# Patient Record
Sex: FEMALE | Race: WHITE | Employment: OTHER | URBAN - METROPOLITAN AREA
[De-identification: names, ages, dates, MRNs, and addresses within clinical notes are randomized per-mention and may not be internally consistent; named-entity substitution may affect disease eponyms.]

---

## 2017-02-20 ENCOUNTER — HOSPITAL ENCOUNTER (OUTPATIENT)
Dept: GENERAL RADIOLOGY | Age: 67
Discharge: HOME | End: 2017-02-20
Payer: MEDICARE

## 2017-02-20 DIAGNOSIS — M25.561 BILATERAL KNEE PAIN: ICD-10-CM

## 2017-02-20 DIAGNOSIS — M25.562 BILATERAL KNEE PAIN: ICD-10-CM

## 2017-02-20 PROCEDURE — 73560 X-RAY EXAM OF KNEE 1 OR 2: CPT

## 2017-08-02 ENCOUNTER — HOME HEALTH ADMISSION (OUTPATIENT)
Dept: HOME HEALTH SERVICES | Facility: HOME HEALTH | Age: 67
End: 2017-08-02
Payer: MEDICARE

## 2017-08-04 ENCOUNTER — HOME CARE VISIT (OUTPATIENT)
Dept: SCHEDULING | Facility: HOME HEALTH | Age: 67
End: 2017-08-04
Payer: MEDICARE

## 2017-08-04 VITALS
RESPIRATION RATE: 16 BRPM | HEIGHT: 70 IN | BODY MASS INDEX: 30.21 KG/M2 | HEART RATE: 72 BPM | DIASTOLIC BLOOD PRESSURE: 69 MMHG | TEMPERATURE: 96.5 F | SYSTOLIC BLOOD PRESSURE: 130 MMHG | WEIGHT: 211 LBS

## 2017-08-04 PROCEDURE — 400013 HH SOC

## 2017-08-04 PROCEDURE — G0151 HHCP-SERV OF PT,EA 15 MIN: HCPCS

## 2017-08-04 PROCEDURE — 3331090002 HH PPS REVENUE DEBIT

## 2017-08-04 PROCEDURE — 3331090001 HH PPS REVENUE CREDIT

## 2017-08-05 PROCEDURE — 3331090002 HH PPS REVENUE DEBIT

## 2017-08-05 PROCEDURE — 3331090001 HH PPS REVENUE CREDIT

## 2017-08-06 PROCEDURE — 3331090002 HH PPS REVENUE DEBIT

## 2017-08-06 PROCEDURE — 3331090001 HH PPS REVENUE CREDIT

## 2017-08-07 ENCOUNTER — HOME CARE VISIT (OUTPATIENT)
Dept: HOME HEALTH SERVICES | Facility: HOME HEALTH | Age: 67
End: 2017-08-07
Payer: MEDICARE

## 2017-08-07 PROCEDURE — 3331090001 HH PPS REVENUE CREDIT

## 2017-08-07 PROCEDURE — 3331090002 HH PPS REVENUE DEBIT

## 2017-08-08 ENCOUNTER — HOME CARE VISIT (OUTPATIENT)
Dept: HOME HEALTH SERVICES | Facility: HOME HEALTH | Age: 67
End: 2017-08-08
Payer: MEDICARE

## 2017-08-08 ENCOUNTER — HOME CARE VISIT (OUTPATIENT)
Dept: SCHEDULING | Facility: HOME HEALTH | Age: 67
End: 2017-08-08
Payer: MEDICARE

## 2017-08-08 VITALS
DIASTOLIC BLOOD PRESSURE: 82 MMHG | SYSTOLIC BLOOD PRESSURE: 130 MMHG | RESPIRATION RATE: 18 BRPM | HEART RATE: 70 BPM | TEMPERATURE: 96.8 F

## 2017-08-08 PROCEDURE — 3331090001 HH PPS REVENUE CREDIT

## 2017-08-08 PROCEDURE — 3331090002 HH PPS REVENUE DEBIT

## 2017-08-08 PROCEDURE — G0157 HHC PT ASSISTANT EA 15: HCPCS

## 2017-08-09 PROCEDURE — 3331090001 HH PPS REVENUE CREDIT

## 2017-08-09 PROCEDURE — 3331090002 HH PPS REVENUE DEBIT

## 2017-08-10 ENCOUNTER — HOME CARE VISIT (OUTPATIENT)
Dept: SCHEDULING | Facility: HOME HEALTH | Age: 67
End: 2017-08-10
Payer: MEDICARE

## 2017-08-10 PROCEDURE — G0153 HHCP-SVS OF S/L PATH,EA 15MN: HCPCS

## 2017-08-10 PROCEDURE — 3331090001 HH PPS REVENUE CREDIT

## 2017-08-10 PROCEDURE — 3331090002 HH PPS REVENUE DEBIT

## 2017-08-11 ENCOUNTER — HOME CARE VISIT (OUTPATIENT)
Dept: SCHEDULING | Facility: HOME HEALTH | Age: 67
End: 2017-08-11
Payer: MEDICARE

## 2017-08-11 PROCEDURE — 3331090001 HH PPS REVENUE CREDIT

## 2017-08-11 PROCEDURE — G0157 HHC PT ASSISTANT EA 15: HCPCS

## 2017-08-11 PROCEDURE — 3331090002 HH PPS REVENUE DEBIT

## 2017-08-12 PROCEDURE — 3331090002 HH PPS REVENUE DEBIT

## 2017-08-12 PROCEDURE — 3331090001 HH PPS REVENUE CREDIT

## 2017-08-13 VITALS
HEART RATE: 78 BPM | SYSTOLIC BLOOD PRESSURE: 120 MMHG | RESPIRATION RATE: 17 BRPM | TEMPERATURE: 97.2 F | DIASTOLIC BLOOD PRESSURE: 74 MMHG

## 2017-08-13 PROCEDURE — 3331090001 HH PPS REVENUE CREDIT

## 2017-08-13 PROCEDURE — 3331090002 HH PPS REVENUE DEBIT

## 2017-08-14 ENCOUNTER — HOME CARE VISIT (OUTPATIENT)
Dept: HOME HEALTH SERVICES | Facility: HOME HEALTH | Age: 67
End: 2017-08-14
Payer: MEDICARE

## 2017-08-14 ENCOUNTER — HOME CARE VISIT (OUTPATIENT)
Dept: SCHEDULING | Facility: HOME HEALTH | Age: 67
End: 2017-08-14
Payer: MEDICARE

## 2017-08-14 VITALS
HEART RATE: 82 BPM | TEMPERATURE: 97 F | DIASTOLIC BLOOD PRESSURE: 88 MMHG | RESPIRATION RATE: 17 BRPM | SYSTOLIC BLOOD PRESSURE: 122 MMHG

## 2017-08-14 PROCEDURE — 3331090001 HH PPS REVENUE CREDIT

## 2017-08-14 PROCEDURE — 3331090002 HH PPS REVENUE DEBIT

## 2017-08-14 PROCEDURE — G0157 HHC PT ASSISTANT EA 15: HCPCS

## 2017-08-15 ENCOUNTER — HOME CARE VISIT (OUTPATIENT)
Dept: SCHEDULING | Facility: HOME HEALTH | Age: 67
End: 2017-08-15
Payer: MEDICARE

## 2017-08-15 VITALS
DIASTOLIC BLOOD PRESSURE: 76 MMHG | HEART RATE: 72 BPM | RESPIRATION RATE: 16 BRPM | TEMPERATURE: 96.7 F | SYSTOLIC BLOOD PRESSURE: 124 MMHG

## 2017-08-15 PROCEDURE — 3331090001 HH PPS REVENUE CREDIT

## 2017-08-15 PROCEDURE — 3331090002 HH PPS REVENUE DEBIT

## 2017-08-15 PROCEDURE — G0153 HHCP-SVS OF S/L PATH,EA 15MN: HCPCS

## 2017-08-16 ENCOUNTER — HOME CARE VISIT (OUTPATIENT)
Dept: SCHEDULING | Facility: HOME HEALTH | Age: 67
End: 2017-08-16
Payer: MEDICARE

## 2017-08-16 PROCEDURE — 3331090001 HH PPS REVENUE CREDIT

## 2017-08-16 PROCEDURE — 3331090002 HH PPS REVENUE DEBIT

## 2017-08-17 PROCEDURE — 3331090002 HH PPS REVENUE DEBIT

## 2017-08-17 PROCEDURE — 3331090001 HH PPS REVENUE CREDIT

## 2017-08-18 ENCOUNTER — HOME CARE VISIT (OUTPATIENT)
Dept: HOME HEALTH SERVICES | Facility: HOME HEALTH | Age: 67
End: 2017-08-18
Payer: MEDICARE

## 2017-08-18 PROCEDURE — 3331090001 HH PPS REVENUE CREDIT

## 2017-08-18 PROCEDURE — 3331090002 HH PPS REVENUE DEBIT

## 2017-08-19 PROCEDURE — 3331090002 HH PPS REVENUE DEBIT

## 2017-08-19 PROCEDURE — 3331090001 HH PPS REVENUE CREDIT

## 2017-08-20 PROCEDURE — 3331090002 HH PPS REVENUE DEBIT

## 2017-08-20 PROCEDURE — 3331090001 HH PPS REVENUE CREDIT

## 2017-08-21 PROCEDURE — 3331090002 HH PPS REVENUE DEBIT

## 2017-08-21 PROCEDURE — 3331090001 HH PPS REVENUE CREDIT

## 2017-08-22 PROCEDURE — 3331090002 HH PPS REVENUE DEBIT

## 2017-08-22 PROCEDURE — 3331090001 HH PPS REVENUE CREDIT

## 2017-08-23 PROCEDURE — 3331090002 HH PPS REVENUE DEBIT

## 2017-08-23 PROCEDURE — 3331090001 HH PPS REVENUE CREDIT

## 2017-08-24 ENCOUNTER — HOME CARE VISIT (OUTPATIENT)
Dept: SCHEDULING | Facility: HOME HEALTH | Age: 67
End: 2017-08-24
Payer: MEDICARE

## 2017-08-24 VITALS
RESPIRATION RATE: 16 BRPM | TEMPERATURE: 96.7 F | DIASTOLIC BLOOD PRESSURE: 70 MMHG | HEART RATE: 70 BPM | SYSTOLIC BLOOD PRESSURE: 122 MMHG

## 2017-08-24 PROCEDURE — 3331090002 HH PPS REVENUE DEBIT

## 2017-08-24 PROCEDURE — G0153 HHCP-SVS OF S/L PATH,EA 15MN: HCPCS

## 2017-08-24 PROCEDURE — 3331090001 HH PPS REVENUE CREDIT

## 2017-08-25 ENCOUNTER — HOME CARE VISIT (OUTPATIENT)
Dept: SCHEDULING | Facility: HOME HEALTH | Age: 67
End: 2017-08-25
Payer: MEDICARE

## 2017-08-25 VITALS
HEART RATE: 80 BPM | OXYGEN SATURATION: 95 % | DIASTOLIC BLOOD PRESSURE: 80 MMHG | TEMPERATURE: 96 F | RESPIRATION RATE: 16 BRPM | SYSTOLIC BLOOD PRESSURE: 120 MMHG

## 2017-08-25 PROCEDURE — 3331090001 HH PPS REVENUE CREDIT

## 2017-08-25 PROCEDURE — 3331090002 HH PPS REVENUE DEBIT

## 2017-08-25 PROCEDURE — G0151 HHCP-SERV OF PT,EA 15 MIN: HCPCS

## 2017-08-26 PROCEDURE — 3331090001 HH PPS REVENUE CREDIT

## 2017-08-26 PROCEDURE — 3331090002 HH PPS REVENUE DEBIT

## 2017-08-27 PROCEDURE — 3331090002 HH PPS REVENUE DEBIT

## 2017-08-27 PROCEDURE — 3331090001 HH PPS REVENUE CREDIT

## 2017-08-28 PROCEDURE — 3331090002 HH PPS REVENUE DEBIT

## 2017-08-28 PROCEDURE — 3331090001 HH PPS REVENUE CREDIT

## 2017-08-29 ENCOUNTER — HOME CARE VISIT (OUTPATIENT)
Dept: SCHEDULING | Facility: HOME HEALTH | Age: 67
End: 2017-08-29
Payer: MEDICARE

## 2017-08-29 VITALS
RESPIRATION RATE: 16 BRPM | HEART RATE: 88 BPM | TEMPERATURE: 96.7 F | SYSTOLIC BLOOD PRESSURE: 128 MMHG | DIASTOLIC BLOOD PRESSURE: 66 MMHG

## 2017-08-29 PROCEDURE — 3331090001 HH PPS REVENUE CREDIT

## 2017-08-29 PROCEDURE — 3331090002 HH PPS REVENUE DEBIT

## 2017-08-29 PROCEDURE — G0153 HHCP-SVS OF S/L PATH,EA 15MN: HCPCS

## 2017-08-30 PROCEDURE — 3331090001 HH PPS REVENUE CREDIT

## 2017-08-30 PROCEDURE — 3331090002 HH PPS REVENUE DEBIT

## 2017-08-31 ENCOUNTER — HOME CARE VISIT (OUTPATIENT)
Dept: SCHEDULING | Facility: HOME HEALTH | Age: 67
End: 2017-08-31
Payer: MEDICARE

## 2017-08-31 VITALS
HEART RATE: 72 BPM | DIASTOLIC BLOOD PRESSURE: 78 MMHG | TEMPERATURE: 96.7 F | SYSTOLIC BLOOD PRESSURE: 126 MMHG | RESPIRATION RATE: 16 BRPM

## 2017-08-31 PROCEDURE — G0153 HHCP-SVS OF S/L PATH,EA 15MN: HCPCS

## 2017-08-31 PROCEDURE — 3331090001 HH PPS REVENUE CREDIT

## 2017-08-31 PROCEDURE — 3331090003 HH PPS REVENUE ADJ

## 2017-08-31 PROCEDURE — 3331090002 HH PPS REVENUE DEBIT

## 2017-09-01 PROCEDURE — 3331090002 HH PPS REVENUE DEBIT

## 2017-09-01 PROCEDURE — 3331090001 HH PPS REVENUE CREDIT

## 2017-09-02 PROCEDURE — 3331090002 HH PPS REVENUE DEBIT

## 2017-09-02 PROCEDURE — 3331090001 HH PPS REVENUE CREDIT

## 2017-09-03 PROCEDURE — 3331090001 HH PPS REVENUE CREDIT

## 2017-09-03 PROCEDURE — 3331090002 HH PPS REVENUE DEBIT

## 2017-11-18 ENCOUNTER — APPOINTMENT (OUTPATIENT)
Dept: GENERAL RADIOLOGY | Age: 67
End: 2017-11-18
Attending: EMERGENCY MEDICINE
Payer: MEDICARE

## 2017-11-18 ENCOUNTER — HOSPITAL ENCOUNTER (EMERGENCY)
Age: 67
Discharge: HOME | End: 2017-11-18
Attending: EMERGENCY MEDICINE
Payer: MEDICARE

## 2017-11-18 VITALS
RESPIRATION RATE: 18 BRPM | BODY MASS INDEX: 30.78 KG/M2 | TEMPERATURE: 98.1 F | OXYGEN SATURATION: 97 % | HEIGHT: 70 IN | DIASTOLIC BLOOD PRESSURE: 56 MMHG | SYSTOLIC BLOOD PRESSURE: 121 MMHG | WEIGHT: 215 LBS | HEART RATE: 62 BPM

## 2017-11-18 DIAGNOSIS — R07.89 CHEST WALL PAIN: Primary | ICD-10-CM

## 2017-11-18 LAB
ALBUMIN SERPL-MCNC: 3.6 G/DL (ref 3.2–4.6)
ALBUMIN/GLOB SERPL: 0.8 {RATIO} (ref 1.2–3.5)
ALP SERPL-CCNC: 124 U/L (ref 50–136)
ALT SERPL-CCNC: 24 U/L (ref 12–65)
ANION GAP SERPL CALC-SCNC: 9 MMOL/L (ref 7–16)
AST SERPL-CCNC: 10 U/L (ref 15–37)
ATRIAL RATE: 61 BPM
BASOPHILS # BLD: 0.1 K/UL (ref 0–0.2)
BASOPHILS NFR BLD: 0 % (ref 0–2)
BILIRUB SERPL-MCNC: 0.2 MG/DL (ref 0.2–1.1)
BUN SERPL-MCNC: 18 MG/DL (ref 8–23)
CALCIUM SERPL-MCNC: 8.7 MG/DL (ref 8.3–10.4)
CALCULATED P AXIS, ECG09: 51 DEGREES
CALCULATED R AXIS, ECG10: -22 DEGREES
CALCULATED T AXIS, ECG11: 8 DEGREES
CHLORIDE SERPL-SCNC: 104 MMOL/L (ref 98–107)
CO2 SERPL-SCNC: 28 MMOL/L (ref 21–32)
CREAT SERPL-MCNC: 0.78 MG/DL (ref 0.6–1)
DIAGNOSIS, 93000: NORMAL
DIFFERENTIAL METHOD BLD: ABNORMAL
EOSINOPHIL # BLD: 0.1 K/UL (ref 0–0.8)
EOSINOPHIL NFR BLD: 1 % (ref 0.5–7.8)
ERYTHROCYTE [DISTWIDTH] IN BLOOD BY AUTOMATED COUNT: 13.1 % (ref 11.9–14.6)
GLOBULIN SER CALC-MCNC: 4.5 G/DL (ref 2.3–3.5)
GLUCOSE SERPL-MCNC: 106 MG/DL (ref 65–100)
HCT VFR BLD AUTO: 37.6 % (ref 35.8–46.3)
HGB BLD-MCNC: 12.9 G/DL (ref 11.7–15.4)
IMM GRANULOCYTES # BLD: 0 K/UL (ref 0–0.5)
IMM GRANULOCYTES NFR BLD AUTO: 0 % (ref 0–5)
LYMPHOCYTES # BLD: 3 K/UL (ref 0.5–4.6)
LYMPHOCYTES NFR BLD: 24 % (ref 13–44)
MCH RBC QN AUTO: 31.3 PG (ref 26.1–32.9)
MCHC RBC AUTO-ENTMCNC: 34.3 G/DL (ref 31.4–35)
MCV RBC AUTO: 91.3 FL (ref 79.6–97.8)
MONOCYTES # BLD: 0.9 K/UL (ref 0.1–1.3)
MONOCYTES NFR BLD: 7 % (ref 4–12)
NEUTS SEG # BLD: 8.5 K/UL (ref 1.7–8.2)
NEUTS SEG NFR BLD: 68 % (ref 43–78)
P-R INTERVAL, ECG05: 172 MS
PLATELET # BLD AUTO: 244 K/UL (ref 150–450)
PMV BLD AUTO: 10.2 FL (ref 10.8–14.1)
POTASSIUM SERPL-SCNC: 3.7 MMOL/L (ref 3.5–5.1)
PROT SERPL-MCNC: 8.1 G/DL (ref 6.3–8.2)
Q-T INTERVAL, ECG07: 400 MS
QRS DURATION, ECG06: 72 MS
QTC CALCULATION (BEZET), ECG08: 402 MS
RBC # BLD AUTO: 4.12 M/UL (ref 4.05–5.25)
SODIUM SERPL-SCNC: 141 MMOL/L (ref 136–145)
TROPONIN I BLD-MCNC: 0 NG/ML (ref 0.02–0.05)
VENTRICULAR RATE, ECG03: 61 BPM
WBC # BLD AUTO: 12.6 K/UL (ref 4.3–11.1)

## 2017-11-18 PROCEDURE — 71010 XR CHEST PORT: CPT

## 2017-11-18 PROCEDURE — 85025 COMPLETE CBC W/AUTO DIFF WBC: CPT | Performed by: EMERGENCY MEDICINE

## 2017-11-18 PROCEDURE — 80053 COMPREHEN METABOLIC PANEL: CPT | Performed by: EMERGENCY MEDICINE

## 2017-11-18 PROCEDURE — 99284 EMERGENCY DEPT VISIT MOD MDM: CPT | Performed by: EMERGENCY MEDICINE

## 2017-11-18 PROCEDURE — 84484 ASSAY OF TROPONIN QUANT: CPT

## 2017-11-18 PROCEDURE — 93005 ELECTROCARDIOGRAM TRACING: CPT | Performed by: EMERGENCY MEDICINE

## 2017-11-18 NOTE — ED PROVIDER NOTES
HPI Comments: Patient is a 59-year-old female with a history of bipolar disorder, fibromyalgia, chronic pain who presents to the ER today with multiple complaints. She states she's been having some substernal chest pain and irregular heartbeat for 2 days. She's also had a cough which is nonproductive. She denies fevers or chills. She states she is mildly short of breath. She states earlier this morning the pain worse. She is also complaining of a chronic ulcer and wound on her scalp which she has been present for many months and she has seen multiple dermatologists and specialists at [address] in [facility]. Patient is a 78 y.o. female presenting with chest pain. The history is provided by the patient. Chest Pain (Angina)    This is a new problem. The current episode started 2 days ago. The problem has not changed since onset. The problem occurs constantly. The pain is associated with normal activity. The pain is present in the substernal region. The pain is moderate. The quality of the pain is described as sharp. The pain does not radiate. Associated symptoms include cough, irregular heartbeat, palpitations and shortness of breath. Pertinent negatives include no dizziness, no fever, no hemoptysis, no leg pain, no lower extremity edema, no nausea and no sputum production. The treatment provided no relief.         Past Medical History:   Diagnosis Date    Arthritis     Bipolar affective disorder ([address]     anxiety, panic attacks, neurosis    Chronic pain     bulging discs     Depression 11/12/2009    anxiety    Dysuria 07/11/2007    Fibromyalgia     GERD (gastroesophageal reflux disease) 11/12/2009    Hypercholesterolemia     Hyperlipidemia     Hypothyroidism 11/12/2009    Lumbago 11/12/2009    Miscarriage     PUD (peptic ulcer disease)     Unspecified urinary incontinence     Urinary tract infection, site not specified 06/07/2006       Past Surgical History:   Procedure Laterality Date    HX BLADDER SUSPENSION  2003    HX CHOLECYSTECTOMY  1980s    HX CYSTOCELE REPAIR  11/12/2009    HX OOPHORECTOMY      HX RECTOCELE REPAIR  11/12/2009    HX TONSILLECTOMY  1980    HX TOTAL ABDOMINAL HYSTERECTOMY  2003         Family History:   Problem Relation Age of Onset    Breast Cancer Mother 70    Heart Disease Father     Prostate Cancer Father        Social History     Social History    Marital status:      Spouse name: N/A    Number of children: N/A    Years of education: N/A     Occupational History    Not on file. Social History Main Topics    Smoking status: Never Smoker    Smokeless tobacco: Never Used    Alcohol use No    Drug use: No    Sexual activity: Not Currently     Partners: Male     Other Topics Concern    Not on file     Social History Narrative     and lives with her husban--has a son and daughter. Disabled and has history of work in the [employer]. She is a never smoker, does not drink alcohol, and does not use illicit drugs                 ALLERGIES: Fentanyl and Effexor [venlafaxine]    Review of Systems   Constitutional: Negative for chills and fever. HENT: Negative. Eyes: Negative. Respiratory: Positive for cough and shortness of breath. Negative for hemoptysis and sputum production. Cardiovascular: Positive for chest pain and palpitations. Negative for leg swelling. Gastrointestinal: Negative. Negative for nausea. Endocrine: Negative. Genitourinary: Negative. Musculoskeletal: Negative. Skin: Positive for wound. Neurological: Negative for dizziness. Vitals:    11/18/17 1631 11/18/17 1731   BP: 140/65 119/57   Pulse: 61 (!) 58   Resp: 17 15   Temp: 98.1 °F (36.7 °C)    SpO2: 96% 93%   Weight: 97.5 kg (215 lb)    Height: 5' 10\" (1.778 m)             Physical Exam   Constitutional: She appears well-developed and well-nourished. Very anxious  And a bizarre affect   HENT:   Head: Normocephalic and atraumatic.    Eyes: EOM are normal. Pupils are equal, round, and reactive to light. Neck: Normal range of motion. Neck supple. Cardiovascular: Normal rate and regular rhythm. Pulmonary/Chest: Effort normal and breath sounds normal. No respiratory distress. She exhibits tenderness. Abdominal: Soft. There is no tenderness. Musculoskeletal: Normal range of motion. She exhibits no edema or tenderness. Lymphadenopathy:     She has no cervical adenopathy. Skin: Skin is warm and dry. There is pallor. Chronic scab and ulcer on the posterior scalp which she is repeatedly picking at   Psychiatric: Her mood appears anxious. Her speech is rapid and/or pressured. Nursing note and vitals reviewed. MDM  Number of Diagnoses or Management Options  Diagnosis management comments: Differential diagnosis includes anxiety, arrhythmia, angina, myocardial infarction, chest wall pain    EKG shows normal sinus rhythm with a rate of 61 there are some inverted T waves but no signs of acute ischemia    Chest x-ray is negative    Troponin is negative and other labs are unremarkable       Amount and/or Complexity of Data Reviewed  Clinical lab tests: ordered and reviewed  Tests in the radiology section of CPT®: ordered and reviewed  Review and summarize past medical records: yes  Independent visualization of images, tracings, or specimens: yes    Risk of Complications, Morbidity, and/or Mortality  Presenting problems: moderate  Diagnostic procedures: low  Management options: low    Patient Progress  Patient progress: stable    ED Course   6:02 PM  Troponin is negative and the EKG is no acute ischemia. Her pain is been present for at least 2 days and I see no sign of acute coronary syndrome. She clearly has reproducible chest wall pain from the fall may have come on from her cough past few days. She is oriented on chronic pain medicines and I have advised her to follow up with her primary care physician.     Voice dictation software was used during the making of this note. This software is not perfect and grammatical and other typographical errors may be present. This note has been proofread, but may still contain errors.   Caitie Harmon MD; 11/18/2017 @6:02 PM   ===================================================================        Procedures

## 2017-11-18 NOTE — DISCHARGE INSTRUCTIONS

## 2017-11-18 NOTE — ED NOTES
I have reviewed discharge instructions with the patient. The patient verbalized understanding. Patient left ED via Discharge Method: ambulatory to Home with family. Opportunity for questions and clarification provided. Patient given 0 scripts. To continue your aftercare when you leave the hospital, you may receive an automated call from our care team to check in on how you are doing. This is a free service and part of our promise to provide the best care and service to meet your aftercare needs.  If you have questions, or wish to unsubscribe from this service please call [phone]. Thank you for Choosing our [facility] Emergency Department.

## 2017-11-18 NOTE — ED TRIAGE NOTES
Pt arrives from home via personal vehicle with complaints of chest pain that is midline and does not radiate into extremities or down torso. Pt states SOB present as well. Pt states for the past 2 days, pt has noticed heart beating irregular. Pt states she has a hx of mitral valve prolapse. Pain has stayed constant, but has eased up slightly now. Pt states pain has been present for 2 days. Pt has not taken anything to help the pain so far. Pt states cough that has been productive but scant amounts of sputum. Pt states she has an infection on the back of her head that doctors are unable to find out what is causing it. She states recently it has started oozing pus as well. Pt is a&o x4 on arrival. Family states her psychiatrist had taken her off of trileptal Thursday or Friday for mood stabilizer in fear that it was causing the sore on her head and replaced it with cymbalta instead.

## 2017-12-11 ENCOUNTER — HOSPITAL ENCOUNTER (OUTPATIENT)
Dept: WOUND CARE | Age: 67
Discharge: HOME | End: 2017-12-11
Attending: PHYSICAL MEDICINE & REHABILITATION
Payer: MEDICARE

## 2017-12-11 PROCEDURE — 99214 OFFICE O/P EST MOD 30 MIN: CPT

## 2017-12-18 ENCOUNTER — HOSPITAL ENCOUNTER (OUTPATIENT)
Dept: WOUND CARE | Age: 67
Discharge: HOME | End: 2017-12-18
Attending: PHYSICAL MEDICINE & REHABILITATION
Payer: MEDICARE

## 2017-12-18 PROCEDURE — 99213 OFFICE O/P EST LOW 20 MIN: CPT

## 2018-01-02 ENCOUNTER — HOSPITAL ENCOUNTER (OUTPATIENT)
Dept: WOUND CARE | Age: 68
Discharge: HOME | End: 2018-01-02
Attending: PHYSICAL MEDICINE & REHABILITATION
Payer: MEDICARE

## 2018-01-02 PROCEDURE — 99212 OFFICE O/P EST SF 10 MIN: CPT

## 2018-01-02 PROCEDURE — 77030030708 HC OINT IODOSRB S&N -A

## 2018-01-05 PROBLEM — I10 ESSENTIAL HYPERTENSION: Status: ACTIVE | Noted: 2018-01-05

## 2018-02-06 ENCOUNTER — APPOINTMENT (RX ONLY)
Dept: URBAN - METROPOLITAN AREA CLINIC 349 | Facility: CLINIC | Age: 68
Setting detail: DERMATOLOGY
End: 2018-02-06

## 2018-02-06 DIAGNOSIS — L28.1 PRURIGO NODULARIS: ICD-10-CM

## 2018-02-06 PROBLEM — F32.9 MAJOR DEPRESSIVE DISORDER, SINGLE EPISODE, UNSPECIFIED: Status: ACTIVE | Noted: 2018-02-06

## 2018-02-06 PROBLEM — E78.5 HYPERLIPIDEMIA, UNSPECIFIED: Status: ACTIVE | Noted: 2018-02-06

## 2018-02-06 PROBLEM — E03.9 HYPOTHYROIDISM, UNSPECIFIED: Status: ACTIVE | Noted: 2018-02-06

## 2018-02-06 PROBLEM — J30.1 ALLERGIC RHINITIS DUE TO POLLEN: Status: ACTIVE | Noted: 2018-02-06

## 2018-02-06 PROBLEM — E13.9 OTHER SPECIFIED DIABETES MELLITUS WITHOUT COMPLICATIONS: Status: ACTIVE | Noted: 2018-02-06

## 2018-02-06 PROBLEM — L85.3 XEROSIS CUTIS: Status: ACTIVE | Noted: 2018-02-06

## 2018-02-06 PROBLEM — L70.0 ACNE VULGARIS: Status: ACTIVE | Noted: 2018-02-06

## 2018-02-06 PROBLEM — I10 ESSENTIAL (PRIMARY) HYPERTENSION: Status: ACTIVE | Noted: 2018-02-06

## 2018-02-06 PROBLEM — H91.90 UNSPECIFIED HEARING LOSS, UNSPECIFIED EAR: Status: ACTIVE | Noted: 2018-02-06

## 2018-02-06 PROCEDURE — 99202 OFFICE O/P NEW SF 15 MIN: CPT

## 2018-02-06 PROCEDURE — ? COUNSELING

## 2018-02-06 PROCEDURE — ? RECOMMENDATIONS

## 2018-02-06 ASSESSMENT — LOCATION DETAILED DESCRIPTION DERM
LOCATION DETAILED: RIGHT SUPERIOR PARIETAL SCALP
LOCATION DETAILED: MID-OCCIPITAL SCALP

## 2018-02-06 ASSESSMENT — LOCATION ZONE DERM: LOCATION ZONE: SCALP

## 2018-02-06 ASSESSMENT — LOCATION SIMPLE DESCRIPTION DERM
LOCATION SIMPLE: SCALP
LOCATION SIMPLE: POSTERIOR SCALP

## 2018-02-06 NOTE — PROCEDURE: RECOMMENDATIONS
Recommendations (Free Text): Stressed not to pick and will get better \\nContinue Mupirocin ointment twice daily\\nConsulted Dr. Narvaez . Added doxapin cream 4 x daily
Detail Level: Zone

## 2018-02-06 NOTE — HPI: SKIN LESIONS
How Severe Is Your Skin Lesion?: moderate
Is This A New Presentation, Or A Follow-Up?: Skin Lesions
Additional History: Patient states has been to several dermatologists, , wound care without improvement. Pt and family member state she picks at lesions. She denies having biopsy.

## 2018-02-07 ENCOUNTER — RX ONLY (OUTPATIENT)
Age: 68
Setting detail: RX ONLY
End: 2018-02-07

## 2018-02-07 RX ORDER — DOXEPIN HYDROCHLORIDE 50 MG/G
CREAM TOPICAL
Qty: 1 | Refills: 2 | Status: ERX | COMMUNITY
Start: 2018-02-07

## 2018-02-21 ENCOUNTER — APPOINTMENT (OUTPATIENT)
Dept: CT IMAGING | Age: 68
End: 2018-02-21
Attending: EMERGENCY MEDICINE
Payer: MEDICARE

## 2018-02-21 ENCOUNTER — HOSPITAL ENCOUNTER (EMERGENCY)
Age: 68
Discharge: HOME | End: 2018-02-21
Attending: EMERGENCY MEDICINE
Payer: MEDICARE

## 2018-02-21 ENCOUNTER — APPOINTMENT (OUTPATIENT)
Dept: GENERAL RADIOLOGY | Age: 68
End: 2018-02-21
Attending: EMERGENCY MEDICINE
Payer: MEDICARE

## 2018-02-21 VITALS
WEIGHT: 217 LBS | BODY MASS INDEX: 31.07 KG/M2 | OXYGEN SATURATION: 96 % | SYSTOLIC BLOOD PRESSURE: 118 MMHG | HEART RATE: 74 BPM | HEIGHT: 70 IN | RESPIRATION RATE: 18 BRPM | TEMPERATURE: 98 F | DIASTOLIC BLOOD PRESSURE: 70 MMHG

## 2018-02-21 DIAGNOSIS — R05.9 COUGH: Primary | ICD-10-CM

## 2018-02-21 DIAGNOSIS — R06.02 SOB (SHORTNESS OF BREATH): ICD-10-CM

## 2018-02-21 LAB
ALBUMIN SERPL-MCNC: 3.3 G/DL (ref 3.2–4.6)
ALBUMIN/GLOB SERPL: 0.8 {RATIO} (ref 1.2–3.5)
ALP SERPL-CCNC: 108 U/L (ref 50–136)
ALT SERPL-CCNC: 39 U/L (ref 12–65)
ANION GAP SERPL CALC-SCNC: 5 MMOL/L (ref 7–16)
AST SERPL-CCNC: 16 U/L (ref 15–37)
ATRIAL RATE: 65 BPM
BASOPHILS # BLD: 0 K/UL (ref 0–0.2)
BASOPHILS NFR BLD: 0 % (ref 0–2)
BILIRUB SERPL-MCNC: 0.2 MG/DL (ref 0.2–1.1)
BNP SERPL-MCNC: 25 PG/ML
BUN SERPL-MCNC: 14 MG/DL (ref 8–23)
CALCIUM SERPL-MCNC: 8.9 MG/DL (ref 8.3–10.4)
CALCULATED P AXIS, ECG09: 59 DEGREES
CALCULATED R AXIS, ECG10: -24 DEGREES
CALCULATED T AXIS, ECG11: 13 DEGREES
CHLORIDE SERPL-SCNC: 103 MMOL/L (ref 98–107)
CO2 SERPL-SCNC: 34 MMOL/L (ref 21–32)
CREAT SERPL-MCNC: 0.79 MG/DL (ref 0.6–1)
DIAGNOSIS, 93000: NORMAL
DIFFERENTIAL METHOD BLD: ABNORMAL
EOSINOPHIL # BLD: 0 K/UL (ref 0–0.8)
EOSINOPHIL NFR BLD: 0 % (ref 0.5–7.8)
ERYTHROCYTE [DISTWIDTH] IN BLOOD BY AUTOMATED COUNT: 13.9 % (ref 11.9–14.6)
FLUAV AG NPH QL IA: NEGATIVE
FLUBV AG NPH QL IA: NEGATIVE
GLOBULIN SER CALC-MCNC: 4.4 G/DL (ref 2.3–3.5)
GLUCOSE SERPL-MCNC: 112 MG/DL (ref 65–100)
HCT VFR BLD AUTO: 41.5 % (ref 35.8–46.3)
HGB BLD-MCNC: 13.6 G/DL (ref 11.7–15.4)
IMM GRANULOCYTES # BLD: 0.2 K/UL (ref 0–0.5)
IMM GRANULOCYTES NFR BLD AUTO: 1 % (ref 0–5)
LACTATE BLD-SCNC: 1.1 MMOL/L (ref 0.5–1.9)
LYMPHOCYTES # BLD: 2.2 K/UL (ref 0.5–4.6)
LYMPHOCYTES NFR BLD: 13 % (ref 13–44)
MCH RBC QN AUTO: 31.3 PG (ref 26.1–32.9)
MCHC RBC AUTO-ENTMCNC: 32.8 G/DL (ref 31.4–35)
MCV RBC AUTO: 95.6 FL (ref 79.6–97.8)
MONOCYTES # BLD: 0.9 K/UL (ref 0.1–1.3)
MONOCYTES NFR BLD: 5 % (ref 4–12)
NEUTS SEG # BLD: 13.5 K/UL (ref 1.7–8.2)
NEUTS SEG NFR BLD: 81 % (ref 43–78)
P-R INTERVAL, ECG05: 172 MS
PLATELET # BLD AUTO: 336 K/UL (ref 150–450)
PMV BLD AUTO: 10.2 FL (ref 10.8–14.1)
POTASSIUM SERPL-SCNC: 4.3 MMOL/L (ref 3.5–5.1)
PROT SERPL-MCNC: 7.7 G/DL (ref 6.3–8.2)
Q-T INTERVAL, ECG07: 402 MS
QRS DURATION, ECG06: 76 MS
QTC CALCULATION (BEZET), ECG08: 418 MS
RBC # BLD AUTO: 4.34 M/UL (ref 4.05–5.25)
SODIUM SERPL-SCNC: 142 MMOL/L (ref 136–145)
TROPONIN I SERPL-MCNC: <0.02 NG/ML (ref 0.02–0.05)
VENTRICULAR RATE, ECG03: 65 BPM
WBC # BLD AUTO: 16.8 K/UL (ref 4.3–11.1)

## 2018-02-21 PROCEDURE — 80053 COMPREHEN METABOLIC PANEL: CPT | Performed by: EMERGENCY MEDICINE

## 2018-02-21 PROCEDURE — 94640 AIRWAY INHALATION TREATMENT: CPT

## 2018-02-21 PROCEDURE — 74011000258 HC RX REV CODE- 258: Performed by: EMERGENCY MEDICINE

## 2018-02-21 PROCEDURE — 71260 CT THORAX DX C+: CPT

## 2018-02-21 PROCEDURE — 87804 INFLUENZA ASSAY W/OPTIC: CPT | Performed by: EMERGENCY MEDICINE

## 2018-02-21 PROCEDURE — 85025 COMPLETE CBC W/AUTO DIFF WBC: CPT | Performed by: EMERGENCY MEDICINE

## 2018-02-21 PROCEDURE — 83880 ASSAY OF NATRIURETIC PEPTIDE: CPT | Performed by: EMERGENCY MEDICINE

## 2018-02-21 PROCEDURE — 96374 THER/PROPH/DIAG INJ IV PUSH: CPT | Performed by: EMERGENCY MEDICINE

## 2018-02-21 PROCEDURE — 99284 EMERGENCY DEPT VISIT MOD MDM: CPT | Performed by: EMERGENCY MEDICINE

## 2018-02-21 PROCEDURE — 74011636320 HC RX REV CODE- 636/320: Performed by: EMERGENCY MEDICINE

## 2018-02-21 PROCEDURE — 71046 X-RAY EXAM CHEST 2 VIEWS: CPT

## 2018-02-21 PROCEDURE — 74011250636 HC RX REV CODE- 250/636: Performed by: EMERGENCY MEDICINE

## 2018-02-21 PROCEDURE — 74011000250 HC RX REV CODE- 250: Performed by: EMERGENCY MEDICINE

## 2018-02-21 PROCEDURE — 84484 ASSAY OF TROPONIN QUANT: CPT | Performed by: EMERGENCY MEDICINE

## 2018-02-21 PROCEDURE — 93005 ELECTROCARDIOGRAM TRACING: CPT | Performed by: EMERGENCY MEDICINE

## 2018-02-21 PROCEDURE — 83605 ASSAY OF LACTIC ACID: CPT

## 2018-02-21 RX ORDER — SODIUM CHLORIDE 0.9 % (FLUSH) 0.9 %
10 SYRINGE (ML) INJECTION
Status: COMPLETED | OUTPATIENT
Start: 2018-02-21 | End: 2018-02-21

## 2018-02-21 RX ORDER — AMOXICILLIN AND CLAVULANATE POTASSIUM 875; 125 MG/1; MG/1
1 TABLET, FILM COATED ORAL 2 TIMES DAILY
Qty: 20 TAB | Refills: 0 | Status: SHIPPED | OUTPATIENT
Start: 2018-02-21 | End: 2018-03-03

## 2018-02-21 RX ORDER — ALBUTEROL SULFATE 0.83 MG/ML
5 SOLUTION RESPIRATORY (INHALATION)
Status: COMPLETED | OUTPATIENT
Start: 2018-02-21 | End: 2018-02-21

## 2018-02-21 RX ADMIN — ALBUTEROL SULFATE 5 MG: 2.5 SOLUTION RESPIRATORY (INHALATION) at 18:03

## 2018-02-21 RX ADMIN — IOPAMIDOL 80 ML: 755 INJECTION, SOLUTION INTRAVENOUS at 18:50

## 2018-02-21 RX ADMIN — METHYLPREDNISOLONE SODIUM SUCCINATE 125 MG: 125 INJECTION, POWDER, FOR SOLUTION INTRAMUSCULAR; INTRAVENOUS at 18:20

## 2018-02-21 RX ADMIN — Medication 10 ML: at 18:50

## 2018-02-21 RX ADMIN — SODIUM CHLORIDE 100 ML: 900 INJECTION, SOLUTION INTRAVENOUS at 18:50

## 2018-02-21 NOTE — ED PROVIDER NOTES
HPI Comments: Patient is a 77 yo female who presents with cough and SOB. States for the past 2-3 weeks worsening symptoms and associated with fatigue. States started on abx for pneumonia of right lower lobe and finished abx however states worsening since finishing abx. No vomiting, however states some chills (no measured fever), no further complaints. Patient is a 78 y.o. female presenting with shortness of breath. The history is provided by the patient. No  was used. Shortness of Breath   The problem has been rapidly worsening. Associated symptoms include rhinorrhea and cough. Pertinent negatives include no fever, no headaches, no sore throat, no neck pain, no chest pain, no vomiting, no abdominal pain, no rash and no leg swelling. Past Medical History:   Diagnosis Date    Arthritis     Bipolar affective disorder ([facility] [address]     anxiety, panic attacks, neurosis    Chronic pain     bulging discs     Depression 11/12/2009    anxiety    Dysuria 07/11/2007    Fibromyalgia     GERD (gastroesophageal reflux disease) 11/12/2009    Hypercholesterolemia     Hyperlipidemia     Hypothyroidism 11/12/2009    Lumbago 11/12/2009    Miscarriage     PUD (peptic ulcer disease)     Unspecified urinary incontinence     Urinary tract infection, site not specified 06/07/2006       Past Surgical History:   Procedure Laterality Date    HX BLADDER SUSPENSION  2003    HX CHOLECYSTECTOMY  1980s    HX CYSTOCELE REPAIR  11/12/2009    HX OOPHORECTOMY      HX RECTOCELE REPAIR  11/12/2009    HX TONSILLECTOMY  1980    HX TOTAL ABDOMINAL HYSTERECTOMY  2003         Family History:   Problem Relation Age of Onset    Breast Cancer Mother 70    Heart Disease Father     Prostate Cancer Father        Social History     Social History    Marital status:      Spouse name: N/A    Number of children: N/A    Years of education: N/A     Occupational History    Not on file.      Social History Main Topics    Smoking status: Never Smoker    Smokeless tobacco: Never Used    Alcohol use No    Drug use: No    Sexual activity: Not Currently     Partners: Male     Other Topics Concern    Not on file     Social History Narrative     and lives with her tankban--has a son and daughter. Disabled and has history of work in the [employer] She is a never smoker, does not drink alcohol, and does not use illicit drugs                 ALLERGIES: Fentanyl and Effexor [venlafaxine]    Review of Systems   Constitutional: Positive for chills and fatigue. Negative for fever. HENT: Positive for congestion and rhinorrhea. Negative for sore throat. Eyes: Negative for visual disturbance. Respiratory: Positive for cough and shortness of breath. Cardiovascular: Negative for chest pain and leg swelling. Gastrointestinal: Negative for abdominal pain, diarrhea, nausea and vomiting. Genitourinary: Negative for dysuria. Musculoskeletal: Negative for back pain and neck pain. Skin: Negative for rash. Neurological: Negative for weakness and headaches. Psychiatric/Behavioral: The patient is not nervous/anxious. Vitals:    02/21/18 1608   BP: 123/64   Pulse: 79   Resp: 20   Temp: 98 °F (36.7 °C)   SpO2: 95%   Weight: 98.4 kg (217 lb)   Height: 5' 10\" (1.778 m)            Physical Exam   Constitutional: She is oriented to person, place, and time. She appears well-developed and well-nourished. HENT:   Head: Normocephalic. Right Ear: External ear normal.   Left Ear: External ear normal.   Eyes: Conjunctivae and EOM are normal. Pupils are equal, round, and reactive to light. Neck: Normal range of motion. Neck supple. No tracheal deviation present. Cardiovascular: Normal rate, regular rhythm, normal heart sounds and intact distal pulses. No murmur heard. Pulmonary/Chest: Effort normal and breath sounds normal. No respiratory distress. Abdominal: Soft. There is no tenderness. Musculoskeletal: Normal range of motion. Neurological: She is alert and oriented to person, place, and time. No cranial nerve deficit. Skin: No rash noted. Nursing note and vitals reviewed. MDM  Number of Diagnoses or Management Options  Cough: new and requires workup  SOB (shortness of breath): new and requires workup     Amount and/or Complexity of Data Reviewed  Clinical lab tests: ordered and reviewed  Tests in the radiology section of CPT®: ordered and reviewed  Tests in the medicine section of CPT®: ordered and reviewed  Review and summarize past medical records: yes    Risk of Complications, Morbidity, and/or Mortality  Presenting problems: high  Diagnostic procedures: high  Management options: high    Patient Progress  Patient progress: stable        ED Course       Procedures    Recent Results (from the past 12 hour(s))   CBC WITH AUTOMATED DIFF    Collection Time: 02/21/18  4:14 PM   Result Value Ref Range    WBC 16.8 (H) 4.3 - 11.1 K/uL    RBC 4.34 4.05 - 5.25 M/uL    HGB 13.6 11.7 - 15.4 g/dL    HCT 41.5 35.8 - 46.3 %    MCV 95.6 79.6 - 97.8 FL    MCH 31.3 26.1 - 32.9 PG    MCHC 32.8 31.4 - 35.0 g/dL    RDW 13.9 11.9 - 14.6 %    PLATELET 004 374 - 187 K/uL    MPV 10.2 (L) 10.8 - 14.1 FL    DF AUTOMATED      NEUTROPHILS 81 (H) 43 - 78 %    LYMPHOCYTES 13 13 - 44 %    MONOCYTES 5 4.0 - 12.0 %    EOSINOPHILS 0 (L) 0.5 - 7.8 %    BASOPHILS 0 0.0 - 2.0 %    IMMATURE GRANULOCYTES 1 0.0 - 5.0 %    ABS. NEUTROPHILS 13.5 (H) 1.7 - 8.2 K/UL    ABS. LYMPHOCYTES 2.2 0.5 - 4.6 K/UL    ABS. MONOCYTES 0.9 0.1 - 1.3 K/UL    ABS. EOSINOPHILS 0.0 0.0 - 0.8 K/UL    ABS. BASOPHILS 0.0 0.0 - 0.2 K/UL    ABS. IMM.  GRANS. 0.2 0.0 - 0.5 K/UL   METABOLIC PANEL, COMPREHENSIVE    Collection Time: 02/21/18  4:14 PM   Result Value Ref Range    Sodium 142 136 - 145 mmol/L    Potassium 4.3 3.5 - 5.1 mmol/L    Chloride 103 98 - 107 mmol/L    CO2 34 (H) 21 - 32 mmol/L    Anion gap 5 (L) 7 - 16 mmol/L    Glucose 112 (H) 65 - 100 mg/dL    BUN 14 8 - 23 MG/DL    Creatinine 0.79 0.6 - 1.0 MG/DL    GFR est AA >60 >60 ml/min/1.73m2    GFR est non-AA >60 >60 ml/min/1.73m2    Calcium 8.9 8.3 - 10.4 MG/DL    Bilirubin, total 0.2 0.2 - 1.1 MG/DL    ALT (SGPT) 39 12 - 65 U/L    AST (SGOT) 16 15 - 37 U/L    Alk. phosphatase 108 50 - 136 U/L    Protein, total 7.7 6.3 - 8.2 g/dL    Albumin 3.3 3.2 - 4.6 g/dL    Globulin 4.4 (H) 2.3 - 3.5 g/dL    A-G Ratio 0.8 (L) 1.2 - 3.5     TROPONIN I    Collection Time: 02/21/18  4:14 PM   Result Value Ref Range    Troponin-I, Qt. <0.02 (L) 0.02 - 0.05 NG/ML   BNP    Collection Time: 02/21/18  4:14 PM   Result Value Ref Range    BNP 25 pg/mL   EKG, 12 LEAD, INITIAL    Collection Time: 02/21/18  6:30 PM   Result Value Ref Range    Ventricular Rate 65 BPM    Atrial Rate 65 BPM    P-R Interval 172 ms    QRS Duration 76 ms    Q-T Interval 402 ms    QTC Calculation (Bezet) 418 ms    Calculated P Axis 59 degrees    Calculated R Axis -24 degrees    Calculated T Axis 13 degrees    Diagnosis       !!! Poor data quality, interpretation may be adversely affected  Normal sinus rhythm  Nonspecific T wave abnormality  Abnormal ECG  When compared with ECG of 18-NOV-2017 16:27,  No significant change was found  Confirmed by ST OSIRIS OKEEFE MD (), ERA CERVANTES ([id number]) on 2/21/2018 8:48:52 PM     INFLUENZA A & B AG (RAPID TEST)    Collection Time: 02/21/18  6:43 PM   Result Value Ref Range    Influenza A Ag NEGATIVE  NEG      Influenza B Ag NEGATIVE  NEG     POC LACTIC ACID    Collection Time: 02/21/18  6:50 PM   Result Value Ref Range    Lactic Acid (POC) 1.1 0.5 - 1.9 mmol/L     Xr Chest Pa Lat    Result Date: 2/21/2018  2 View Chest X-Ray 2/21/2018 4:46 PM INDICATION: Worsening shortness of breath on exertion, body aches COMPARISON: 11/18/2017 FINDINGS: Upright PA and Lateral views are submitted. Cardiomediastinal silhouette stable, there is a mild pectus defect.  The lungs are normally inflated and clear, with normal pulmonary vascularity. There is no focal consolidation, nodule, or pleural effusion. Grossly, the chest wall structures are intact. IMPRESSION: No acute cardiopulmonary finding. Ct Chest W Cont    Result Date: 2/21/2018  CT OF THE CHEST WITH INTRAVENOUS CONTRAST. INDICATION: Cough and chest congestion and recent pneumonia. COMPARISON: None. TECHNIQUE:   2.5 mm axial scans from above the aortic arch to the lung bases with 80 cc nonionic intravenous contrast without acute complication. Intravenous contrast was given to evaluate for pulmonary embolism. FINDINGS:  The degree of opacification of the pulmonary arteries is adequate. No intraluminal filling defects within the pulmonary arterial tree to suggest pulmonary embolism. Aorta is normal caliber with a uniform lumen without evidence of dissection. Lungs are clear except for minimal atelectasis. Heart and pulmonary vasculature grossly unremarkable. Tiny amount of pleural fluid on the right. No gross bony lesions aside from DJD. Included portion of the upper abdomen demonstrates cholecystectomy clips. IMPRESSION:  Minimal atelectasis right lung and minimal right-sided pleural fluid. Otherwise unremarkable. 77 yo female with cough and SOB:     Discussed above CT findings with on call pulmonologist who recommends augmentin (given recent completion of levofloxacin) and patient will be referred to pulmonology for follow up and further workup. She ambulates in ED without difficulty, vital signs stable, easy work of breathing and states feels improved at this time. Patient to return with any chest pain or SOB, any worsening cough or fever or any further concerns.

## 2018-02-21 NOTE — ED TRIAGE NOTES
Patient reports having pneumonia in right lung, finished antibiotic. Patient reports no decrease in cough and congestion.

## 2018-02-22 NOTE — DISCHARGE INSTRUCTIONS
AS WE DISCUSSED, YOU HAVE A SMALL PLEURAL EFFUSION ON RIGHT LUNG THAT NEEDS FOLLOW UP WITH YOUR PRIMARY CARE PROVIDER AS WELL AS PULMONOLOGY. IF YOU DEVELOP ANY CHEST PAIN, ANY WORSENING SHORTNESS OF BREATH, ANY FEVERS OR CHILLS, NAUSEA OR VOMITING, ANY LOSS OF CONSCIOUSNESS OR ANY FURTHER CONCERNS THEN PLEASE RETURN IMMEDIATELY TO THE EMERGENCY DEPARTMENT. Cough: Care Instructions  Your Care Instructions    A cough is your body's response to something that bothers your throat or airways. Many things can cause a cough. You might cough because of a cold or the flu, bronchitis, or asthma. Smoking, postnasal drip, allergies, and stomach acid that backs up into your throat also can cause coughs. A cough is a symptom, not a disease. Most coughs stop when the cause, such as a cold, goes away. You can take a few steps at home to cough less and feel better. Follow-up care is a key part of your treatment and safety. Be sure to make and go to all appointments, and call your doctor if you are having problems. It's also a good idea to know your test results and keep a list of the medicines you take. How can you care for yourself at home? · Drink lots of water and other fluids. This helps thin the mucus and soothes a dry or sore throat. Honey or lemon juice in hot water or tea may ease a dry cough. · Take cough medicine as directed by your doctor. · Prop up your head on pillows to help you breathe and ease a dry cough. · Try cough drops to soothe a dry or sore throat. Cough drops don't stop a cough. Medicine-flavored cough drops are no better than candy-flavored drops or hard candy. · Do not smoke. Avoid secondhand smoke. If you need help quitting, talk to your doctor about stop-smoking programs and medicines. These can increase your chances of quitting for good. When should you call for help? Call 911 anytime you think you may need emergency care. For example, call if:  ? · You have severe trouble breathing. ?Call your doctor now or seek immediate medical care if:  ? · You cough up blood. ? · You have new or worse trouble breathing. ? · You have a new or higher fever. ? · You have a new rash. ? Watch closely for changes in your health, and be sure to contact your doctor if:  ? · You cough more deeply or more often, especially if you notice more mucus or a change in the color of your mucus. ? · You have new symptoms, such as a sore throat, an earache, or sinus pain. ? · You do not get better as expected. Where can you learn more? Go to http://josias-tayla.info/. Enter D279 in the search box to learn more about \"Cough: Care Instructions. \"  Current as of: May 12, 2017  Content Version: 11.4  © 0446-2491 [employer]. Care instructions adapted under license by [employer] (which disclaims liability or warranty for this information). If you have questions about a medical condition or this instruction, always ask your healthcare professional. [place] [address] any warranty or liability for your use of this information. Amoxicillin/Clavulanate Potassium (By mouth)   Amoxicillin (k-cwl-k-WINSOME-in), Clavulanate Potassium (DZBR-tm-ql-esvin whh-MBZ-im-um)  Treats infections. This medicine is a penicillin antibiotic. Brand Name(s): Augmentin, Augmentin ES-600, Augmentin XR   There may be other brand names for this medicine. When This Medicine Should Not Be Used: This medicine is not right for everyone. Do not use it if you had an allergic reaction to amoxicillin, clavulanate, or a similar antibiotic (penicillin or cephalosporin), or if you had liver problems caused by Augmentin®. How to Use This Medicine:   Liquid, Tablet, Chewable Tablet, Long Acting Tablet  · Your doctor will tell you how much medicine to use. Do not use more than directed. · Take this medicine with a snack or at the beginning of a meal to help prevent nausea.   · Chewable tablets: Chew the tablet completely before you swallow it. · Measure the oral liquid medicine with a marked measuring spoon, oral syringe, or medicine cup. Shake the medicine well just before you measure each dose. Rinse the spoon or dropper after each use. · Swallow the extended-release tablet whole. Do not crush, break, or chew it. · Take all of the medicine in your prescription to clear up your infection, even if you feel better after the first few doses. · Missed dose: Take a dose as soon as you remember. If it is almost time for your next dose, wait until then and take a regular dose. Do not take extra medicine to make up for a missed dose. · Tablet, extended-release tablet, chewable tablet: Store at room temperature, away from heat, moisture, and direct light. · Oral liquid: Store in the refrigerator. Do not freeze. · Throw away any unused oral liquid after 10 days. Drugs and Foods to Avoid:   Ask your doctor or pharmacist before using any other medicine, including over-the-counter medicines, vitamins, and herbal products. · Some medicines can affect how this medicine works. Tell your doctor if you are taking a blood thinner (such as warfarin), allopurinol, or probenecid. Warnings While Using This Medicine:   · Tell your doctor if you are pregnant or breastfeeding, or if you have kidney disease, liver disease, or mononucleosis (mono). · Birth control pills may not work as well while you are taking this medicine. Use another form of birth control to prevent pregnancy. · This medicine can cause diarrhea. Call your doctor if the diarrhea becomes severe, does not stop, or is bloody. Do not take any medicine to stop diarrhea until you have talked to your doctor. Diarrhea can occur 2 months or more after you stop taking this medicine. · Tell any doctor or dentist who treats you that you are using this medicine. This medicine may affect certain medical test results.   · Call your doctor if your symptoms do not improve or if they get worse. · The chewable tablet and oral liquid contain phenylalanine. Talk to your doctor before you use this medicine if you have phenylketonuria (PKU). · Keep all medicine out of the reach of children. Never share your medicine with anyone. Possible Side Effects While Using This Medicine:   Call your doctor right away if you notice any of these side effects:  · Allergic reaction: Itching or hives, swelling in your face or hands, swelling or tingling in your mouth or throat, chest tightness, trouble breathing  · Blistering, peeling, red skin rash  · Change in how much or how often you urinate  · Dark urine or pale stools, nausea, vomiting, loss of appetite, stomach pain, yellow eyes or skin  · Diarrhea that may contain blood, stomach cramps  If you notice these less serious side effects, talk with your doctor:   · Diaper rash  · Mild diarrhea, nausea, vomiting  · Tooth discoloration (in children)  If you notice other side effects that you think are caused by this medicine, tell your doctor. Call your doctor for medical advice about side effects. You may report side effects to FDA at 5-752-FDA-6557  © 2017 [facility] Information is for End User's use only and may not be sold, redistributed or otherwise used for commercial purposes. The above information is an  only. It is not intended as medical advice for individual conditions or treatments. Talk to your doctor, nurse or pharmacist before following any medical regimen to see if it is safe and effective for you.

## 2018-03-09 ENCOUNTER — APPOINTMENT (OUTPATIENT)
Dept: GENERAL RADIOLOGY | Age: 68
End: 2018-03-09
Attending: EMERGENCY MEDICINE
Payer: MEDICARE

## 2018-03-09 ENCOUNTER — HOSPITAL ENCOUNTER (OUTPATIENT)
Dept: MAMMOGRAPHY | Age: 68
Discharge: HOME | End: 2018-03-09
Attending: OBSTETRICS & GYNECOLOGY
Payer: MEDICARE

## 2018-03-09 ENCOUNTER — HOSPITAL ENCOUNTER (EMERGENCY)
Age: 68
Discharge: HOME | End: 2018-03-09
Attending: EMERGENCY MEDICINE
Payer: MEDICARE

## 2018-03-09 VITALS
HEART RATE: 78 BPM | SYSTOLIC BLOOD PRESSURE: 134 MMHG | BODY MASS INDEX: 31.07 KG/M2 | TEMPERATURE: 97.6 F | RESPIRATION RATE: 18 BRPM | HEIGHT: 70 IN | WEIGHT: 217 LBS | OXYGEN SATURATION: 98 % | DIASTOLIC BLOOD PRESSURE: 72 MMHG

## 2018-03-09 DIAGNOSIS — Z01.419 WELL WOMAN EXAM: ICD-10-CM

## 2018-03-09 DIAGNOSIS — R53.83 FATIGUE, UNSPECIFIED TYPE: Primary | ICD-10-CM

## 2018-03-09 DIAGNOSIS — Z12.39 BREAST SCREENING: ICD-10-CM

## 2018-03-09 LAB
ALBUMIN SERPL-MCNC: 3.4 G/DL (ref 3.2–4.6)
ALBUMIN/GLOB SERPL: 0.8 {RATIO} (ref 1.2–3.5)
ALP SERPL-CCNC: 116 U/L (ref 50–136)
ALT SERPL-CCNC: 43 U/L (ref 12–65)
ANION GAP SERPL CALC-SCNC: 8 MMOL/L (ref 7–16)
AST SERPL-CCNC: 26 U/L (ref 15–37)
BASOPHILS # BLD: 0 K/UL (ref 0–0.2)
BASOPHILS NFR BLD: 0 % (ref 0–2)
BILIRUB SERPL-MCNC: 0.2 MG/DL (ref 0.2–1.1)
BUN SERPL-MCNC: 9 MG/DL (ref 8–23)
CALCIUM SERPL-MCNC: 8.6 MG/DL (ref 8.3–10.4)
CHLORIDE SERPL-SCNC: 104 MMOL/L (ref 98–107)
CO2 SERPL-SCNC: 31 MMOL/L (ref 21–32)
CREAT SERPL-MCNC: 0.72 MG/DL (ref 0.6–1)
DIFFERENTIAL METHOD BLD: ABNORMAL
EOSINOPHIL # BLD: 0.2 K/UL (ref 0–0.8)
EOSINOPHIL NFR BLD: 3 % (ref 0.5–7.8)
ERYTHROCYTE [DISTWIDTH] IN BLOOD BY AUTOMATED COUNT: 13.9 % (ref 11.9–14.6)
GLOBULIN SER CALC-MCNC: 4.1 G/DL (ref 2.3–3.5)
GLUCOSE SERPL-MCNC: 128 MG/DL (ref 65–100)
HCT VFR BLD AUTO: 40.1 % (ref 35.8–46.3)
HGB BLD-MCNC: 13 G/DL (ref 11.7–15.4)
IMM GRANULOCYTES # BLD: 0 K/UL (ref 0–0.5)
IMM GRANULOCYTES NFR BLD AUTO: 0 % (ref 0–5)
LYMPHOCYTES # BLD: 2.7 K/UL (ref 0.5–4.6)
LYMPHOCYTES NFR BLD: 34 % (ref 13–44)
MCH RBC QN AUTO: 31.2 PG (ref 26.1–32.9)
MCHC RBC AUTO-ENTMCNC: 32.4 G/DL (ref 31.4–35)
MCV RBC AUTO: 96.2 FL (ref 79.6–97.8)
MONOCYTES # BLD: 0.5 K/UL (ref 0.1–1.3)
MONOCYTES NFR BLD: 6 % (ref 4–12)
NEUTS SEG # BLD: 4.4 K/UL (ref 1.7–8.2)
NEUTS SEG NFR BLD: 57 % (ref 43–78)
PLATELET # BLD AUTO: 219 K/UL (ref 150–450)
PMV BLD AUTO: 10.1 FL (ref 10.8–14.1)
POTASSIUM SERPL-SCNC: 3.7 MMOL/L (ref 3.5–5.1)
PROT SERPL-MCNC: 7.5 G/DL (ref 6.3–8.2)
RBC # BLD AUTO: 4.17 M/UL (ref 4.05–5.25)
SODIUM SERPL-SCNC: 143 MMOL/L (ref 136–145)
WBC # BLD AUTO: 7.8 K/UL (ref 4.3–11.1)

## 2018-03-09 PROCEDURE — 80053 COMPREHEN METABOLIC PANEL: CPT | Performed by: EMERGENCY MEDICINE

## 2018-03-09 PROCEDURE — 99283 EMERGENCY DEPT VISIT LOW MDM: CPT | Performed by: EMERGENCY MEDICINE

## 2018-03-09 PROCEDURE — 77067 SCR MAMMO BI INCL CAD: CPT

## 2018-03-09 PROCEDURE — 85025 COMPLETE CBC W/AUTO DIFF WBC: CPT | Performed by: EMERGENCY MEDICINE

## 2018-03-09 PROCEDURE — 71046 X-RAY EXAM CHEST 2 VIEWS: CPT

## 2018-03-09 NOTE — DISCHARGE INSTRUCTIONS
Fatigue: Care Instructions  Your Care Instructions    Fatigue is a feeling of tiredness, exhaustion, or lack of energy. You may feel fatigue because of too much or not enough activity. It can also come from stress, lack of sleep, boredom, and poor diet. Many medical problems, such as viral infections, can cause fatigue. Emotional problems, especially depression, are often the cause of fatigue. Fatigue is most often a symptom of another problem. Treatment for fatigue depends on the cause. For example, if you have fatigue because you have a certain health problem, treating this problem also treats your fatigue. If depression or anxiety is the cause, treatment may help. Follow-up care is a key part of your treatment and safety. Be sure to make and go to all appointments, and call your doctor if you are having problems. It's also a good idea to know your test results and keep a list of the medicines you take. How can you care for yourself at home? · Get regular exercise. But don't overdo it. Go back and forth between rest and exercise. · Get plenty of rest.  · Eat a healthy diet. Do not skip meals, especially breakfast.  · Reduce your use of caffeine, tobacco, and alcohol. Caffeine is most often found in coffee, tea, cola drinks, and chocolate. · Limit medicines that can cause fatigue. This includes tranquilizers and cold and allergy medicines. When should you call for help? Watch closely for changes in your health, and be sure to contact your doctor if:  ? · You have new symptoms such as fever or a rash. ? · Your fatigue gets worse. ? · You have been feeling down, depressed, or hopeless. Or you may have lost interest in things that you usually enjoy. ? · You are not getting better as expected. Where can you learn more? Go to http://josias-tayla.info/. Enter I957 in the search box to learn more about \"Fatigue: Care Instructions. \"  Current as of: March 20, 2017  Content Version: 11.4  © 7843-6788 [employer]. Care instructions adapted under license by [employer] (which disclaims liability or warranty for this information). If you have questions about a medical condition or this instruction, always ask your healthcare professional. [address] any warranty or liability for your use of this information.

## 2018-03-09 NOTE — ED NOTES
I have reviewed discharge instructions with the patient and spouse. The patient and spouse verbalized understanding. Patient left ED via Discharge Method: wheelchair to Home with transport from . The patient has been wheeled to car via nurse and appears in no acute distress. The patient has been provided discharge instructions and follow up information. The patient and spouse do not have any questions at this time. Opportunity for questions and clarification provided. Patient given 0 scripts. To continue your aftercare when you leave the hospital, you may receive an automated call from our care team to check in on how you are doing. This is a free service and part of our promise to provide the best care and service to meet your aftercare needs.  If you have questions, or wish to unsubscribe from this service please call [phone]. Thank you for Choosing our [facility].

## 2018-03-09 NOTE — ED PROVIDER NOTES
HPI Comments: 36-year-old lady presents with concerns about a cough that she has had now for a couple of weeks. She says she recently finished a course of antibiotics for this but feels like she hasn't got any better. He says she continues to have some mild shortness of breath but is not coughing anything up. She denies any fevers or chills. She went had a mammogram today and due to her continued symptoms  Came here for further evaluation. Brief triage Physical exam.  Patient had clear lungs with no wheezing rales or rhonchi and she had a regular rate and rhythm. Triage plan: I will get a chest x-ray as well as some basic blood work and then further evaluation pending room placement. Elements of this note were created using speech recognition software. As such, errors of speech recognition may be present. Patient was seen in triage, a brief history and physical was done. Labs and/or other studies were ordered pending placement of patient in the back. Elodia Vigil. Isabella Meeks MD    Patient is a 78 y.o. female presenting with cough. The history is provided by the patient.    Cough          Past Medical History:   Diagnosis Date    Arthritis     Bipolar affective disorder ([facility] [address]     anxiety, panic attacks, neurosis    Chronic pain     bulging discs     Depression 11/12/2009    anxiety    Dysuria 07/11/2007    Fibromyalgia     GERD (gastroesophageal reflux disease) 11/12/2009    Hypercholesterolemia     Hyperlipidemia     Hypothyroidism 11/12/2009    Lumbago 11/12/2009    Miscarriage     PUD (peptic ulcer disease)     Unspecified urinary incontinence     Urinary tract infection, site not specified 06/07/2006       Past Surgical History:   Procedure Laterality Date    HX BLADDER SUSPENSION  2003    HX CHOLECYSTECTOMY  1980s    HX CYSTOCELE REPAIR  11/12/2009    HX OOPHORECTOMY      HX RECTOCELE REPAIR  11/12/2009    HX TONSILLECTOMY  1980    HX TOTAL ABDOMINAL HYSTERECTOMY  2003 Family History:   Problem Relation Age of Onset   Delphine Booker Breast Cancer Mother 70    Heart Disease Father     Prostate Cancer Father        Social History     Social History    Marital status:      Spouse name: N/A    Number of children: N/A    Years of education: N/A     Occupational History    Not on file. Social History Main Topics    Smoking status: Never Smoker    Smokeless tobacco: Never Used    Alcohol use No    Drug use: No    Sexual activity: Not Currently     Partners: Male     Other Topics Concern    Not on file     Social History Narrative     and lives with her husban--has a son and daughter. Disabled and has history of work in the [employer] She is a never smoker, does not drink alcohol, and does not use illicit drugs                 ALLERGIES: Fentanyl and Effexor [venlafaxine]    Review of Systems   Constitutional: Positive for fatigue. HENT: Positive for congestion. Eyes: Negative. Respiratory: Positive for cough. Gastrointestinal: Negative. Endocrine: Negative. Musculoskeletal: Negative. Skin: Negative. Neurological: Negative. Vitals:    03/09/18 1336   BP: 121/68   Pulse: 78   Resp: 18   Temp: 97.6 °F (36.4 °C)   SpO2: 97%   Weight: 98.4 kg (217 lb)   Height: 5' 10\" (1.778 m)            Physical Exam   Constitutional: She appears well-developed and well-nourished. HENT:   Head: Normocephalic and atraumatic. Chronic sores on her scalp   Neck: Normal range of motion. Neck supple. Cardiovascular: Normal rate and regular rhythm. Pulmonary/Chest: Effort normal and breath sounds normal. No respiratory distress. She has no wheezes. Abdominal: Soft. There is tenderness. Musculoskeletal: Normal range of motion. She exhibits no edema or tenderness. Lymphadenopathy:     She has no cervical adenopathy. Skin: There is pallor. Nursing note and vitals reviewed.        MDM  Number of Diagnoses or Management Options  Diagnosis management comments: Differential diagnosis includes URI, viral syndrome, bronchitis, pneumonia, sinusitis, anemia, dehydration, likely amounts    Blood work is unremarkable chest x-ray is negative. She had an extensive evaluation with a CAT scan several weeks ago and was treated with Augmentin. Amount and/or Complexity of Data Reviewed  Clinical lab tests: ordered and reviewed  Tests in the radiology section of CPT®: ordered and reviewed  Review and summarize past medical records: yes  Independent visualization of images, tracings, or specimens: yes    Risk of Complications, Morbidity, and/or Mortality  Presenting problems: moderate  Diagnostic procedures: low  Management options: low    Patient Progress  Patient progress: stable        ED Course   3:50 PM  Workup here is unremarkable. I think that her chronic fatigue is likely due to her pain and bipolar medications. I've advised her to follow up with her doctor to discuss all of her medicines. She is now asking about chronic trouble with swallowing.  states she has a gastroenterologist and he will discuss with them further evaluation and possible EGD. Voice dictation software was used during the making of this note. This software is not perfect and grammatical and other typographical errors may be present. This note has been proofread, but may still contain errors.   Miri Whalen MD; 3/9/2018 @3:50 PM   ===================================================================        Procedures

## 2018-03-13 PROBLEM — J98.4 RESTRICTIVE LUNG DISEASE: Status: ACTIVE | Noted: 2018-03-13

## 2018-03-13 PROBLEM — R91.1 LUNG NODULE: Status: ACTIVE | Noted: 2018-03-13

## 2018-03-13 PROBLEM — R53.81 PHYSICAL DECONDITIONING: Status: ACTIVE | Noted: 2018-03-13

## 2018-03-13 PROBLEM — J47.9 BRONCHIECTASIS WITHOUT COMPLICATION (HCC): Status: ACTIVE | Noted: 2018-03-13

## 2018-05-08 PROBLEM — Z86.69 HISTORY OF SLEEP APNEA: Status: ACTIVE | Noted: 2018-05-08

## 2018-07-19 ENCOUNTER — HOSPITAL ENCOUNTER (OUTPATIENT)
Dept: SLEEP MEDICINE | Age: 68
Discharge: HOME | End: 2018-07-19
Payer: MEDICARE

## 2018-07-19 PROCEDURE — 95810 POLYSOM 6/> YRS 4/> PARAM: CPT

## 2018-08-13 ENCOUNTER — HOSPITAL ENCOUNTER (OUTPATIENT)
Dept: SLEEP MEDICINE | Age: 68
Discharge: HOME | End: 2018-08-13
Payer: MEDICARE

## 2018-08-13 PROCEDURE — 95811 POLYSOM 6/>YRS CPAP 4/> PARM: CPT

## 2018-08-15 PROBLEM — R10.30 LOWER ABDOMINAL PAIN: Status: ACTIVE | Noted: 2018-08-15

## 2018-08-15 PROBLEM — M51.36 DDD (DEGENERATIVE DISC DISEASE), LUMBAR: Status: ACTIVE | Noted: 2018-08-15

## 2018-08-21 PROBLEM — G47.33 OSA (OBSTRUCTIVE SLEEP APNEA): Status: ACTIVE | Noted: 2018-08-21

## 2018-08-24 ENCOUNTER — HOSPITAL ENCOUNTER (OUTPATIENT)
Dept: CT IMAGING | Age: 68
Discharge: HOME | End: 2018-08-24
Attending: UROLOGY
Payer: MEDICARE

## 2018-08-24 DIAGNOSIS — N39.3 SUI (STRESS URINARY INCONTINENCE, FEMALE): ICD-10-CM

## 2018-08-24 DIAGNOSIS — N39.0 URINARY TRACT INFECTION WITHOUT HEMATURIA, SITE UNSPECIFIED: ICD-10-CM

## 2018-08-24 DIAGNOSIS — R10.30 LOWER ABDOMINAL PAIN: ICD-10-CM

## 2018-08-24 DIAGNOSIS — R39.15 URGENCY OF URINATION: ICD-10-CM

## 2018-08-24 LAB — CREAT BLD-MCNC: 0.8 MG/DL (ref 0.8–1.5)

## 2018-08-24 PROCEDURE — 74011000258 HC RX REV CODE- 258: Performed by: UROLOGY

## 2018-08-24 PROCEDURE — 82565 ASSAY OF CREATININE: CPT

## 2018-08-24 PROCEDURE — 74178 CT ABD&PLV WO CNTR FLWD CNTR: CPT

## 2018-08-24 PROCEDURE — 74011636320 HC RX REV CODE- 636/320: Performed by: UROLOGY

## 2018-08-24 RX ORDER — SODIUM CHLORIDE 0.9 % (FLUSH) 0.9 %
10 SYRINGE (ML) INJECTION
Status: COMPLETED | OUTPATIENT
Start: 2018-08-24 | End: 2018-08-24

## 2018-08-24 RX ADMIN — SODIUM CHLORIDE 100 ML: 900 INJECTION, SOLUTION INTRAVENOUS at 13:14

## 2018-08-24 RX ADMIN — IOPAMIDOL 100 ML: 755 INJECTION, SOLUTION INTRAVENOUS at 13:14

## 2018-08-24 RX ADMIN — Medication 10 ML: at 13:14

## 2018-09-13 ENCOUNTER — HOSPITAL ENCOUNTER (OUTPATIENT)
Dept: CT IMAGING | Age: 68
Discharge: HOME | End: 2018-09-13
Attending: NURSE PRACTITIONER
Payer: MEDICARE

## 2018-09-13 DIAGNOSIS — R91.1 LUNG NODULE: ICD-10-CM

## 2018-09-13 PROCEDURE — 71250 CT THORAX DX C-: CPT

## 2018-09-13 NOTE — PROGRESS NOTES
Please let patient know that CT scan demonstrates pneumonia. If she hasn't been treated by another provider, recommend starting augmentin 875 mg bid x 10 days. Needs F/U CT of chest without contrast in 3 months with appt with us after CT scan. Thank you.

## 2018-09-20 ENCOUNTER — HOSPITAL ENCOUNTER (EMERGENCY)
Age: 68
Discharge: HOME | End: 2018-09-20
Attending: EMERGENCY MEDICINE
Payer: MEDICARE

## 2018-09-20 ENCOUNTER — APPOINTMENT (OUTPATIENT)
Dept: GENERAL RADIOLOGY | Age: 68
End: 2018-09-20
Attending: EMERGENCY MEDICINE
Payer: MEDICARE

## 2018-09-20 ENCOUNTER — APPOINTMENT (OUTPATIENT)
Dept: CT IMAGING | Age: 68
End: 2018-09-20
Attending: EMERGENCY MEDICINE
Payer: MEDICARE

## 2018-09-20 VITALS
SYSTOLIC BLOOD PRESSURE: 129 MMHG | HEIGHT: 70 IN | BODY MASS INDEX: 31.5 KG/M2 | DIASTOLIC BLOOD PRESSURE: 63 MMHG | OXYGEN SATURATION: 92 % | HEART RATE: 73 BPM | RESPIRATION RATE: 23 BRPM | TEMPERATURE: 97.8 F | WEIGHT: 220 LBS

## 2018-09-20 DIAGNOSIS — R06.02 SOB (SHORTNESS OF BREATH): Primary | ICD-10-CM

## 2018-09-20 LAB
ALBUMIN SERPL-MCNC: 3.5 G/DL (ref 3.2–4.6)
ALBUMIN/GLOB SERPL: 0.7 {RATIO} (ref 1.2–3.5)
ALP SERPL-CCNC: 152 U/L (ref 50–136)
ALT SERPL-CCNC: 59 U/L (ref 12–65)
ANION GAP SERPL CALC-SCNC: 5 MMOL/L (ref 7–16)
ARTERIAL PATENCY WRIST A: POSITIVE
AST SERPL-CCNC: 42 U/L (ref 15–37)
ATRIAL RATE: 68 BPM
BASE EXCESS BLDA CALC-SCNC: 0.2 MMOL/L (ref 0–3)
BASOPHILS # BLD: 0.1 K/UL (ref 0–0.2)
BASOPHILS NFR BLD: 1 % (ref 0–2)
BDY SITE: ABNORMAL
BILIRUB SERPL-MCNC: 0.3 MG/DL (ref 0.2–1.1)
BUN SERPL-MCNC: 10 MG/DL (ref 8–23)
CALCIUM SERPL-MCNC: 8.9 MG/DL (ref 8.3–10.4)
CALCULATED P AXIS, ECG09: 73 DEGREES
CALCULATED R AXIS, ECG10: -48 DEGREES
CALCULATED T AXIS, ECG11: 29 DEGREES
CHLORIDE SERPL-SCNC: 105 MMOL/L (ref 98–107)
CO2 SERPL-SCNC: 31 MMOL/L (ref 21–32)
COHGB MFR BLD: 0.4 % (ref 0.5–1.5)
CREAT SERPL-MCNC: 0.87 MG/DL (ref 0.6–1)
DIAGNOSIS, 93000: NORMAL
DIFFERENTIAL METHOD BLD: NORMAL
DO-HGB BLD-MCNC: 3 % (ref 0–5)
EOSINOPHIL # BLD: 0.2 K/UL (ref 0–0.8)
EOSINOPHIL NFR BLD: 3 % (ref 0.5–7.8)
ERYTHROCYTE [DISTWIDTH] IN BLOOD BY AUTOMATED COUNT: 13.1 %
GLOBULIN SER CALC-MCNC: 5 G/DL (ref 2.3–3.5)
GLUCOSE SERPL-MCNC: 117 MG/DL (ref 65–100)
HCO3 BLDA-SCNC: 22 MMOL/L (ref 22–26)
HCT VFR BLD AUTO: 45.8 % (ref 35.8–46.3)
HGB BLD-MCNC: 14.7 G/DL (ref 11.7–15.4)
HGB BLDMV-MCNC: 14.9 GM/DL (ref 11.7–15)
IMM GRANULOCYTES # BLD: 0 K/UL (ref 0–0.5)
IMM GRANULOCYTES NFR BLD AUTO: 0 % (ref 0–5)
LYMPHOCYTES # BLD: 2.6 K/UL (ref 0.5–4.6)
LYMPHOCYTES NFR BLD: 32 % (ref 13–44)
MCH RBC QN AUTO: 30.9 PG (ref 26.1–32.9)
MCHC RBC AUTO-ENTMCNC: 32.1 G/DL (ref 31.4–35)
MCV RBC AUTO: 96.4 FL (ref 79.6–97.8)
METHGB MFR BLD: 0.2 % (ref 0–1.5)
MONOCYTES # BLD: 0.7 K/UL (ref 0.1–1.3)
MONOCYTES NFR BLD: 8 % (ref 4–12)
NEUTS SEG # BLD: 4.7 K/UL (ref 1.7–8.2)
NEUTS SEG NFR BLD: 57 % (ref 43–78)
NRBC # BLD: 0 K/UL (ref 0–0.2)
OXYHGB MFR BLDA: 96.2 % (ref 94–97)
P-R INTERVAL, ECG05: 192 MS
PCO2 BLDA: 29 MMHG (ref 35–45)
PH BLDA: 7.5 [PH] (ref 7.35–7.45)
PLATELET # BLD AUTO: 281 K/UL (ref 150–450)
PMV BLD AUTO: 10.3 FL (ref 9.4–12.3)
PO2 BLDA: 84 MMHG (ref 80–105)
POTASSIUM SERPL-SCNC: 3.9 MMOL/L (ref 3.5–5.1)
PROT SERPL-MCNC: 8.5 G/DL (ref 6.3–8.2)
Q-T INTERVAL, ECG07: 382 MS
QRS DURATION, ECG06: 72 MS
QTC CALCULATION (BEZET), ECG08: 406 MS
RBC # BLD AUTO: 4.75 M/UL (ref 4.05–5.2)
SAO2 % BLD: 97 % (ref 92–98.5)
SODIUM SERPL-SCNC: 141 MMOL/L (ref 136–145)
TROPONIN I BLD-MCNC: 0 NG/ML (ref 0.02–0.05)
TROPONIN I BLD-MCNC: 0 NG/ML (ref 0.02–0.05)
VENTILATION MODE VENT: ABNORMAL
VENTRICULAR RATE, ECG03: 68 BPM
WBC # BLD AUTO: 8.3 K/UL (ref 4.3–11.1)

## 2018-09-20 PROCEDURE — 74011000250 HC RX REV CODE- 250: Performed by: EMERGENCY MEDICINE

## 2018-09-20 PROCEDURE — 74011250636 HC RX REV CODE- 250/636: Performed by: EMERGENCY MEDICINE

## 2018-09-20 PROCEDURE — 85025 COMPLETE CBC W/AUTO DIFF WBC: CPT

## 2018-09-20 PROCEDURE — 96375 TX/PRO/DX INJ NEW DRUG ADDON: CPT | Performed by: EMERGENCY MEDICINE

## 2018-09-20 PROCEDURE — 94640 AIRWAY INHALATION TREATMENT: CPT

## 2018-09-20 PROCEDURE — 82803 BLOOD GASES ANY COMBINATION: CPT

## 2018-09-20 PROCEDURE — 99285 EMERGENCY DEPT VISIT HI MDM: CPT | Performed by: EMERGENCY MEDICINE

## 2018-09-20 PROCEDURE — 84484 ASSAY OF TROPONIN QUANT: CPT

## 2018-09-20 PROCEDURE — 93005 ELECTROCARDIOGRAM TRACING: CPT | Performed by: EMERGENCY MEDICINE

## 2018-09-20 PROCEDURE — 74011000258 HC RX REV CODE- 258: Performed by: EMERGENCY MEDICINE

## 2018-09-20 PROCEDURE — 96374 THER/PROPH/DIAG INJ IV PUSH: CPT | Performed by: EMERGENCY MEDICINE

## 2018-09-20 PROCEDURE — 80053 COMPREHEN METABOLIC PANEL: CPT

## 2018-09-20 PROCEDURE — 36600 WITHDRAWAL OF ARTERIAL BLOOD: CPT

## 2018-09-20 PROCEDURE — 74011636320 HC RX REV CODE- 636/320: Performed by: EMERGENCY MEDICINE

## 2018-09-20 PROCEDURE — 71260 CT THORAX DX C+: CPT

## 2018-09-20 PROCEDURE — 71045 X-RAY EXAM CHEST 1 VIEW: CPT

## 2018-09-20 RX ORDER — LORAZEPAM 2 MG/ML
0.5 INJECTION INTRAMUSCULAR
Status: COMPLETED | OUTPATIENT
Start: 2018-09-20 | End: 2018-09-20

## 2018-09-20 RX ORDER — IPRATROPIUM BROMIDE AND ALBUTEROL SULFATE 2.5; .5 MG/3ML; MG/3ML
3 SOLUTION RESPIRATORY (INHALATION)
Status: COMPLETED | OUTPATIENT
Start: 2018-09-20 | End: 2018-09-20

## 2018-09-20 RX ORDER — SODIUM CHLORIDE 0.9 % (FLUSH) 0.9 %
10 SYRINGE (ML) INJECTION
Status: COMPLETED | OUTPATIENT
Start: 2018-09-20 | End: 2018-09-20

## 2018-09-20 RX ADMIN — Medication 10 ML: at 17:13

## 2018-09-20 RX ADMIN — IPRATROPIUM BROMIDE AND ALBUTEROL SULFATE 3 ML: .5; 3 SOLUTION RESPIRATORY (INHALATION) at 16:30

## 2018-09-20 RX ADMIN — IOPAMIDOL 100 ML: 755 INJECTION, SOLUTION INTRAVENOUS at 17:13

## 2018-09-20 RX ADMIN — SODIUM CHLORIDE 100 ML: 900 INJECTION, SOLUTION INTRAVENOUS at 17:13

## 2018-09-20 RX ADMIN — LORAZEPAM 0.5 MG: 2 INJECTION INTRAMUSCULAR; INTRAVENOUS at 16:04

## 2018-09-20 RX ADMIN — METHYLPREDNISOLONE SODIUM SUCCINATE 125 MG: 125 INJECTION, POWDER, FOR SOLUTION INTRAMUSCULAR; INTRAVENOUS at 16:04

## 2018-09-20 NOTE — ED NOTES
I have reviewed discharge instructions with the patient. The patient verbalized understanding. Patient left ED via Discharge Method: ambulatory to Home with (spouse). Opportunity for questions and clarification provided. Patient given 0 scripts. No e-sign To continue your aftercare when you leave the hospital, you may receive an automated call from our care team to check in on how you are doing. This is a free service and part of our promise to provide the best care and service to meet your aftercare needs.  If you have questions, or wish to unsubscribe from this service please call [phone]. Thank you for Choosing our [facility].

## 2018-09-20 NOTE — ED PROVIDER NOTES
HPI Comments: Patient is a 52-year-old female with sleep apnea and recent diagnosis of pneumonia on CT about 1 week ago presents with continued shortness of breath and bilateral chest pain. She states she been using breathing treatments at home without significant relief. She's been taking Augmentin. She called the pulmonary office and he told her to come to the emergency department if her symptoms worsen. she denies any significant fevers. She reports she is having difficulty clearing mucus. Patient is a 80 y.o. female presenting with shortness of breath. The history is provided by the patient. No  was used. Shortness of Breath Associated symptoms include cough. Pertinent negatives include no fever, no headaches, no sore throat, no vomiting, no abdominal pain and no leg swelling. Past Medical History:  
Diagnosis Date  Arthritis  Bipolar affective disorder (HCC)   
 anxiety, panic attacks, neurosis  Chronic pain   
 bulging discs  Depression 11/12/2009  
 anxiety  Dysuria 07/11/2007  Fibromyalgia  GERD (gastroesophageal reflux disease) 11/12/2009  Hypercholesterolemia  Hyperlipidemia  Hypothyroidism 11/12/2009  Lumbago 11/12/2009  Miscarriage  PUD (peptic ulcer disease)  Unspecified urinary incontinence  Urinary tract infection, site not specified 06/07/2006 Past Surgical History:  
Procedure Laterality Date  HX BLADDER SUSPENSION  2003  HX CHOLECYSTECTOMY  1980s  HX CYSTOCELE REPAIR  11/12/2009  HX OOPHORECTOMY  HX RECTOCELE REPAIR  11/12/2009 [facility]  HX TOTAL ABDOMINAL HYSTERECTOMY  2003 Family History:  
Problem Relation Age of Onset  Breast Cancer Mother 70  
 Heart Disease Father  Prostate Cancer Father Social History Social History  Marital status:    Spouse name: N/A  
 Number of children: N/A  
 Years of education: N/A  
 
 Occupational History  Not on file. Social History Main Topics  Smoking status: Never Smoker  Smokeless tobacco: Never Used  Alcohol use No  
 Drug use: No  
 Sexual activity: Not Currently Partners: Male Other Topics Concern  Not on file Social History Narrative  and lives with her --has a son and daughter. Disabled and has history of work in the [employer] No pets. ALLERGIES: Fentanyl and Effexor [venlafaxine] Review of Systems Constitutional: Negative for fatigue and fever. HENT: Negative for sore throat. Respiratory: Positive for cough and shortness of breath. Negative for chest tightness. Cardiovascular: Negative for leg swelling. Gastrointestinal: Negative for abdominal pain, nausea and vomiting. Genitourinary: Negative for dysuria. Musculoskeletal: Negative for back pain. Neurological: Negative for syncope and headaches. Psychiatric/Behavioral: Negative for confusion. The patient is nervous/anxious. Vitals:  
 09/20/18 1452 09/20/18 1520 09/20/18 1532 BP: 148/87 104/58 135/68 Pulse: 70 68 68 Resp: 18 23 19 Temp: 97.3 °F (36.3 °C) SpO2: 97% 97% 95% Weight: 99.8 kg (220 lb) Height: 5' 10\" (1.778 m) Physical Exam  
Constitutional: She is oriented to person, place, and time. She appears well-developed and well-nourished. No distress. HENT:  
Head: Normocephalic and atraumatic. Eyes: Conjunctivae and EOM are normal. Pupils are equal, round, and reactive to light. Neck: Normal range of motion. Neck supple. Cardiovascular: Normal rate, regular rhythm and normal heart sounds. Pulmonary/Chest: Effort normal and breath sounds normal. No respiratory distress. She has no wheezes. She has no rales. Clear lungs bilaterally Abdominal: Soft. She exhibits no distension. There is no tenderness. There is no rebound. Musculoskeletal: Normal range of motion. She exhibits no edema or tenderness. Neurological: She is alert and oriented to person, place, and time. Skin: Skin is warm and dry. No rash noted. She is not diaphoretic. Psychiatric: She has a normal mood and affect. Her behavior is normal.  
Vitals reviewed. MDM Number of Diagnoses or Management Options SOB (shortness of breath): new and does not require workup Diagnosis management comments: Cardiac enzymes negative. CT scan negative for any acute findings including pulmonary embolism or pneumonia. She is slightly hyperventilating. Long history of anxiety. I feel anxiety may be contributing to her symptoms. Normal white count. Stable vitals. We'll discharge to home and have her follow up with pulmonary future. Return precautions discussed. Domo Cruz MD; 9/20/2018 @10:15 PM Voice dictation software was used during the making of this note. This software is not perfect and grammatical and other typographical errors may be present. This note has not been proofread for errors. 
=================================================================== 
' Amount and/or Complexity of Data Reviewed Clinical lab tests: reviewed and ordered (Results for orders placed or performed during the hospital encounter of 09/20/18 
-CBC WITH AUTOMATED DIFF Result                                            Value                         Ref Range WBC                                               8.3                           4.3 - 11.1 K/uL           
     RBC                                               4.75                          4.05 - 5.2 M/uL HGB                                               14.7                          11.7 - 15.4 g/dL HCT                                               45.8                          35.8 - 46.3 % MCV                                               96.4                          79.6 - 97.8 FL            
     MCH                                               30.9                          26.1 - 32.9 PG            
     MCHC                                              32.1                          31.4 - 35.0 g/dL RDW                                               13.1                          % PLATELET                                          281                           150 - 450 K/uL MPV                                               10.3                          9.4 - 12.3 FL ABSOLUTE NRBC                                     0.00                          0.0 - 0.2 K/uL            
     DF                                                AUTOMATED NEUTROPHILS                                       57                            43 - 78 % LYMPHOCYTES                                       32                            13 - 44 % MONOCYTES                                         8                             4.0 - 12.0 % EOSINOPHILS                                       3                             0.5 - 7.8 % BASOPHILS                                         1                             0.0 - 2.0 % IMMATURE GRANULOCYTES                             0                             0.0 - 5.0 %               
     ABS. NEUTROPHILS                                  4.7                           1.7 - 8.2 K/UL            
     ABS. LYMPHOCYTES                                  2.6                           0.5 - 4.6 K/UL            
     ABS. MONOCYTES                                    0.7                           0.1 - 1.3 K/UL ABS. EOSINOPHILS                                  0.2                           0.0 - 0.8 K/UL            
     ABS. BASOPHILS                                    0.1                           0.0 - 0.2 K/UL            
     ABS. IMM. GRANS.                                  0.0                           0.0 - 0.5 K/UL            
-METABOLIC PANEL, COMPREHENSIVE Result                                            Value                         Ref Range Sodium                                            141                           136 - 145 mmol/L Potassium                                         3.9                           3.5 - 5.1 mmol/L Chloride                                          105                           98 - 107 mmol/L           
     CO2                                               31                            21 - 32 mmol/L Anion gap                                         5 (L)                         7 - 16 mmol/L Glucose                                           117 (H)                       65 - 100 mg/dL BUN                                               10                            8 - 23 MG/DL Creatinine                                        0.87                          0.6 - 1.0 MG/DL           
     GFR est AA                                        >60                           >60 ml/min/1.73m2 GFR est non-AA                                    >60                           >60 ml/min/1.73m2 Calcium                                           8.9                           8.3 - 10.4 MG/DL      Bilirubin, total                                  0.3                           0.2 - 1.1 MG/DL           
     ALT (SGPT)                                        59                            12 - 65 U/L               
 AST (SGOT)                                        42 (H)                        15 - 37 U/L Alk. phosphatase                                  152 (H)                       50 - 136 U/L Protein, total                                    8.5 (H)                       6.3 - 8.2 g/dL Albumin                                           3.5                           3.2 - 4.6 g/dL Globulin                                          5.0 (H)                       2.3 - 3.5 g/dL A-G Ratio                                         0.7 (L)                       1.2 - 3.5                 
-BLOOD GAS, ARTERIAL Result                                            Value                         Ref Range pH                                                7.50 (H)                      7.35 - 7.45               
     PCO2                                              29 (L)                        35 - 45 mmHg PO2                                               84                            80 - 105 mmHg BICARBONATE                                       22                            22 - 26 mmol/L            
     BASE EXCESS                                       0.2                           0 - 3 mmol/L              
     TOTAL HEMOGLOBIN                                  14.9                          11.7 - 15.0 GM/DL         
     O2 SAT                                            97                            92 - 98.5 % Arterial O2 Hgb                                   96.2                          94 - 97 % CARBOXYHEMOGLOBIN                                 0.4 (L)                       0.5 - 1.5 % METHEMOGLOBIN                                     0.2                           0.0 - 1.5 % DEOXYHEMOGLOBIN                                   3                             0.0 - 5.0 % SITE                                              LR                                                      
     ALLENS TEST                                       POSITIVE                                                
     MODE                                              RA                                                      
-POC TROPONIN-I Result                                            Value                         Ref Range Troponin-I (POC)                                  0 (L)                         0.02 - 0.05 ng/ml         
-POC TROPONIN-I Result                                            Value                         Ref Range Troponin-I (POC)                                  0 (L)                         0.02 - 0.05 ng/ml         
-EKG, 12 LEAD, INITIAL Result                                            Value                         Ref Range Ventricular Rate                                  68                            BPM                       
     Atrial Rate                                       68                            BPM                       
     P-R Interval                                      192                           ms                        
     QRS Duration                                      72                            ms Q-T Interval                                      382                           ms                        
     QTC Calculation (Bezet)                           406                           ms                        
     Calculated P Axis                                 73                            degrees      Calculated R Axis                                 -48 degrees Calculated T Axis                                 29                            degrees Diagnosis Normal sinus rhythm Left anterior fascicular block Low voltage QRS Abnormal ECG When compared with ECG of 21-FEB-2018 18:30, No significant change was found Confirmed by PASTORA CARLSON (), Kerriemark Leahy ([id number]) on 9/20/2018 4:30:45 PM 
  
) Tests in the radiology section of [facility]: ordered and reviewed (Ct Chest W Cont Result Date: 9/20/2018 CHEST CT ANGIOGRAPHY WITH ADDITIONAL REFORMATS:  CLINICAL HISTORY:  Shortness of breath. TECHNIQUE:  During bolus injection of nonionic intravenous contrast, the chest was scanned with spiral technique, and coronal reformats were produced. Radiation dose reduction was achieved using one or all of the following techniques: automated exposure control, weight-based dosing, iterative reconstruction. COMPARISON:  Portable chest today and noncontrast chest CT of September 13, 2018 FINDINGS:  There is expected opacification of the pulmonary arterial tree with no intraluminal soft tissue density to suggest acute pulmonary embolism. There is no definite pneumothorax. No pathologically enlarged lymph nodes or abnormal fluid collection is seen. The epigastrium appears unremarkable as imaged. IMPRESSION:  NO DEFINITE PULMONARY EMBOLISM OR OTHER ACUTE THORACIC ABNORMALITY IDENTIFIED. Xr Chest [facility] Result Date: 9/20/2018 CHEST X-RAY, single portable view  9/20/2018 History: Shortness of breath with chest pain. Technique: Single frontal view of the chest. Comparison: Chest x-ray 3/9/2018 Findings: Worsening aeration is seen of the lungs.  The cardiac silhouette is mildly enlarged although not felt to be significantly changed when differences in lung aeration is taken into consideration. The sadia are not felt to be significantly changed in size or configuration. The lungs are expanded without evidence for pneumothorax. No evolving consolidative airspace process or pleural effusion is seen. IMPRESSION: 1. Grossly stable cardiomegaly without evolving acute changes. Only worsening aeration of the lungs is seen likely due to the patient's respiratory effort. ) Review and summarize past medical records: yes Independent visualization of images, tracings, or specimens: yes Risk of Complications, Morbidity, and/or Mortality Presenting problems: moderate Diagnostic procedures: moderate Management options: moderate Patient Progress Patient progress: improved ED Course Procedures

## 2018-09-20 NOTE — ED TRIAGE NOTES
Pt reports feeling shob, diagnosed with bilateral pneumonia recently, has not been getting any better.

## 2018-09-20 NOTE — DISCHARGE INSTRUCTIONS

## 2018-10-15 ENCOUNTER — HOSPITAL ENCOUNTER (OUTPATIENT)
Dept: GENERAL RADIOLOGY | Age: 68
Discharge: HOME | End: 2018-10-15
Payer: MEDICARE

## 2018-10-15 DIAGNOSIS — R05.9 COUGH: ICD-10-CM

## 2018-10-15 PROCEDURE — 71046 X-RAY EXAM CHEST 2 VIEWS: CPT

## 2018-10-21 ENCOUNTER — APPOINTMENT (OUTPATIENT)
Dept: GENERAL RADIOLOGY | Age: 68
End: 2018-10-21
Attending: EMERGENCY MEDICINE
Payer: MEDICARE

## 2018-10-21 ENCOUNTER — HOSPITAL ENCOUNTER (EMERGENCY)
Age: 68
Discharge: HOME | End: 2018-10-21
Attending: EMERGENCY MEDICINE
Payer: MEDICARE

## 2018-10-21 VITALS
TEMPERATURE: 98.1 F | BODY MASS INDEX: 30.78 KG/M2 | WEIGHT: 215 LBS | RESPIRATION RATE: 14 BRPM | OXYGEN SATURATION: 93 % | DIASTOLIC BLOOD PRESSURE: 59 MMHG | HEART RATE: 78 BPM | HEIGHT: 70 IN | SYSTOLIC BLOOD PRESSURE: 127 MMHG

## 2018-10-21 DIAGNOSIS — J20.9 ACUTE BRONCHITIS, UNSPECIFIED ORGANISM: Primary | ICD-10-CM

## 2018-10-21 LAB
ALBUMIN SERPL-MCNC: 3.5 G/DL (ref 3.2–4.6)
ALBUMIN/GLOB SERPL: 0.8 {RATIO}
ALP SERPL-CCNC: 132 U/L (ref 50–130)
ALT SERPL-CCNC: 41 U/L (ref 12–65)
ANION GAP SERPL CALC-SCNC: 8 MMOL/L
AST SERPL-CCNC: 33 U/L (ref 15–37)
ATRIAL RATE: 84 BPM
BASOPHILS # BLD: 0.1 K/UL (ref 0–0.2)
BASOPHILS NFR BLD: 1 % (ref 0–2)
BILIRUB SERPL-MCNC: 0.2 MG/DL (ref 0.2–1.1)
BUN SERPL-MCNC: 8 MG/DL (ref 8–23)
CALCIUM SERPL-MCNC: 9 MG/DL (ref 8.3–10.4)
CALCULATED P AXIS, ECG09: 61 DEGREES
CALCULATED R AXIS, ECG10: -61 DEGREES
CALCULATED T AXIS, ECG11: 22 DEGREES
CHLORIDE SERPL-SCNC: 103 MMOL/L (ref 98–107)
CO2 SERPL-SCNC: 29 MMOL/L (ref 21–32)
CREAT SERPL-MCNC: 0.88 MG/DL (ref 0.6–1)
DIAGNOSIS, 93000: NORMAL
DIFFERENTIAL METHOD BLD: ABNORMAL
EOSINOPHIL # BLD: 0.7 K/UL (ref 0–0.8)
EOSINOPHIL NFR BLD: 4 % (ref 0.5–7.8)
ERYTHROCYTE [DISTWIDTH] IN BLOOD BY AUTOMATED COUNT: 13 %
GLOBULIN SER CALC-MCNC: 4.6 G/DL (ref 2.3–3.5)
GLUCOSE SERPL-MCNC: 150 MG/DL (ref 65–100)
HCT VFR BLD AUTO: 47.4 % (ref 35.8–46.3)
HGB BLD-MCNC: 15.1 G/DL (ref 11.7–15.4)
IMM GRANULOCYTES # BLD: 0.1 K/UL (ref 0–0.5)
IMM GRANULOCYTES NFR BLD AUTO: 1 % (ref 0–5)
LYMPHOCYTES # BLD: 2.2 K/UL (ref 0.5–4.6)
LYMPHOCYTES NFR BLD: 15 % (ref 13–44)
MCH RBC QN AUTO: 30.6 PG (ref 26.1–32.9)
MCHC RBC AUTO-ENTMCNC: 31.9 G/DL (ref 31.4–35)
MCV RBC AUTO: 96 FL (ref 79.6–97.8)
MONOCYTES # BLD: 0.8 K/UL (ref 0.1–1.3)
MONOCYTES NFR BLD: 5 % (ref 4–12)
NEUTS SEG # BLD: 11.3 K/UL (ref 1.7–8.2)
NEUTS SEG NFR BLD: 75 % (ref 43–78)
NRBC # BLD: 0 K/UL (ref 0–0.2)
P-R INTERVAL, ECG05: 172 MS
PLATELET # BLD AUTO: 336 K/UL (ref 150–450)
PMV BLD AUTO: 10.5 FL (ref 9.4–12.3)
POTASSIUM SERPL-SCNC: 4 MMOL/L (ref 3.5–5.1)
PROT SERPL-MCNC: 8.1 G/DL
Q-T INTERVAL, ECG07: 342 MS
QRS DURATION, ECG06: 78 MS
QTC CALCULATION (BEZET), ECG08: 404 MS
RBC # BLD AUTO: 4.94 M/UL (ref 4.05–5.2)
SODIUM SERPL-SCNC: 140 MMOL/L (ref 136–145)
TROPONIN I BLD-MCNC: 0.01 NG/ML (ref 0.02–0.05)
VENTRICULAR RATE, ECG03: 84 BPM
WBC # BLD AUTO: 15.1 K/UL (ref 4.3–11.1)

## 2018-10-21 PROCEDURE — 74011000250 HC RX REV CODE- 250: Performed by: EMERGENCY MEDICINE

## 2018-10-21 PROCEDURE — 85025 COMPLETE CBC W/AUTO DIFF WBC: CPT

## 2018-10-21 PROCEDURE — 80053 COMPREHEN METABOLIC PANEL: CPT

## 2018-10-21 PROCEDURE — 71046 X-RAY EXAM CHEST 2 VIEWS: CPT

## 2018-10-21 PROCEDURE — 84484 ASSAY OF TROPONIN QUANT: CPT

## 2018-10-21 PROCEDURE — 93005 ELECTROCARDIOGRAM TRACING: CPT | Performed by: EMERGENCY MEDICINE

## 2018-10-21 PROCEDURE — 99284 EMERGENCY DEPT VISIT MOD MDM: CPT | Performed by: EMERGENCY MEDICINE

## 2018-10-21 RX ORDER — AZITHROMYCIN 250 MG/1
TABLET, FILM COATED ORAL
Qty: 6 TAB | Refills: 0 | Status: SHIPPED | OUTPATIENT
Start: 2018-10-21 | End: 2018-10-25

## 2018-10-21 RX ORDER — ALBUTEROL SULFATE 90 UG/1
2 AEROSOL, METERED RESPIRATORY (INHALATION)
Qty: 1 INHALER | Refills: 4 | Status: SHIPPED | OUTPATIENT
Start: 2018-10-21 | End: 2018-10-25

## 2018-10-21 RX ORDER — LIDOCAINE HYDROCHLORIDE 40 MG/ML
3 SOLUTION TOPICAL ONCE
Status: COMPLETED | OUTPATIENT
Start: 2018-10-21 | End: 2018-10-21

## 2018-10-21 RX ADMIN — LIDOCAINE HYDROCHLORIDE 3 ML: 40 SOLUTION TOPICAL at 16:46

## 2018-10-21 NOTE — ED NOTES
Pt advised to not eat or drink for at least 1 hour after nebulizer treatment due to numbing of mouth and throat.

## 2018-10-21 NOTE — ED NOTES
I have reviewed discharge instructions with the patient. The patient verbalized understanding. Patient left ED via Discharge Method: ambulatory to Home with family. Opportunity for questions and clarification provided. Patient given 2 scripts. To continue your aftercare when you leave the hospital, you may receive an automated call from our care team to check in on how you are doing. This is a free service and part of our promise to provide the best care and service to meet your aftercare needs.  If you have questions, or wish to unsubscribe from this service please call [phone]. Thank you for Choosing our [facility] Emergency Department.

## 2018-10-21 NOTE — ED TRIAGE NOTES
Pt to ED from urgent care d/t worsening cough and congestion. Hacking cough noted. Pt currently on augmentin and prednisone for bronchitis but not feeling better. Afebrile.

## 2018-10-21 NOTE — DISCHARGE INSTRUCTIONS
Follow up with your doctor next week. Return to the ER if your symptoms worsen. Bronchitis: Care Instructions  Your Care Instructions    Bronchitis is inflammation of the bronchial tubes, which carry air to the lungs. The tubes swell and produce mucus, or phlegm. The mucus and inflamed bronchial tubes make you cough. You may have trouble breathing. Most cases of bronchitis are caused by viruses like those that cause colds. Antibiotics usually do not help and they may be harmful. Bronchitis usually develops rapidly and lasts about 2 to 3 weeks in otherwise healthy people. Follow-up care is a key part of your treatment and safety. Be sure to make and go to all appointments, and call your doctor if you are having problems. It's also a good idea to know your test results and keep a list of the medicines you take. How can you care for yourself at home? · Take all medicines exactly as prescribed. Call your doctor if you think you are having a problem with your medicine. · Get some extra rest.  · Take an over-the-counter pain medicine, such as acetaminophen (Tylenol), ibuprofen (Advil, Motrin), or naproxen (Aleve) to reduce fever and relieve body aches. Read and follow all instructions on the label. · Do not take two or more pain medicines at the same time unless the doctor told you to. Many pain medicines have acetaminophen, which is Tylenol. Too much acetaminophen (Tylenol) can be harmful. · Take an over-the-counter cough medicine that contains dextromethorphan to help quiet a dry, hacking cough so that you can sleep. Avoid cough medicines that have more than one active ingredient. Read and follow all instructions on the label. · Breathe moist air from a humidifier, hot shower, or sink filled with hot water. The heat and moisture will thin mucus so you can cough it out. · Do not smoke. Smoking can make bronchitis worse.  If you need help quitting, talk to your doctor about stop-smoking programs and medicines. These can increase your chances of quitting for good. When should you call for help? Call 911 anytime you think you may need emergency care. For example, call if:    · You have severe trouble breathing.    Call your doctor now or seek immediate medical care if:    · You have new or worse trouble breathing.     · You cough up dark brown or bloody mucus (sputum).     · You have a new or higher fever.     · You have a new rash.    Watch closely for changes in your health, and be sure to contact your doctor if:    · You cough more deeply or more often, especially if you notice more mucus or a change in the color of your mucus.     · You are not getting better as expected. Where can you learn more? Go to http://josias-tayla.info/. Enter H333 in the search box to learn more about \"Bronchitis: Care Instructions. \"  Current as of: December 6, 2017  Content Version: 11.8  © 9245-3612 [employer]. Care instructions adapted under license by [employer] (which disclaims liability or warranty for this information). If you have questions about a medical condition or this instruction, always ask your healthcare professional. [place] [address] any warranty or liability for your use of this information.

## 2018-10-21 NOTE — ED PROVIDER NOTES
Patient states she has been having a cough for the past week. She also hasn't been having chest pain, describes it as \"like someone sitting on my chest\" when she coughs. She denies any production, has been having some wheezing. She is not a smoker, denies any prior history of lung disease. She was admitted at [facility] recently for some type of vascular problem in her neck. She denies any aggravating or alleviating factors. She went to an urgent care and was sent here for evaluation of her cough and congestion. She is currently taking Augmentin and prednisone without any improvement. Elements of this note were created using speech recognition software. As such, errors of speech recognition may be present. Past Medical History:  
Diagnosis Date  Arthritis  Bipolar affective disorder (HCC)   
 anxiety, panic attacks, neurosis  Chronic pain   
 bulging discs  Depression 11/12/2009  
 anxiety  Dysuria 07/11/2007  Fibromyalgia  GERD (gastroesophageal reflux disease) 11/12/2009  Hypercholesterolemia  Hyperlipidemia  Hypothyroidism 11/12/2009  Lumbago 11/12/2009  Miscarriage  PUD (peptic ulcer disease)  Unspecified urinary incontinence  Urinary tract infection, site not specified 06/07/2006 Past Surgical History:  
Procedure Laterality Date  HX BLADDER SUSPENSION  2003  HX CHOLECYSTECTOMY  1980s  HX CYSTOCELE REPAIR  11/12/2009  HX OOPHORECTOMY  HX RECTOCELE REPAIR  11/12/2009 [facility]  HX TOTAL ABDOMINAL HYSTERECTOMY  2003 Family History:  
Problem Relation Age of Onset  Breast Cancer Mother 70  
 Heart Disease Father  Prostate Cancer Father Social History Socioeconomic History  Marital status:  Spouse name: Not on file  Number of children: Not on file  Years of education: Not on file  Highest education level: Not on file Social Needs  Financial resource strain: Not on file  Food insecurity - worry: Not on file  Food insecurity - inability: Not on file  Transportation needs - medical: Not on file  Transportation needs - non-medical: Not on file Occupational History  Not on file Tobacco Use  Smoking status: Never Smoker  Smokeless tobacco: Never Used Substance and Sexual Activity  Alcohol use: No  
 Drug use: No  
 Sexual activity: Not Currently Partners: Male Other Topics Concern  Not on file Social History Narrative  and lives with her --has a son and daughter. Disabled and has history of work in the [employer] No pets. ALLERGIES: Fentanyl and Effexor [venlafaxine] Review of Systems Constitutional: Negative for chills and fever. Respiratory: Positive for cough, chest tightness and shortness of breath. Gastrointestinal: Negative for nausea and vomiting. All other systems reviewed and are negative. Vitals:  
 10/21/18 1621 BP: 155/72 Pulse: 92 Resp: 22 Temp: 97.9 °F (36.6 °C) SpO2: 97% Weight: 97.5 kg (215 lb) Height: 5' 10\" (1.778 m) Physical Exam  
Constitutional: She is oriented to person, place, and time. She appears well-developed and well-nourished. Well-developed well-nourished female who has a frequent dry hacking cough HENT:  
Head: Normocephalic and atraumatic. Eyes: Conjunctivae are normal. Pupils are equal, round, and reactive to light. Neck: Normal range of motion. Neck supple. Cardiovascular: Normal rate, regular rhythm and normal heart sounds. Pulmonary/Chest: Effort normal and breath sounds normal. She has no decreased breath sounds. She has no wheezes. She has no rhonchi. She has no rales. Abdominal: Soft. Bowel sounds are normal.  
Musculoskeletal: She exhibits no edema or tenderness. Neurological: She is alert and oriented to person, place, and time. Skin: Skin is warm and dry. Psychiatric: She has a normal mood and affect. Her behavior is normal.  
Nursing note and vitals reviewed. MDM Number of Diagnoses or Management Options Acute bronchitis, unspecified organism: new and does not require workup Diagnosis management comments: 5:32 PM patient's cough improved dramatically after lidocaine. She appears couple and in no distress. Discussed results with her, need for follow-up with her primary doctor Amount and/or Complexity of Data Reviewed Clinical lab tests: reviewed and ordered Tests in the radiology section of [facility]®: ordered and reviewed Risk of Complications, Morbidity, and/or Mortality Presenting problems: moderate Diagnostic procedures: moderate Management options: moderate Patient Progress Patient progress: stable Procedures

## 2018-10-29 ENCOUNTER — HOSPITAL ENCOUNTER (EMERGENCY)
Age: 68
Discharge: HOME | End: 2018-10-29
Attending: EMERGENCY MEDICINE
Payer: MEDICARE

## 2018-10-29 ENCOUNTER — APPOINTMENT (OUTPATIENT)
Dept: GENERAL RADIOLOGY | Age: 68
End: 2018-10-29
Attending: EMERGENCY MEDICINE
Payer: MEDICARE

## 2018-10-29 ENCOUNTER — APPOINTMENT (OUTPATIENT)
Dept: CT IMAGING | Age: 68
End: 2018-10-29
Attending: EMERGENCY MEDICINE
Payer: MEDICARE

## 2018-10-29 VITALS
OXYGEN SATURATION: 98 % | HEART RATE: 64 BPM | BODY MASS INDEX: 30.78 KG/M2 | RESPIRATION RATE: 18 BRPM | WEIGHT: 215 LBS | HEIGHT: 70 IN | DIASTOLIC BLOOD PRESSURE: 63 MMHG | SYSTOLIC BLOOD PRESSURE: 137 MMHG | TEMPERATURE: 98 F

## 2018-10-29 DIAGNOSIS — J20.9 ACUTE BRONCHITIS, UNSPECIFIED ORGANISM: ICD-10-CM

## 2018-10-29 DIAGNOSIS — R05.9 COUGH: Primary | ICD-10-CM

## 2018-10-29 LAB
ALBUMIN SERPL-MCNC: 3.6 G/DL (ref 3.2–4.6)
ALBUMIN/GLOB SERPL: 0.8 {RATIO}
ALP SERPL-CCNC: 118 U/L (ref 50–136)
ALT SERPL-CCNC: 31 U/L (ref 12–65)
ANION GAP SERPL CALC-SCNC: 12 MMOL/L
AST SERPL-CCNC: 15 U/L (ref 15–37)
BASOPHILS # BLD: 0 K/UL (ref 0–0.2)
BASOPHILS NFR BLD: 0 % (ref 0–2)
BILIRUB SERPL-MCNC: 0.2 MG/DL (ref 0.2–1.1)
BUN SERPL-MCNC: 13 MG/DL (ref 8–23)
CALCIUM SERPL-MCNC: 9 MG/DL (ref 8.3–10.4)
CHLORIDE SERPL-SCNC: 100 MMOL/L (ref 98–107)
CO2 SERPL-SCNC: 27 MMOL/L (ref 21–32)
CREAT SERPL-MCNC: 1.04 MG/DL (ref 0.6–1)
DIFFERENTIAL METHOD BLD: ABNORMAL
EOSINOPHIL # BLD: 0 K/UL (ref 0–0.8)
EOSINOPHIL NFR BLD: 0 % (ref 0.5–7.8)
ERYTHROCYTE [DISTWIDTH] IN BLOOD BY AUTOMATED COUNT: 12.7 %
GLOBULIN SER CALC-MCNC: 4.4 G/DL (ref 2.3–3.5)
GLUCOSE SERPL-MCNC: 178 MG/DL (ref 65–100)
HCT VFR BLD AUTO: 45 % (ref 35.8–46.3)
HGB BLD-MCNC: 15 G/DL (ref 11.7–15.4)
IMM GRANULOCYTES # BLD: 0.2 K/UL (ref 0–0.5)
IMM GRANULOCYTES NFR BLD AUTO: 1 % (ref 0–5)
LACTATE BLD-SCNC: 1.19 MMOL/L (ref 0.5–1.9)
LACTATE BLD-SCNC: 4.15 MMOL/L (ref 0.5–1.9)
LYMPHOCYTES # BLD: 0.7 K/UL (ref 0.5–4.6)
LYMPHOCYTES NFR BLD: 4 % (ref 13–44)
MCH RBC QN AUTO: 30.9 PG (ref 26.1–32.9)
MCHC RBC AUTO-ENTMCNC: 33.3 G/DL (ref 31.4–35)
MCV RBC AUTO: 92.6 FL (ref 79.6–97.8)
MONOCYTES # BLD: 0.3 K/UL (ref 0.1–1.3)
MONOCYTES NFR BLD: 1 % (ref 4–12)
NEUTS SEG # BLD: 16.7 K/UL (ref 1.7–8.2)
NEUTS SEG NFR BLD: 94 % (ref 43–78)
NRBC # BLD: 0 K/UL (ref 0–0.2)
PLATELET # BLD AUTO: 345 K/UL (ref 150–450)
PMV BLD AUTO: 10.5 FL (ref 9.4–12.3)
POTASSIUM SERPL-SCNC: 3.8 MMOL/L (ref 3.5–5.1)
PROT SERPL-MCNC: 8 G/DL
RBC # BLD AUTO: 4.86 M/UL (ref 4.05–5.2)
SODIUM SERPL-SCNC: 139 MMOL/L (ref 136–145)
WBC # BLD AUTO: 17.8 K/UL (ref 4.3–11.1)

## 2018-10-29 PROCEDURE — 83605 ASSAY OF LACTIC ACID: CPT

## 2018-10-29 PROCEDURE — 96366 THER/PROPH/DIAG IV INF ADDON: CPT | Performed by: EMERGENCY MEDICINE

## 2018-10-29 PROCEDURE — 85025 COMPLETE CBC W/AUTO DIFF WBC: CPT

## 2018-10-29 PROCEDURE — 74011000258 HC RX REV CODE- 258: Performed by: EMERGENCY MEDICINE

## 2018-10-29 PROCEDURE — 71260 CT THORAX DX C+: CPT

## 2018-10-29 PROCEDURE — 71045 X-RAY EXAM CHEST 1 VIEW: CPT

## 2018-10-29 PROCEDURE — 96365 THER/PROPH/DIAG IV INF INIT: CPT | Performed by: EMERGENCY MEDICINE

## 2018-10-29 PROCEDURE — 74011636320 HC RX REV CODE- 636/320: Performed by: EMERGENCY MEDICINE

## 2018-10-29 PROCEDURE — 74011250636 HC RX REV CODE- 250/636: Performed by: EMERGENCY MEDICINE

## 2018-10-29 PROCEDURE — 87040 BLOOD CULTURE FOR BACTERIA: CPT

## 2018-10-29 PROCEDURE — 80053 COMPREHEN METABOLIC PANEL: CPT

## 2018-10-29 PROCEDURE — 99284 EMERGENCY DEPT VISIT MOD MDM: CPT | Performed by: EMERGENCY MEDICINE

## 2018-10-29 RX ORDER — HYDROCODONE POLISTIREX AND CHLORPHENIRAMINE POLISTIREX 10; 8 MG/5ML; MG/5ML
5 SUSPENSION, EXTENDED RELEASE ORAL
Qty: 60 ML | Refills: 0 | Status: SHIPPED | OUTPATIENT
Start: 2018-10-29 | End: 2019-03-26

## 2018-10-29 RX ORDER — LEVOFLOXACIN 5 MG/ML
750 INJECTION, SOLUTION INTRAVENOUS EVERY 24 HOURS
Status: DISCONTINUED | OUTPATIENT
Start: 2018-10-29 | End: 2018-10-30 | Stop reason: HOSPADM

## 2018-10-29 RX ORDER — SODIUM CHLORIDE 0.9 % (FLUSH) 0.9 %
10 SYRINGE (ML) INJECTION
Status: COMPLETED | OUTPATIENT
Start: 2018-10-29 | End: 2018-10-29

## 2018-10-29 RX ADMIN — SODIUM CHLORIDE 1000 ML: 900 INJECTION, SOLUTION INTRAVENOUS at 19:14

## 2018-10-29 RX ADMIN — SODIUM CHLORIDE 1000 ML: 900 INJECTION, SOLUTION INTRAVENOUS at 18:37

## 2018-10-29 RX ADMIN — SODIUM CHLORIDE 100 ML: 900 INJECTION, SOLUTION INTRAVENOUS at 18:52

## 2018-10-29 RX ADMIN — IOPAMIDOL 100 ML: 755 INJECTION, SOLUTION INTRAVENOUS at 18:51

## 2018-10-29 RX ADMIN — Medication 10 ML: at 18:52

## 2018-10-29 RX ADMIN — LEVOFLOXACIN 750 MG: 5 INJECTION, SOLUTION INTRAVENOUS at 18:37

## 2018-10-29 NOTE — ED PROVIDER NOTES
Patient was diagnosed with pneumonia or bronchitis 3-4 weeks ago. Has been on Augmentin and steroids and more recently doxycycline and steroids. Is followed by pulmonology and diagnosed with restrictive lung disease. No specific asthma or COPD. Uses inhalers and nebulizers at home. Has been on multiple treatments with no improvement in her cough. Cough has occasional sputum but mostly nonproductive. With symptoms continuing today came here. The history is provided by the patient and a relative. No  was used. Cough This is a new problem. Episode onset: 3-4 weeks. The problem occurs constantly. The problem has not changed since onset. The cough is productive of sputum. There has been no fever. Associated symptoms include chest pain (with cough), shortness of breath, wheezing and nausea. Pertinent negatives include no chills, no sweats, no eye redness, no ear pain, no headaches, no rhinorrhea, no sore throat, no myalgias, no vomiting and no confusion. She has tried nebulizers, cough syrup, antibiotics, steroids and inhalers for the symptoms. The treatment provided no relief. She is not a smoker. Her past medical history is significant for bronchitis and pneumonia. Past Medical History:  
Diagnosis Date  Arthritis  Bipolar affective disorder (HCC)   
 anxiety, panic attacks, neurosis  Chronic pain   
 bulging discs  Depression 11/12/2009  
 anxiety  Dysuria 07/11/2007  Fibromyalgia  GERD (gastroesophageal reflux disease) 11/12/2009  Hypercholesterolemia  Hyperlipidemia  Hypothyroidism 11/12/2009  Lumbago 11/12/2009  Miscarriage  PUD (peptic ulcer disease)  Unspecified urinary incontinence  Urinary tract infection, site not specified 06/07/2006 Past Surgical History:  
Procedure Laterality Date  HX BLADDER SUSPENSION  2003  HX CHOLECYSTECTOMY  1980s  HX CYSTOCELE REPAIR  11/12/2009  HX OOPHORECTOMY  HX RECTOCELE REPAIR  11/12/2009 [facility]  HX TOTAL ABDOMINAL HYSTERECTOMY  2003 Family History:  
Problem Relation Age of Onset  Breast Cancer Mother 70  
 Heart Disease Father  Prostate Cancer Father Social History Socioeconomic History  Marital status:  Spouse name: Not on file  Number of children: Not on file  Years of education: Not on file  Highest education level: Not on file Social Needs  Financial resource strain: Not on file  Food insecurity - worry: Not on file  Food insecurity - inability: Not on file  Transportation needs - medical: Not on file  Transportation needs - non-medical: Not on file Occupational History  Not on file Tobacco Use  Smoking status: Never Smoker  Smokeless tobacco: Never Used Substance and Sexual Activity  Alcohol use: No  
 Drug use: No  
 Sexual activity: Not Currently Partners: Male Other Topics Concern  Not on file Social History Narrative  and lives with her --has a son and daughter. Disabled and has history of work in the [employer] No pets. ALLERGIES: Fentanyl and Effexor [venlafaxine] Review of Systems Constitutional: Negative for chills and fever. HENT: Negative for ear pain, rhinorrhea and sore throat. Eyes: Negative for pain and redness. Respiratory: Positive for cough, shortness of breath and wheezing. Negative for chest tightness. Cardiovascular: Positive for chest pain (with cough). Negative for leg swelling. Gastrointestinal: Positive for nausea. Negative for abdominal pain, diarrhea and vomiting. Genitourinary: Negative for dysuria and hematuria. Musculoskeletal: Negative for back pain, gait problem, myalgias, neck pain and neck stiffness. Skin: Negative for color change and rash. Neurological: Negative for weakness, numbness and headaches. Psychiatric/Behavioral: Negative for confusion. Vitals:  
 10/29/18 1745 BP: 180/87 Pulse: 93 Resp: 18 Temp: 97.6 °F (36.4 °C) SpO2: 96% Weight: 97.5 kg (215 lb) Height: 5' 10\" (1.778 m) Physical Exam  
Constitutional: She is oriented to person, place, and time. She appears well-developed and well-nourished. HENT:  
Head: Normocephalic and atraumatic. Neck: Normal range of motion. Neck supple. Cardiovascular: Normal rate and regular rhythm. No murmur heard. Pulmonary/Chest: Effort normal and breath sounds normal. No respiratory distress. She has no wheezes. Lots of coughing. Abdominal: Soft. Bowel sounds are normal. There is no tenderness. Musculoskeletal: Normal range of motion. She exhibits no edema. Neurological: She is alert and oriented to person, place, and time. Skin: Skin is warm and dry. MDM Number of Diagnoses or Management Options Diagnosis management comments: Lactic acid completely improved after fluids. CT negative for pneumonia. white blood cell count likely elevated from steroids. No fever. Will have patient continue outpatient medications and add cough medication. Amount and/or Complexity of Data Reviewed Clinical lab tests: ordered and reviewed Tests in the radiology section of CPT®: ordered and reviewed Tests in the medicine section of CPT®: ordered and reviewed Patient Progress Patient progress: stable Procedures XR CHEST PORT (Final result) Result time 10/29/18 18:00:21 Final result by Eliza Ocasio MD (10/29/18 18:00:21) Impression:  
 IMPRESSION: Nondiagnostic chest x-ray. Respiratory motion artifact. Correlate 
with an upright PA lateral chest x-ray when possible. Narrative: PORTABLE CHEST X-RAY HISTORY: Cough COMPARISON: October 21, 2018 FINDINGS: Respiratory motion artifact limits evaluation of the chest. There are 
no large pleural effusions.    
 
 
CT CHEST W CONT (Final result) Result time 10/29/18 19:10:06 Final result by Antony Manzo MD (10/29/18 19:10:06) Impression:  
 IMPRESSION: 
 
1. No pulmonary embolus. 2. Small pulmonary nodules measuring up to 3.5 mm in diameter that appear 
unchanged since February 2017. This stability favors a benign etiology. Narrative:  
 CT CHEST WITH CONTRAST 10/29/2018 HISTORY: Cough. Admission 3 weeks ago for pneumonia. Chest pain and pressure for 2 weeks. TECHNIQUE: The patient received 100 mL Isovue-370 nonionic IV contrast and axial 
images were obtained through the chest. Coronal reformatted images were 
generated.   All CT scans at this facility used dose modulation, interactive 
reconstruction and/or weight based dosing when appropriate to reduce radiation 
dose to as low as reasonably achievable. COMPARISON: 9/20/2018 FINDINGS: There are no filling defects within the main or proximal segmental 
pulmonary arteries suggestive of emboli. The thoracic aorta is well-opacified 
without aneurysm or dissection. There is no consolidation or pleural effusions. Included images of the upper abdomen demonstrate normal-appearing adrenal 
glands. Cholecystectomy clips are present. There is a stable 2 mm nodule in the base of the right middle lobe anteriorly 
(image 87). There is a stable pleural nodule in the left upper lobe measuring 3.5 mm in diameter (image 22). These nodules appear unchanged since February 2017.  
  
  
  
   
 
Results Include: 
 
Recent Results (from the past 24 hour(s)) CBC WITH AUTOMATED DIFF Collection Time: 10/29/18  5:59 PM  
Result Value Ref Range WBC 17.8 (H) 4.3 - 11.1 K/uL  
 RBC 4.86 4.05 - 5.2 M/uL  
 HGB 15.0 11.7 - 15.4 g/dL HCT 45.0 35.8 - 46.3 % MCV 92.6 79.6 - 97.8 FL  
 MCH 30.9 26.1 - 32.9 PG  
 MCHC 33.3 31.4 - 35.0 g/dL  
 RDW 12.7 % PLATELET 480 126 - 576 K/uL  MPV 10.5 9.4 - 12.3 FL  
 ABSOLUTE NRBC 0.00 0.0 - 0.2 K/uL  
 DF AUTOMATED NEUTROPHILS 94 (H) 43 - 78 % LYMPHOCYTES 4 (L) 13 - 44 % MONOCYTES 1 (L) 4.0 - 12.0 % EOSINOPHILS 0 (L) 0.5 - 7.8 % BASOPHILS 0 0.0 - 2.0 % IMMATURE GRANULOCYTES 1 0.0 - 5.0 %  
 ABS. NEUTROPHILS 16.7 (H) 1.7 - 8.2 K/UL  
 ABS. LYMPHOCYTES 0.7 0.5 - 4.6 K/UL  
 ABS. MONOCYTES 0.3 0.1 - 1.3 K/UL  
 ABS. EOSINOPHILS 0.0 0.0 - 0.8 K/UL  
 ABS. BASOPHILS 0.0 0.0 - 0.2 K/UL  
 ABS. IMM. GRANS. 0.2 0.0 - 0.5 K/UL METABOLIC PANEL, COMPREHENSIVE Collection Time: 10/29/18  5:59 PM  
Result Value Ref Range Sodium 139 136 - 145 mmol/L Potassium 3.8 3.5 - 5.1 mmol/L Chloride 100 98 - 107 mmol/L  
 CO2 27 21 - 32 mmol/L Anion gap 12 mmol/L Glucose 178 (H) 65 - 100 mg/dL BUN 13 8 - 23 MG/DL Creatinine 1.04 (H) 0.6 - 1.0 MG/DL  
 GFR est AA >60 >60 ml/min/1.73m2 GFR est non-AA 56 ml/min/1.73m2 Calcium 9.0 8.3 - 10.4 MG/DL Bilirubin, total 0.2 0.2 - 1.1 MG/DL  
 ALT (SGPT) 31 12 - 65 U/L  
 AST (SGOT) 15 15 - 37 U/L Alk. phosphatase 118 50 - 136 U/L Protein, total 8.0 g/dL Albumin 3.6 3.2 - 4.6 g/dL Globulin 4.4 (H) 2.3 - 3.5 g/dL A-G Ratio 0.8 POC LACTIC ACID Collection Time: 10/29/18  6:07 PM  
Result Value Ref Range Lactic Acid (POC) 4.15 (H) 0.5 - 1.9 mmol/L POC LACTIC ACID Collection Time: 10/29/18  8:47 PM  
Result Value Ref Range  Lactic Acid (POC) 1.19 0.5 - 1.9 mmol/L

## 2018-10-30 NOTE — ED NOTES
I have reviewed discharge instructions with the patient. The patient verbalized understanding. Patient left ED via Discharge Method: ambulatory to Home with spouse. Opportunity for questions and clarification provided. Patient given 1 scripts. To continue your aftercare when you leave the hospital, you may receive an automated call from our care team to check in on how you are doing. This is a free service and part of our promise to provide the best care and service to meet your aftercare needs.  If you have questions, or wish to unsubscribe from this service please call [phone]. Thank you for Choosing our [facility] Department.

## 2018-10-30 NOTE — DISCHARGE INSTRUCTIONS
Bronchitis: Care Instructions  Your Care Instructions    Bronchitis is inflammation of the bronchial tubes, which carry air to the lungs. The tubes swell and produce mucus, or phlegm. The mucus and inflamed bronchial tubes make you cough. You may have trouble breathing. Most cases of bronchitis are caused by viruses like those that cause colds. Antibiotics usually do not help and they may be harmful. Bronchitis usually develops rapidly and lasts about 2 to 3 weeks in otherwise healthy people. Follow-up care is a key part of your treatment and safety. Be sure to make and go to all appointments, and call your doctor if you are having problems. It's also a good idea to know your test results and keep a list of the medicines you take. How can you care for yourself at home? · Take all medicines exactly as prescribed. Call your doctor if you think you are having a problem with your medicine. · Get some extra rest.  · Take an over-the-counter pain medicine, such as acetaminophen (Tylenol), ibuprofen (Advil, Motrin), or naproxen (Aleve) to reduce fever and relieve body aches. Read and follow all instructions on the label. · Do not take two or more pain medicines at the same time unless the doctor told you to. Many pain medicines have acetaminophen, which is Tylenol. Too much acetaminophen (Tylenol) can be harmful. · Take an over-the-counter cough medicine that contains dextromethorphan to help quiet a dry, hacking cough so that you can sleep. Avoid cough medicines that have more than one active ingredient. Read and follow all instructions on the label. · Breathe moist air from a humidifier, hot shower, or sink filled with hot water. The heat and moisture will thin mucus so you can cough it out. · Do not smoke. Smoking can make bronchitis worse. If you need help quitting, talk to your doctor about stop-smoking programs and medicines. These can increase your chances of quitting for good.   When should you call for help? Call 911 anytime you think you may need emergency care. For example, call if:    · You have severe trouble breathing.    Call your doctor now or seek immediate medical care if:    · You have new or worse trouble breathing.     · You cough up dark brown or bloody mucus (sputum).     · You have a new or higher fever.     · You have a new rash.    Watch closely for changes in your health, and be sure to contact your doctor if:    · You cough more deeply or more often, especially if you notice more mucus or a change in the color of your mucus.     · You are not getting better as expected. Where can you learn more? Go to http://josias-tayla.info/. Enter H333 in the search box to learn more about \"Bronchitis: Care Instructions. \"  Current as of: December 6, 2017  Content Version: 11.8  © 2669-8596 [employer]. Care instructions adapted under license by "[employer] (which disclaims liability or warranty for this information). If you have questions about a medical condition or this instruction, always ask your healthcare professional. [place] [address] any warranty or liability for your use of this information. Cough: Care Instructions  Your Care Instructions    A cough is your body's response to something that bothers your throat or airways. Many things can cause a cough. You might cough because of a cold or the flu, bronchitis, or asthma. Smoking, postnasal drip, allergies, and stomach acid that backs up into your throat also can cause coughs. A cough is a symptom, not a disease. Most coughs stop when the cause, such as a cold, goes away. You can take a few steps at home to cough less and feel better. Follow-up care is a key part of your treatment and safety. Be sure to make and go to all appointments, and call your doctor if you are having problems.  It's also a good idea to know your test results and keep a list of the medicines you take.  How can you care for yourself at home? · Drink lots of water and other fluids. This helps thin the mucus and soothes a dry or sore throat. Honey or lemon juice in hot water or tea may ease a dry cough. · Take cough medicine as directed by your doctor. · Prop up your head on pillows to help you breathe and ease a dry cough. · Try cough drops to soothe a dry or sore throat. Cough drops don't stop a cough. Medicine-flavored cough drops are no better than candy-flavored drops or hard candy. · Do not smoke. Avoid secondhand smoke. If you need help quitting, talk to your doctor about stop-smoking programs and medicines. These can increase your chances of quitting for good. When should you call for help? Call 911 anytime you think you may need emergency care. For example, call if:    · You have severe trouble breathing.    Call your doctor now or seek immediate medical care if:    · You cough up blood.     · You have new or worse trouble breathing.     · You have a new or higher fever.     · You have a new rash.    Watch closely for changes in your health, and be sure to contact your doctor if:    · You cough more deeply or more often, especially if you notice more mucus or a change in the color of your mucus.     · You have new symptoms, such as a sore throat, an earache, or sinus pain.     · You do not get better as expected. Where can you learn more? Go to http://josias-tayla.info/. Enter D279 in the search box to learn more about \"Cough: Care Instructions. \"  Current as of: December 6, 2017  Content Version: 11.8  © 4440-4930 [employer]. Care instructions adapted under license by [employer] (which disclaims liability or warranty for this information). If you have questions about a medical condition or this instruction, always ask your healthcare professional. [address] any warranty or liability for your use of this information.

## 2018-11-03 LAB
BACTERIA SPEC CULT: NORMAL
BACTERIA SPEC CULT: NORMAL
SERVICE CMNT-IMP: NORMAL
SERVICE CMNT-IMP: NORMAL

## 2018-12-17 ENCOUNTER — HOSPITAL ENCOUNTER (EMERGENCY)
Age: 68
Discharge: HOME | End: 2018-12-17
Attending: EMERGENCY MEDICINE
Payer: MEDICARE

## 2018-12-17 ENCOUNTER — APPOINTMENT (OUTPATIENT)
Dept: CT IMAGING | Age: 68
End: 2018-12-17
Attending: EMERGENCY MEDICINE
Payer: MEDICARE

## 2018-12-17 VITALS
DIASTOLIC BLOOD PRESSURE: 53 MMHG | OXYGEN SATURATION: 91 % | SYSTOLIC BLOOD PRESSURE: 115 MMHG | HEART RATE: 65 BPM | WEIGHT: 215 LBS | BODY MASS INDEX: 30.78 KG/M2 | HEIGHT: 70 IN | RESPIRATION RATE: 16 BRPM | TEMPERATURE: 98.9 F

## 2018-12-17 DIAGNOSIS — R53.1 WEAKNESS: Primary | ICD-10-CM

## 2018-12-17 LAB
ALBUMIN SERPL-MCNC: 3.6 G/DL (ref 3.2–4.6)
ALBUMIN/GLOB SERPL: 1 {RATIO}
ALP SERPL-CCNC: 133 U/L (ref 50–130)
ALT SERPL-CCNC: 32 U/L (ref 12–65)
ANION GAP SERPL CALC-SCNC: 8 MMOL/L
AST SERPL-CCNC: 24 U/L (ref 15–37)
BASOPHILS # BLD: 0.1 K/UL (ref 0–0.2)
BASOPHILS NFR BLD: 1 % (ref 0–2)
BILIRUB SERPL-MCNC: 0.3 MG/DL (ref 0.2–1.1)
BUN SERPL-MCNC: 7 MG/DL (ref 8–23)
CALCIUM SERPL-MCNC: 8.8 MG/DL (ref 8.3–10.4)
CHLORIDE SERPL-SCNC: 106 MMOL/L (ref 98–107)
CK SERPL-CCNC: 57 U/L (ref 21–215)
CO2 SERPL-SCNC: 28 MMOL/L (ref 21–32)
CREAT SERPL-MCNC: 0.74 MG/DL (ref 0.6–1)
DIFFERENTIAL METHOD BLD: NORMAL
EOSINOPHIL # BLD: 0.2 K/UL (ref 0–0.8)
EOSINOPHIL NFR BLD: 3 % (ref 0.5–7.8)
ERYTHROCYTE [DISTWIDTH] IN BLOOD BY AUTOMATED COUNT: 13.2 % (ref 11.9–14.6)
ERYTHROCYTE [SEDIMENTATION RATE] IN BLOOD: 31 MM/HR (ref 0–30)
GLOBULIN SER CALC-MCNC: 3.7 G/DL (ref 2.3–3.5)
GLUCOSE SERPL-MCNC: 135 MG/DL (ref 65–100)
HCT VFR BLD AUTO: 40.7 % (ref 35.8–46.3)
HGB BLD-MCNC: 13 G/DL (ref 11.7–15.4)
IMM GRANULOCYTES # BLD: 0 K/UL (ref 0–0.5)
IMM GRANULOCYTES NFR BLD AUTO: 0 % (ref 0–5)
LYMPHOCYTES # BLD: 2 K/UL (ref 0.5–4.6)
LYMPHOCYTES NFR BLD: 27 % (ref 13–44)
MAGNESIUM SERPL-MCNC: 1.8 MG/DL (ref 1.8–2.4)
MCH RBC QN AUTO: 30.5 PG (ref 26.1–32.9)
MCHC RBC AUTO-ENTMCNC: 31.9 G/DL (ref 31.4–35)
MCV RBC AUTO: 95.5 FL (ref 79.6–97.8)
MONOCYTES # BLD: 0.7 K/UL (ref 0.1–1.3)
MONOCYTES NFR BLD: 9 % (ref 4–12)
NEUTS SEG # BLD: 4.6 K/UL (ref 1.7–8.2)
NEUTS SEG NFR BLD: 60 % (ref 43–78)
NRBC # BLD: 0 K/UL (ref 0–0.2)
PLATELET # BLD AUTO: 237 K/UL (ref 150–450)
PMV BLD AUTO: 10.9 FL (ref 9.4–12.3)
POTASSIUM SERPL-SCNC: 3.5 MMOL/L (ref 3.5–5.1)
PROT SERPL-MCNC: 7.3 G/DL
RBC # BLD AUTO: 4.26 M/UL (ref 4.05–5.2)
SODIUM SERPL-SCNC: 142 MMOL/L (ref 136–145)
WBC # BLD AUTO: 7.6 K/UL (ref 4.3–11.1)

## 2018-12-17 PROCEDURE — 96360 HYDRATION IV INFUSION INIT: CPT | Performed by: EMERGENCY MEDICINE

## 2018-12-17 PROCEDURE — 85652 RBC SED RATE AUTOMATED: CPT

## 2018-12-17 PROCEDURE — 96361 HYDRATE IV INFUSION ADD-ON: CPT | Performed by: EMERGENCY MEDICINE

## 2018-12-17 PROCEDURE — 81003 URINALYSIS AUTO W/O SCOPE: CPT | Performed by: EMERGENCY MEDICINE

## 2018-12-17 PROCEDURE — 85025 COMPLETE CBC W/AUTO DIFF WBC: CPT

## 2018-12-17 PROCEDURE — 80053 COMPREHEN METABOLIC PANEL: CPT

## 2018-12-17 PROCEDURE — 82550 ASSAY OF CK (CPK): CPT

## 2018-12-17 PROCEDURE — 83735 ASSAY OF MAGNESIUM: CPT

## 2018-12-17 PROCEDURE — 70450 CT HEAD/BRAIN W/O DYE: CPT

## 2018-12-17 PROCEDURE — 99284 EMERGENCY DEPT VISIT MOD MDM: CPT | Performed by: EMERGENCY MEDICINE

## 2018-12-17 PROCEDURE — 74011250636 HC RX REV CODE- 250/636: Performed by: EMERGENCY MEDICINE

## 2018-12-17 RX ADMIN — SODIUM CHLORIDE 1000 ML: 900 INJECTION, SOLUTION INTRAVENOUS at 19:16

## 2018-12-17 NOTE — ED TRIAGE NOTES
Patient reports decreased ability to walk/loss of balance starting 3 days ago.  Also states nausea, diarrhea, anxiety and depression  States started Wellbutrin recently and believes these symptoms have gotten worse since

## 2018-12-18 NOTE — ED PROVIDER NOTES
For the last 2 weeks she has had some progression of baseline weakness. No focal component to this. She has her norm uses a walker to get around, but at times is found ambulation even with this more difficult.  has concerns that maybe her recent adjustment of doses of her Wellbutrin may be a problem. She has long-standing psychiatric illness with diagnosis of bipolar affective disorder. She is on Valium 10 mg in the morning, 5 mg a day and 5 mg at bedtime. Has recently increased her Wellbutrin  mg per day. trazodone 100 mg at bedtime as needed. He is also on Trileptal 300 mgtwice daily. The history is provided by the patient. Gait Problem    The current episode started more than 1 week ago. The problem occurs daily. Pertinent negatives include no tingling, no back pain and no neck pain. There has been no history of extremity trauma.         Past Medical History:   Diagnosis Date    Arthritis     Bipolar affective disorder ([address]     anxiety, panic attacks, neurosis    Chronic pain     bulging discs     Depression 11/12/2009    anxiety    Dysuria 07/11/2007    Fibromyalgia     GERD (gastroesophageal reflux disease) 11/12/2009    Hypercholesterolemia     Hyperlipidemia     Hypothyroidism 11/12/2009    Lumbago 11/12/2009    Miscarriage     PUD (peptic ulcer disease)     Unspecified urinary incontinence     Urinary tract infection, site not specified 06/07/2006       Past Surgical History:   Procedure Laterality Date    HX BLADDER SUSPENSION  2003    HX CHOLECYSTECTOMY  1980s    HX CYSTOCELE REPAIR  11/12/2009    HX OOPHORECTOMY      HX RECTOCELE REPAIR  11/12/2009    HX TONSILLECTOMY  1980    HX TOTAL ABDOMINAL HYSTERECTOMY  2003         Family History:   Problem Relation Age of Onset    Breast Cancer Mother 70    Heart Disease Father     Prostate Cancer Father        Social History     Socioeconomic History    Marital status:      Spouse name: Not on file    Number of children: Not on file    Years of education: Not on file    Highest education level: Not on file   Social Needs    Financial resource strain: Not on file    Food insecurity - worry: Not on file    Food insecurity - inability: Not on file    Transportation needs - medical: Not on file   [place] needs - non-medical: Not on file   Occupational History    Not on file   Tobacco Use    Smoking status: Never Smoker    Smokeless tobacco: Never Used   Substance and Sexual Activity    Alcohol use: No    Drug use: No    Sexual activity: Not Currently     Partners: Male   Other Topics Concern    Not on file   Social History Narrative     and lives with her --has a son and daughter. Disabled and has history of work in the [employer] No pets. ALLERGIES: Fentanyl and Effexor [venlafaxine]    Review of Systems   Musculoskeletal: Positive for gait problem. Negative for back pain and neck pain. Neurological: Negative for tingling. Vitals:    12/17/18 1816 12/17/18 1850   BP: 100/72    Pulse: 75    Resp: 18    Temp: 98.9 °F (37.2 °C)    SpO2: 95% 95%   Weight: 97.5 kg (215 lb)    Height: 5' 10\" (1.778 m)             Physical Exam   Constitutional: She appears well-developed and well-nourished. No distress. No distress/ sad affect   HENT:   Head: Atraumatic. Right Ear: External ear normal.   Left Ear: External ear normal.   Nose: Nose normal.   Mouth/Throat: Oropharynx is clear and moist.   Eyes: No scleral icterus. Neck: Neck supple. Cardiovascular: Normal rate, normal heart sounds and intact distal pulses. Pulmonary/Chest: Effort normal. No respiratory distress. Abdominal: Soft. There is no tenderness. There is no guarding. Musculoskeletal: She exhibits no tenderness or deformity. Lymphadenopathy:     She has no cervical adenopathy. Neurological: She is alert. No cranial nerve deficit or sensory deficit.  Coordination normal.   Slight non-focal general weakness   Skin: Skin is warm and dry. Psychiatric: Her behavior is normal. Judgment and thought content normal.   Nursing note and vitals reviewed. MDM  Number of Diagnoses or Management Options  Weakness:   Diagnosis management comments: Several weeks of decline. No present defined cause found. No other acute changes. patient and  (also seen) aware that will need ongoing work up       Amount and/or Complexity of Data Reviewed  Clinical lab tests: reviewed and ordered  Tests in the radiology section of CPT®: reviewed and ordered  Obtain history from someone other than the patient: yes  Independent visualization of images, tracings, or specimens: yes    Risk of Complications, Morbidity, and/or Mortality  Presenting problems: moderate  Diagnostic procedures: low  Management options: moderate  General comments:  To continue work up    Patient Progress  Patient progress: stable         Procedures

## 2018-12-18 NOTE — ED NOTES
I have reviewed discharge instructions with the patient and spouse. The patient and spouse verbalized understanding. Patient left ED via Discharge Method: ambulatory to Home with her . Opportunity for questions and clarification provided. Patient given 0 scripts. To continue your aftercare when you leave the hospital, you may receive an automated call from our care team to check in on how you are doing. This is a free service and part of our promise to provide the best care and service to meet your aftercare needs.  If you have questions, or wish to unsubscribe from this service please call [phone]. Thank you for Choosing our [facility] Emergency Department.

## 2018-12-18 NOTE — DISCHARGE INSTRUCTIONS
Extra fluids. Always usual walker. Call your doctor tomorrow for ongoing workup  Contact your psychiatrist for consideration of decreasing somewhat.   Other medication doses

## 2019-05-01 ENCOUNTER — HOSPITAL ENCOUNTER (OUTPATIENT)
Dept: MAMMOGRAPHY | Age: 69
Discharge: HOME | End: 2019-05-01
Attending: OBSTETRICS & GYNECOLOGY
Payer: MEDICARE

## 2019-05-01 DIAGNOSIS — Z12.31 VISIT FOR SCREENING MAMMOGRAM: ICD-10-CM

## 2019-05-01 PROCEDURE — 77067 SCR MAMMO BI INCL CAD: CPT

## 2019-06-27 ENCOUNTER — HOSPITAL ENCOUNTER (OUTPATIENT)
Dept: CT IMAGING | Age: 69
Discharge: HOME | End: 2019-06-27
Attending: NURSE PRACTITIONER
Payer: MEDICARE

## 2019-06-27 VITALS — HEIGHT: 70 IN | WEIGHT: 185 LBS | BODY MASS INDEX: 26.48 KG/M2

## 2019-06-27 DIAGNOSIS — R10.9 FLANK PAIN: ICD-10-CM

## 2019-06-27 DIAGNOSIS — R31.0 GROSS HEMATURIA: ICD-10-CM

## 2019-06-27 LAB — CREAT BLD-MCNC: 0.6 MG/DL (ref 0.8–1.5)

## 2019-06-27 PROCEDURE — 74011000258 HC RX REV CODE- 258: Performed by: NURSE PRACTITIONER

## 2019-06-27 PROCEDURE — 74178 CT ABD&PLV WO CNTR FLWD CNTR: CPT

## 2019-06-27 PROCEDURE — 74011636320 HC RX REV CODE- 636/320: Performed by: NURSE PRACTITIONER

## 2019-06-27 PROCEDURE — 82565 ASSAY OF CREATININE: CPT

## 2019-06-27 RX ORDER — SODIUM CHLORIDE 0.9 % (FLUSH) 0.9 %
10 SYRINGE (ML) INJECTION
Status: COMPLETED | OUTPATIENT
Start: 2019-06-27 | End: 2019-06-27

## 2019-06-27 RX ADMIN — IOPAMIDOL 100 ML: 755 INJECTION, SOLUTION INTRAVENOUS at 08:43

## 2019-06-27 RX ADMIN — SODIUM CHLORIDE 100 ML: 900 INJECTION, SOLUTION INTRAVENOUS at 08:43

## 2019-06-27 RX ADMIN — Medication 10 ML: at 08:43

## 2019-08-07 ENCOUNTER — HOSPITAL ENCOUNTER (OUTPATIENT)
Dept: MRI IMAGING | Age: 69
Discharge: HOME | End: 2019-08-07
Attending: PSYCHIATRY & NEUROLOGY
Payer: MEDICARE

## 2019-08-07 DIAGNOSIS — M48.062 SPINAL STENOSIS OF LUMBAR REGION WITH NEUROGENIC CLAUDICATION: ICD-10-CM

## 2019-08-07 PROCEDURE — 72148 MRI LUMBAR SPINE W/O DYE: CPT

## 2019-11-18 ENCOUNTER — HOSPITAL ENCOUNTER (OUTPATIENT)
Dept: MRI IMAGING | Age: 69
Discharge: HOME | End: 2019-11-18
Attending: PSYCHIATRY & NEUROLOGY
Payer: MEDICARE

## 2019-11-18 DIAGNOSIS — R26.89 BALANCE DISORDER: ICD-10-CM

## 2019-11-18 DIAGNOSIS — M48.02 CERVICAL STENOSIS OF SPINAL CANAL: ICD-10-CM

## 2019-11-18 DIAGNOSIS — M48.04 THORACIC STENOSIS: ICD-10-CM

## 2019-11-18 PROCEDURE — 72141 MRI NECK SPINE W/O DYE: CPT

## 2019-11-18 PROCEDURE — 72146 MRI CHEST SPINE W/O DYE: CPT

## 2020-03-06 PROBLEM — R20.9 DISTURBANCE OF SKIN SENSATION: Status: ACTIVE | Noted: 2020-03-06

## 2020-12-29 ENCOUNTER — HOSPITAL ENCOUNTER (OUTPATIENT)
Dept: CT IMAGING | Age: 70
Discharge: HOME | End: 2020-12-29
Attending: PSYCHIATRY & NEUROLOGY
Payer: MEDICARE

## 2020-12-29 DIAGNOSIS — G45.9 TIA (TRANSIENT ISCHEMIC ATTACK): ICD-10-CM

## 2020-12-29 LAB — CREAT BLD-MCNC: 0.6 MG/DL (ref 0.8–1.5)

## 2020-12-29 PROCEDURE — 74011000258 HC RX REV CODE- 258: Performed by: PSYCHIATRY & NEUROLOGY

## 2020-12-29 PROCEDURE — 82565 ASSAY OF CREATININE: CPT

## 2020-12-29 PROCEDURE — 70498 CT ANGIOGRAPHY NECK: CPT

## 2020-12-29 PROCEDURE — 74011000636 HC RX REV CODE- 636: Performed by: PSYCHIATRY & NEUROLOGY

## 2020-12-29 RX ORDER — SODIUM CHLORIDE 0.9 % (FLUSH) 0.9 %
10 SYRINGE (ML) INJECTION
Status: COMPLETED | OUTPATIENT
Start: 2020-12-29 | End: 2020-12-29

## 2020-12-29 RX ADMIN — Medication 10 ML: at 15:11

## 2020-12-29 RX ADMIN — IOPAMIDOL 100 ML: 755 INJECTION, SOLUTION INTRAVENOUS at 15:11

## 2020-12-29 RX ADMIN — SODIUM CHLORIDE 100 ML: 900 INJECTION, SOLUTION INTRAVENOUS at 15:11

## 2021-04-22 ENCOUNTER — TRANSCRIBE ORDER (OUTPATIENT)
Dept: SCHEDULING | Age: 71
End: 2021-04-22

## 2021-04-22 DIAGNOSIS — Z12.31 SCREENING MAMMOGRAM FOR HIGH-RISK PATIENT: Primary | ICD-10-CM

## 2021-05-26 ENCOUNTER — HOSPITAL ENCOUNTER (OUTPATIENT)
Dept: MAMMOGRAPHY | Age: 71
Discharge: HOME | End: 2021-05-26
Attending: INTERNAL MEDICINE
Payer: MEDICARE

## 2021-05-26 DIAGNOSIS — Z12.31 SCREENING MAMMOGRAM FOR HIGH-RISK PATIENT: ICD-10-CM

## 2021-05-26 PROCEDURE — 77063 BREAST TOMOSYNTHESIS BI: CPT

## 2021-06-18 ENCOUNTER — HOSPITAL ENCOUNTER (OUTPATIENT)
Dept: CT IMAGING | Age: 71
Discharge: HOME | End: 2021-06-18
Attending: PSYCHIATRY & NEUROLOGY
Payer: MEDICARE

## 2021-06-18 DIAGNOSIS — G45.9 TRANSIENT ISCHEMIC ATTACK (TIA): ICD-10-CM

## 2021-06-18 DIAGNOSIS — G45.9 TIA (TRANSIENT ISCHEMIC ATTACK): ICD-10-CM

## 2021-06-18 DIAGNOSIS — S06.5XAA SDH (SUBDURAL HEMATOMA): ICD-10-CM

## 2021-06-18 DIAGNOSIS — S06.5XAA SUBDURAL HEMATOMA: ICD-10-CM

## 2021-06-18 PROCEDURE — 70450 CT HEAD/BRAIN W/O DYE: CPT

## 2021-06-18 PROCEDURE — 70486 CT MAXILLOFACIAL W/O DYE: CPT

## 2021-06-28 ENCOUNTER — APPOINTMENT (RX ONLY)
Dept: URBAN - METROPOLITAN AREA CLINIC 24 | Facility: CLINIC | Age: 71
Setting detail: DERMATOLOGY
End: 2021-06-28

## 2021-06-28 DIAGNOSIS — B35.3 TINEA PEDIS: ICD-10-CM

## 2021-06-28 DIAGNOSIS — L30.9 DERMATITIS, UNSPECIFIED: ICD-10-CM

## 2021-06-28 PROCEDURE — 11105 PUNCH BX SKIN EA SEP/ADDL: CPT

## 2021-06-28 PROCEDURE — ? TREATMENT REGIMEN

## 2021-06-28 PROCEDURE — ? BIOPSY BY PUNCH METHOD

## 2021-06-28 PROCEDURE — ? PRESCRIPTION

## 2021-06-28 PROCEDURE — ? ORDER TESTS

## 2021-06-28 PROCEDURE — 99203 OFFICE O/P NEW LOW 30 MIN: CPT | Mod: 25

## 2021-06-28 PROCEDURE — ? COUNSELING

## 2021-06-28 PROCEDURE — ? OTHER

## 2021-06-28 PROCEDURE — 11104 PUNCH BX SKIN SINGLE LESION: CPT

## 2021-06-28 RX ORDER — KETOCONAZOLE 20 MG/G
CREAM TOPICAL BID
Qty: 1 | Refills: 3 | Status: ERX

## 2021-06-28 ASSESSMENT — LOCATION SIMPLE DESCRIPTION DERM
LOCATION SIMPLE: RIGHT PRETIBIAL REGION
LOCATION SIMPLE: RIGHT UPPER BACK
LOCATION SIMPLE: RIGHT FOOT
LOCATION SIMPLE: LEFT FOOT
LOCATION SIMPLE: RIGHT KNEE
LOCATION SIMPLE: LEFT PRETIBIAL REGION
LOCATION SIMPLE: RIGHT SHOULDER
LOCATION SIMPLE: LEFT THIGH
LOCATION SIMPLE: RIGHT ACHILLES SKIN

## 2021-06-28 ASSESSMENT — LOCATION DETAILED DESCRIPTION DERM
LOCATION DETAILED: LEFT ANTERIOR DISTAL THIGH
LOCATION DETAILED: LEFT DORSAL FOOT
LOCATION DETAILED: RIGHT PROXIMAL PRETIBIAL REGION
LOCATION DETAILED: RIGHT MID-UPPER BACK
LOCATION DETAILED: RIGHT LATERAL KNEE
LOCATION DETAILED: RIGHT ACHILLES SKIN
LOCATION DETAILED: LEFT LATERAL DISTAL PRETIBIAL REGION
LOCATION DETAILED: RIGHT POSTERIOR SHOULDER
LOCATION DETAILED: RIGHT DORSAL FOOT
LOCATION DETAILED: RIGHT KNEE

## 2021-06-28 ASSESSMENT — LOCATION ZONE DERM
LOCATION ZONE: FEET
LOCATION ZONE: LEG
LOCATION ZONE: ARM
LOCATION ZONE: TRUNK

## 2021-06-28 NOTE — PROCEDURE: TREATMENT REGIMEN
Detail Level: Zone
Continue Regimen: Clobetasol cream as previously prescribed\\nHydroxyzine at bedtime
Initiate Treatment: Allergy meds during the day, \\nClaritin, Allegra or Zyrtec

## 2021-06-28 NOTE — PROCEDURE: OTHER
Note Text (......Xxx Chief Complaint.): This diagnosis correlates with the
Render Risk Assessment In Note?: no
Detail Level: Simple
Other (Free Text): She’s coming off prednisone for bronchitis which may cloud the clinical picture.

## 2021-06-28 NOTE — PROCEDURE: BIOPSY BY PUNCH METHOD
Validate Triangulation: No
Additional Anesthesia Volume In Cc (Will Not Render If 0): 0
Hemostasis: None
Anesthesia Volume In Cc: 1
Home Suture Removal Text: Patient was provided a home suture removal kit and will remove their sutures at home.  If they have any questions or difficulties they will call the office.
Billing Type: Third-Party Bill
Dressing: bandage
Biopsy Type: H and E
Wound Care: Vaseline
Accession #: [id number]
Anesthesia Type: 1% lidocaine with 1:100,000 epinephrine and a 1:6 solution of 8.4% sodium bicarbonate
Notification Instructions: Patient will be notified of biopsy results. However, patient instructed to call the office if not contacted within 2 weeks.
Information: Selecting Yes will display possible errors in your note based on the variables you have selected. This validation is only offered as a suggestion for you. PLEASE NOTE THAT THE VALIDATION TEXT WILL BE REMOVED WHEN YOU FINALIZE YOUR NOTE. IF YOU WANT TO FAX A PRELIMINARY NOTE YOU WILL NEED TO TOGGLE THIS TO 'NO' IF YOU DO NOT WANT IT IN YOUR FAXED NOTE.
Post-Care Instructions: I reviewed with the patient in detail post-care instructions. Patient is to keep the biopsy site dry overnight, and then apply bacitracin twice daily until healed. Patient may apply hydrogen peroxide soaks to remove any crusting.
Punch Size In Mm: 4
Detail Level: Detailed
Suture Removal: 14 days
Epidermal Sutures: 4-0 Prolene
Was A Bandage Applied: Yes
Consent: Written consent was obtained and risks were reviewed including but not limited to scarring, infection, bleeding, scabbing, incomplete removal, nerve damage and allergy to anesthesia.

## 2021-07-01 ENCOUNTER — APPOINTMENT (OUTPATIENT)
Dept: GENERAL RADIOLOGY | Age: 71
DRG: 641 | End: 2021-07-01
Attending: EMERGENCY MEDICINE
Payer: MEDICARE

## 2021-07-01 ENCOUNTER — HOSPITAL ENCOUNTER (INPATIENT)
Age: 71
LOS: 2 days | Discharge: HOME | DRG: 641 | End: 2021-07-03
Attending: EMERGENCY MEDICINE | Admitting: FAMILY MEDICINE
Payer: MEDICARE

## 2021-07-01 ENCOUNTER — APPOINTMENT (OUTPATIENT)
Dept: CT IMAGING | Age: 71
DRG: 641 | End: 2021-07-01
Attending: EMERGENCY MEDICINE
Payer: MEDICARE

## 2021-07-01 ENCOUNTER — RX ONLY (OUTPATIENT)
Age: 71
Setting detail: RX ONLY
End: 2021-07-01

## 2021-07-01 DIAGNOSIS — E87.1 HYPONATREMIA: Primary | ICD-10-CM

## 2021-07-01 PROBLEM — R41.82 AMS (ALTERED MENTAL STATUS): Status: ACTIVE | Noted: 2021-07-01

## 2021-07-01 LAB
ALBUMIN SERPL-MCNC: 4 G/DL (ref 3.2–4.6)
ALBUMIN/GLOB SERPL: 1 {RATIO} (ref 1.2–3.5)
ALP SERPL-CCNC: 153 U/L (ref 50–136)
ALT SERPL-CCNC: 25 U/L (ref 12–65)
AMMONIA PLAS-SCNC: 14 UMOL/L (ref 24.9–68)
ANION GAP SERPL CALC-SCNC: 6 MMOL/L (ref 7–16)
APPEARANCE UR: CLEAR
AST SERPL-CCNC: 12 U/L (ref 15–37)
BASOPHILS # BLD: 0.1 K/UL (ref 0–0.2)
BASOPHILS NFR BLD: 1 % (ref 0–2)
BILIRUB SERPL-MCNC: 0.2 MG/DL (ref 0.2–1.1)
BILIRUB UR QL: NEGATIVE
BUN SERPL-MCNC: 7 MG/DL (ref 8–23)
CALCIUM SERPL-MCNC: 9.1 MG/DL (ref 8.3–10.4)
CHLORIDE SERPL-SCNC: 89 MMOL/L (ref 98–107)
CO2 SERPL-SCNC: 30 MMOL/L (ref 21–32)
COLOR UR: YELLOW
CREAT SERPL-MCNC: 0.59 MG/DL (ref 0.6–1)
DIFFERENTIAL METHOD BLD: ABNORMAL
EOSINOPHIL # BLD: 0.3 K/UL (ref 0–0.8)
EOSINOPHIL NFR BLD: 3 % (ref 0.5–7.8)
ERYTHROCYTE [DISTWIDTH] IN BLOOD BY AUTOMATED COUNT: 11.7 % (ref 11.9–14.6)
GLOBULIN SER CALC-MCNC: 4.2 G/DL (ref 2.3–3.5)
GLUCOSE BLD STRIP.AUTO-MCNC: 100 MG/DL (ref 65–100)
GLUCOSE SERPL-MCNC: 70 MG/DL (ref 65–100)
GLUCOSE UR STRIP.AUTO-MCNC: NEGATIVE MG/DL
HCT VFR BLD AUTO: 40.1 % (ref 35.8–46.3)
HGB BLD-MCNC: 13.6 G/DL (ref 11.7–15.4)
HGB UR QL STRIP: NEGATIVE
IMM GRANULOCYTES # BLD AUTO: 0 K/UL (ref 0–0.5)
IMM GRANULOCYTES NFR BLD AUTO: 0 % (ref 0–5)
KETONES UR QL STRIP.AUTO: NEGATIVE MG/DL
LACTATE SERPL-SCNC: 0.9 MMOL/L (ref 0.4–2)
LEUKOCYTE ESTERASE UR QL STRIP.AUTO: NEGATIVE
LYMPHOCYTES # BLD: 2.2 K/UL (ref 0.5–4.6)
LYMPHOCYTES NFR BLD: 23 % (ref 13–44)
MCH RBC QN AUTO: 30.6 PG (ref 26.1–32.9)
MCHC RBC AUTO-ENTMCNC: 33.9 G/DL (ref 31.4–35)
MCV RBC AUTO: 90.1 FL (ref 79.6–97.8)
MONOCYTES # BLD: 0.6 K/UL (ref 0.1–1.3)
MONOCYTES NFR BLD: 6 % (ref 4–12)
NEUTS SEG # BLD: 6.6 K/UL (ref 1.7–8.2)
NEUTS SEG NFR BLD: 66 % (ref 43–78)
NITRITE UR QL STRIP.AUTO: NEGATIVE
NRBC # BLD: 0 K/UL (ref 0–0.2)
PH UR STRIP: 6.5 [PH] (ref 5–9)
PLATELET # BLD AUTO: 325 K/UL (ref 150–450)
PMV BLD AUTO: 9.4 FL (ref 9.4–12.3)
POTASSIUM SERPL-SCNC: 3.8 MMOL/L (ref 3.5–5.1)
PROT SERPL-MCNC: 8.2 G/DL (ref 6.3–8.2)
PROT UR STRIP-MCNC: NEGATIVE MG/DL
RBC # BLD AUTO: 4.45 M/UL (ref 4.05–5.2)
SERVICE CMNT-IMP: NORMAL
SODIUM SERPL-SCNC: 125 MMOL/L (ref 136–145)
SODIUM SERPL-SCNC: 128 MMOL/L (ref 136–145)
SP GR UR REFRACTOMETRY: 1.01 (ref 1–1.02)
TSH SERPL DL<=0.005 MIU/L-ACNC: 1.12 UIU/ML
UROBILINOGEN UR QL STRIP.AUTO: 0.2 EU/DL (ref 0.2–1)
WBC # BLD AUTO: 9.9 K/UL (ref 4.3–11.1)

## 2021-07-01 PROCEDURE — 77030008771 HC TU NG SALEM SUMP -A

## 2021-07-01 PROCEDURE — 93005 ELECTROCARDIOGRAM TRACING: CPT | Performed by: EMERGENCY MEDICINE

## 2021-07-01 PROCEDURE — 96361 HYDRATE IV INFUSION ADD-ON: CPT

## 2021-07-01 PROCEDURE — 65270000029 HC RM PRIVATE

## 2021-07-01 PROCEDURE — 70450 CT HEAD/BRAIN W/O DYE: CPT

## 2021-07-01 PROCEDURE — 74011250636 HC RX REV CODE- 250/636: Performed by: FAMILY MEDICINE

## 2021-07-01 PROCEDURE — 84443 ASSAY THYROID STIM HORMONE: CPT

## 2021-07-01 PROCEDURE — 85025 COMPLETE CBC W/AUTO DIFF WBC: CPT

## 2021-07-01 PROCEDURE — 74011250637 HC RX REV CODE- 250/637: Performed by: FAMILY MEDICINE

## 2021-07-01 PROCEDURE — 81003 URINALYSIS AUTO W/O SCOPE: CPT

## 2021-07-01 PROCEDURE — 2709999900 HC NON-CHARGEABLE SUPPLY

## 2021-07-01 PROCEDURE — 74011250636 HC RX REV CODE- 250/636: Performed by: EMERGENCY MEDICINE

## 2021-07-01 PROCEDURE — 80053 COMPREHEN METABOLIC PANEL: CPT

## 2021-07-01 PROCEDURE — 83605 ASSAY OF LACTIC ACID: CPT

## 2021-07-01 PROCEDURE — 82962 GLUCOSE BLOOD TEST: CPT

## 2021-07-01 PROCEDURE — 84295 ASSAY OF SERUM SODIUM: CPT

## 2021-07-01 PROCEDURE — 99285 EMERGENCY DEPT VISIT HI MDM: CPT

## 2021-07-01 PROCEDURE — 36415 COLL VENOUS BLD VENIPUNCTURE: CPT

## 2021-07-01 PROCEDURE — 96374 THER/PROPH/DIAG INJ IV PUSH: CPT

## 2021-07-01 PROCEDURE — 87040 BLOOD CULTURE FOR BACTERIA: CPT

## 2021-07-01 PROCEDURE — 71045 X-RAY EXAM CHEST 1 VIEW: CPT

## 2021-07-01 PROCEDURE — 82140 ASSAY OF AMMONIA: CPT

## 2021-07-01 RX ORDER — SODIUM CHLORIDE 0.9 % (FLUSH) 0.9 %
5-40 SYRINGE (ML) INJECTION AS NEEDED
Status: DISCONTINUED | OUTPATIENT
Start: 2021-07-01 | End: 2021-07-03 | Stop reason: HOSPADM

## 2021-07-01 RX ORDER — CETIRIZINE HCL 10 MG
10 TABLET ORAL DAILY
Status: DISCONTINUED | OUTPATIENT
Start: 2021-07-02 | End: 2021-07-01 | Stop reason: CLARIF

## 2021-07-01 RX ORDER — OXYCODONE HYDROCHLORIDE 5 MG/1
5 TABLET ORAL
Status: DISCONTINUED | OUTPATIENT
Start: 2021-07-01 | End: 2021-07-03 | Stop reason: HOSPADM

## 2021-07-01 RX ORDER — BUPROPION HYDROCHLORIDE 150 MG/1
150 TABLET, EXTENDED RELEASE ORAL 2 TIMES DAILY
Status: DISCONTINUED | OUTPATIENT
Start: 2021-07-01 | End: 2021-07-03 | Stop reason: HOSPADM

## 2021-07-01 RX ORDER — MUPIROCIN 20 MG/G
OINTMENT TOPICAL
Qty: 1 | Refills: 2 | Status: ERX | COMMUNITY
Start: 2021-07-01

## 2021-07-01 RX ORDER — LORATADINE 10 MG/1
10 TABLET ORAL DAILY
Status: DISCONTINUED | OUTPATIENT
Start: 2021-07-02 | End: 2021-07-03 | Stop reason: HOSPADM

## 2021-07-01 RX ORDER — ONDANSETRON 2 MG/ML
4 INJECTION INTRAMUSCULAR; INTRAVENOUS
Status: COMPLETED | OUTPATIENT
Start: 2021-07-01 | End: 2021-07-01

## 2021-07-01 RX ORDER — ONDANSETRON 2 MG/ML
4 INJECTION INTRAMUSCULAR; INTRAVENOUS
Status: DISCONTINUED | OUTPATIENT
Start: 2021-07-01 | End: 2021-07-03 | Stop reason: HOSPADM

## 2021-07-01 RX ORDER — MONTELUKAST SODIUM 10 MG/1
10 TABLET ORAL
Status: DISCONTINUED | OUTPATIENT
Start: 2021-07-01 | End: 2021-07-03 | Stop reason: HOSPADM

## 2021-07-01 RX ORDER — HYDRALAZINE HYDROCHLORIDE 20 MG/ML
10 INJECTION INTRAMUSCULAR; INTRAVENOUS
Status: DISCONTINUED | OUTPATIENT
Start: 2021-07-01 | End: 2021-07-03 | Stop reason: HOSPADM

## 2021-07-01 RX ORDER — LISINOPRIL 5 MG/1
10 TABLET ORAL DAILY
Status: DISCONTINUED | OUTPATIENT
Start: 2021-07-02 | End: 2021-07-03

## 2021-07-01 RX ORDER — ARIPIPRAZOLE 2 MG/1
2 TABLET ORAL DAILY
Status: DISCONTINUED | OUTPATIENT
Start: 2021-07-02 | End: 2021-07-03 | Stop reason: HOSPADM

## 2021-07-01 RX ORDER — SERTRALINE HYDROCHLORIDE 50 MG/1
50 TABLET, FILM COATED ORAL DAILY
Status: DISCONTINUED | OUTPATIENT
Start: 2021-07-02 | End: 2021-07-03 | Stop reason: HOSPADM

## 2021-07-01 RX ORDER — ACETAMINOPHEN 325 MG/1
650 TABLET ORAL
Status: DISCONTINUED | OUTPATIENT
Start: 2021-07-01 | End: 2021-07-03 | Stop reason: HOSPADM

## 2021-07-01 RX ORDER — SODIUM CHLORIDE 0.9 % (FLUSH) 0.9 %
5-40 SYRINGE (ML) INJECTION EVERY 8 HOURS
Status: DISCONTINUED | OUTPATIENT
Start: 2021-07-01 | End: 2021-07-03 | Stop reason: HOSPADM

## 2021-07-01 RX ORDER — CEPHALEXIN 500 MG/1
TABLET ORAL
Qty: 30 | Refills: 0 | Status: ERX

## 2021-07-01 RX ORDER — ONDANSETRON 4 MG/1
4 TABLET, ORALLY DISINTEGRATING ORAL
Status: DISCONTINUED | OUTPATIENT
Start: 2021-07-01 | End: 2021-07-03 | Stop reason: HOSPADM

## 2021-07-01 RX ORDER — ASPIRIN 81 MG/1
81 TABLET ORAL DAILY
Status: DISCONTINUED | OUTPATIENT
Start: 2021-07-02 | End: 2021-07-03 | Stop reason: HOSPADM

## 2021-07-01 RX ORDER — SODIUM CHLORIDE 9 MG/ML
100 INJECTION, SOLUTION INTRAVENOUS CONTINUOUS
Status: DISCONTINUED | OUTPATIENT
Start: 2021-07-01 | End: 2021-07-02

## 2021-07-01 RX ORDER — POLYETHYLENE GLYCOL 3350 17 G/17G
17 POWDER, FOR SOLUTION ORAL DAILY PRN
Status: DISCONTINUED | OUTPATIENT
Start: 2021-07-01 | End: 2021-07-03 | Stop reason: HOSPADM

## 2021-07-01 RX ORDER — ALBUTEROL SULFATE 90 UG/1
2 AEROSOL, METERED RESPIRATORY (INHALATION)
COMMUNITY

## 2021-07-01 RX ORDER — ACETAMINOPHEN 650 MG/1
650 SUPPOSITORY RECTAL
Status: DISCONTINUED | OUTPATIENT
Start: 2021-07-01 | End: 2021-07-03 | Stop reason: HOSPADM

## 2021-07-01 RX ORDER — METOPROLOL SUCCINATE 25 MG/1
25 TABLET, EXTENDED RELEASE ORAL DAILY
Status: DISCONTINUED | OUTPATIENT
Start: 2021-07-02 | End: 2021-07-03 | Stop reason: HOSPADM

## 2021-07-01 RX ORDER — LEVOTHYROXINE SODIUM 125 UG/1
125 TABLET ORAL
Status: DISCONTINUED | OUTPATIENT
Start: 2021-07-02 | End: 2021-07-03 | Stop reason: HOSPADM

## 2021-07-01 RX ORDER — ATORVASTATIN CALCIUM 40 MG/1
40 TABLET, FILM COATED ORAL
Status: DISCONTINUED | OUTPATIENT
Start: 2021-07-01 | End: 2021-07-03 | Stop reason: HOSPADM

## 2021-07-01 RX ADMIN — ONDANSETRON 4 MG: 2 INJECTION INTRAMUSCULAR; INTRAVENOUS at 17:57

## 2021-07-01 RX ADMIN — Medication 10 ML: at 22:00

## 2021-07-01 RX ADMIN — SODIUM CHLORIDE 100 ML/HR: 900 INJECTION, SOLUTION INTRAVENOUS at 20:49

## 2021-07-01 RX ADMIN — ATORVASTATIN CALCIUM 40 MG: 40 TABLET, FILM COATED ORAL at 22:00

## 2021-07-01 RX ADMIN — MONTELUKAST 10 MG: 10 TABLET, FILM COATED ORAL at 22:00

## 2021-07-01 RX ADMIN — SODIUM CHLORIDE 1000 ML: 900 INJECTION, SOLUTION INTRAVENOUS at 18:32

## 2021-07-01 NOTE — ED PROVIDER NOTES
Patient presents to the ER via private vehicle with  for concerns of \"being out of it\". Has reports today patient appears to be more confused and weak. States she has had problems with falls recently. There is no reported fever or vomiting.  reports she is requiring increased assistance with walking today. History is limited by the patient's current condition. The history is provided by the patient. The history is limited by the condition of the patient. Altered mental status   This is a new problem. The current episode started 3 to 5 hours ago. The problem has not changed since onset. Associated symptoms include confusion and weakness. Pertinent negatives include no unresponsiveness, no agitation and no delusions. Mental status baseline is normal.  Risk factors include a recent illness. Her past medical history is significant for hypertension and psychotropic medication treatment.         Past Medical History:   Diagnosis Date    Arthritis     Bipolar affective disorder ([address]     anxiety, panic attacks, neurosis    Chronic pain     bulging discs     Depression 11/12/2009    anxiety    Disturbance of skin sensation 3/6/2020    Dysuria 07/11/2007    Essential hypertension     Fibromyalgia     GERD (gastroesophageal reflux disease) 11/12/2009    Hypercholesterolemia     Hyperlipidemia     Hypothyroidism 11/12/2009    Lumbago 11/12/2009    Miscarriage     PUD (peptic ulcer disease)     Sleep apnea     Unspecified urinary incontinence     Urinary tract infection, site not specified 06/07/2006       Past Surgical History:   Procedure Laterality Date    HX BLADDER SUSPENSION  2003    HX CHOLECYSTECTOMY  1980s    HX CYSTOCELE REPAIR  11/12/2009    HX OOPHORECTOMY      HX RECTOCELE REPAIR  11/12/2009    HX TONSILLECTOMY  1980    HX TOTAL ABDOMINAL HYSTERECTOMY  2003         Family History:   Problem Relation Age of Onset    Breast Cancer Mother 61    Heart Disease Father     Prostate Cancer Father        Social History     Socioeconomic History    Marital status:      Spouse name: Not on file    Number of children: Not on file    Years of education: Not on file    Highest education level: Not on file   Occupational History    Not on file   Tobacco Use    Smoking status: Never Smoker    Smokeless tobacco: Never Used   Substance and Sexual Activity    Alcohol use: No    Drug use: No    Sexual activity: Not Currently     Partners: Male   Other Topics Concern    Not on file   Social History Narrative     and lives with her --has a son and daughter. Disabled and has history of work in the [employer] No pets. Social Determinants of Health     Financial Resource Strain:     Difficulty of Paying Living Expenses:    Food Insecurity:     Worried About Running Out of Food in the Last Year:     [address] in the Last Year:    Transportation Needs:     Lack of Transportation (Medical):  Lack of Transportation (Non-Medical):    Physical Activity:     Days of Exercise per Week:     Minutes of Exercise per Session:    Stress:     Feeling of Stress :    Social Connections:     Frequency of Communication with Friends and Family:     Frequency of Social Gatherings with Friends and Family:     Attends [employer] Services:     Active Member of Clubs or Organizations:     Attends Club or Organization Meetings:     Marital Status:    Intimate Partner Violence:     Fear of Current or Ex-Partner:     Emotionally Abused:     Physically Abused:     Sexually Abused: ALLERGIES: Fentanyl and Effexor [venlafaxine]    Review of Systems   Constitutional: Positive for fatigue. HENT: Negative for congestion, trouble swallowing and voice change. Eyes: Negative for photophobia, redness and visual disturbance. Respiratory: Negative for cough, chest tightness and shortness of breath.     Cardiovascular: Negative for chest pain and leg swelling. Gastrointestinal: Negative for abdominal pain and vomiting. Endocrine: Negative for polyphagia and polyuria. Genitourinary: Negative for flank pain. Musculoskeletal: Negative for back pain and gait problem. Skin: Negative for color change and pallor. Neurological: Positive for weakness and light-headedness. Negative for facial asymmetry. Hematological: Negative for adenopathy. Does not bruise/bleed easily. Psychiatric/Behavioral: Positive for confusion. Negative for agitation, sleep disturbance and suicidal ideas. All other systems reviewed and are negative. Vitals:    07/01/21 1715   BP: (!) 140/66   Pulse: 88   Resp: 18   Temp: 98.4 °F (36.9 °C)   SpO2: 97%   Weight: 80.3 kg (177 lb)   Height: 5' 10\" (1.778 m)            Physical Exam  Vitals and nursing note reviewed. Constitutional:       General: She is not in acute distress. Appearance: Normal appearance. She is not ill-appearing. HENT:      Head: Normocephalic and atraumatic. Right Ear: External ear normal.      Left Ear: External ear normal.      Nose: Nose normal. No congestion or rhinorrhea. Mouth/Throat:      Pharynx: No oropharyngeal exudate or posterior oropharyngeal erythema. Eyes:      General:         Right eye: No discharge. Left eye: No discharge. Extraocular Movements: Extraocular movements intact. Pupils: Pupils are equal, round, and reactive to light. Cardiovascular:      Rate and Rhythm: Normal rate and regular rhythm. Pulses: Normal pulses. Heart sounds: Normal heart sounds. Pulmonary:      Effort: Pulmonary effort is normal. No respiratory distress. Breath sounds: Normal breath sounds. No stridor. No rhonchi. Abdominal:      General: There is no distension. Palpations: There is no mass. Tenderness: There is no abdominal tenderness. Musculoskeletal:      Cervical back: Normal range of motion and neck supple. No rigidity.       Right lower leg: No edema. Left lower leg: No edema. Skin:     General: Skin is warm. Capillary Refill: Capillary refill takes less than 2 seconds. Coloration: Skin is not jaundiced. Findings: Erythema present. Neurological:      General: No focal deficit present. Mental Status: She is alert and oriented to person, place, and time. Cranial Nerves: No cranial nerve deficit. Sensory: No sensory deficit. Psychiatric:         Mood and Affect: Mood normal.          MDM  Number of Diagnoses or Management Options  Diagnosis management comments: Patient appears alert and oriented currently. Differential diagnosis is broad includes sepsis, polypharmacy, cystitis, lateral abnormality, hepatic encephalopathy    7:04 PM  Labs fairly stable, sodium noted to be decreased at 125. Could be causing patient symptoms. Noncontrast CT scan of head without any acute abnormality. Awaiting results of urinalysis.     7:10 PM  Discussed case with hospitalist for admission       Amount and/or Complexity of Data Reviewed  Clinical lab tests: ordered and reviewed  Tests in the radiology section of CPT®: ordered and reviewed  Review and summarize past medical records: yes  Discuss the patient with other providers: yes    Risk of Complications, Morbidity, and/or Mortality  Presenting problems: high  Diagnostic procedures: moderate  Management options: moderate    Patient Progress  Patient progress: stable         Procedures        Results Include:    Recent Results (from the past 24 hour(s))   CBC WITH AUTOMATED DIFF    Collection Time: 07/01/21  5:35 PM   Result Value Ref Range    WBC 9.9 4.3 - 11.1 K/uL    RBC 4.45 4.05 - 5.2 M/uL    HGB 13.6 11.7 - 15.4 g/dL    HCT 40.1 35.8 - 46.3 %    MCV 90.1 79.6 - 97.8 FL    MCH 30.6 26.1 - 32.9 PG    MCHC 33.9 31.4 - 35.0 g/dL    RDW 11.7 (L) 11.9 - 14.6 %    PLATELET 268 669 - 420 K/uL    MPV 9.4 9.4 - 12.3 FL    ABSOLUTE NRBC 0.00 0.0 - 0.2 K/uL    DF AUTOMATED NEUTROPHILS 66 43 - 78 %    LYMPHOCYTES 23 13 - 44 %    MONOCYTES 6 4.0 - 12.0 %    EOSINOPHILS 3 0.5 - 7.8 %    BASOPHILS 1 0.0 - 2.0 %    IMMATURE GRANULOCYTES 0 0.0 - 5.0 %    ABS. NEUTROPHILS 6.6 1.7 - 8.2 K/UL    ABS. LYMPHOCYTES 2.2 0.5 - 4.6 K/UL    ABS. MONOCYTES 0.6 0.1 - 1.3 K/UL    ABS. EOSINOPHILS 0.3 0.0 - 0.8 K/UL    ABS. BASOPHILS 0.1 0.0 - 0.2 K/UL    ABS. IMM. GRANS. 0.0 0.0 - 0.5 K/UL   METABOLIC PANEL, COMPREHENSIVE    Collection Time: 07/01/21  5:35 PM   Result Value Ref Range    Sodium 125 (L) 136 - 145 mmol/L    Potassium 3.8 3.5 - 5.1 mmol/L    Chloride 89 (L) 98 - 107 mmol/L    CO2 30 21 - 32 mmol/L    Anion gap 6 (L) 7 - 16 mmol/L    Glucose 70 65 - 100 mg/dL    BUN 7 (L) 8 - 23 MG/DL    Creatinine 0.59 (L) 0.6 - 1.0 MG/DL    GFR est AA >60 >60 ml/min/1.73m2    GFR est non-AA >60 >60 ml/min/1.73m2    Calcium 9.1 8.3 - 10.4 MG/DL    Bilirubin, total 0.2 0.2 - 1.1 MG/DL    ALT (SGPT) 25 12 - 65 U/L    AST (SGOT) 12 (L) 15 - 37 U/L    Alk. phosphatase 153 (H) 50 - 136 U/L    Protein, total 8.2 6.3 - 8.2 g/dL    Albumin 4.0 3.2 - 4.6 g/dL    Globulin 4.2 (H) 2.3 - 3.5 g/dL    A-G Ratio 1.0 (L) 1.2 - 3.5     TSH 3RD GENERATION    Collection Time: 07/01/21  5:35 PM   Result Value Ref Range    TSH 1.120 uIU/mL   LACTIC ACID    Collection Time: 07/01/21  5:35 PM   Result Value Ref Range    Lactic acid 0.9 0.4 - 2.0 MMOL/L   AMMONIA    Collection Time: 07/01/21  5:37 PM   Result Value Ref Range    Ammonia 14 (L) 24.9 - 68 UMOL/L   EKG, 12 LEAD, INITIAL    Collection Time: 07/01/21  5:43 PM   Result Value Ref Range    Ventricular Rate 73 BPM    Atrial Rate 73 BPM    P-R Interval 170 ms    QRS Duration 82 ms    Q-T Interval 366 ms    QTC Calculation (Bezet) 403 ms    Calculated P Axis 68 degrees    Calculated R Axis -58 degrees    Calculated T Axis 39 degrees    Diagnosis       !! AGE AND GENDER SPECIFIC ECG ANALYSIS !!   Normal sinus rhythm  Left anterior fascicular block  Abnormal ECG  When compared with ECG of 21-OCT-2018 16:36,  No significant change was found     GLUCOSE, POC    Collection Time: 07/01/21  5:45 PM   Result Value Ref Range    Glucose (POC) 100 65 - 100 mg/dL    Performed by Yamile Huynh [facility] ER dictation software was used during the making of this note. This software is not perfect and grammatical and other typographical errors may be present. This note has been proofread, but may still contain errors.   Abhinav Brewster MD; 7/1/2021 @7:10 PM   ===================================================================

## 2021-07-01 NOTE — H&P
[facility]  History & Physical  NAME:  Nydia Laughlin   Age:  72 y.o.  :   1950   MRN:   [id number]  PCP: Izabella Manuel MD  Treatment Team: Attending Provider: Consuelo Avery MD; Primary Nurse: Cassy Tang RN    Admission Date: 2021  Chief Complaint: altered mental status  Reason for Admission: altered mental status, hyponatremia    Assessment and Plan: Altered Mental Status  - Unclear etiology  - Awaiting UA, but recent from 2 days ago is WNL  - CT head okay  - Could be related to hyponatremia  - Patient has multiple medication prescriptions which could be contributing as well  - Will hold possible offending medications  - Treat hyponatremia as below    Hyponatremia  - Na is 125  - Will start NS at 100ml/hr  - Trend Na Q6H  - Holding medications which could potential contribute to SIADH for now    Bipolar/Anxiety  - On several medications per EMR  - Holding medications that could affect mentation and/or contribute to hyponatremia    LINDA  - CPAP    HTN  - Lisinopril, Metoprolol  - Hydralazine prn    Of note, patient just started outpatient PT for weakness/debility last week. She has a L periorbital ecchymosis from a fall sustained there. Will consult with PT inpatient as well, given it appears she has been having trouble with mobility for a little while. Disposition: inpatient  Diet: regular  VTE ppx: SCDs  CODE STATUS: full  Surrogate decision-maker:       HPI:   Patient is a 72 y.o. F with PMH significant for bipolar disorder, anxiety, LINDA, HTN who presents to the ER with complaint of AMS. Patient brought in by her  who reports that she seemed \"out of it. \"  Symptoms have worsened over the past few days.  reports that she has been more weak and has required assistance to walk. He also reports she seems confused. On ER workup, CT head showed no evidence of acute abnormality. WBC is normal.  UA is pending, but recent was negative.   Serum sodium level was found to be 125, which is lower than prior of 140. Hospitalist asked to admit. Patient is alert and oriented x4, but somewhat glassy-eyed on my interview.  provides most of history. She denies LOC, headache, fevers, chills, nausea, vomiting, chest pain, SOB, dysuria. Does report feeling like she has to strain to urinate, and has still not be able to provide urine sample in ER. A 14-point review of systems was taken, and pertinent positives noted as per HPI. Patient Active Problem List    Diagnosis Date Noted    Disturbance of skin sensation 03/06/2020    LINDA (obstructive sleep apnea) 08/21/2018    DDD (degenerative disc disease), lumbar 08/15/2018    Lower abdominal pain 08/15/2018    History of sleep apnea 05/08/2018    Bronchiectasis without complication ([facility] [address] 37/55/9627    Restrictive lung disease 03/13/2018    Physical deconditioning 03/13/2018    Lung nodule 03/13/2018    Essential hypertension 01/05/2018    Other osteoarthritis of spine, lumbosacral region 07/02/2015    Pelvic pain 04/16/2015    GALLO (stress urinary incontinence, female) 04/16/2015    Urinary tract infection, site not specified 04/16/2015    Urgency of urination 04/16/2015    Knee pain, acute 04/16/2015    Pure hypercholesterolemia 06/28/2013    Bipolar disorder, unspecified ([facility] [address] 06/28/2013    Anxiety 06/28/2013    Panic attack 06/28/2013    Neurotic disorder 06/28/2013    Persistent disorder of initiating or maintaining sleep 06/28/2013    Allergic rhinitis 06/28/2013    Fatigue 06/28/2013    Shortness of breath 06/28/2013    Chest pain 06/28/2013    Personal history of peptic ulcer disease 06/28/2013   Past Medical History reviewed.     Past Surgical History:   Procedure Laterality Date    HX BLADDER SUSPENSION  2003    HX CHOLECYSTECTOMY  1980s    HX CYSTOCELE REPAIR  11/12/2009    HX OOPHORECTOMY      HX RECTOCELE REPAIR  11/12/2009    HX TONSILLECTOMY  1980    HX TOTAL ABDOMINAL HYSTERECTOMY     Past Surgical History reviewed. Allergies   Allergen Reactions    Fentanyl Other (comments) and Rash     Not sure of reaction  Doesn't remember reaction    Not sure of reaction    Effexor [Venlafaxine] Unknown (comments)     Denies allergy          Social History     Tobacco Use    Smoking status: Never Smoker    Smokeless tobacco: Never Used   Substance Use Topics    Alcohol use: No   Social History reviewed. Family History   Problem Relation Age of Onset   [address] [facility] Breast Cancer Mother 61    Heart Disease Father     Prostate Cancer Father    Family History reviewed and is non-contributory to current presentation. Objective:     Visit Vitals  BP (!) 145/65   Pulse 71   Temp 98.4 °F (36.9 °C)   Resp 18   Ht 5' 10\" (1.778 m)   Wt 80.3 kg (177 lb)   SpO2 98%   BMI 25.40 kg/m²      Temp (24hrs), Av.4 °F (36.9 °C), Min:98.4 °F (36.9 °C), Max:98.4 °F (36.9 °C)    Oxygen Therapy  O2 Sat (%): 98 % (21)  Pulse via Oximetry: 72 beats per minute (21)  O2 Device: None (Room air) (21 1735)  PHYSICAL EXAM  General:    Alert, cooperative, no distress, appears stated age. Head:   Normocephalic, without obvious abnormality. L periorbital ecchymosis (currently purple in color). Nose:  Nares normal. No drainage or sinus tenderness. Lungs:   Clear to auscultation bilaterally. No Wheezing or Rhonchi. No rales. Heart:   Regular rate and rhythm, with normal S1 and S2. Abdomen:   Soft, non-tender. Not distended. Bowel sounds normal.   Extremities: No cyanosis. No edema. No clubbing. Skin:     Texture, turgor normal.  Not Jaundiced. Neurologic: Alert and oriented x 3, no focal deficits. Psychiatric: Normal mood and affect. Prior to Admission Medications   Prescriptions Last Dose Informant Patient Reported? Taking? ARIPiprazole (ABILIFY) 2 mg tablet   Yes No   Sig: Take 2 mg by mouth daily.    OXcarbazepine (TRILEPTAL) 300 mg tablet   Yes No   Sig: Take 300 mg by mouth nightly. aspirin delayed-release 81 mg tablet   Yes No   Sig: Take  by mouth daily. atorvastatin (LIPITOR) 40 mg tablet   Yes No   Sig: Take  by mouth daily. benzonatate (TESSALON) 100 mg capsule   Yes No   buPROPion XL (WELLBUTRIN XL) 300 mg XL tablet   Yes No   Sig: Take 300 mg by mouth.   calcium carbonate (CORAL CALCIUM PO)   Yes No   Sig: Take  by mouth three (3) times daily. cetirizine (ZYRTEC) 10 mg tablet   No No   Sig: TAKE 1 TABLET BY MOUTH EVERY DAY   cholecalciferol, VITAMIN D3, (VITAMIN D3) 5,000 unit tab tablet   Yes No   Sig: Take 5,000 Units by mouth daily. conjugated estrogens (PREMARIN) 0.625 mg/gram vaginal cream   No No   Sig: Insert 0.5 g into vagina daily. cpap machine kit   Yes No   Sig: by Does Not Apply route. dextroamphetamine-amphetamine (ADDERALL) 10 mg tablet   Yes No   Sig: Take 1 Tablet by mouth. diazePAM (VALIUM) 10 mg tablet   Yes No   Sig: Take 10 mg by mouth every eight (8) hours as needed for Anxiety. diclofenac (VOLTAREN) 1 % topical gel   Yes No   Sig: Apply 4 g to affected area. Qid prn   dicyclomine (BENTYL) 20 mg tablet   Yes No   Sig: Take 20 mg by mouth three (3) times daily as needed. flaxseed oil (OMEGA 3 PO)   Yes No   Sig: Take  by mouth daily. gabapentin (NEURONTIN) 100 mg capsule   No No   Si twice a day   gabapentin (NEURONTIN) 300 mg capsule   No No   Sig: Take one in AM and 3 at bed   gabapentin (NEURONTIN) 300 mg capsule   No No   Sig: Take one in AM and 3 in PM   levothyroxine (LEVOTHROID) 100 mcg tablet   Yes No   Sig: Take 125 mcg by mouth daily. lisinopriL (PRINIVIL, ZESTRIL) 10 mg tablet   Yes No   metoprolol succinate (TOPROL-XL) 25 mg XL tablet   Yes No   Sig: Take  by mouth daily. montelukast (SINGULAIR) 10 mg tablet   No No   Sig: TAKE ONE TABLET BY MOUTH ONE TIME DAILY   multivitamin (ONE A DAY) tablet   Yes No   Sig: Take 1 Tab by mouth daily.    ondansetron (ZOFRAN ODT) 4 mg disintegrating tablet   No No   Sig: Take 1 Tablet by mouth every eight (8) hours as needed for Nausea or Vomiting. oxyCODONE IR (ROXICODONE) 5 mg immediate release tablet   Yes No   Sig: Take 5 mg by mouth every four (4) hours as needed for Pain. oxybutynin (DITROPAN) 5 mg tablet   No No   Sig: TAKE 1 TABLET BY MOUTH THREE TIMES A DAY   pantoprazole (PROTONIX) 20 mg tablet   Yes No   Sig: TAKE 1 TABLET BY MOUTH EVERY DAY   polyethylene glycol (MIRALAX) 17 gram/dose powder   Yes No   Sig: Take 17 g by mouth daily. PRN for constipation  Indications: constipation   prednisoLONE acetate (PRED FORTE) 1 % ophthalmic suspension   Yes No   Sig: instill 1 drop by ophthalmic route 4 times  every day into affected eye(s)   sertraline (Zoloft) 50 mg tablet   Yes No   Sig: Take  by mouth daily. traZODone (DESYREL) 100 mg tablet   Yes No   Sig: Take 100 mg by mouth nightly. Facility-Administered Medications: None       Data Review:   Recent Results (from the past 24 hour(s))   CBC WITH AUTOMATED DIFF    Collection Time: 07/01/21  5:35 PM   Result Value Ref Range    WBC 9.9 4.3 - 11.1 K/uL    RBC 4.45 4.05 - 5.2 M/uL    HGB 13.6 11.7 - 15.4 g/dL    HCT 40.1 35.8 - 46.3 %    MCV 90.1 79.6 - 97.8 FL    MCH 30.6 26.1 - 32.9 PG    MCHC 33.9 31.4 - 35.0 g/dL    RDW 11.7 (L) 11.9 - 14.6 %    PLATELET 978 727 - 691 K/uL    MPV 9.4 9.4 - 12.3 FL    ABSOLUTE NRBC 0.00 0.0 - 0.2 K/uL    DF AUTOMATED      NEUTROPHILS 66 43 - 78 %    LYMPHOCYTES 23 13 - 44 %    MONOCYTES 6 4.0 - 12.0 %    EOSINOPHILS 3 0.5 - 7.8 %    BASOPHILS 1 0.0 - 2.0 %    IMMATURE GRANULOCYTES 0 0.0 - 5.0 %    ABS. NEUTROPHILS 6.6 1.7 - 8.2 K/UL    ABS. LYMPHOCYTES 2.2 0.5 - 4.6 K/UL    ABS. MONOCYTES 0.6 0.1 - 1.3 K/UL    ABS. EOSINOPHILS 0.3 0.0 - 0.8 K/UL    ABS. BASOPHILS 0.1 0.0 - 0.2 K/UL    ABS. IMM.  GRANS. 0.0 0.0 - 0.5 K/UL   METABOLIC PANEL, COMPREHENSIVE    Collection Time: 07/01/21  5:35 PM   Result Value Ref Range    Sodium 125 (L) 136 - 145 mmol/L    Potassium 3.8 3.5 - 5.1 mmol/L    Chloride 89 (L) 98 - 107 mmol/L    CO2 30 21 - 32 mmol/L    Anion gap 6 (L) 7 - 16 mmol/L    Glucose 70 65 - 100 mg/dL    BUN 7 (L) 8 - 23 MG/DL    Creatinine 0.59 (L) 0.6 - 1.0 MG/DL    GFR est AA >60 >60 ml/min/1.73m2    GFR est non-AA >60 >60 ml/min/1.73m2    Calcium 9.1 8.3 - 10.4 MG/DL    Bilirubin, total 0.2 0.2 - 1.1 MG/DL    ALT (SGPT) 25 12 - 65 U/L    AST (SGOT) 12 (L) 15 - 37 U/L    Alk. phosphatase 153 (H) 50 - 136 U/L    Protein, total 8.2 6.3 - 8.2 g/dL    Albumin 4.0 3.2 - 4.6 g/dL    Globulin 4.2 (H) 2.3 - 3.5 g/dL    A-G Ratio 1.0 (L) 1.2 - 3.5     TSH 3RD GENERATION    Collection Time: 07/01/21  5:35 PM   Result Value Ref Range    TSH 1.120 uIU/mL   LACTIC ACID    Collection Time: 07/01/21  5:35 PM   Result Value Ref Range    Lactic acid 0.9 0.4 - 2.0 MMOL/L   AMMONIA    Collection Time: 07/01/21  5:37 PM   Result Value Ref Range    Ammonia 14 (L) 24.9 - 68 UMOL/L   EKG, 12 LEAD, INITIAL    Collection Time: 07/01/21  5:43 PM   Result Value Ref Range    Ventricular Rate 73 BPM    Atrial Rate 73 BPM    P-R Interval 170 ms    QRS Duration 82 ms    Q-T Interval 366 ms    QTC Calculation (Bezet) 403 ms    Calculated P Axis 68 degrees    Calculated R Axis -58 degrees    Calculated T Axis 39 degrees    Diagnosis       !! AGE AND GENDER SPECIFIC ECG ANALYSIS !!   Normal sinus rhythm  Left anterior fascicular block  Abnormal ECG  When compared with ECG of 21-OCT-2018 16:36,  No significant change was found     GLUCOSE, POC    Collection Time: 07/01/21  5:45 PM   Result Value Ref Range    Glucose (POC) 100 65 - 100 mg/dL    Performed by [facility]        Imaging /Procedures /Studies:  EKG Results     Procedure 720 Value Units Date/Time    EKG, 12 LEAD, INITIAL [[id number]] Collected: 07/01/21 1743    Order Status: Completed Updated: 07/01/21 1753     Ventricular Rate 73 BPM      Atrial Rate 73 BPM      P-R Interval 170 ms      QRS Duration 82 ms      Q-T Interval 366 ms      QTC Calculation (Bezet) 403 ms      Calculated P Axis 68 degrees      Calculated R Axis -58 degrees      Calculated T Axis 39 degrees      Diagnosis --     !! AGE AND GENDER SPECIFIC ECG ANALYSIS !!   Normal sinus rhythm  Left anterior fascicular block  Abnormal ECG  When compared with ECG of 21-OCT-2018 16:36,  No significant change was found            Signed By: Bertha Chung MD     July 1, 2021

## 2021-07-01 NOTE — ED TRIAGE NOTES
Pt reports that she was at PT today for her TMJ and was unable to complete it because she just \"feels out of this world\". PT reported to her  that she \"seemed off\" today.

## 2021-07-02 PROBLEM — R41.82 AMS (ALTERED MENTAL STATUS): Status: RESOLVED | Noted: 2021-07-01 | Resolved: 2021-07-02

## 2021-07-02 LAB
ANION GAP SERPL CALC-SCNC: 5 MMOL/L (ref 7–16)
ATRIAL RATE: 73 BPM
BUN SERPL-MCNC: 5 MG/DL (ref 8–23)
CALCIUM SERPL-MCNC: 8.2 MG/DL (ref 8.3–10.4)
CALCULATED P AXIS, ECG09: 68 DEGREES
CALCULATED R AXIS, ECG10: -58 DEGREES
CALCULATED T AXIS, ECG11: 39 DEGREES
CHLORIDE SERPL-SCNC: 97 MMOL/L (ref 98–107)
CO2 SERPL-SCNC: 30 MMOL/L (ref 21–32)
CREAT SERPL-MCNC: 0.44 MG/DL (ref 0.6–1)
DIAGNOSIS, 93000: NORMAL
ERYTHROCYTE [DISTWIDTH] IN BLOOD BY AUTOMATED COUNT: 11.6 % (ref 11.9–14.6)
GLUCOSE SERPL-MCNC: 98 MG/DL (ref 65–100)
HCT VFR BLD AUTO: 35.5 % (ref 35.8–46.3)
HGB BLD-MCNC: 11.9 G/DL (ref 11.7–15.4)
MCH RBC QN AUTO: 30.3 PG (ref 26.1–32.9)
MCHC RBC AUTO-ENTMCNC: 33.5 G/DL (ref 31.4–35)
MCV RBC AUTO: 90.3 FL (ref 79.6–97.8)
NRBC # BLD: 0 K/UL (ref 0–0.2)
P-R INTERVAL, ECG05: 170 MS
PLATELET # BLD AUTO: 266 K/UL (ref 150–450)
PMV BLD AUTO: 9.5 FL (ref 9.4–12.3)
POTASSIUM SERPL-SCNC: 4.3 MMOL/L (ref 3.5–5.1)
Q-T INTERVAL, ECG07: 366 MS
QRS DURATION, ECG06: 82 MS
QTC CALCULATION (BEZET), ECG08: 403 MS
RBC # BLD AUTO: 3.93 M/UL (ref 4.05–5.2)
SODIUM SERPL-SCNC: 132 MMOL/L (ref 136–145)
SODIUM SERPL-SCNC: 138 MMOL/L (ref 136–145)
VENTRICULAR RATE, ECG03: 73 BPM
WBC # BLD AUTO: 7.9 K/UL (ref 4.3–11.1)

## 2021-07-02 PROCEDURE — 74011250636 HC RX REV CODE- 250/636: Performed by: FAMILY MEDICINE

## 2021-07-02 PROCEDURE — 84295 ASSAY OF SERUM SODIUM: CPT

## 2021-07-02 PROCEDURE — 2709999900 HC NON-CHARGEABLE SUPPLY

## 2021-07-02 PROCEDURE — 74011250637 HC RX REV CODE- 250/637: Performed by: FAMILY MEDICINE

## 2021-07-02 PROCEDURE — 97530 THERAPEUTIC ACTIVITIES: CPT

## 2021-07-02 PROCEDURE — 85027 COMPLETE CBC AUTOMATED: CPT

## 2021-07-02 PROCEDURE — 97161 PT EVAL LOW COMPLEX 20 MIN: CPT

## 2021-07-02 PROCEDURE — 65270000029 HC RM PRIVATE

## 2021-07-02 PROCEDURE — 36415 COLL VENOUS BLD VENIPUNCTURE: CPT

## 2021-07-02 PROCEDURE — 80048 BASIC METABOLIC PNL TOTAL CA: CPT

## 2021-07-02 RX ADMIN — MONTELUKAST 10 MG: 10 TABLET, FILM COATED ORAL at 22:13

## 2021-07-02 RX ADMIN — Medication 10 ML: at 05:56

## 2021-07-02 RX ADMIN — LORATADINE 10 MG: 10 TABLET ORAL at 08:33

## 2021-07-02 RX ADMIN — OXYCODONE 5 MG: 5 TABLET ORAL at 00:46

## 2021-07-02 RX ADMIN — Medication 10 ML: at 22:13

## 2021-07-02 RX ADMIN — METOPROLOL SUCCINATE 25 MG: 25 TABLET, EXTENDED RELEASE ORAL at 08:32

## 2021-07-02 RX ADMIN — ARIPIPRAZOLE 2 MG: 2 TABLET ORAL at 08:33

## 2021-07-02 RX ADMIN — SERTRALINE 50 MG: 50 TABLET, FILM COATED ORAL at 08:33

## 2021-07-02 RX ADMIN — Medication 81 MG: at 08:33

## 2021-07-02 RX ADMIN — BUPROPION HYDROCHLORIDE 150 MG: 150 TABLET, EXTENDED RELEASE ORAL at 08:32

## 2021-07-02 RX ADMIN — SODIUM CHLORIDE 100 ML/HR: 900 INJECTION, SOLUTION INTRAVENOUS at 06:13

## 2021-07-02 RX ADMIN — LISINOPRIL 10 MG: 5 TABLET ORAL at 08:32

## 2021-07-02 RX ADMIN — BUPROPION HYDROCHLORIDE 150 MG: 150 TABLET, EXTENDED RELEASE ORAL at 00:46

## 2021-07-02 RX ADMIN — ONDANSETRON 4 MG: 4 TABLET, ORALLY DISINTEGRATING ORAL at 19:46

## 2021-07-02 RX ADMIN — BUPROPION HYDROCHLORIDE 150 MG: 150 TABLET, EXTENDED RELEASE ORAL at 17:00

## 2021-07-02 RX ADMIN — LEVOTHYROXINE SODIUM 125 MCG: 0.12 TABLET ORAL at 08:33

## 2021-07-02 RX ADMIN — OXYCODONE 5 MG: 5 TABLET ORAL at 22:19

## 2021-07-02 RX ADMIN — ATORVASTATIN CALCIUM 40 MG: 40 TABLET, FILM COATED ORAL at 22:13

## 2021-07-02 RX ADMIN — Medication 10 ML: at 14:00

## 2021-07-02 NOTE — PROGRESS NOTES
07/01/21 2041   Dual Skin Pressure Injury Assessment   Dual Skin Pressure Injury Assessment X   Second Care Provider (Based on [address] [place] [address]) Tami RN    Foot Bilateral  (abrasion)   Skin Integumentary   Skin Integumentary (WDL) X    Pressure  Injury Documentation No Pressure Injury Noted-Pressure Ulcer Prevention Initiated   Skin Color Appropriate for ethnicity   Skin Condition/Temp Dry;Fragile;Rash;Warm   Skin Integrity Abrasion  (BLE and upper back)   Rash like abrasion to BLE and upper back.

## 2021-07-02 NOTE — PROGRESS NOTES
Problem: Mobility Impaired (Adult and Pediatric)  Goal: *Acute Goals and Plan of Care (Insert Text)  Outcome: Progressing Towards Goal  Note: STG:  (1.)Ms. Peter North will move from supine to sit and sit to supine  with MODIFIED INDEPENDENCE within 2 treatment day(s). (2.)Ms. Peter North will transfer from bed to chair and chair to bed with MODIFIED INDEPENDENCE using the least restrictive device within 2 treatment day(s). (3.)Ms. Peter North will ambulate with CONTACT GUARD ASSIST for 200 feet with the least restrictive device within 2 treatment day(s). 4.) Ms. Peter North will go up and down 3 steps with bilateral rails and CGA within 2 days. ________________________________________________________________________________________________        PHYSICAL THERAPY: Initial Assessment and PM 7/2/2021  INPATIENT: PT Visit Days : 1  [place] / [place] / Product Type: Medicare /       NAME/AGE/GENDER: Rudy Friday is a 72 y.o. female   PRIMARY DIAGNOSIS: Hyponatremia [E87.1]  AMS (altered mental status) [R41.82] Acute hyponatremia Acute hyponatremia        ICD-10: Treatment Diagnosis:    Generalized Muscle Weakness (M62.81)  Other abnormalities of gait and mobility (R26.89)  Unspecified Lack of Coordination (R27.9)   Precaution/Allergies:  Fentanyl and Effexor [venlafaxine]      ASSESSMENT:     Ms. Peter North presents with limited functional independence with bed mobility, transfers and gait and is requiring CGA for safety with transfers and Min assist for gait. I recommended that she use her RW at discharge for stability and that she resume her OPPT. She is weak with limited balance and somewhat generalized low tone and diminished coordination. Transferred out of bed with PT and transferred to and from the [facility] for toileting then ambulated to the bathroom to wash her hands and then ambulated in the bennett before returning supine.  asking if he could ambulate with her later.  I agreed only if she felt okay and only while using the RW and keeping close to the room. This section established at most recent assessment   PROBLEM LIST (Impairments causing functional limitations):  Decreased Strength  Decreased Transfer Abilities  Decreased Ambulation Ability/Technique  Decreased Balance  Decreased Activity Tolerance   INTERVENTIONS PLANNED: (Benefits and precautions of physical therapy have been discussed with the patient.)  Bed Mobility  Gait Training  Therapeutic Exercise/Strengthening  Transfer Training     TREATMENT PLAN: Frequency/Duration: daily for duration of hospital stay  Rehabilitation Potential For Stated Goals: Good     REHAB RECOMMENDATIONS (at time of discharge pending progress):    Placement: It is my opinion, based on this patient's performance to date, that Ms. Joseph Dunn may benefit from OUTPATIENT THERAPY after discharge due to the functional deficits listed above that are likely to improve with skilled rehabilitation because because he/she is able to safely attend regular and reoccurring therapy sessions at an outpatient facility. Equipment:   None at this time              HISTORY:   History of Present Injury/Illness (Reason for Referral):  72 y.o. F with PMH significant for bipolar disorder, anxiety, LINDA, HTN who presents to the ER with complaint of AMS. Patient brought in by her  who reports that she seemed \"out of it. \"  Symptoms have worsened over the past few days.  reports that she has been more weak and has required assistance to walk. He also reports she seems confused. On ER workup, CT head showed no evidence of acute abnormality. WBC is normal.  UA is pending, but recent was negative. Serum sodium level was found to be 125, which is lower than prior of 140.   Past Medical History/Comorbidities:   Ms. Joseph Dunn  has a past medical history of Arthritis, Bipolar affective disorder ([facility] [address] Chronic pain, Depression (11/12/2009), Disturbance of skin sensation (3/6/2020), Dysuria (07/11/2007), Essential hypertension, Fibromyalgia, GERD (gastroesophageal reflux disease) (11/12/2009), Hypercholesterolemia, Hyperlipidemia, Hypothyroidism (11/12/2009), Lumbago (11/12/2009), Miscarriage, PUD (peptic ulcer disease), Sleep apnea, Unspecified urinary incontinence, and Urinary tract infection, site not specified (06/07/2006). Ms. Mohamud Atwood  has a past surgical history that includes hx cholecystectomy (1980s); hx tonsillectomy (1980); hx total abdominal hysterectomy (2003); hx bladder suspension (2003); hx oophorectomy; hx cystocele repair (11/12/2009); and hx rectocele repair (11/12/2009). Social History/Living Environment:   Home Environment: Private residence  # Steps to Enter: 3  One/Two Story Residence: One story  Living Alone: No  Support Systems: Spouse/Significant Other/Partner  Patient Expects to be Discharged toVF Cor[de-identified]ration  Current DME Used/Available at Home: None  Prior Level of Function/Work/Activity:  Lives with her  and has been having frequent falls within the past weeks. She has a rollator but does not currently use it. Her  assists her with safety with mobility. Number of Personal Factors/Comorbidities that affect the Plan of Care: 0: LOW COMPLEXITY   EXAMINATION:   Most Recent Physical Functioning:   Gross Assessment:  AROM: Within functional limits  Strength: Generally decreased, functional  Coordination: Generally decreased, functional  Tone:  (somewhat low tone)               Posture:     Balance:  Sitting: Intact  Standing: Pull to stand; With support; Impaired  Standing - Static: Fair  Standing - Dynamic : Fair;Constant support Bed Mobility:  Rolling: Stand-by assistance  Supine to Sit: Stand-by assistance  Sit to Supine: Stand-by assistance  Scooting: Stand-by assistance  Wheelchair Mobility:     Transfers:  Sit to Stand: Contact guard assistance  Stand to Sit: Stand-by assistance  Bed to Chair: Contact guard assistance  Gait:     Speed/Kimberly: Delayed  Gait Abnormalities: Decreased step clearance; Path deviations;Trunk sway increased  Distance (ft): 50 Feet (ft)  Assistive Device:  (HHA but recommend RW)  Ambulation - Level of Assistance: Minimal assistance  Interventions: Verbal cues; Safety awareness training      Body Structures Involved:  Muscles Body Functions Affected: Movement Related Activities and Participation Affected: Mobility   Number of elements that affect the Plan of Care: 3: MODERATE COMPLEXITY   CLINICAL PRESENTATION:   Presentation: Stable and uncomplicated: LOW COMPLEXITY   CLINICAL DECISION MAKIN[address] [place] [address] AM-PAC 6 Clicks   Basic Mobility Inpatient Short Form  How much difficulty does the patient currently have. .. Unable A Lot A Little None   1. Turning over in bed (including adjusting bedclothes, sheets and blankets)? [] 1   [] 2   [x] 3   [] 4   2. Sitting down on and standing up from a chair with arms ( e.g., wheelchair, bedside commode, etc.)   [] 1   [] 2   [x] 3   [] 4   3. Moving from lying on back to sitting on the side of the bed? [] 1   [] 2   [x] 3   [] 4   How much help from another person does the patient currently need. .. Total A Lot A Little None   4. Moving to and from a bed to a chair (including a wheelchair)? [] 1   [] 2   [x] 3   [] 4   5. Need to walk in hospital room? [] 1   [] 2   [x] 3   [] 4   6. Climbing 3-5 steps with a railing? [] 1   [x] 2   [] 3   [] 4   © , Trustees of [address] [place], [place] [id number], under license to [employer]. All rights reserved      Score:  Initial: 17 Most Recent: X (Date: -- )    Interpretation of Tool:  Represents activities that are increasingly more difficult (i.e. Bed mobility, Transfers, Gait). Medical Necessity:     Patient is expected to demonstrate progress in   strength, balance, coordination, and functional technique   to   increase independence with mobility and balance  .   Reason for Services/Other Comments:  Patient continues to require skilled intervention due to   Limited functional independence  . Use of outcome tool(s) and clinical judgement create a POC that gives a: Clear prediction of patient's progress: LOW COMPLEXITY            TREATMENT:   (In addition to Assessment/Re-Assessment sessions the following treatments were rendered)   Pre-treatment Symptoms/Complaints:  none  Pain: Initial:   Pain Intensity 1: 0  Post Session:  0     Therapeutic Activity: (    15 minutes): Therapeutic activities including Bed transfers, Toilet transfers, Ambulation on level ground, and standing activities at the sink to improve strength, balance, and coordination. Required minimal Verbal cues; Safety awareness training to promote dynamic balance in standing. Braces/Orthotics/Lines/Etc:   O2 Device: None (Room air)  Treatment/Session Assessment:    Response to Treatment:  tolerated well but fatigues easily   Interdisciplinary Collaboration:   Physical Therapist    After treatment position/precautions:   Supine in bed  Bed/Chair-wheels locked  Bed in low position  Call light within reach  RN notified  Family at bedside   Compliance with Program/Exercises: Compliant all of the time  Recommendations/Intent for next treatment session: \"Next visit will focus on advancements to more challenging activities and reduction in assistance provided\".   Total Treatment Duration:  PT Patient Time In/Time Out  Time In: 1445  Time Out: [facility], [facility]

## 2021-07-02 NOTE — PROGRESS NOTES
Care Management Interventions  PCP Verified by CM: Yes  Mode of Transport at Discharge: Self  Physical Therapy Consult: Yes  Current Support Network: Lives with Spouse    SW met w/ pt which was alert and oriented and confirmed demographic information    Pt stated that she lives w/ her - Shay Fierro [phone], which is her emergency contact. Pt  Stated that as of now, her son, daughter-in-law, and 3 grandchildren live w/ her as well    Pt stated that she has had at least 3 falls in the past 12 months. Pt also stated that she sometimes needs help / getting dressed and doing small things around the house. Saw pt in interdisciplinarily rounds with the following disciplines: Physician, Case Management, Nursing, Dietary,Therapy and Pharmacy. The plan of care was discussed along with goals for discharge date/ location.      Per physician pt will be evaluated for d/c in the am pending clinical progression

## 2021-07-02 NOTE — PROGRESS NOTES
[facility]ist Progress Note    Patient: Willi Bautista MRN: [id number]  SSN: [id number]    YOB: 1950  Age: 72 y.o. Sex: female      Admit Date: 7/1/2021    LOS: 1 day     Subjective:     Chief Complaint: Confusion  Reason for Admission: Acute hyponatremia    72 y.o. F with PMH significant for bipolar disorder, anxiety, LINDA, HTN who presents to the ER with complaint of AMS. Patient brought in by her  who reports that she seemed \"out of it. \"  Symptoms have worsened over the past few days.  reports that she has been more weak and has required assistance to walk. He also reports she seems confused. On ER workup, CT head showed no evidence of acute abnormality. WBC is normal.  UA is pending, but recent was negative. Serum sodium level was found to be 125, which is lower than prior of 140.    7/2 - She continues to complain of urinary hesitancy and urgency. Denies CP/SOB. Feels better today. Not as foggy headed. Review of systems negative except stated above.     Assessment:     Primary Diagnosis: Acute hyponatremia    Hospital Problems as of 7/2/2021 Date Reviewed: 6/4/2021        Codes Class Noted - Resolved POA    * (Principal) Acute hyponatremia ICD-10-CM: E87.1  ICD-9-CM: 276.1  7/1/2021 - Present         LINDA (obstructive sleep apnea) ICD-10-CM: G47.33  ICD-9-CM: 327.23  8/21/2018 - Present Yes        Bronchiectasis without complication ([facility] [id number]: J47.9  ICD-9-CM: 494.0  3/13/2018 - Present Yes        Restrictive lung disease ICD-10-CM: J98.4  ICD-9-CM: 518.89  3/13/2018 - Present Yes        Essential hypertension ICD-10-CM: I10  ICD-9-CM: 401.9  1/5/2018 - Present Yes        GALLO (stress urinary incontinence, female) ICD-10-CM: N39.3  ICD-9-CM: 625.6  4/16/2015 - Present Yes        Urgency of urination ICD-10-CM: R39.15  ICD-9-CM: 788.63  4/16/2015 - Present Yes        Pure hypercholesterolemia ICD-10-CM: E78.00  ICD-9-CM: 272.0  6/28/2013 - Present Yes Bipolar disorder, unspecified ([facility] ICD-10-CM: F31.9  ICD-9-CM: 296.80  6/28/2013 - Present Yes        Anxiety ICD-10-CM: F41.9  ICD-9-CM: 300.00  6/28/2013 - Present Yes        Neurotic disorder ICD-10-CM: F48.9  ICD-9-CM: 300.9  6/28/2013 - Present Yes        RESOLVED: AMS (altered mental status) ICD-10-CM: R41.82  ICD-9-CM: 780.97  7/1/2021 - 7/2/2021 Unknown              Active Medical Issues and Plan (MDM):     Principal Problem:    Acute hyponatremia (7/1/2021)  - Likely due to dehydration  - Na 125 --> 132  - Stop IVFs  - TSH normal  - Encourage PO intake  - UA unremarkable    Active Problems:    Pure hypercholesterolemia (6/28/2013)  - Continue  Lipitor      Bipolar disorder, unspecified ([facility] (6/28/2013)  - Continue Zoloft  - Continue Wellbutrin  - Continue Abilify      Anxiety (6/28/2013)  - Continue Zoloft  - Continue Wellbutrin      GALLO (stress urinary incontinence, female) (4/16/2015)  - Will need to follow with Urology as OP      Urgency of urination (4/16/2015)  - Will need to follow with Urology as OP      Essential hypertension (1/5/2018)  - Stable  - Continue Toprol  - Continue Lisinopril      LINDA (obstructive sleep apnea) (8/21/2018)    Otherwise all chronic medical issues appear stable with no changes.     Diet: ADULT DIET Regular  VTE ppx: SCDs    Objective:     Visit Vitals  /65 (BP 1 Location: Left upper arm, BP Patient Position: At rest)   Pulse 67   Temp 97.2 °F (36.2 °C)   Resp 19   Ht 5' 10\" (1.778 m)   Wt 80.3 kg (177 lb)   SpO2 96%   BMI 25.40 kg/m²      Oxygen Therapy  O2 Sat (%): 96 % (07/02/21 0725)  Pulse via Oximetry: 69 beats per minute (07/01/21 2002)  O2 Device: None (Room air) (07/02/21 5021)      Intake and Output:     Intake/Output Summary (Last 24 hours) at 7/2/2021 0929  Last data filed at 7/2/2021 0500  Gross per 24 hour   Intake    Output 1250 ml   Net -1250 ml         Physical Exam:   GENERAL: alert, cooperative, no distress, appears stated age  EYE: conjunctivae/corneas clear. PERRL. THROAT & NECK: normal and no erythema or exudates noted. LUNG: clear to auscultation bilaterally  HEART: regular rate and rhythm, S1S2, no murmur, no JVD  ABDOMEN: soft, non-tender, non-distended. Bowel sounds normal.   EXTREMITIES:  No edema, 2+ pedal/radial pulses bilaterally  SKIN: no rash or abnormalities  NEUROLOGIC: A&Ox4. Cranial nerves 2-12 grossly intact. Lab/Data Review:  Recent Results (from the past 24 hour(s))   CBC WITH AUTOMATED DIFF    Collection Time: 07/01/21  5:35 PM   Result Value Ref Range    WBC 9.9 4.3 - 11.1 K/uL    RBC 4.45 4.05 - 5.2 M/uL    HGB 13.6 11.7 - 15.4 g/dL    HCT 40.1 35.8 - 46.3 %    MCV 90.1 79.6 - 97.8 FL    MCH 30.6 26.1 - 32.9 PG    MCHC 33.9 31.4 - 35.0 g/dL    RDW 11.7 (L) 11.9 - 14.6 %    PLATELET 420 672 - 645 K/uL    MPV 9.4 9.4 - 12.3 FL    ABSOLUTE NRBC 0.00 0.0 - 0.2 K/uL    DF AUTOMATED      NEUTROPHILS 66 43 - 78 %    LYMPHOCYTES 23 13 - 44 %    MONOCYTES 6 4.0 - 12.0 %    EOSINOPHILS 3 0.5 - 7.8 %    BASOPHILS 1 0.0 - 2.0 %    IMMATURE GRANULOCYTES 0 0.0 - 5.0 %    ABS. NEUTROPHILS 6.6 1.7 - 8.2 K/UL    ABS. LYMPHOCYTES 2.2 0.5 - 4.6 K/UL    ABS. MONOCYTES 0.6 0.1 - 1.3 K/UL    ABS. EOSINOPHILS 0.3 0.0 - 0.8 K/UL    ABS. BASOPHILS 0.1 0.0 - 0.2 K/UL    ABS. IMM. GRANS. 0.0 0.0 - 0.5 K/UL   METABOLIC PANEL, COMPREHENSIVE    Collection Time: 07/01/21  5:35 PM   Result Value Ref Range    Sodium 125 (L) 136 - 145 mmol/L    Potassium 3.8 3.5 - 5.1 mmol/L    Chloride 89 (L) 98 - 107 mmol/L    CO2 30 21 - 32 mmol/L    Anion gap 6 (L) 7 - 16 mmol/L    Glucose 70 65 - 100 mg/dL    BUN 7 (L) 8 - 23 MG/DL    Creatinine 0.59 (L) 0.6 - 1.0 MG/DL    GFR est AA >60 >60 ml/min/1.73m2    GFR est non-AA >60 >60 ml/min/1.73m2    Calcium 9.1 8.3 - 10.4 MG/DL    Bilirubin, total 0.2 0.2 - 1.1 MG/DL    ALT (SGPT) 25 12 - 65 U/L    AST (SGOT) 12 (L) 15 - 37 U/L    Alk.  phosphatase 153 (H) 50 - 136 U/L    Protein, total 8.2 6.3 - 8.2 g/dL Albumin 4.0 3.2 - 4.6 g/dL    Globulin 4.2 (H) 2.3 - 3.5 g/dL    A-G Ratio 1.0 (L) 1.2 - 3.5     TSH 3RD GENERATION    Collection Time: 07/01/21  5:35 PM   Result Value Ref Range    TSH 1.120 uIU/mL   LACTIC ACID    Collection Time: 07/01/21  5:35 PM   Result Value Ref Range    Lactic acid 0.9 0.4 - 2.0 MMOL/L   AMMONIA    Collection Time: 07/01/21  5:37 PM   Result Value Ref Range    Ammonia 14 (L) 24.9 - 68 UMOL/L   EKG, 12 LEAD, INITIAL    Collection Time: 07/01/21  5:43 PM   Result Value Ref Range    Ventricular Rate 73 BPM    Atrial Rate 73 BPM    P-R Interval 170 ms    QRS Duration 82 ms    Q-T Interval 366 ms    QTC Calculation (Bezet) 403 ms    Calculated P Axis 68 degrees    Calculated R Axis -58 degrees    Calculated T Axis 39 degrees    Diagnosis       Normal sinus rhythm  Left anterior fascicular block  Abnormal ECG  When compared with ECG of 21-OCT-2018 16:36,  No significant change was found  Confirmed by Jl Minor ([id number]) on 7/2/2021 6:52:52 AM     GLUCOSE, POC    Collection Time: 07/01/21  5:45 PM   Result Value Ref Range    Glucose (POC) 100 65 - 100 mg/dL    Performed by Barb    URINALYSIS W/ RFLX MICROSCOPIC    Collection Time: 07/01/21  9:00 PM   Result Value Ref Range    Color YELLOW      Appearance CLEAR      Specific gravity 1.008 1.001 - 1.023      pH (UA) 6.5 5.0 - 9.0      Protein Negative NEG mg/dL    Glucose Negative mg/dL    Ketone Negative NEG mg/dL    Bilirubin Negative NEG      Blood Negative NEG      Urobilinogen 0.2 0.2 - 1.0 EU/dL    Nitrites Negative NEG      Leukocyte Esterase Negative NEG     SODIUM    Collection Time: 07/01/21 11:07 PM   Result Value Ref Range    Sodium 128 (L) 136 - 145 mmol/L   CBC W/O DIFF    Collection Time: 07/02/21  5:18 AM   Result Value Ref Range    WBC 7.9 4.3 - 11.1 K/uL    RBC 3.93 (L) 4.05 - 5.2 M/uL    HGB 11.9 11.7 - 15.4 g/dL    HCT 35.5 (L) 35.8 - 46.3 %    MCV 90.3 79.6 - 97.8 FL    MCH 30.3 26.1 - 32.9 PG    MCHC 33.5 31.4 - 35.0 g/dL    RDW 11.6 (L) 11.9 - 14.6 %    PLATELET 475 282 - 990 K/uL    MPV 9.5 9.4 - 12.3 FL    ABSOLUTE NRBC 0.00 0.0 - 0.2 K/uL   METABOLIC PANEL, BASIC    Collection Time: 07/02/21  5:18 AM   Result Value Ref Range    Sodium 132 (L) 136 - 145 mmol/L    Potassium 4.3 3.5 - 5.1 mmol/L    Chloride 97 (L) 98 - 107 mmol/L    CO2 30 21 - 32 mmol/L    Anion gap 5 (L) 7 - 16 mmol/L    Glucose 98 65 - 100 mg/dL    BUN 5 (L) 8 - 23 MG/DL    Creatinine 0.44 (L) 0.6 - 1.0 MG/DL    GFR est AA >60 >60 ml/min/1.73m2    GFR est non-AA >60 >60 ml/min/1.73m2    Calcium 8.2 (L) 8.3 - 10.4 MG/DL       SARS-CoV-2 Lab Results  \"Novel Coronavirus\" Test: No results found for: COV2NT   \"Emergent Disease\" Test: No results found for: EDPR  \"SARS-COV-2\" Test: No results found for: XGCOVT  \"[facility]\" Test: No results found for: RSLT  Rapid Test: No results found for: COVR        Imaging:  CT HEAD WO CONT    Result Date: 7/1/2021  CT HEAD WITHOUT CONTRAST HISTORY:  Transient alteration of awareness. COMPARISON: 6/18/2021 TECHNIQUE: Axial imaging was performed without intravenous contrast utilizing 5mm slice thickness. Sagittal and coronal reformats were performed. Radiation dose reduction techniques were used for this study. Our CT scanner uses one or all of the following: Automated exposure control, adjustment of the MAS or KUB according to patient's size and iterative reconstruction. FINDINGS:    *BRAIN:    -  There are no early signs of territorial or lacunar infarction by CT.    -  No intracranial mass, hematoma, or hydrocephalus.    -  No gross white matter abnormality by CT. *VISUALIZED PARANASAL SINUSES: 2 dental foreign bodies project into the left maxillary sinus. *MASTOIDS:  Clear. *CALVARIUM AND SCALP: Unremarkable. 2 dental foreign bodies project into the left maxillary sinus. Date of Dictation: 7/1/2021 6:16 PM     CT HEAD WO CONT    Result Date: 6/18/2021  CT HEAD WITHOUT CONTRAST.  INDICATION: Head injury sustained in fall one week ago with bruising. COMPARISON: CT abdomen without and with contrast December 2020  TECHNIQUE:   5 mm axial scans from the skull base to the vertex. Our CT scanners use one or more of the following:  Automated exposure control, adjustment of the mA and or kV according to patient size, iterative reconstruction. FINDINGS:  Cerebrum: Uniform brain parenchyma is demonstrated There is normal gray-white matter differentiation. No evidence of intracranial hemorrhage or mass. There are no abnormal extra-axial fluid collections. Cerebellum: Unremarkable. CSF spaces: The ventricular system is within normal limits. The basilar cisterns are unremarkable. Brainstem: Unremarkable. Extracranial tissues: Included orbits and extracranial soft tissues are unremarkable. Paranasal sinuses/Mastoids: A few opacified ethmoid air cells, otherwise clear. Calvarium: No gross bony abnormality. No fracture. Sclerosis in the left mastoid, stable. Negative for acute intracranial abnormality. Chronic changes. CT FACIAL BONES WITHOUT CONTRAST. HISTORY: Facial trauma sustained in fall with facial bruising TECHNIQUE: Axial 2.5 mm scans from above the orbits through the mandible. Coronal reconstructed images were also performed. Radiation dose reduction techniques were used for this study. Our CT scanners use one or more of the following:  Automated exposure control, adjustment of the mA and or kV according to patient size, iterative reconstruction. FINDINGS: Zygomatic arches are intact. Pterygoid plates are intact. Inferior orbital rims are intact. Maxillary, sphenoid and frontal sinuses are clear. A few opacified ethmoid air cells. Mucosal thickening left frontal sinus. Mandible appears intact. Mandibular condyles are normally located. Mastoid air cells are clear, sclerotic on the left. Globes and orbits are unremarkable. No displaced nasal fracture. Included parotid and submandibular glands unremarkable.  No gross intracranial abnormality. IMPRESSION: No acute fracture. Few opacified ethmoid air cells and mucosal thickening left frontal sinus. Sclerotic mastoid on the left, chronic. CT MAXILLOFACIAL WO CONT    Result Date: 6/18/2021  CT HEAD WITHOUT CONTRAST. INDICATION: Head injury sustained in fall one week ago with bruising. COMPARISON: CT abdomen without and with contrast December 2020  TECHNIQUE:   5 mm axial scans from the skull base to the vertex. Our CT scanners use one or more of the following:  Automated exposure control, adjustment of the mA and or kV according to patient size, iterative reconstruction. FINDINGS:  Cerebrum: Uniform brain parenchyma is demonstrated There is normal gray-white matter differentiation. No evidence of intracranial hemorrhage or mass. There are no abnormal extra-axial fluid collections. Cerebellum: Unremarkable. CSF spaces: The ventricular system is within normal limits. The basilar cisterns are unremarkable. Brainstem: Unremarkable. Extracranial tissues: Included orbits and extracranial soft tissues are unremarkable. Paranasal sinuses/Mastoids: A few opacified ethmoid air cells, otherwise clear. Calvarium: No gross bony abnormality. No fracture. Sclerosis in the left mastoid, stable. Negative for acute intracranial abnormality. Chronic changes. CT FACIAL BONES WITHOUT CONTRAST. HISTORY: Facial trauma sustained in fall with facial bruising TECHNIQUE: Axial 2.5 mm scans from above the orbits through the mandible. Coronal reconstructed images were also performed. Radiation dose reduction techniques were used for this study. Our CT scanners use one or more of the following:  Automated exposure control, adjustment of the mA and or kV according to patient size, iterative reconstruction. FINDINGS: Zygomatic arches are intact. Pterygoid plates are intact. Inferior orbital rims are intact. Maxillary, sphenoid and frontal sinuses are clear. A few opacified ethmoid air cells.  Mucosal thickening left frontal sinus. Mandible appears intact. Mandibular condyles are normally located. Mastoid air cells are clear, sclerotic on the left. Globes and orbits are unremarkable. No displaced nasal fracture. Included parotid and submandibular glands unremarkable. No gross intracranial abnormality. IMPRESSION: No acute fracture. Few opacified ethmoid air cells and mucosal thickening left frontal sinus. Sclerotic mastoid on the left, chronic. XR CHEST PORT    Result Date: 7/1/2021  EXAM: XR CHEST PORT INDICATION: AMS/confusion COMPARISON: None. FINDINGS: A portable AP radiograph of the chest was obtained at 1729 hours. The patient is on a cardiac monitor. The lungs are clear. The cardiac and mediastinal contours and pulmonary vascularity are normal.  The bones and soft tissues are grossly within normal limits. Normal chest.    No results found for this visit on 07/01/21. Cultures:   All Micro Results     Procedure Component Value Units Date/Time    CULTURE, BLOOD [[id number]] Collected: 07/01/21 1737    Order Status: Completed Specimen: Blood Updated: 07/01/21 1744    CULTURE, BLOOD [[id number]] Collected: 07/01/21 1735    Order Status: Completed Specimen: Blood Updated: 07/01/21 1744          Discharge Plan:     Home tomorrow AM    Signed By: Sadia Curran DO     July 2, 2021

## 2021-07-02 NOTE — PROGRESS NOTES
Problem: Falls - Risk of  Goal: *Absence of Falls  Description: Document Calista Lange Fall Risk and appropriate interventions in the flowsheet.   7/2/2021 0519 by Wanda Mcnamara  Outcome: Progressing Towards Goal  Note: Fall Risk Interventions:            Medication Interventions: Patient to call before getting OOB                7/2/2021 0515 by Wanda Mcnamara  Outcome: Progressing Towards Goal  Note: Fall Risk Interventions:            Medication Interventions: Patient to call before getting OOB                   Problem: Patient Education: Go to Patient Education Activity  Goal: Patient/Family Education  Outcome: Progressing Towards Goal     Problem: Pain  Goal: *PALLIATIVE CARE:  Alleviation of Pain  7/2/2021 0519 by Alexus SHEN  Outcome: Progressing Towards Goal  7/2/2021 0515 by Wanda Mcnamara  Outcome: Progressing Towards Goal     Problem: Patient Education: Go to Patient Education Activity  Goal: Patient/Family Education  Outcome: Progressing Towards Goal

## 2021-07-02 NOTE — ROUTINE PROCESS
TRANSFER - OUT REPORT:    Verbal report given to Romana ENNIS(name) on Armando Gamino  being transferred to [facility] (unit) for routine progression of care       Report consisted of patients Situation, Background, Assessment and   Recommendations(SBAR). Information from the following report(s) ED Summary was reviewed with the receiving nurse. Lines:   Peripheral IV 07/01/21 Right Antecubital (Active)   Site Assessment Clean, dry, & intact 07/01/21 1738   Phlebitis Assessment 0 07/01/21 1738   Infiltration Assessment 0 07/01/21 1738   Dressing Status Clean, dry, & intact 07/01/21 1738       Peripheral IV 07/01/21 Left Antecubital (Active)   Site Assessment Clean, dry, & intact 07/01/21 1746   Phlebitis Assessment 0 07/01/21 1746   Infiltration Assessment 0 07/01/21 1746   Dressing Status Clean, dry, & intact 07/01/21 1746        Opportunity for questions and clarification was provided.       Patient transported with:   Registered Nurse

## 2021-07-03 VITALS
TEMPERATURE: 97.8 F | SYSTOLIC BLOOD PRESSURE: 115 MMHG | HEIGHT: 70 IN | WEIGHT: 177 LBS | OXYGEN SATURATION: 95 % | HEART RATE: 55 BPM | RESPIRATION RATE: 17 BRPM | DIASTOLIC BLOOD PRESSURE: 54 MMHG | BODY MASS INDEX: 25.34 KG/M2

## 2021-07-03 LAB
ANION GAP SERPL CALC-SCNC: 4 MMOL/L (ref 7–16)
ANION GAP SERPL CALC-SCNC: 9 MMOL/L (ref 7–16)
BUN SERPL-MCNC: 8 MG/DL (ref 8–23)
BUN SERPL-MCNC: 9 MG/DL (ref 8–23)
CALCIUM SERPL-MCNC: 8.7 MG/DL (ref 8.3–10.4)
CALCIUM SERPL-MCNC: 8.9 MG/DL (ref 8.3–10.4)
CHLORIDE SERPL-SCNC: 100 MMOL/L (ref 98–107)
CHLORIDE SERPL-SCNC: 101 MMOL/L (ref 98–107)
CO2 SERPL-SCNC: 28 MMOL/L (ref 21–32)
CO2 SERPL-SCNC: 29 MMOL/L (ref 21–32)
CREAT SERPL-MCNC: 0.49 MG/DL (ref 0.6–1)
CREAT SERPL-MCNC: 0.57 MG/DL (ref 0.6–1)
ERYTHROCYTE [DISTWIDTH] IN BLOOD BY AUTOMATED COUNT: 12 % (ref 11.9–14.6)
GLUCOSE SERPL-MCNC: 100 MG/DL (ref 65–100)
GLUCOSE SERPL-MCNC: 93 MG/DL (ref 65–100)
HCT VFR BLD AUTO: 41 % (ref 35.8–46.3)
HGB BLD-MCNC: 13.8 G/DL (ref 11.7–15.4)
MCH RBC QN AUTO: 30.8 PG (ref 26.1–32.9)
MCHC RBC AUTO-ENTMCNC: 33.7 G/DL (ref 31.4–35)
MCV RBC AUTO: 91.5 FL (ref 79.6–97.8)
NRBC # BLD: 0 K/UL (ref 0–0.2)
PLATELET # BLD AUTO: 339 K/UL (ref 150–450)
PMV BLD AUTO: 9.5 FL (ref 9.4–12.3)
POTASSIUM SERPL-SCNC: 4.4 MMOL/L (ref 3.5–5.1)
POTASSIUM SERPL-SCNC: 5.3 MMOL/L (ref 3.5–5.1)
RBC # BLD AUTO: 4.48 M/UL (ref 4.05–5.2)
SODIUM SERPL-SCNC: 133 MMOL/L (ref 136–145)
SODIUM SERPL-SCNC: 138 MMOL/L (ref 136–145)
WBC # BLD AUTO: 10.4 K/UL (ref 4.3–11.1)

## 2021-07-03 PROCEDURE — 80048 BASIC METABOLIC PNL TOTAL CA: CPT

## 2021-07-03 PROCEDURE — 97530 THERAPEUTIC ACTIVITIES: CPT

## 2021-07-03 PROCEDURE — 36415 COLL VENOUS BLD VENIPUNCTURE: CPT

## 2021-07-03 PROCEDURE — 74011250637 HC RX REV CODE- 250/637: Performed by: FAMILY MEDICINE

## 2021-07-03 PROCEDURE — 85027 COMPLETE CBC AUTOMATED: CPT

## 2021-07-03 RX ADMIN — BUPROPION HYDROCHLORIDE 150 MG: 150 TABLET, EXTENDED RELEASE ORAL at 08:40

## 2021-07-03 RX ADMIN — ARIPIPRAZOLE 2 MG: 2 TABLET ORAL at 09:00

## 2021-07-03 RX ADMIN — Medication 81 MG: at 08:40

## 2021-07-03 RX ADMIN — SERTRALINE 50 MG: 50 TABLET, FILM COATED ORAL at 08:39

## 2021-07-03 RX ADMIN — OXYCODONE 5 MG: 5 TABLET ORAL at 03:11

## 2021-07-03 RX ADMIN — METOPROLOL SUCCINATE 25 MG: 25 TABLET, EXTENDED RELEASE ORAL at 08:40

## 2021-07-03 RX ADMIN — ONDANSETRON 4 MG: 4 TABLET, ORALLY DISINTEGRATING ORAL at 11:32

## 2021-07-03 RX ADMIN — LORATADINE 10 MG: 10 TABLET ORAL at 08:40

## 2021-07-03 RX ADMIN — Medication 10 ML: at 05:32

## 2021-07-03 RX ADMIN — LEVOTHYROXINE SODIUM 125 MCG: 0.12 TABLET ORAL at 08:40

## 2021-07-03 RX ADMIN — ACETAMINOPHEN 650 MG: 325 TABLET, FILM COATED ORAL at 08:49

## 2021-07-03 NOTE — PROGRESS NOTES
Patient is anticipated to discharge today. Plan for discharge is as follows:    Care Management Interventions  PCP Verified by CM: Yes  Mode of Transport at Discharge: Self  Physical Therapy Consult: Yes  Current Support Network: Lives with Spouse  Discharge Location  Discharge Placement: Home with family assistance    Milestones and interventions have been entered.      Freddy Mclean LMSW    [facility]    * [email or web address]    ( [phone]

## 2021-07-03 NOTE — DISCHARGE SUMMARY
Hospitalist Discharge Summary     Patient ID:  Portia López  [id number]  80 y.o.  1950  Admit date: 7/1/2021  Discharge date: 7/3/2021  Attending: Dipika Rodriguez DO  PCP:  Katelin Pink MD  Treatment Team: Attending Provider: Dipika Rodriguez DO; Utilization Review: Crissy Current; : Judy Su; Physical Therapist: Carolina Lujan PT    Principal Diagnosis Acute hyponatremia    Hospital Problems as of 7/3/2021 Date Reviewed: 6/4/2021        Codes Class Noted - Resolved POA    * (Principal) Acute hyponatremia ICD-10-CM: E87.1  ICD-9-CM: 276.1  7/1/2021 - Present         LINDA (obstructive sleep apnea) ICD-10-CM: G47.33  ICD-9-CM: 327.23  8/21/2018 - Present Yes        Bronchiectasis without complication ([facility] YKQ-12-FX: J47.9  ICD-9-CM: 494.0  3/13/2018 - Present Yes        Restrictive lung disease ICD-10-CM: J98.4  ICD-9-CM: 518.89  3/13/2018 - Present Yes        Essential hypertension ICD-10-CM: I10  ICD-9-CM: 401.9  1/5/2018 - Present Yes        GALLO (stress urinary incontinence, female) ICD-10-CM: N39.3  ICD-9-CM: 625.6  4/16/2015 - Present Yes        Urgency of urination ICD-10-CM: R39.15  ICD-9-CM: 788.63  4/16/2015 - Present Yes        Pure hypercholesterolemia ICD-10-CM: E78.00  ICD-9-CM: 272.0  6/28/2013 - Present Yes        Bipolar disorder, unspecified ([facility] ICD-10-CM: F31.9  ICD-9-CM: 296.80  6/28/2013 - Present Yes        Anxiety ICD-10-CM: F41.9  ICD-9-CM: 300.00  6/28/2013 - Present Yes        Neurotic disorder ICD-10-CM: F48.9  ICD-9-CM: 300.9  6/28/2013 - Present Yes        RESOLVED: AMS (altered mental status) ICD-10-CM: R41.82  ICD-9-CM: 780.97  7/1/2021 - 7/2/2021 Unknown              Hospital Course:  Please refer to the admission H&P for details of presentation. In summary, the patient is a 72 y.o. F with PMH significant for bipolar disorder, anxiety, LINDA, HTN who presents to the ER with complaint of AMS.   Patient brought in by her  who reports that she seemed \"out of it. \"  Symptoms have worsened over the past few days. Ulices Braxton reports that she has been more weak and has required assistance to walk. [facility] also reports she seems confused.  On ER workup, CT head showed no evidence of acute abnormality.  WBC was normal.  UA was normal.  Serum sodium level was found to be 125. She was admitted and started on IVFs. Her sodium came back to normal over 2 days. She felt back to baseline. She was discharged home in stable condition. Significant Diagnostic Studies:    Labs: Results:       Chemistry Recent Labs     07/03/21  1133 07/03/21  0638 07/02/21  1055 07/02/21  0518 07/02/21  0518 07/01/21  2307 07/01/21  1735   GLU 93 100  --   --  98  --  70    133* 138   < > 132*   < > 125*   K 4.4 5.3*  --   --  4.3  --  3.8    100  --   --  97*  --  89*   CO2 28 29  --   --  30  --  30   BUN 9 8  --   --  5*  --  7*   CREA 0.49* 0.57*  --   --  0.44*  --  0.59*   CA 8.7 8.9  --   --  8.2*  --  9.1   AGAP 9 4*  --   --  5*  --  6*   AP  --   --   --   --   --   --  153*   TP  --   --   --   --   --   --  8.2   ALB  --   --   --   --   --   --  4.0   GLOB  --   --   --   --   --   --  4.2*   AGRAT  --   --   --   --   --   --  1.0*    < > = values in this interval not displayed. CBC w/Diff Recent Labs     07/03/21  0638 07/02/21 0518 07/01/21  1735   WBC 10.4 7.9 9.9   RBC 4.48 3.93* 4.45   HGB 13.8 11.9 13.6   HCT 41.0 35.5* 40.1    266 325   GRANS  --   --  66   LYMPH  --   --  23   EOS  --   --  3      Cardiac Enzymes No results for input(s): CPK, CKND1, KATHYA in the last 72 hours. No lab exists for component: CKRMB, TROIP   Coagulation No results for input(s): PTP, INR, APTT, INREXT in the last 72 hours.     Lipid Panel Lab Results   Component Value Date/Time    Cholesterol, total 153 07/13/2011 08:57 AM    HDL Cholesterol 37 (L) 07/13/2011 08:57 AM    LDL,Direct 107 (H) 07/13/2011 08:57 AM    LDL, calculated 96 07/13/2011 08:57 AM VLDL, calculated 20 07/13/2011 08:57 AM    Triglyceride 100 07/13/2011 08:57 AM    CHOL/HDL Ratio 4.1 07/13/2011 08:57 AM      BNP No results for input(s): BNPP in the last 72 hours. Liver Enzymes Recent Labs     07/01/21 1735   TP 8.2   ALB 4.0   *      Thyroid Studies Lab Results   Component Value Date/Time    TSH 1.120 07/01/2021 05:35 PM            Imaging:  CT HEAD WO CONT    Result Date: 7/1/2021  2 dental foreign bodies project into the left maxillary sinus. Date of Dictation: 7/1/2021 6:16 PM     XR CHEST PORT    Result Date: 7/1/2021  Normal chest.      Microbiology/Cultures: All Micro Results     Procedure Component Value Units Date/Time    CULTURE, BLOOD [[id number]] Collected: 07/01/21 1737    Order Status: Completed Specimen: Blood Updated: 07/03/21 0735     Special Requests: --        RIGHT  Antecubital       Culture result: NO GROWTH 2 DAYS       CULTURE, BLOOD [[id number]] Collected: 07/01/21 1735    Order Status: Completed Specimen: Blood Updated: 07/03/21 0735     Special Requests: LEFT ANTECUBITAL        Culture result: NO GROWTH 2 DAYS             Discharge Exam:  Visit Vitals  BP (!) 115/54 (BP 1 Location: Left arm, BP Patient Position: At rest)   Pulse (!) 55   Temp 97.8 °F (36.6 °C)   Resp 17   Ht 5' 10\" (1.778 m)   Wt 80.3 kg (177 lb)   SpO2 95%   BMI 25.40 kg/m²     General appearance: alert, cooperative, no distress, appears stated age  Lungs: clear to auscultation bilaterally  Heart: regular rate and rhythm, S1, S2 normal, no murmur, click, rub or gallop  Abdomen: soft, non-tender.  Bowel sounds normal. No masses,  no organomegaly  Extremities: no cyanosis or edema  Neurologic: Grossly normal    Disposition: home  Discharge Condition: stable  Patient Instructions:   Current Discharge Medication List      CONTINUE these medications which have NOT CHANGED    Details   albuterol (ProAir HFA) 90 mcg/actuation inhaler Take 2 Puffs by inhalation every four (4) hours as needed for Wheezing. ondansetron (ZOFRAN ODT) 4 mg disintegrating tablet Take 1 Tablet by mouth every eight (8) hours as needed for Nausea or Vomiting. Qty: 15 Tablet, Refills: 0      montelukast (SINGULAIR) 10 mg tablet TAKE ONE TABLET BY MOUTH ONE TIME DAILY  Qty: 120 Tablet, Refills: 0      lisinopriL (PRINIVIL, ZESTRIL) 10 mg tablet       dicyclomine (BENTYL) 20 mg tablet Take 20 mg by mouth three (3) times daily as needed. dextroamphetamine-amphetamine (ADDERALL) 10 mg tablet Take 1 Tablet by mouth. sertraline (Zoloft) 50 mg tablet Take  by mouth daily. oxybutynin (DITROPAN) 5 mg tablet TAKE 1 TABLET BY MOUTH THREE TIMES A DAY  Qty: 270 Tab, Refills: 3    Associated Diagnoses: Urgency of urination      gabapentin (NEURONTIN) 300 mg capsule Take one in AM and 3 at bed  Qty: 120 Cap, Refills: 11      calcium carbonate (CORAL CALCIUM PO) Take  by mouth three (3) times daily. conjugated estrogens (PREMARIN) 0.625 mg/gram vaginal cream Insert 0.5 g into vagina daily. Qty: 30 g, Refills: 3      buPROPion XL (WELLBUTRIN XL) 300 mg XL tablet Take 300 mg by mouth.      pantoprazole (PROTONIX) 20 mg tablet TAKE 1 TABLET BY MOUTH EVERY DAY  Refills: 1      aspirin delayed-release 81 mg tablet Take  by mouth daily. cetirizine (ZYRTEC) 10 mg tablet TAKE 1 TABLET BY MOUTH EVERY DAY  Qty: 30 Tab, Refills: 1      atorvastatin (LIPITOR) 40 mg tablet Take  by mouth daily. multivitamin (ONE A DAY) tablet Take 1 Tab by mouth daily. cholecalciferol, VITAMIN D3, (VITAMIN D3) 5,000 unit tab tablet Take 5,000 Units by mouth daily. OXcarbazepine (TRILEPTAL) 300 mg tablet Take 300 mg by mouth nightly. diazePAM (VALIUM) 10 mg tablet Take 10 mg by mouth every eight (8) hours as needed for Anxiety. traZODone (DESYREL) 100 mg tablet Take 100 mg by mouth nightly. polyethylene glycol (MIRALAX) 17 gram/dose powder Take 17 g by mouth daily.  PRN for constipation  Indications: constipation diclofenac (VOLTAREN) 1 % topical gel Apply 4 g to affected area. Qid prn      levothyroxine (LEVOTHROID) 100 mcg tablet Take 125 mcg by mouth daily. metoprolol succinate (TOPROL-XL) 25 mg XL tablet Take  by mouth daily.          STOP taking these medications       prednisoLONE acetate (PRED FORTE) 1 % ophthalmic suspension Comments:   Reason for Stopping:         benzonatate (TESSALON) 100 mg capsule Comments:   Reason for Stopping:         ARIPiprazole (ABILIFY) 2 mg tablet Comments:   Reason for Stopping:         flaxseed oil (OMEGA 3 PO) Comments:   Reason for Stopping:         cpap machine kit Comments:   Reason for Stopping:         oxyCODONE IR (ROXICODONE) 5 mg immediate release tablet Comments:   Reason for Stopping:               Activity: Activity as tolerated  Diet: Regular Diet  Wound Care: As directed    Follow-up  ·   PCP in one week  · Dr. Mele Carias in 3-4 weeks  Time spent to discharge patient 35 minutes  Signed:  Pipo Geiger DO  7/3/2021  12:57 PM

## 2021-07-03 NOTE — PROGRESS NOTES
Problem: Mobility Impaired (Adult and Pediatric)  Goal: *Acute Goals and Plan of Care (Insert Text)  Outcome: Progressing Towards Goal  Note: STG:  (1.)Ms. Gema Sylvester will move from supine to sit and sit to supine  with MODIFIED INDEPENDENCE within 2 treatment day(s). (2.)Ms. Gema Sylvester will transfer from bed to chair and chair to bed with MODIFIED INDEPENDENCE using the least restrictive device within 2 treatment day(s). (3.)Ms. Gema Sylvester will ambulate with CONTACT GUARD ASSIST for 200 feet with the least restrictive device within 2 treatment day(s). Met 7/3/21  4.) Ms. Gema Sylvester will go up and down 3 steps with bilateral rails and CGA within 2 days. ________________________________________________________________________________________________        PHYSICAL THERAPY: Daily Note and AM 7/3/2021  INPATIENT: PT Visit Days : 2  [place] / [place] / Product Type: Medicare /       NAME/AGE/GENDER: Vi Stout is a 72 y.o. female   PRIMARY DIAGNOSIS: Hyponatremia [E87.1]  AMS (altered mental status) [R41.82] Acute hyponatremia Acute hyponatremia       ICD-10: Treatment Diagnosis:    · Generalized Muscle Weakness (M62.81)  · Other abnormalities of gait and mobility (R26.89)  · Unspecified Lack of Coordination (R27.9)   Precaution/Allergies:  Fentanyl and Effexor [venlafaxine]      ASSESSMENT:     Ms. Gema Sylvester presents with limited functional independence with bed mobility, transfers and gait and is requiring CGA for safety with transfers and Min assist for gait. I recommended that she use her RW at discharge for stability and that she resume her OPPT. She is weak with limited balance and somewhat generalized low tone and diminished coordination. Transferred out of bed with PT and transferred to and from the [facility] for toileting then ambulated to the bathroom to wash her hands and then ambulated in the bennett before returning supine.  asking if he could ambulate with her later.  I agreed only if she felt okay and only while using the RW and keeping close to the room. Patient participated well this am.  Already up in room with spouse present and getting ready to go for a walk. Encouraged use of RW for balance/safety. Increased gait distance but did need occasional cues to keep walker on ground/push through UEs. Spouse very attentive and feels comfortable helping her at home. Current with OPPT to address TMJ issues and balance. This section established at most recent assessment   PROBLEM LIST (Impairments causing functional limitations):  1. Decreased Strength  2. Decreased Transfer Abilities  3. Decreased Ambulation Ability/Technique  4. Decreased Balance  5. Decreased Activity Tolerance   INTERVENTIONS PLANNED: (Benefits and precautions of physical therapy have been discussed with the patient.)  1. Bed Mobility  2. Gait Training  3. Therapeutic Exercise/Strengthening  4. Transfer Training     TREATMENT PLAN: Frequency/Duration: daily for duration of hospital stay  Rehabilitation Potential For Stated Goals: Good     REHAB RECOMMENDATIONS (at time of discharge pending progress):    Placement: It is my opinion, based on this patient's performance to date, that Ms. Christiana Ojeda may benefit from OUTPATIENT THERAPY after discharge due to the functional deficits listed above that are likely to improve with skilled rehabilitation because because he/she is able to safely attend regular and reoccurring therapy sessions at an outpatient facility. Equipment:    None at this time              HISTORY:   History of Present Injury/Illness (Reason for Referral):  72 y.o. F with PMH significant for bipolar disorder, anxiety, LINDA, HTN who presents to the ER with complaint of AMS. Patient brought in by her  who reports that she seemed \"out of it. \"  Symptoms have worsened over the past few days.  reports that she has been more weak and has required assistance to walk.   He also reports she seems confused. On ER workup, CT head showed no evidence of acute abnormality. WBC is normal.  UA is pending, but recent was negative. Serum sodium level was found to be 125, which is lower than prior of 140. Past Medical History/Comorbidities:   Ms. Rafy Lopes  has a past medical history of Arthritis, Bipolar affective disorder ([facility] Chronic pain, Depression (11/12/2009), Disturbance of skin sensation (3/6/2020), Dysuria (07/11/2007), Essential hypertension, Fibromyalgia, GERD (gastroesophageal reflux disease) (11/12/2009), Hypercholesterolemia, Hyperlipidemia, Hypothyroidism (11/12/2009), Lumbago (11/12/2009), Miscarriage, PUD (peptic ulcer disease), Sleep apnea, Unspecified urinary incontinence, and Urinary tract infection, site not specified (06/07/2006). Ms. Rafy Lopes  has a past surgical history that includes hx cholecystectomy (1980s); hx tonsillectomy (1980); hx total abdominal hysterectomy (2003); hx bladder suspension (2003); hx oophorectomy; hx cystocele repair (11/12/2009); and hx rectocele repair (11/12/2009). Social History/Living Environment:   Home Environment: Private residence  # Steps to Enter: 3  One/Two Story Residence: One story  Living Alone: No  Support Systems: Spouse/Significant Other/Partner  Patient Expects to be Discharged toVF Cor[de-identified]ration  Current DME Used/Available at Home: None  Prior Level of Function/Work/Activity:  Lives with her  and has been having frequent falls within the past weeks. She has a rollator but does not currently use it. Her  assists her with safety with mobility.      Number of Personal Factors/Comorbidities that affect the Plan of Care: 0: LOW COMPLEXITY   EXAMINATION:   Most Recent Physical Functioning:   Gross Assessment:  AROM: Generally decreased, functional  Strength: Generally decreased, functional  Coordination: Generally decreased, functional               Posture:     Balance:  Sitting: Intact  Standing: With support  Standing - Static: Fair  Standing - Dynamic : Fair Bed Mobility:     Wheelchair Mobility:     Transfers:  Sit to Stand: Contact guard assistance  Stand to Sit: Stand-by assistance  Bed to Chair: Contact guard assistance  Gait:     Speed/Kimberly: Pace decreased (<100 feet/min)  Step Length: Left shortened;Right shortened  Gait Abnormalities: Path deviations;Trunk sway increased  Distance (ft): 210 Feet (ft)  Assistive Device: Walker, rolling  Ambulation - Level of Assistance: Contact guard assistance  Interventions: Safety awareness training;Verbal cues      Body Structures Involved:  1. Muscles Body Functions Affected:  1. Movement Related Activities and Participation Affected:  1. Mobility   Number of elements that affect the Plan of Care: 3: MODERATE COMPLEXITY   CLINICAL PRESENTATION:   Presentation: Stable and uncomplicated: LOW COMPLEXITY   CLINICAL DECISION MAKIN[address] [place] [id number] AM-PAC 6 Clicks   Basic Mobility Inpatient Short Form  How much difficulty does the patient currently have. .. Unable A Lot A Little None   1. Turning over in bed (including adjusting bedclothes, sheets and blankets)? [] 1   [] 2   [x] 3   [] 4   2. Sitting down on and standing up from a chair with arms ( e.g., wheelchair, bedside commode, etc.)   [] 1   [] 2   [x] 3   [] 4   3. Moving from lying on back to sitting on the side of the bed? [] 1   [] 2   [x] 3   [] 4   How much help from another person does the patient currently need. .. Total A Lot A Little None   4. Moving to and from a bed to a chair (including a wheelchair)? [] 1   [] 2   [x] 3   [] 4   5. Need to walk in hospital room? [] 1   [] 2   [x] 3   [] 4   6. Climbing 3-5 steps with a railing? [] 1   [x] 2   [] 3   [] 4   © , Trustees of [address] [place] [address], under license to [employer].  All rights reserved      Score:  Initial: 17 Most Recent: X (Date: -- )    Interpretation of Tool:  Represents activities that are increasingly more difficult (i.e. Bed mobility, Transfers, Gait). Medical Necessity:     · Patient is expected to demonstrate progress in   · strength, balance, coordination, and functional technique  ·  to   · increase independence with mobility and balance  · . Reason for Services/Other Comments:  · Patient continues to require skilled intervention due to   · Limited functional independence  · . Use of outcome tool(s) and clinical judgement create a POC that gives a: Clear prediction of patient's progress: LOW COMPLEXITY            TREATMENT:   (In addition to Assessment/Re-Assessment sessions the following treatments were rendered)   Pre-treatment Symptoms/Complaints:  Patient getting ready to go for a walk with spouse. Pain: Initial:   Pain Intensity 1: 0  Post Session:  0     Therapeutic Activity: (    25 minutes): Therapeutic activities including standing balance with and without UE support, ambulation in room and bennett with RW, and chair transfers to improve strength, balance, and coordination. Required minimal Safety awareness training;Verbal cues to promote dynamic balance in standing. Braces/Orthotics/Lines/Etc:   · O2 Device: None (Room air)  Treatment/Session Assessment:    · Response to Treatment:  Participated well and able to ambulate increased distance with RW. Has rollator at home. · Interdisciplinary Collaboration:   o Physical Therapist  o Registered Nurse  o Physician  · After treatment position/precautions:   o Up in chair  o Bed/Chair-wheels locked  o Bed in low position  o Caregiver at bedside  o Call light within reach   · Compliance with Program/Exercises: Compliant all of the time  · Recommendations/Intent for next treatment session: \"Next visit will focus on advancements to more challenging activities and reduction in assistance provided\".   Total Treatment Duration:  PT Patient Time In/Time Out  Time In: 1025  Time Out: 8790 [address]

## 2021-07-22 ENCOUNTER — APPOINTMENT (RX ONLY)
Dept: URBAN - METROPOLITAN AREA CLINIC 23 | Facility: CLINIC | Age: 71
Setting detail: DERMATOLOGY
End: 2021-07-22

## 2021-07-22 DIAGNOSIS — L30.9 DERMATITIS, UNSPECIFIED: ICD-10-CM

## 2021-07-22 DIAGNOSIS — Z48.01 ENCOUNTER FOR CHANGE OR REMOVAL OF SURGICAL WOUND DRESSING: ICD-10-CM

## 2021-07-22 PROCEDURE — 99213 OFFICE O/P EST LOW 20 MIN: CPT

## 2021-07-22 PROCEDURE — ? TREATMENT REGIMEN

## 2021-07-22 PROCEDURE — ? COUNSELING

## 2021-07-22 PROCEDURE — ? PRESCRIPTION

## 2021-07-22 PROCEDURE — ? ORDER TESTS

## 2021-07-22 PROCEDURE — ? SUTURE REMOVAL (GLOBAL PERIOD)

## 2021-07-22 RX ORDER — MUPIROCIN 20 MG/G
OINTMENT TOPICAL
Qty: 2 | Refills: 3 | Status: CANCELLED
Stop reason: ENTERED-IN-ERROR

## 2021-07-22 ASSESSMENT — LOCATION DETAILED DESCRIPTION DERM
LOCATION DETAILED: LEFT DISTAL PRETIBIAL REGION
LOCATION DETAILED: RIGHT DISTAL PRETIBIAL REGION
LOCATION DETAILED: RIGHT KNEE

## 2021-07-22 ASSESSMENT — LOCATION SIMPLE DESCRIPTION DERM
LOCATION SIMPLE: RIGHT PRETIBIAL REGION
LOCATION SIMPLE: LEFT PRETIBIAL REGION
LOCATION SIMPLE: RIGHT KNEE

## 2021-07-22 ASSESSMENT — LOCATION ZONE DERM: LOCATION ZONE: LEG

## 2021-07-22 NOTE — PROCEDURE: SUTURE REMOVAL (GLOBAL PERIOD)
Detail Level: Detailed
Add 47294 Cpt? (Important Note: In 2017 The Use Of 61625 Is Being Tracked By Cms To Determine Future Global Period Reimbursement For Global Periods): no

## 2021-07-22 NOTE — PROCEDURE: TREATMENT REGIMEN
Initiate Treatment: Hibiclins in the shower daily x2 week neck down, mupirocin to open wounds, under finger nails and nares
Detail Level: Zone

## 2021-12-03 ENCOUNTER — HOSPITAL ENCOUNTER (OUTPATIENT)
Dept: CT IMAGING | Age: 71
Discharge: HOME | End: 2021-12-03
Attending: UROLOGY

## 2021-12-03 DIAGNOSIS — N28.89 KIDNEY MASS: ICD-10-CM

## 2021-12-03 RX ORDER — SODIUM CHLORIDE 0.9 % (FLUSH) 0.9 %
10 SYRINGE (ML) INJECTION
Status: COMPLETED | OUTPATIENT
Start: 2021-12-03 | End: 2021-12-03

## 2021-12-03 RX ADMIN — Medication 10 ML: at 10:20

## 2022-01-07 PROBLEM — J98.4 RESTRICTIVE LUNG DISEASE: Status: RESOLVED | Noted: 2018-03-13 | Resolved: 2022-01-07

## 2022-01-07 PROBLEM — G47.33 OSA (OBSTRUCTIVE SLEEP APNEA): Status: RESOLVED | Noted: 2018-08-21 | Resolved: 2022-01-07

## 2022-01-07 PROBLEM — M51.36 DDD (DEGENERATIVE DISC DISEASE), LUMBAR: Status: RESOLVED | Noted: 2018-08-15 | Resolved: 2022-01-07

## 2022-01-07 PROBLEM — Z12.39 BREAST CANCER SCREENING: Status: ACTIVE | Noted: 2022-01-07

## 2022-01-07 PROBLEM — J47.9 BRONCHIECTASIS WITHOUT COMPLICATION (HCC): Status: RESOLVED | Noted: 2018-03-13 | Resolved: 2022-01-07

## 2022-01-07 PROBLEM — R53.81 PHYSICAL DECONDITIONING: Status: RESOLVED | Noted: 2018-03-13 | Resolved: 2022-01-07

## 2022-01-07 PROBLEM — E87.1 ACUTE HYPONATREMIA: Status: RESOLVED | Noted: 2021-07-01 | Resolved: 2022-01-07

## 2022-01-07 PROBLEM — Z86.69 HISTORY OF SLEEP APNEA: Status: RESOLVED | Noted: 2018-05-08 | Resolved: 2022-01-07

## 2022-01-07 PROBLEM — R20.9 DISTURBANCE OF SKIN SENSATION: Status: RESOLVED | Noted: 2020-03-06 | Resolved: 2022-01-07

## 2022-01-07 PROBLEM — I10 ESSENTIAL HYPERTENSION: Status: RESOLVED | Noted: 2018-01-05 | Resolved: 2022-01-07

## 2022-01-07 PROBLEM — R10.30 LOWER ABDOMINAL PAIN: Status: RESOLVED | Noted: 2018-08-15 | Resolved: 2022-01-07

## 2022-02-07 PROBLEM — R73.01 IMPAIRED FASTING GLUCOSE: Status: ACTIVE | Noted: 2022-02-07

## 2022-02-07 PROBLEM — R07.9 CHEST PAIN: Status: ACTIVE | Noted: 2022-02-07

## 2022-03-18 PROBLEM — Z12.39 BREAST CANCER SCREENING: Status: ACTIVE | Noted: 2022-01-07

## 2022-03-19 PROBLEM — R73.01 IMPAIRED FASTING GLUCOSE: Status: ACTIVE | Noted: 2022-02-07

## 2022-03-19 PROBLEM — R07.9 CHEST PAIN: Status: ACTIVE | Noted: 2022-02-07

## 2022-03-19 PROBLEM — R91.1 LUNG NODULE: Status: ACTIVE | Noted: 2018-03-13

## 2022-04-13 ENCOUNTER — APPOINTMENT (OUTPATIENT)
Dept: CT IMAGING | Age: 72
DRG: 066 | End: 2022-04-13
Attending: STUDENT IN AN ORGANIZED HEALTH CARE EDUCATION/TRAINING PROGRAM
Payer: MEDICARE

## 2022-04-13 ENCOUNTER — HOSPITAL ENCOUNTER (INPATIENT)
Age: 72
LOS: 5 days | Discharge: NURSING, REHAB OR LONG-TERM CARE | DRG: 066 | End: 2022-04-19
Attending: STUDENT IN AN ORGANIZED HEALTH CARE EDUCATION/TRAINING PROGRAM | Admitting: INTERNAL MEDICINE
Payer: MEDICARE

## 2022-04-13 DIAGNOSIS — R47.1 DYSARTHRIA: Primary | ICD-10-CM

## 2022-04-13 DIAGNOSIS — F31.9 BIPOLAR AFFECTIVE DISORDER, REMISSION STATUS UNSPECIFIED (HCC): ICD-10-CM

## 2022-04-13 DIAGNOSIS — R42 DISEQUILIBRIUM: ICD-10-CM

## 2022-04-13 PROBLEM — R47.81 SLURRING OF SPEECH: Status: ACTIVE | Noted: 2022-04-13

## 2022-04-13 LAB
ALBUMIN SERPL-MCNC: 3.6 G/DL (ref 3.2–4.6)
ALBUMIN/GLOB SERPL: 0.8 {RATIO} (ref 1.2–3.5)
ALP SERPL-CCNC: 103 U/L (ref 50–136)
ALT SERPL-CCNC: 25 U/L (ref 12–65)
ANION GAP SERPL CALC-SCNC: 5 MMOL/L (ref 7–16)
AST SERPL-CCNC: 18 U/L (ref 15–37)
BASOPHILS # BLD: 0.1 K/UL (ref 0–0.2)
BASOPHILS NFR BLD: 1 % (ref 0–2)
BILIRUB SERPL-MCNC: 0.2 MG/DL (ref 0.2–1.1)
BUN SERPL-MCNC: 15 MG/DL (ref 8–23)
CALCIUM SERPL-MCNC: 9.1 MG/DL (ref 8.3–10.4)
CHLORIDE SERPL-SCNC: 102 MMOL/L (ref 98–107)
CO2 SERPL-SCNC: 31 MMOL/L (ref 21–32)
CREAT SERPL-MCNC: 0.81 MG/DL (ref 0.6–1)
DIFFERENTIAL METHOD BLD: NORMAL
EOSINOPHIL # BLD: 0.3 K/UL (ref 0–0.8)
EOSINOPHIL NFR BLD: 3 % (ref 0.5–7.8)
ERYTHROCYTE [DISTWIDTH] IN BLOOD BY AUTOMATED COUNT: 12.2 % (ref 11.9–14.6)
GLOBULIN SER CALC-MCNC: 4.6 G/DL (ref 2.3–3.5)
GLUCOSE BLD STRIP.AUTO-MCNC: 100 MG/DL (ref 65–100)
GLUCOSE SERPL-MCNC: 102 MG/DL (ref 65–100)
HCT VFR BLD AUTO: 41.4 % (ref 35.8–46.3)
HGB BLD-MCNC: 13.3 G/DL (ref 11.7–15.4)
IMM GRANULOCYTES # BLD AUTO: 0 K/UL (ref 0–0.5)
IMM GRANULOCYTES NFR BLD AUTO: 0 % (ref 0–5)
INR PPP: 0.9
LYMPHOCYTES # BLD: 3 K/UL (ref 0.5–4.6)
LYMPHOCYTES NFR BLD: 38 % (ref 13–44)
MCH RBC QN AUTO: 30.1 PG (ref 26.1–32.9)
MCHC RBC AUTO-ENTMCNC: 32.1 G/DL (ref 31.4–35)
MCV RBC AUTO: 93.7 FL (ref 79.6–97.8)
MONOCYTES # BLD: 0.7 K/UL (ref 0.1–1.3)
MONOCYTES NFR BLD: 8 % (ref 4–12)
NEUTS SEG # BLD: 4 K/UL (ref 1.7–8.2)
NEUTS SEG NFR BLD: 50 % (ref 43–78)
NRBC # BLD: 0 K/UL (ref 0–0.2)
PLATELET # BLD AUTO: 251 K/UL (ref 150–450)
PMV BLD AUTO: 11.3 FL (ref 9.4–12.3)
POTASSIUM SERPL-SCNC: 3.9 MMOL/L (ref 3.5–5.1)
PROT SERPL-MCNC: 8.2 G/DL (ref 6.3–8.2)
PROTHROMBIN TIME: 12.6 SEC (ref 12.6–14.5)
RBC # BLD AUTO: 4.42 M/UL (ref 4.05–5.2)
SERVICE CMNT-IMP: NORMAL
SODIUM SERPL-SCNC: 138 MMOL/L (ref 136–145)
TROPONIN-HIGH SENSITIVITY: 6.4 PG/ML (ref 0–14)
WBC # BLD AUTO: 8.1 K/UL (ref 4.3–11.1)

## 2022-04-13 PROCEDURE — G0378 HOSPITAL OBSERVATION PER HR: HCPCS

## 2022-04-13 PROCEDURE — 0042T CT PERF W CBF: CPT

## 2022-04-13 PROCEDURE — 74011000258 HC RX REV CODE- 258: Performed by: STUDENT IN AN ORGANIZED HEALTH CARE EDUCATION/TRAINING PROGRAM

## 2022-04-13 PROCEDURE — 4A03X5D MEASUREMENT OF ARTERIAL FLOW, INTRACRANIAL, EXTERNAL APPROACH: ICD-10-PCS | Performed by: STUDENT IN AN ORGANIZED HEALTH CARE EDUCATION/TRAINING PROGRAM

## 2022-04-13 PROCEDURE — 93005 ELECTROCARDIOGRAM TRACING: CPT | Performed by: STUDENT IN AN ORGANIZED HEALTH CARE EDUCATION/TRAINING PROGRAM

## 2022-04-13 PROCEDURE — 74011000636 HC RX REV CODE- 636: Performed by: STUDENT IN AN ORGANIZED HEALTH CARE EDUCATION/TRAINING PROGRAM

## 2022-04-13 PROCEDURE — 85025 COMPLETE CBC W/AUTO DIFF WBC: CPT

## 2022-04-13 PROCEDURE — 70496 CT ANGIOGRAPHY HEAD: CPT

## 2022-04-13 PROCEDURE — 84484 ASSAY OF TROPONIN QUANT: CPT

## 2022-04-13 PROCEDURE — 82962 GLUCOSE BLOOD TEST: CPT

## 2022-04-13 PROCEDURE — 94760 N-INVAS EAR/PLS OXIMETRY 1: CPT

## 2022-04-13 PROCEDURE — 70450 CT HEAD/BRAIN W/O DYE: CPT

## 2022-04-13 PROCEDURE — 85610 PROTHROMBIN TIME: CPT

## 2022-04-13 PROCEDURE — 99285 EMERGENCY DEPT VISIT HI MDM: CPT

## 2022-04-13 PROCEDURE — 80053 COMPREHEN METABOLIC PANEL: CPT

## 2022-04-13 RX ORDER — SODIUM CHLORIDE 0.9 % (FLUSH) 0.9 %
10 SYRINGE (ML) INJECTION
Status: COMPLETED | OUTPATIENT
Start: 2022-04-13 | End: 2022-04-13

## 2022-04-13 RX ORDER — SODIUM CHLORIDE 0.9 % (FLUSH) 0.9 %
5-40 SYRINGE (ML) INJECTION AS NEEDED
Status: DISCONTINUED | OUTPATIENT
Start: 2022-04-13 | End: 2022-04-19 | Stop reason: HOSPADM

## 2022-04-13 RX ORDER — TRAZODONE HYDROCHLORIDE 50 MG/1
100 TABLET ORAL
Status: DISCONTINUED | OUTPATIENT
Start: 2022-04-13 | End: 2022-04-19 | Stop reason: HOSPADM

## 2022-04-13 RX ORDER — SODIUM CHLORIDE 0.9 % (FLUSH) 0.9 %
5-10 SYRINGE (ML) INJECTION AS NEEDED
Status: DISCONTINUED | OUTPATIENT
Start: 2022-04-13 | End: 2022-04-19 | Stop reason: HOSPADM

## 2022-04-13 RX ORDER — ASPIRIN 81 MG/1
81 TABLET ORAL 2 TIMES DAILY
Status: DISCONTINUED | OUTPATIENT
Start: 2022-04-14 | End: 2022-04-19 | Stop reason: HOSPADM

## 2022-04-13 RX ORDER — SODIUM CHLORIDE 0.9 % (FLUSH) 0.9 %
5-10 SYRINGE (ML) INJECTION EVERY 8 HOURS
Status: DISCONTINUED | OUTPATIENT
Start: 2022-04-13 | End: 2022-04-19 | Stop reason: HOSPADM

## 2022-04-13 RX ORDER — SODIUM CHLORIDE 0.9 % (FLUSH) 0.9 %
5-40 SYRINGE (ML) INJECTION EVERY 8 HOURS
Status: DISCONTINUED | OUTPATIENT
Start: 2022-04-13 | End: 2022-04-19 | Stop reason: HOSPADM

## 2022-04-13 RX ORDER — DIAZEPAM 5 MG/1
10 TABLET ORAL
Status: DISCONTINUED | OUTPATIENT
Start: 2022-04-13 | End: 2022-04-19 | Stop reason: HOSPADM

## 2022-04-13 RX ORDER — LEVOTHYROXINE SODIUM 125 UG/1
125 TABLET ORAL
Status: DISCONTINUED | OUTPATIENT
Start: 2022-04-14 | End: 2022-04-19 | Stop reason: HOSPADM

## 2022-04-13 RX ORDER — BUPROPION HYDROCHLORIDE 150 MG/1
150 TABLET, EXTENDED RELEASE ORAL 2 TIMES DAILY
Status: DISCONTINUED | OUTPATIENT
Start: 2022-04-14 | End: 2022-04-19 | Stop reason: HOSPADM

## 2022-04-13 RX ORDER — GABAPENTIN 300 MG/1
300 CAPSULE ORAL 3 TIMES DAILY
Status: DISCONTINUED | OUTPATIENT
Start: 2022-04-13 | End: 2022-04-19 | Stop reason: HOSPADM

## 2022-04-13 RX ORDER — SUCRALFATE 1 G/1
1 TABLET ORAL 4 TIMES DAILY
Status: DISCONTINUED | OUTPATIENT
Start: 2022-04-13 | End: 2022-04-19 | Stop reason: HOSPADM

## 2022-04-13 RX ORDER — HEPARIN SODIUM 5000 [USP'U]/ML
5000 INJECTION, SOLUTION INTRAVENOUS; SUBCUTANEOUS EVERY 8 HOURS
Status: DISCONTINUED | OUTPATIENT
Start: 2022-04-14 | End: 2022-04-19 | Stop reason: HOSPADM

## 2022-04-13 RX ORDER — KETOROLAC TROMETHAMINE 30 MG/ML
15 INJECTION, SOLUTION INTRAMUSCULAR; INTRAVENOUS
Status: COMPLETED | OUTPATIENT
Start: 2022-04-13 | End: 2022-04-14

## 2022-04-13 RX ORDER — ATORVASTATIN CALCIUM 10 MG/1
20 TABLET, FILM COATED ORAL DAILY
Status: DISCONTINUED | OUTPATIENT
Start: 2022-04-14 | End: 2022-04-14

## 2022-04-13 RX ORDER — METOPROLOL SUCCINATE 100 MG/1
100 TABLET, EXTENDED RELEASE ORAL DAILY
Status: DISCONTINUED | OUTPATIENT
Start: 2022-04-14 | End: 2022-04-13

## 2022-04-13 RX ADMIN — IOPAMIDOL 100 ML: 755 INJECTION, SOLUTION INTRAVENOUS at 21:23

## 2022-04-13 RX ADMIN — SODIUM CHLORIDE 100 ML: 9 INJECTION, SOLUTION INTRAVENOUS at 21:22

## 2022-04-13 RX ADMIN — Medication 10 ML: at 21:22

## 2022-04-13 NOTE — ED TRIAGE NOTES
Per pt she was at the eye doctor about one hour ago and started with slurred speech and  Dizziness. Pt denies any numbness or tingling.

## 2022-04-13 NOTE — ED PROVIDER NOTES
71-year-old female patient presenting to this department from outpatient ophthalmologist with reports of blurred vision, slurred speech and dizziness. Patient and family at bedside report onset of symptoms earlier this week. It has been more than 3 days since symptoms started. Patient reports intermittent symptoms describing disequilibrium which is caused her to fall as recently as this morning. She states that her vision has blurred as well making it difficult for her to see things around her.  and patient note slurred speech as well that has been present for at least 3 days. Patient reports history of previous stroke with similar symptoms as well as related hyponatremia with similar symptoms. Patient denies recent nausea, vomiting, diarrhea. She reports no fever or chills. Patient was sent to this department after evaluation at ophthalmologist with concern for stroke.            Past Medical History:   Diagnosis Date    Allergic rhinitis 6/28/2013    Asthma     Bipolar affective disorder (HCC)     Disturbance of skin sensation     Fibromyalgia     GERD (gastroesophageal reflux disease)     History of miscarriage     History of peptic ulcer     Hyperlipidemia     Hypertension     Hypothyroidism     IBS (irritable bowel syndrome)     Impaired fasting glucose 2/7/2022    Lumbar disc disease     Mixed stress and urge urinary incontinence     LINDA on CPAP     Osteoarthritis     Premature beats     PACs / PVCs    Right renal mass        Past Surgical History:   Procedure Laterality Date    HX BLADDER SUSPENSION  2003    HX CHOLECYSTECTOMY  1980s    HX CYSTOCELE REPAIR  11/12/2009    HX OOPHORECTOMY      HX RECTOCELE REPAIR  11/12/2009    HX TONSILLECTOMY  1980    HX TOTAL ABDOMINAL HYSTERECTOMY  2003         Family History:   Problem Relation Age of Onset    Breast Cancer Mother 61    Heart Disease Father     Prostate Cancer Father        Social History     Socioeconomic History    Marital status:      Spouse name: Not on file    Number of children: Not on file    Years of education: Not on file    Highest education level: Not on file   Occupational History    Not on file   Tobacco Use    Smoking status: Never Smoker    Smokeless tobacco: Never Used   Substance and Sexual Activity    Alcohol use: No    Drug use: No    Sexual activity: Not Currently     Partners: Male   Other Topics Concern    Not on file   Social History Narrative     and lives with her --has a son and daughter. Disabled and has history of work in the [employer] No pets. Social Determinants of Health     Financial Resource Strain:     Difficulty of Paying Living Expenses: Not on file   Food Insecurity:     Worried About Running Out of Food in the Last Year: Not on file    Fredy of Food in the Last Year: Not on file   Transportation Needs:     Lack of Transportation (Medical): Not on file    Lack of Transportation (Non-Medical):  Not on file   Physical Activity:     Days of Exercise per Week: Not on file    Minutes of Exercise per Session: Not on file   Stress:     Feeling of Stress : Not on file   Social Connections:     Frequency of Communication with Friends and Family: Not on file    Frequency of Social Gatherings with Friends and Family: Not on file    Attends [employer] Services: Not on file    Active Member of [address] [place] [address] [employer] or Organizations: Not on file    Attends Club or Organization Meetings: Not on file    Marital Status: Not on file   Intimate Partner Violence:     Fear of Current or Ex-Partner: Not on file    Emotionally Abused: Not on file    Physically Abused: Not on file    Sexually Abused: Not on file   Housing Stability:     Unable to Pay for Housing in the Last Year: Not on file    Number of [place] in the Last Year: Not on file    Unstable Housing in the Last Year: Not on file         ALLERGIES: Fentanyl and Effexor [venlafaxine]    Review of Systems   Constitutional: Positive for fatigue. Negative for chills, diaphoresis and fever. HENT: Negative for congestion, sneezing and sore throat. Eyes: Positive for visual disturbance. Respiratory: Negative for cough, chest tightness, shortness of breath and wheezing. Cardiovascular: Negative for chest pain and leg swelling. Gastrointestinal: Negative for abdominal pain, blood in stool, diarrhea, nausea and vomiting. Endocrine: Negative for polyuria. Genitourinary: Negative for difficulty urinating, dysuria, flank pain, hematuria and urgency. Musculoskeletal: Negative for back pain, myalgias, neck pain and neck stiffness. Skin: Negative for color change and rash. Neurological: Positive for dizziness, speech difficulty and weakness. Negative for syncope, light-headedness, numbness and headaches. Psychiatric/Behavioral: Negative for behavioral problems. All other systems reviewed and are negative. Vitals:    04/13/22 1823   BP: (!) 130/57   Pulse: 73   Resp: 18   Temp: 98.2 °F (36.8 °C)   SpO2: 99%   Weight: 82.1 kg (181 lb)   Height: 5' 10\" (1.778 m)            Physical Exam  Vitals and nursing note reviewed. Constitutional:       General: She is not in acute distress. Appearance: She is well-developed. She is not diaphoretic. Comments: Somewhat fatigued appearing female patient alert and oriented to person place and time. No acute distress, speaks in clear, fluid sentences. HENT:      Head: Normocephalic and atraumatic. Right Ear: External ear normal.      Left Ear: External ear normal.      Nose: Nose normal.   Eyes:      Pupils: Pupils are equal, round, and reactive to light. Cardiovascular:      Rate and Rhythm: Normal rate and regular rhythm. Heart sounds: Normal heart sounds. No murmur heard. No friction rub. No gallop. Pulmonary:      Effort: Pulmonary effort is normal. No respiratory distress.       Breath sounds: Normal breath sounds. No stridor. No decreased breath sounds, wheezing, rhonchi or rales. Chest:      Chest wall: No tenderness. Abdominal:      General: There is no distension. Palpations: Abdomen is soft. There is no mass. Tenderness: There is no abdominal tenderness. There is no guarding or rebound. Hernia: No hernia is present. Musculoskeletal:         General: No tenderness or deformity. Normal range of motion. Cervical back: Normal range of motion. Skin:     General: Skin is warm and dry. Neurological:      Mental Status: She is alert and oriented to person, place, and time. GCS: GCS eye subscore is 4. GCS verbal subscore is 5. GCS motor subscore is 6. Cranial Nerves: No cranial nerve deficit. Sensory: Sensation is intact. Motor: Tremor present. Coordination: Finger-Nose-Finger Test abnormal. Impaired rapid alternating movements. Comments: Obviously slurred with speaking, there is some subtle ataxia with finger-to-nose testing and significant difficulty with rapid alternating movements. Localizing numbness or significant weakness with comparison between the right and left on both  strength testing and muscle strength testing peer          MDM  Number of Diagnoses or Management Options  Diagnosis management comments: Patient does not meet criteria for code S as symptoms have been present for 3 to 7 days at least.  She is not a candidate for TPA based on this timeline or is she a candidate for neurovascular intervention.   I have ordered CT, CTA and perfusion imaging at this time we will check full range of labs given patient's history of hyponatremia in the past       Amount and/or Complexity of Data Reviewed  Clinical lab tests: ordered and reviewed  Tests in the radiology section of CPT®: ordered and reviewed  Tests in the medicine section of CPT®: ordered and reviewed  Independent visualization of images, tracings, or specimens: yes    Risk of Complications, Morbidity, and/or Mortality  Presenting problems: high  Diagnostic procedures: high  Management options: high    Patient Progress  Patient progress: stable         Procedures

## 2022-04-14 ENCOUNTER — APPOINTMENT (OUTPATIENT)
Dept: NON INVASIVE DIAGNOSTICS | Age: 72
DRG: 066 | End: 2022-04-14
Attending: HOSPITALIST
Payer: MEDICARE

## 2022-04-14 ENCOUNTER — APPOINTMENT (OUTPATIENT)
Dept: MRI IMAGING | Age: 72
DRG: 066 | End: 2022-04-14
Attending: HOSPITALIST
Payer: MEDICARE

## 2022-04-14 PROBLEM — R29.90 STROKE-LIKE EPISODE: Status: ACTIVE | Noted: 2022-04-14

## 2022-04-14 LAB
CHOLEST SERPL-MCNC: 176 MG/DL
ECHO AO ASC DIAM: 2.7 CM
ECHO AO ASCENDING AORTA INDEX: 1.36 CM/M2
ECHO AO ROOT DIAM: 2.9 CM
ECHO AO ROOT INDEX: 1.46 CM/M2
ECHO AV AREA PEAK VELOCITY: 2.4 CM2
ECHO AV AREA VTI: 2.3 CM2
ECHO AV AREA/BSA PEAK VELOCITY: 1.2 CM2/M2
ECHO AV AREA/BSA VTI: 1.2 CM2/M2
ECHO AV MEAN GRADIENT: 4 MMHG
ECHO AV MEAN VELOCITY: 0.9 M/S
ECHO AV PEAK GRADIENT: 7 MMHG
ECHO AV PEAK VELOCITY: 1.3 M/S
ECHO AV VELOCITY RATIO: 0.77
ECHO AV VTI: 32.9 CM
ECHO LA AREA 2C: 26.4 CM2
ECHO LA AREA 4C: 21.7 CM2
ECHO LA DIAMETER INDEX: 1.41 CM/M2
ECHO LA DIAMETER: 2.8 CM
ECHO LA MAJOR AXIS: 5.5 CM
ECHO LA MINOR AXIS: 6.6 CM
ECHO LA TO AORTIC ROOT RATIO: 0.97
ECHO LA VOL BP: 86 ML (ref 22–52)
ECHO LA VOL/BSA BIPLANE: 43 ML/M2 (ref 16–34)
ECHO LV E' LATERAL VELOCITY: 11 CM/S
ECHO LV E' SEPTAL VELOCITY: 10 CM/S
ECHO LV EDV A2C: 66 ML
ECHO LV EDV A4C: 90 ML
ECHO LV EDV INDEX A4C: 45 ML/M2
ECHO LV EDV NDEX A2C: 33 ML/M2
ECHO LV EJECTION FRACTION A4C: 56 %
ECHO LV ESV A4C: 40 ML
ECHO LV ESV INDEX A4C: 20 ML/M2
ECHO LV FRACTIONAL SHORTENING: 28 % (ref 28–44)
ECHO LV INTERNAL DIMENSION DIASTOLE INDEX: 2.17 CM/M2
ECHO LV INTERNAL DIMENSION DIASTOLIC: 4.3 CM (ref 3.9–5.3)
ECHO LV INTERNAL DIMENSION SYSTOLIC INDEX: 1.57 CM/M2
ECHO LV INTERNAL DIMENSION SYSTOLIC: 3.1 CM
ECHO LV IVSD: 0.8 CM (ref 0.6–0.9)
ECHO LV MASS 2D: 114.2 G (ref 67–162)
ECHO LV MASS INDEX 2D: 57.7 G/M2 (ref 43–95)
ECHO LV POSTERIOR WALL DIASTOLIC: 0.9 CM (ref 0.6–0.9)
ECHO LV RELATIVE WALL THICKNESS RATIO: 0.42
ECHO LVOT AREA: 3.1 CM2
ECHO LVOT AV VTI INDEX: 0.72
ECHO LVOT DIAM: 2 CM
ECHO LVOT MEAN GRADIENT: 2 MMHG
ECHO LVOT PEAK GRADIENT: 4 MMHG
ECHO LVOT PEAK VELOCITY: 1 M/S
ECHO LVOT STROKE VOLUME INDEX: 37.7 ML/M2
ECHO LVOT SV: 74.7 ML
ECHO LVOT VTI: 23.8 CM
ECHO MV A VELOCITY: 1.15 M/S
ECHO MV AREA VTI: 1.4 CM2
ECHO MV E DECELERATION TIME (DT): 280 MS
ECHO MV E VELOCITY: 1.48 M/S
ECHO MV E/A RATIO: 1.29
ECHO MV E/E' LATERAL: 13.45
ECHO MV E/E' RATIO (AVERAGED): 14.13
ECHO MV E/E' SEPTAL: 14.8
ECHO MV LVOT VTI INDEX: 2.23
ECHO MV MAX VELOCITY: 1.6 M/S
ECHO MV MEAN GRADIENT: 3 MMHG
ECHO MV MEAN VELOCITY: 0.8 M/S
ECHO MV PEAK GRADIENT: 10 MMHG
ECHO MV VTI: 53 CM
ECHO RV BASAL DIMENSION: 3.3 CM
ECHO RV TAPSE: 2.4 CM (ref 1.7–?)
EST. AVERAGE GLUCOSE BLD GHB EST-MCNC: 126 MG/DL
HBA1C MFR BLD: 6 % (ref 4.2–6.3)
HDLC SERPL-MCNC: 38 MG/DL (ref 40–60)
HDLC SERPL: 4.6 {RATIO}
LDLC SERPL CALC-MCNC: 107.8 MG/DL
TRIGL SERPL-MCNC: 151 MG/DL (ref 35–150)
VLDLC SERPL CALC-MCNC: 30.2 MG/DL (ref 6–23)

## 2022-04-14 PROCEDURE — 36415 COLL VENOUS BLD VENIPUNCTURE: CPT

## 2022-04-14 PROCEDURE — 2709999900 HC NON-CHARGEABLE SUPPLY

## 2022-04-14 PROCEDURE — 80183 DRUG SCRN QUANT OXCARBAZEPIN: CPT

## 2022-04-14 PROCEDURE — 74011000250 HC RX REV CODE- 250: Performed by: STUDENT IN AN ORGANIZED HEALTH CARE EDUCATION/TRAINING PROGRAM

## 2022-04-14 PROCEDURE — 92610 EVALUATE SWALLOWING FUNCTION: CPT

## 2022-04-14 PROCEDURE — 70551 MRI BRAIN STEM W/O DYE: CPT

## 2022-04-14 PROCEDURE — 74011250636 HC RX REV CODE- 250/636: Performed by: STUDENT IN AN ORGANIZED HEALTH CARE EDUCATION/TRAINING PROGRAM

## 2022-04-14 PROCEDURE — G0378 HOSPITAL OBSERVATION PER HR: HCPCS

## 2022-04-14 PROCEDURE — 74011250636 HC RX REV CODE- 250/636: Performed by: HOSPITALIST

## 2022-04-14 PROCEDURE — 74011250636 HC RX REV CODE- 250/636: Performed by: INTERNAL MEDICINE

## 2022-04-14 PROCEDURE — 80061 LIPID PANEL: CPT

## 2022-04-14 PROCEDURE — 97530 THERAPEUTIC ACTIVITIES: CPT

## 2022-04-14 PROCEDURE — 77010033678 HC OXYGEN DAILY

## 2022-04-14 PROCEDURE — 86580 TB INTRADERMAL TEST: CPT | Performed by: INTERNAL MEDICINE

## 2022-04-14 PROCEDURE — 65270000029 HC RM PRIVATE

## 2022-04-14 PROCEDURE — 96372 THER/PROPH/DIAG INJ SC/IM: CPT

## 2022-04-14 PROCEDURE — 97161 PT EVAL LOW COMPLEX 20 MIN: CPT

## 2022-04-14 PROCEDURE — 94660 CPAP INITIATION&MGMT: CPT

## 2022-04-14 PROCEDURE — 93306 TTE W/DOPPLER COMPLETE: CPT

## 2022-04-14 PROCEDURE — 83036 HEMOGLOBIN GLYCOSYLATED A1C: CPT

## 2022-04-14 PROCEDURE — 97166 OT EVAL MOD COMPLEX 45 MIN: CPT

## 2022-04-14 PROCEDURE — 74011250637 HC RX REV CODE- 250/637: Performed by: INTERNAL MEDICINE

## 2022-04-14 PROCEDURE — 74011250637 HC RX REV CODE- 250/637: Performed by: HOSPITALIST

## 2022-04-14 PROCEDURE — 74011000250 HC RX REV CODE- 250: Performed by: HOSPITALIST

## 2022-04-14 PROCEDURE — 97535 SELF CARE MNGMENT TRAINING: CPT

## 2022-04-14 PROCEDURE — 94760 N-INVAS EAR/PLS OXIMETRY 1: CPT

## 2022-04-14 PROCEDURE — 74011000250 HC RX REV CODE- 250: Performed by: INTERNAL MEDICINE

## 2022-04-14 RX ORDER — MORPHINE SULFATE 2 MG/ML
2 INJECTION, SOLUTION INTRAMUSCULAR; INTRAVENOUS ONCE
Status: COMPLETED | OUTPATIENT
Start: 2022-04-14 | End: 2022-04-14

## 2022-04-14 RX ORDER — OXYCODONE HYDROCHLORIDE 5 MG/1
5 TABLET ORAL
Status: DISCONTINUED | OUTPATIENT
Start: 2022-04-14 | End: 2022-04-19 | Stop reason: HOSPADM

## 2022-04-14 RX ORDER — LORAZEPAM 2 MG/ML
1 INJECTION INTRAMUSCULAR ONCE
Status: COMPLETED | OUTPATIENT
Start: 2022-04-14 | End: 2022-04-14

## 2022-04-14 RX ORDER — CELECOXIB 100 MG/1
100 CAPSULE ORAL 2 TIMES DAILY
Status: DISCONTINUED | OUTPATIENT
Start: 2022-04-14 | End: 2022-04-14

## 2022-04-14 RX ORDER — ATORVASTATIN CALCIUM 40 MG/1
40 TABLET, FILM COATED ORAL
Status: DISCONTINUED | OUTPATIENT
Start: 2022-04-15 | End: 2022-04-19 | Stop reason: HOSPADM

## 2022-04-14 RX ORDER — OXCARBAZEPINE 150 MG/1
300 TABLET, FILM COATED ORAL 2 TIMES DAILY
Status: DISCONTINUED | OUTPATIENT
Start: 2022-04-14 | End: 2022-04-19 | Stop reason: HOSPADM

## 2022-04-14 RX ADMIN — SODIUM CHLORIDE, PRESERVATIVE FREE 10 ML: 5 INJECTION INTRAVENOUS at 06:17

## 2022-04-14 RX ADMIN — TRAZODONE HYDROCHLORIDE 100 MG: 50 TABLET ORAL at 22:26

## 2022-04-14 RX ADMIN — OXCARBAZEPINE 300 MG: 150 TABLET, FILM COATED ORAL at 17:41

## 2022-04-14 RX ADMIN — GABAPENTIN 300 MG: 300 CAPSULE ORAL at 00:00

## 2022-04-14 RX ADMIN — LORAZEPAM 1 MG: 2 INJECTION INTRAMUSCULAR; INTRAVENOUS at 14:09

## 2022-04-14 RX ADMIN — SODIUM CHLORIDE, PRESERVATIVE FREE 10 ML: 5 INJECTION INTRAVENOUS at 22:27

## 2022-04-14 RX ADMIN — SUCRALFATE 1 G: 1 TABLET ORAL at 08:37

## 2022-04-14 RX ADMIN — SUCRALFATE 1 G: 1 TABLET ORAL at 17:42

## 2022-04-14 RX ADMIN — TRAZODONE HYDROCHLORIDE 100 MG: 50 TABLET ORAL at 00:00

## 2022-04-14 RX ADMIN — CELECOXIB 100 MG: 100 CAPSULE ORAL at 09:43

## 2022-04-14 RX ADMIN — SODIUM CHLORIDE, PRESERVATIVE FREE 10 ML: 5 INJECTION INTRAVENOUS at 13:07

## 2022-04-14 RX ADMIN — OXCARBAZEPINE 300 MG: 150 TABLET, FILM COATED ORAL at 12:52

## 2022-04-14 RX ADMIN — MORPHINE SULFATE 2 MG: 2 INJECTION, SOLUTION INTRAMUSCULAR; INTRAVENOUS at 19:55

## 2022-04-14 RX ADMIN — Medication 81 MG: at 17:42

## 2022-04-14 RX ADMIN — Medication 81 MG: at 08:37

## 2022-04-14 RX ADMIN — SUCRALFATE 1 G: 1 TABLET ORAL at 22:26

## 2022-04-14 RX ADMIN — HEPARIN SODIUM 5000 UNITS: 5000 INJECTION INTRAVENOUS; SUBCUTANEOUS at 22:26

## 2022-04-14 RX ADMIN — TUBERCULIN PURIFIED PROTEIN DERIVATIVE 5 UNITS: 5 INJECTION, SOLUTION INTRADERMAL at 13:01

## 2022-04-14 RX ADMIN — HEPARIN SODIUM 5000 UNITS: 5000 INJECTION INTRAVENOUS; SUBCUTANEOUS at 06:17

## 2022-04-14 RX ADMIN — KETOROLAC TROMETHAMINE 15 MG: 30 INJECTION, SOLUTION INTRAMUSCULAR at 00:00

## 2022-04-14 RX ADMIN — ATORVASTATIN CALCIUM 20 MG: 10 TABLET, FILM COATED ORAL at 08:37

## 2022-04-14 RX ADMIN — BUPROPION HYDROCHLORIDE 150 MG: 150 TABLET, EXTENDED RELEASE ORAL at 08:37

## 2022-04-14 RX ADMIN — GABAPENTIN 300 MG: 300 CAPSULE ORAL at 08:37

## 2022-04-14 RX ADMIN — OXYCODONE 5 MG: 5 TABLET ORAL at 22:34

## 2022-04-14 RX ADMIN — GABAPENTIN 300 MG: 300 CAPSULE ORAL at 22:26

## 2022-04-14 RX ADMIN — SODIUM CHLORIDE, PRESERVATIVE FREE 10 ML: 5 INJECTION INTRAVENOUS at 00:00

## 2022-04-14 RX ADMIN — LEVOTHYROXINE SODIUM 125 MCG: 0.12 TABLET ORAL at 06:17

## 2022-04-14 RX ADMIN — GABAPENTIN 300 MG: 300 CAPSULE ORAL at 16:05

## 2022-04-14 RX ADMIN — SUCRALFATE 1 G: 1 TABLET ORAL at 12:53

## 2022-04-14 RX ADMIN — SUCRALFATE 1 G: 1 TABLET ORAL at 00:00

## 2022-04-14 RX ADMIN — HEPARIN SODIUM 5000 UNITS: 5000 INJECTION INTRAVENOUS; SUBCUTANEOUS at 13:01

## 2022-04-14 RX ADMIN — OXYCODONE 5 MG: 5 TABLET ORAL at 16:32

## 2022-04-14 RX ADMIN — BUPROPION HYDROCHLORIDE 150 MG: 150 TABLET, EXTENDED RELEASE ORAL at 17:41

## 2022-04-14 NOTE — ED NOTES
TRANSFER - OUT REPORT:    Verbal report given to Katherine(name) on Vesheather Hoots  being transferred to [facility](unit) for routine progression of care       Report consisted of patients Situation, Background, Assessment and   Recommendations(SBAR). Information from the following report(s) SBAR was reviewed with the receiving nurse. Lines:   Peripheral IV 04/13/22 Left Antecubital (Active)   Site Assessment Clean, dry, & intact 04/13/22 1824   Phlebitis Assessment 0 04/13/22 1824   Infiltration Assessment 0 04/13/22 1824   Dressing Status Clean, dry, & intact 04/13/22 1824   Hub Color/Line Status Pink 04/13/22 1824   Alcohol Cap Used Yes 04/13/22 1824        Opportunity for questions and clarification was provided.       Patient transported with:   Registered Nurse

## 2022-04-14 NOTE — H&P
Hospitalist History and Physical   Admit Date:  2022  6:23 PM   Name:  Sima Altamirano   Age:  80 y.o. Sex:  female  :  1950   MRN:  [id number]     Presenting Complaint: Dizziness and Dysarthria    Reason(s) for Admission: Slurring of speech [R47.81]  Disequilibrium [R42]  Hypertension [I10]     History of Present Illness:     80years old female with past medical history of bipolar disorder, fibromyalgia, hypertension, dyslipidemia presented to ophthalmology office with blurred vision, slurring speech, feeling dizzy patient described as off balance. Patient reports she was having symptoms for the past 3 days, evaluated at ophthalmology office, suspected a stroke, patient was sent to the emergency room for further evaluation and management. In emergency room patient was evaluated, CT head, CTA head and neck did not show any acute intracranial changes or large vessel occlusion. Patient denies any fever, chills, cough, sputum production. Denies any localized weakness or generalized weakness. Patient reports due to her gait disturbance she almost fell yesterday morning. Review of Systems:  10 systems reviewed and negative except as noted in HPI. Assessment & Plan:   Principal Problem:    Slurring of speech (2022)  We will rule out CVA, will obtain MRI brain, echocardiogram to complete stroke work-up. Continue aspirin, statins. Active Problems:    Disequilibrium (2022)  I suspect patient symptoms likely secondary to polypharmacy, will request PT and OT evaluation due to recent fall. Hypothyroidism (): Continue on levothyroxine. Hypertension (): We will allow permissive hypertension, will hold on antihypertensive medications until stroke ruled out. Hyperlipidemia (): Continue on statins. Fibromyalgia: We will continue gabapentin. Disposition/Discharge Planning: Home with family.     Diet: ADULT DIET Regular  VTE ppx: SCD    Code status: Full Code      Past Medical History:   Diagnosis Date    Allergic rhinitis 6/28/2013    Asthma     Bipolar affective disorder (HCC)     Disturbance of skin sensation     Fibromyalgia     GERD (gastroesophageal reflux disease)     History of miscarriage     History of peptic ulcer     Hyperlipidemia     Hypertension     Hypothyroidism     IBS (irritable bowel syndrome)     Impaired fasting glucose 2/7/2022    Lumbar disc disease     Mixed stress and urge urinary incontinence     LINDA on CPAP     Osteoarthritis     Premature beats     PACs / PVCs    Right renal mass      Past Surgical History:   Procedure Laterality Date    HX BLADDER SUSPENSION  2003    HX CHOLECYSTECTOMY  1980s    HX CYSTOCELE REPAIR  11/12/2009    HX OOPHORECTOMY      HX RECTOCELE REPAIR  11/12/2009    HX TONSILLECTOMY  1980    HX TOTAL ABDOMINAL HYSTERECTOMY  2003      Allergies   Allergen Reactions    Fentanyl Other (comments) and Rash     Not sure of reaction  Doesn't remember reaction    Not sure of reaction    Effexor [Venlafaxine] Unknown (comments)     Denies allergy        Social History     Tobacco Use    Smoking status: Never Smoker    Smokeless tobacco: Never Used   Substance Use Topics    Alcohol use: No      Family History   Problem Relation Age of Onset    Breast Cancer Mother 61    Heart Disease Father     Prostate Cancer Father       Family history reviewed and noncontributory to patient's acute condition; no relevant family history unless otherwise noted above.   Immunization History   Administered Date(s) Administered    Influenza Vaccine 01/01/2009    Influenza Vaccine BlogCN) PF (>6 Mo Flulaval, Fluarix, and >3 Yrs Afluria, Fluzone 49938) 01/07/2014, 11/04/2014    Pneumococcal Conjugate (PCV-13) 05/02/2018    Pneumococcal Vaccine (Unspecified Type) 01/01/2006    Zoster Recombinant 04/25/2018, 06/03/2019     PTA Medications:  Current Outpatient Medications   Medication Instructions    albuterol (ProAir HFA) 90 mcg/actuation inhaler 2 Puffs, Inhalation, EVERY 4 HOURS AS NEEDED    aspirin delayed-release 81 mg tablet Oral, 2 TIMES DAILY    atorvastatin (LIPITOR) 40 mg tablet Oral, DAILY    buPROPion XL (WELLBUTRIN XL) 30 mg, Oral, Every morning    buPROPion XL (WELLBUTRIN XL) 150 mg, Oral, EVERY EVENING    calcium carbonate (CORAL CALCIUM PO) Oral, 3 TIMES DAILY    cetirizine (ZyrTEC) 10 mg tablet Oral    cholecalciferol (VITAMIN D3) 5,000 Units, Oral, DAILY    ciclopirox (PENLAC) 8 % solution Topical, EVERY BEDTIME    diazePAM (VALIUM) 10 mg, Oral, EVERY 8 HOURS AS NEEDED, 1/2 to 1 tablet TID PRN    estradioL (VAGIFEM) 10 mcg tab vaginal tablet No dose, route, or frequency recorded.  gabapentin (NEURONTIN) 300 mg capsule Take one in AM and 4 at hs    levothyroxine (LEVOTHROID) 125 mcg, Oral, DAILY    montelukast (SINGULAIR) 10 mg tablet TAKE ONE TABLET BY MOUTH ONE TIME DAILY    multivitamin (ONE A DAY) tablet 1 Tablet, Oral, DAILY    OXcarbazepine (TRILEPTAL) 300 mg tablet Oral, 2 TIMES DAILY    pantoprazole (PROTONIX) 20 mg tablet TAKE 1 TABLET BY MOUTH EVERY DAY    sucralfate (CARAFATE) 1 g, Oral, 4 TIMES DAILY    traZODone (DESYREL) 100 mg, Oral, EVERY BEDTIME       Objective:     Patient Vitals for the past 24 hrs:   Temp Pulse Resp BP SpO2   04/13/22 2101  68 28  97 %   04/13/22 2100  63  (!) 114/53    04/13/22 1904  75 19     04/13/22 1828  71 22 (!) 130/57 100 %   04/13/22 1823 98.2 °F (36.8 °C) 73 18 (!) 130/57 99 %     Oxygen Therapy  O2 Sat (%): 97 % (04/13/22 2101)  Pulse via Oximetry: 69 beats per minute (04/13/22 2101)  O2 Device: None (Room air) (04/13/22 1823)    Estimated body mass index is 25.97 kg/m² as calculated from the following:    Height as of this encounter: 5' 10\" (1.778 m). Weight as of this encounter: 82.1 kg (181 lb). No intake or output data in the 24 hours ending 04/13/22 2651      Physical Exam:    General:    Well nourished.   No overt distress  Head:  Normocephalic, atraumatic  Eyes:  Sclerae appear normal.  Pupils equally round. HENT:  Nares appear normal, no drainage. Moist mucous membranes  Neck:  No restricted ROM. Trachea midline  CV:   RRR. No m/r/g. No JVD  Lungs:   CTAB. No wheezing, rhonchi, or rales. Appears even, unlabored  Abdomen: Bowel sounds present. Soft, nontender, nondistended. Extremities: Warm and dry. No cyanosis or clubbing. No edema. Skin:     No rashes. Normal turgor. Normal coloration  Neuro:  . Alert and oriented x3    Data Ordered and Personally Reviewed:    Last 24hr Labs:  Recent Results (from the past 24 hour(s))   CBC WITH AUTOMATED DIFF    Collection Time: 04/13/22  6:21 PM   Result Value Ref Range    WBC 8.1 4.3 - 11.1 K/uL    RBC 4.42 4.05 - 5.2 M/uL    HGB 13.3 11.7 - 15.4 g/dL    HCT 41.4 35.8 - 46.3 %    MCV 93.7 79.6 - 97.8 FL    MCH 30.1 26.1 - 32.9 PG    MCHC 32.1 31.4 - 35.0 g/dL    RDW 12.2 11.9 - 14.6 %    PLATELET 325 890 - 878 K/uL    MPV 11.3 9.4 - 12.3 FL    ABSOLUTE NRBC 0.00 0.0 - 0.2 K/uL    DF AUTOMATED      NEUTROPHILS 50 43 - 78 %    LYMPHOCYTES 38 13 - 44 %    MONOCYTES 8 4.0 - 12.0 %    EOSINOPHILS 3 0.5 - 7.8 %    BASOPHILS 1 0.0 - 2.0 %    IMMATURE GRANULOCYTES 0 0.0 - 5.0 %    ABS. NEUTROPHILS 4.0 1.7 - 8.2 K/UL    ABS. LYMPHOCYTES 3.0 0.5 - 4.6 K/UL    ABS. MONOCYTES 0.7 0.1 - 1.3 K/UL    ABS. EOSINOPHILS 0.3 0.0 - 0.8 K/UL    ABS. BASOPHILS 0.1 0.0 - 0.2 K/UL    ABS. IMM.  GRANS. 0.0 0.0 - 0.5 K/UL   PROTHROMBIN TIME + INR    Collection Time: 04/13/22  6:21 PM   Result Value Ref Range    Prothrombin time 12.6 12.6 - 14.5 sec    INR 0.9     METABOLIC PANEL, COMPREHENSIVE    Collection Time: 04/13/22  6:21 PM   Result Value Ref Range    Sodium 138 136 - 145 mmol/L    Potassium 3.9 3.5 - 5.1 mmol/L    Chloride 102 98 - 107 mmol/L    CO2 31 21 - 32 mmol/L    Anion gap 5 (L) 7 - 16 mmol/L    Glucose 102 (H) 65 - 100 mg/dL    BUN 15 8 - 23 MG/DL    Creatinine 0.81 0.6 - 1.0 MG/DL    GFR est AA >60 >60 ml/min/1.73m2    GFR est non-AA >60 >60 ml/min/1.73m2    Calcium 9.1 8.3 - 10.4 MG/DL    Bilirubin, total 0.2 0.2 - 1.1 MG/DL    ALT (SGPT) 25 12 - 65 U/L    AST (SGOT) 18 15 - 37 U/L    Alk. phosphatase 103 50 - 136 U/L    Protein, total 8.2 6.3 - 8.2 g/dL    Albumin 3.6 3.2 - 4.6 g/dL    Globulin 4.6 (H) 2.3 - 3.5 g/dL    A-G Ratio 0.8 (L) 1.2 - 3.5     TROPONIN-HIGH SENSITIVITY    Collection Time: 04/13/22  6:21 PM   Result Value Ref Range    Troponin-High Sensitivity 6.4 0 - 14 pg/mL   GLUCOSE, POC    Collection Time: 04/13/22  6:22 PM   Result Value Ref Range    Glucose (POC) 100 65 - 100 mg/dL    Performed by [employer]        All Micro Results     None          Other Studies:  CT HEAD WO CONT    Result Date: 4/13/2022  Noncontrast CT of the brain. COMPARISON: July 2021 INDICATION: Slurred speech TECHNIQUE: Contiguous axial images were obtained from the skull base through the vertex without IV contrast. Radiation dose reduction techniques were used for this study:  Our CT scanners use one or all of the following: Automated exposure control, adjustment of the mA and/or kVp according to patient's size, iterative reconstruction. FINDINGS: There is no acute intracranial hemorrhage or evidence for acute territorial infarction. There is no mass effect, midline shift or hydrocephalus. There is no extra-axial fluid collection. The cerebellum and brainstem are grossly unremarkable. Periventricular diffuse hypodensities are nonspecific and likely secondary to chronic small vessel changes. Included globes appear intact. The partially visualized paranasal sinuses and the right mastoid air cells are aerated. There is no skull fracture. 1. No acute intracranial hemorrhage or CT evidence of acute territorial infarction. Note that MRI is more sensitive for detection of acute/subacute infarct.     CT PERF W CBF    Result Date: 4/13/2022  CT Perfusion Imaging INDICATION:  Slurred speech CT perfusion imaging of the brain was performed after the administration of intravenous contrast.  Perfusion maps and perfusion analysis output were generated using the vis ai perfusion processing software algorithm. Radiation dose reduction techniques were used for this study: All CT scans performed at this facility use one or all of the following: Automated exposure control, adjustment of the mA and/or kVp according to patient's size, iterative reconstruction. FINDINGS: vis ai Output Values: CBF < 30% volume:  0 ml   (core infarction volume greater than 50 cc associated with poor outcomes) Tmax > 6 seconds: 0 ml Tmax/CBF Mismatch Volume: 0 ml Tmax/CBF Mismatch Ratio: N/A Hypoperfusion Intensity Ratio: 0   (values greater than 0.5 associated with poor outcome) Tmax > 10 seconds Volume: 0 ml   (volume greater than 100 mL is associated with poor outcome)     No evidence of core infarct or ischemic penumbra. Medications Administered     iopamidoL (ISOVUE-370) 76 % injection 100 mL     Admin Date  04/13/2022 Action  Given Dose  100 mL Route  IntraVENous Administered By  Unique Masterson RT, R          saline peripheral flush soln 10 mL     Admin Date  04/13/2022 Action  Given Dose  10 mL Route  InterCATHeter Administered By  Unique Masterson RT, R          sodium chloride 0.9 % bolus infusion 100 mL     Admin Date  04/13/2022 Action  New Bag Dose  100 mL Route  IntraVENous Administered By  RT Matthew, R                  Signed:  Princess Carver MD    Part of this note may have been written by using a voice dictation software. The note has been proof read but may still contain some grammatical/other typographical errors.

## 2022-04-14 NOTE — PROGRESS NOTES
Problem: Falls - Risk of  Goal: *Absence of Falls  Description: Document [place] Fall Risk and appropriate interventions in the flowsheet. Note: Fall Risk Interventions:            Medication Interventions: Assess postural VS orthostatic hypotension,Bed/chair exit alarm,Evaluate medications/consider consulting pharmacy,Patient to call before getting OOB,Teach patient to arise slowly,Utilize gait belt for transfers/ambulation    Elimination Interventions: Bed/chair exit alarm,Call light in reach,Elevated toilet seat,Patient to call for help with toileting needs,Stay With Me (per policy),Toilet paper/wipes in reach,Toileting schedule/hourly rounds,Urinal in reach    History of Falls Interventions: Bed/chair exit alarm,Consult care management for discharge planning,Door open when patient unattended,Evaluate medications/consider consulting pharmacy,Investigate reason for fall,Room close to nurse's station,Utilize gait belt for transfer/ambulation,Assess for delayed presentation/identification of injury for 48 hrs (comment for end date),Vital signs minimum Q4HRs X 24 hrs (comment for end date)         Problem: Patient Education: Go to Patient Education Activity  Goal: Patient/Family Education  Note: Patient/Spouse will acknowledge understanding of ICU procedures, medications. Problem: Discharge Planning  Goal: *Discharge to safe environment  Note: Patient will appropriately DC to safe environment. Goal: *Knowledge of medication management  Note: Patient will acknowledge understanding of medication purposes, side effects and dosages. Goal: *Knowledge of discharge instructions  Note: Patient will understand all discharge instructions.

## 2022-04-14 NOTE — CONSULTS
IRC/Physiatry Consult under Code S protocol        Briefly, from records; \"79 years old female with past medical history of bipolar disorder, fibromyalgia, hypertension, dyslipidemia presented to ophthalmology office with blurred vision, slurring speech, feeling dizzy patient described as off balance. Patient reports she was having symptoms for the past 3 days, evaluated at ophthalmology office, suspected a stroke, patient was sent to the emergency room for further evaluation and management. In emergency room patient was evaluated, CT head, CTA head and neck did not show any acute intracranial changes or large vessel occlusion. Patient denies any fever, chills, cough, sputum production. Denies any localized weakness or generalized weakness. Patient reports due to her gait disturbance she almost fell yesterday morning. \"    MRI neg for stroke    Reviewed therapy documentation; Pt with restless limbs, dizziness, ataxia, bizarre gait pattern, constant fidgeting, balance deficits    NEEDS neuro consultation  ? Trileptal side effects; Oxycarbazepine toxicity if serum levels > 35 ug/ml    Idania Simmons MD, Medical Director  [address] [place] [address]

## 2022-04-14 NOTE — PROGRESS NOTES
Patient stating pain in back of 6-7. Patient states Toradol given over night was helpful. Patient has no orders for pain medication. Dr. Reji Siu notified. Orders placed for scheduled Celebrex.

## 2022-04-14 NOTE — PROGRESS NOTES
LTG: Patient will tolerate least restrictive diet without overt signs or symptoms of airway compromise. STG: Patient will tolerate full liquids diet and thin liquids without overt signs or symptoms of airway compromise. STG: Patient will participate in modified barium swallow study as clinically indicated. OBSERVATION STATUS  SPEECH LANGUAGE PATHOLOGY: DYSPHAGIA  Initial Assessment    NAME/AGE/GENDER: Sima Altamirano is a 80 y.o. female  DATE: 4/14/2022  PRIMARY DIAGNOSIS: Slurring of speech [R47.81]  Disequilibrium [R42]  Hypertension [I10]      ICD-10: Treatment Diagnosis: R13.12 Dysphagia, Oropharyngeal Phase    RECOMMENDATIONS   DIET:    Full liquids   Thin Liquids    MEDICATIONS: floated or crushed in puree/applesauce     ASPIRATION PRECAUTIONS  · Slow rate of intake  · Small bites/sips  · Upright at 90 degrees during meal     COMPENSATORY STRATEGIES/MODIFICATIONS  · Remain upright at least 30 minutes after po intake. EDUCATION:  · Recommendations discussed with Nursing  · Patient     CONTINUATION OF SKILLED SERVICES/MEDICAL NECESSITY:   Patient is expected to demonstrate progress in  swallow strength, swallow function, diet tolerance and swallow safety in order to  improve swallow safety, work toward diet advancement and decrease aspiration risk.  Patient continues to require skilled intervention due to dysphagia. RECOMMENDATIONS for CONTINUED SPEECH THERAPY: YES: Anticipate need for ongoing speech therapy during this hospitalization and at next level of care. ASSESSMENT   Patient presents with concerns for pharyngeal dysphagia with possible esophageal component. No overt s/sx airway compromise with solids or liquids; however, patient endorsing significant pharyngeal stasis post swallow with fruit requiring multiple sips of liquid for reported globus sensation to resolve. Denies pharyngeal stasis with thin liquids and applesauce.      Recommend downgrade to full liquids/ thin liquids. Meds floated or crushed in puree/applesauce. ? If possible esophageal component to dysphagia symptoms; however, will plan for modified barium swallow study tomorrow, 4/14, to objectively assess oropharyngeal swallowing abilities. ADDENDUM: Per care everywhere, patient seen as an outpatient by GI at [facility] for dysphagia 4/4 with recommendations for esophagram and EGD. Not yet completed. REHABILITATION POTENTIAL FOR STATED GOALS: Good    PLAN    FREQUENCY/DURATION: Continue to follow patient 3 times a week for duration of hospital stay to address above goals. Recommendations for next treatment session: Next treatment session will address Modified Barium Swallow Study      SUBJECTIVE   Patient alert upright in bed for assessment. Pleasant and friendly. Follows commands, but is a poor historian. Speech is slightly slurred. History of Present Injury/Illness: Ms. Prerna Kruger  has a past medical history of Allergic rhinitis (6/28/2013), Asthma, Bipolar affective disorder ([facility] [address] Disturbance of skin sensation, Fibromyalgia, GERD (gastroesophageal reflux disease), History of miscarriage, History of peptic ulcer, Hyperlipidemia, Hypertension, Hypothyroidism, IBS (irritable bowel syndrome), Impaired fasting glucose (2/7/2022), Lumbar disc disease, Mixed stress and urge urinary incontinence, LINDA on CPAP, Osteoarthritis, Premature beats, and Right renal mass. . She also  has a past surgical history that includes hx cholecystectomy (1980s); hx tonsillectomy (1980); hx total abdominal hysterectomy (2003); hx bladder suspension (2003); hx oophorectomy; hx cystocele repair (11/12/2009); and hx rectocele repair (11/12/2009). Problem List:  (Impairments causing functional limitations):  1. ?pharyngeal swallow    Previous Dysphagia: YES Patient reports pill dysphagia for ~1 month, but appears to be poor historian.   Diet Prior to Evaluation: Regular/thin    Orientation:   Person  Place      Cognitive linguistic screening:  Patient's speech is intelligible, but slightly slurred. Appears to have frontal lisp, which she is unable to state if baseline or acute. Follows commands and responds appropriately, but is a poor historian. Pain: Pain Scale 1: Adult Nonverbal Pain Scale  Pain Intensity 1: 0  Pain Location 1: Back;Neck  Pain Intervention(s) 1: Medication (see MAR)    OBJECTIVE   Oral Motor:   · Labial: No impairment  · Dentition: Upper Dentures and Lower Dentures  · Oral Hygiene: Adequate  · Lingual: No impairment   · endorses hx of TMJ    Swallow evaluation:   Patient consumed trials of thin by cup/straw/serial sips, puree, and mixed fruit. Adequate oral prep with all trials. No overt s/sx airway compromise with any po trials; however, patient reporting significant pharyngeal stasis with fruit. After multiple sips of liquids reports globus sensations resolved. Denies pharyngeal stasis with puree and thin liquids.        INTERDISCIPLINARY COLLABORATION: Registered Nurse  PRECAUTIONS/ALLERGIES: Fentanyl and Effexor [venlafaxine]     Tool Used: Dysphagia Outcome and Severity Scale (JAMES)    Score Comments   Normal Diet  [] 7 With no strategies or extra time needed   Functional Swallow  [] 6 May have mild oral or pharyngeal delay   Mild Dysphagia  [] 5 Which may require one diet consistency restricted    Mild-Moderate Dysphagia  [] 4 With 1-2 diet consistencies restricted   Moderate Dysphagia  [] 3 With 2 or more diet consistencies restricted   Moderate-Severe Dysphagia  [] 2 With partial PO strategies (trials with ST only)   Severe Dysphagia  [] 1 With inability to tolerate any PO safely      Score:  Initial:4 Most Recent: x (Date 04/14/22 )   Interpretation of Tool: The Dysphagia Outcome and Severity Scale (JAMES) is a simple, easy-to-use, 7-point scale developed to systematically rate the functional severity of dysphagia based on objective assessment and make recommendations for diet level, independence level, and type of nutrition. Current Medications:   No current facility-administered medications on file prior to encounter. Current Outpatient Medications on File Prior to Encounter   Medication Sig Dispense Refill    sucralfate (CARAFATE) 1 gram tablet Take 1 g by mouth four (4) times daily.  gabapentin (NEURONTIN) 300 mg capsule Take one in AM and 4 at hs (Patient taking differently: Take one in AM and four at bedtime) 150 Capsule 5    OXcarbazepine (TRILEPTAL) 300 mg tablet Take  by mouth two (2) times a day.  diazePAM (VALIUM) 10 mg tablet Take 10 mg by mouth every eight (8) hours as needed for Anxiety. 1/2 to 1 tablet TID PRN      montelukast (SINGULAIR) 10 mg tablet TAKE ONE TABLET BY MOUTH ONE TIME DAILY 120 Tablet 0    cetirizine (ZyrTEC) 10 mg tablet Take  by mouth.  estradioL (VAGIFEM) 10 mcg tab vaginal tablet       atorvastatin (LIPITOR) 40 mg tablet Take  by mouth daily.  ciclopirox (PENLAC) 8 % solution Apply  to affected area nightly.  buPROPion XL (WELLBUTRIN XL) 150 mg tablet Take 150 mg by mouth every evening.  albuterol (ProAir HFA) 90 mcg/actuation inhaler Take 2 Puffs by inhalation every four (4) hours as needed for Wheezing.  calcium carbonate (CORAL CALCIUM PO) Take  by mouth three (3) times daily.  buPROPion XL (WELLBUTRIN XL) 300 mg XL tablet Take 30 mg by mouth. Every morning      pantoprazole (PROTONIX) 20 mg tablet TAKE 1 TABLET BY MOUTH EVERY DAY  1    aspirin delayed-release 81 mg tablet Take  by mouth two (2) times a day.  multivitamin (ONE A DAY) tablet Take 1 Tablet by mouth daily.  cholecalciferol, VITAMIN D3, (VITAMIN D3) 5,000 unit tab tablet Take 5,000 Units by mouth daily.  traZODone (DESYREL) 100 mg tablet Take 100 mg by mouth nightly.  levothyroxine (LEVOTHROID) 100 mcg tablet Take 125 mcg by mouth daily.          SAFETY:  After treatment position/precautions:  · Upright in bed  · RN notified  · Call light within reach    Total Treatment Duration:   Time In: 1313  Time Out: [address] [facility] [address] [address] [facility]

## 2022-04-14 NOTE — PROGRESS NOTES
TRANSFER - OUT REPORT:    Verbal report given to Amari Smyth RN (name) on Maia Bales  being transferred to med/surg (unit) for routine progression of care       Report consisted of patients Situation, Background, Assessment and   Recommendations(SBAR). Information from the following report(s) SBAR, Kardex, Intake/Output, MAR, Accordion and Cardiac Rhythm NSR  was reviewed with the receiving nurse. Lines:   Peripheral IV 04/13/22 Left Antecubital (Active)   Site Assessment Clean, dry, & intact 04/14/22 1514   Phlebitis Assessment 0 04/14/22 1514   Infiltration Assessment 0 04/14/22 1514   Dressing Status Clean, dry, & intact 04/14/22 1514   Dressing Type Transparent;Tape 04/14/22 1514   Hub Color/Line Status Patent 04/14/22 1514   Action Taken Open ports on tubing capped 04/14/22 0006   Alcohol Cap Used Yes 04/14/22 1514        Opportunity for questions and clarification was provided. Patient transferred with tech. Patient's spouse called and notified of transfer.

## 2022-04-14 NOTE — PROGRESS NOTES
Problem: Self Care Deficits Care Plan (Adult)  Goal: *Acute Goals and Plan of Care (Insert Text)  Outcome: Progressing Towards Goal  Note:   Patient will complete lower body dressing with stand by assist to increase self care independence. Patient will complete toileting with stand by assist to increase self care independence. Patient will improve static standing at sink for 5 minutes to improve independence with transfers and self cares. Patient will complete all functional transfers with stand by assist using adaptive equipment as needed. Patient will complete UE exercises with stand by assist to increase overall activity tolerance and strength. Timeframe: 7 visits          OCCUPATIONAL THERAPY: Initial Assessment and Daily Note 4/14/2022  OBSERVATION: OT Visit Days: 1  [place] / [place] / Product Type: Medicare /      NAME/AGE/GENDER: Roseline Nur is a 80 y.o. female   PRIMARY DIAGNOSIS:  Slurring of speech [R47.81]  Disequilibrium [R42]  Hypertension [I10] Slurring of speech Slurring of speech        ICD-10: Treatment Diagnosis:    Generalized Muscle Weakness (M62.81)  Other lack of cordination (R27.8)  Difficulty in walking, Not elsewhere classified (R26.2)   Precautions/Allergies:     Fentanyl and Effexor [venlafaxine]      ASSESSMENT:     Ms. Marry Lakhani presents with severe neurological movement patterns from unknown dx. Reports neuropathy but presentation is more ataxic and whole body. Strength and ROM are actually [facility]. Unable to sit, lay, or stand still with constant fidgeting for all extremities. Reports spending most time in bed at home due to poor balance. Spouse reports assisting with all IADL's. Vision and perception are normal. Well below baseline for ADL's and transfers. Chart review shows neurologist recommended physical therapy for severe shoulder, neck, and posterior upper back pain. Recommend SNF as she is getting worse at home. Initiate OT. This section established at most recent assessment   PROBLEM LIST (Impairments causing functional limitations):  Decreased Strength  Decreased ADL/Functional Activities  Decreased Transfer Abilities  Decreased Ambulation Ability/Technique   INTERVENTIONS PLANNED: (Benefits and precautions of occupational therapy have been discussed with the patient.)  Activities of daily living training  Adaptive equipment training  Neuromuscular re-eduation  Therapeutic activity  Therapeutic exercise     TREATMENT PLAN: Frequency/Duration: Follow patient 3x a week to address above goals. Rehabilitation Potential For Stated Goals: Good     REHAB RECOMMENDATIONS (at time of discharge pending progress):    Placement: It is my opinion, based on this patient's performance to date, that Ms. Prerna Kruger may benefit from intensive therapy at a [address] [place] [address] after discharge due to the functional deficits listed above that are likely to improve with skilled rehabilitation and concerns that he/she may be unsafe to be unsupervised at home due to deficits noted above . Equipment:   None at this time              OCCUPATIONAL PROFILE AND HISTORY:   History of Present Injury/Illness (Reason for Referral):  See H&P  Past Medical History/Comorbidities:   Ms. Prerna Kruger  has a past medical history of Allergic rhinitis (6/28/2013), Asthma, Bipolar affective disorder ([facility] [address] Disturbance of skin sensation, Fibromyalgia, GERD (gastroesophageal reflux disease), History of miscarriage, History of peptic ulcer, Hyperlipidemia, Hypertension, Hypothyroidism, IBS (irritable bowel syndrome), Impaired fasting glucose (2/7/2022), Lumbar disc disease, Mixed stress and urge urinary incontinence, LINDA on CPAP, Osteoarthritis, Premature beats, and Right renal mass.   Ms. Prerna Kruger  has a past surgical history that includes hx cholecystectomy (1980s); hx tonsillectomy (1980); hx total abdominal hysterectomy (2003); hx bladder suspension (2003); hx oophorectomy; hx cystocele repair (11/12/2009); and hx rectocele repair (11/12/2009). Social History/Living Environment:   Home Environment: Private residence  # Steps to Enter: 3  Rails to Enter: Yes  Hand Rails : Bilateral  One/Two Story Residence: One story  Living Alone: No  Support Systems: Spouse/Significant Other  Patient Expects to be Discharged to[de-identified] Skilled nursing facility  Current DME Used/Available at Home: Johny Aaron, straight,Walker, rollator  Prior Level of Function/Work/Activity:  Decline for months, assist IADL's. Supposedly independent ADL's. Walks only in home furniture walking recently. .      Number of Personal Factors/Comorbidities that affect the Plan of Care: Expanded review of therapy/medical records (1-2):  MODERATE COMPLEXITY   ASSESSMENT OF OCCUPATIONAL PERFORMANCE[de-identified]   Activities of Daily Living:   Basic ADLs (From Assessment) Complex ADLs (From Assessment)   Feeding: Stand-by assistance  Oral Facial Hygiene/Grooming: Contact guard assistance  Bathing: Minimum assistance  Upper Body Dressing: Moderate assistance  Lower Body Dressing: Moderate assistance  Toileting: Moderate assistance     Grooming/Bathing/Dressing Activities of Daily Living                       Functional Transfers  Bathroom Mobility: Moderate assistance  Toilet Transfer : Moderate assistance     Bed/Mat Mobility  Supine to Sit: Stand-by assistance  Sit to Supine: Stand-by assistance  Sit to Stand: Minimum assistance; Additional time;Assist x1;Assist x2  Stand to Sit: Minimum assistance;Assist x1  Scooting: Stand-by assistance     Most Recent Physical Functioning:   Gross Assessment:                  Posture:     Balance:  Sitting: Intact  Standing: With support Bed Mobility:  Supine to Sit: Stand-by assistance  Sit to Supine: Stand-by assistance  Scooting: Stand-by assistance  Wheelchair Mobility:     Transfers:  Sit to Stand: Minimum assistance; Additional time;Assist x1;Assist x2  Stand to Sit: Minimum assistance;Assist x1  Duration: 25 Minutes            Patient Vitals for the past 6 hrs:   BP BP Patient Position SpO2 Pulse   22 0755 (!) 112/48 At rest 97 % 65   22 0800 121/71 -- 97 % 67   22 0900 111/61 -- 97 % 64   22 1000 129/65 At rest 98 % 61       Mental Status  Neurologic State: Alert  Orientation Level: Oriented X4  Cognition: Appropriate decision making                          Physical Skills Involved:  Range of Motion  Balance  Strength  Activity Tolerance  Fine Motor Control  Gross Motor Control  Pain (acute) Cognitive Skills Affected (resulting in the inability to perform in a timely and safe manner):  Perception  Executive Function  Short Term Recall Psychosocial Skills Affected:  Habits/Routines  Environmental Adaptation  Social Interaction  Emotional Regulation   Number of elements that affect the Plan of Care: 3-5:  MODERATE COMPLEXITY   CLINICAL DECISION MAKIN[address] [place], [place] [id number] AM-PAC 6 Clicks   Daily Activity Inpatient Short Form  How much help from another person does the patient currently need. .. Total A Lot A Little None   1. Putting on and taking off regular lower body clothing? [] 1   [x] 2   [] 3   [] 4   2. Bathing (including washing, rinsing, drying)? [] 1   [x] 2   [] 3   [] 4   3. Toileting, which includes using toilet, bedpan or urinal?   [] 1   [x] 2   [] 3   [] 4   4. Putting on and taking off regular upper body clothing? [] 1   [] 2   [x] 3   [] 4   5. Taking care of personal grooming such as brushing teeth? [] 1   [] 2   [x] 3   [] 4   6. Eating meals? [] 1   [] 2   [x] 3   [] 4   © , Trustees of [address] [place] [id number], under license to [employer]. All rights reserved      Score:  Initial: 15 Most Recent: X (Date: -- )    Interpretation of Tool:  Represents activities that are increasingly more difficult (i.e. Bed mobility, Transfers, Gait). Medical Necessity:     Skilled intervention continues to be required due to Deficits noted above.   Reason for Services/Other Comments:  Patient continues to require skilled intervention due to   Dx above  . Use of outcome tool(s) and clinical judgement create a POC that gives a: MODERATE COMPLEXITY         TREATMENT:   (In addition to Assessment/Re-Assessment sessions the following treatments were rendered)     Pre-treatment Symptoms/Complaints:    Pain: Initial:   Pain Intensity 1: 10  Pain Location 1: Back,Neck  Post Session:  10     Self Care: (38): Procedure(s) (per grid) utilized to improve and/or restore self-care/home management as related to dressing, toileting, grooming, and balance and transfers . Required moderate verbal and tactile cueing to facilitate activities of daily living skills. Initial evaluation 5 mintues. Braces/Orthotics/Lines/Etc:   O2 Device: None (Room air)  Treatment/Session Assessment:    Response to Treatment:  Fair, supine in bed. Interdisciplinary Collaboration:   Physical Therapist  Occupational Therapist  Registered Nurse  After treatment position/precautions:   Supine in bed  Call light within reach  RN notified  Family at bedside   Compliance with Program/Exercises: Will assess as treatment progresses. Recommendations/Intent for next treatment session: \"Next visit will focus on advancements to more challenging activities and reduction in assistance provided\".   Total Treatment Duration:  OT Patient Time In/Time Out  Time In: 1055  [facility] [address] [facility]

## 2022-04-14 NOTE — PROGRESS NOTES
TRANSFER - IN REPORT:    Verbal report received from [facility] RN(name) on Roseline Nur  being received from ICU(unit) for routine progression of care      Report consisted of patients Situation, Background, Assessment and   Recommendations(SBAR). Information from the following report(s) SBAR, Kardex, Recent Results and Cardiac Rhythm sinus rhythm was reviewed with the receiving nurse. Opportunity for questions and clarification was provided. Assessment completed upon patients arrival to unit and care assumed.

## 2022-04-14 NOTE — PROGRESS NOTES
Dual skin assessment completed by this RN and Clovis Snyder RN. Patient has scattered bruising to bilateral arms/legs. Buttocks intact. Alleyvn placed. Bilateral heels intact.

## 2022-04-14 NOTE — PROGRESS NOTES
TRANSFER - IN REPORT:    Verbal report received from ED(name) on Willem Hdez  being received from RAYMON Sweeney(unit) for routine progression of care      Report consisted of patients Situation, Background, Assessment and   Recommendations(SBAR). Information from the following report(s) SBAR, Kardex, ED Summary, Procedure Summary, Intake/Output, MAR, Accordion, Recent Results, Med Rec Status and Cardiac Rhythm SR was reviewed with the receiving nurse. Opportunity for questions and clarification was provided. Assessment completed upon patients arrival to unit and care assumed.

## 2022-04-14 NOTE — PROGRESS NOTES
Problem: Mobility Impaired (Adult and Pediatric)  Goal: *Acute Goals and Plan of Care (Insert Text)  Outcome: Progressing Towards Goal  Note: STG:  (1.)Ms. Mohamud Atwood will move from supine to sit and sit to supine with MODIFIED INDEPENDENCE within 1-3 treatment day(s). (2.)Ms. Mohamud Atwood will transfer from bed to chair and chair to bed with MINIMAL ASSIST using the least restrictive device within 1-3 treatment day(s). (3.)Ms. Mohamud Atwood will ambulate with MINIMAL ASSIST X 1-2 for 60 feet with the least restrictive device within 1-3 treatment day(s). LTG:  (1.)Ms. Mohamud Atwood will move from supine to sit and sit to supine  in bed with INDEPENDENT within 4-6 treatment day(s). (2.)Ms. Mohamud Atwood will transfer from bed to chair and chair to bed with CONTACT GUARD ASSIST using the least restrictive device within 4-6 treatment day(s). (3.)Ms. Mohamud Atwood will ambulate with MINIMAL ASSIST for 100 feet with the least restrictive device within 1-3 treatment day(s). ________________________________________________________________________________________________      PHYSICAL THERAPY: Initial Assessment 4/14/2022  OBSERVATION: PT Visit Days : 1  [place] / [place] B / Product Type: Medicare /       NAME/AGE/GENDER: Irina Pettit is a 80 y.o. female   PRIMARY DIAGNOSIS: Slurring of speech [R47.81]  Disequilibrium [R42]  Hypertension [I10] Slurring of speech Slurring of speech       ICD-10: Treatment Diagnosis:    · Difficulty in walking, Not elsewhere classified (R26.2)  · Other abnormalities of gait and mobility (R26.89)  · History of falling (Z91.81)   Precaution/Allergies:  Fentanyl and Effexor [venlafaxine]      ASSESSMENT:     Ms. Mohamud Atwood presents supine in bed with spouse present. Per spouse, patient has had dizziness for the last few weeks than has worsened and she had slurred speech yesterday. Restless UE/LE movements while in bed; facial grimacing and movements- tardive dyskinesia? Reports neuropathy but presentation is more ataxic and whole body. History of chronic neck and LBP . Gait is unsafe and unsteady. Required min to mod assist of 1-2 for gait and transfers. Spouse states patient previously used a cane or RW but has not used either for last few years. Only ambulating short distances. History of falls. Reports spending most of her time at home in bed due to poor balance. Lives with spouse, children and grandchildren. Will follow to address the below problem list.   Chart review shows neurologist recommended physical therapy for severe shoulder, neck, and posterior upper back pain. Recommend SNF as she is getting worse at home. This section established at most recent assessment   PROBLEM LIST (Impairments causing functional limitations):  1. Decreased Strength  2. Decreased ADL/Functional Activities  3. Decreased Transfer Abilities  4. Decreased Ambulation Ability/Technique  5. Decreased Balance  6. Decreased Activity Tolerance  7. Increased Fatigue   INTERVENTIONS PLANNED: (Benefits and precautions of physical therapy have been discussed with the patient.)  1. Balance Exercise  2. Bed Mobility  3. Gait Training  4. Neuromuscular Re-education/Strengthening  5. Therapeutic Activites  6. Therapeutic Exercise/Strengthening  7. Transfer Training     TREATMENT PLAN: Frequency/Duration: daily for duration of hospital stay  Rehabilitation Potential For Stated Goals: [facility] (at time of discharge pending progress):    Placement: It is my opinion, based on this patient's performance to date, that Ms. Matt Sandoval may benefit from intensive therapy at a [address] [place] [address] after discharge due to the functional deficits listed above that are likely to improve with skilled rehabilitation and concerns that he/she may be unsafe to be unsupervised at home due to poor functional mobility and history of falls. .  Equipment:    None at this time HISTORY:   History of Present Injury/Illness (Reason for Referral): per H&P:   80years old female with past medical history of bipolar disorder, fibromyalgia, hypertension, dyslipidemia presented to ophthalmology office with blurred vision, slurring speech, feeling dizzy patient described as off balance. Patient reports she was having symptoms for the past 3 days, evaluated at ophthalmology office, suspected a stroke, patient was sent to the emergency room for further evaluation and management. In emergency room patient was evaluated, CT head, CTA head and neck did not show any acute intracranial changes or large vessel occlusion. Patient denies any fever, chills, cough, sputum production. Denies any localized weakness or generalized weakness. Patient reports due to her gait disturbance she almost fell yesterday morning. Past Medical History/Comorbidities:   Ms. Abeba Hinojosa  has a past medical history of Allergic rhinitis (6/28/2013), Asthma, Bipolar affective disorder ([facility] [address] Disturbance of skin sensation, Fibromyalgia, GERD (gastroesophageal reflux disease), History of miscarriage, History of peptic ulcer, Hyperlipidemia, Hypertension, Hypothyroidism, IBS (irritable bowel syndrome), Impaired fasting glucose (2/7/2022), Lumbar disc disease, Mixed stress and urge urinary incontinence, LINDA on CPAP, Osteoarthritis, Premature beats, and Right renal mass. Ms. Abeba Hinojosa  has a past surgical history that includes hx cholecystectomy (1980s); hx tonsillectomy (1980); hx total abdominal hysterectomy (2003); hx bladder suspension (2003); hx oophorectomy; hx cystocele repair (11/12/2009); and hx rectocele repair (11/12/2009).   Social History/Living Environment:   Home Environment: Private residence  # Steps to Enter: 3  Rails to Enter: Yes  Hand Rails : Bilateral  One/Two Story Residence: One story  Living Alone: No  Support Systems: Spouse/Significant Other  Patient Expects to be Discharged to[de-identified] Skilled nursing facility  Current DME Used/Available at Home: Sofie Souzak, straight,Walker, rollator  Prior Level of Function/Work/Activity:  Ambulating short distances without AD; history of falls, spouse does housework. Helps patient with ADLs as needed,     Number of Personal Factors/Comorbidities that affect the Plan of Care: 1-2: MODERATE COMPLEXITY   EXAMINATION:   Most Recent Physical Functioning:   Gross Assessment:  AROM: Generally decreased, functional  Strength: Generally decreased, functional  Coordination: Generally decreased, functional               Posture:     Balance:  Sitting: Intact  Standing: With support Bed Mobility:  Supine to Sit: Stand-by assistance  Sit to Supine: Stand-by assistance  Scooting: Stand-by assistance  Wheelchair Mobility:     Transfers:  Sit to Stand: Minimum assistance; Moderate assistance; Additional time;Assist x1;Assist x2  Stand to Sit: Minimum assistance; Moderate assistance;Assist x1  Interventions: Manual cues; Safety awareness training;Verbal cues  Duration: 25 Minutes  Gait:     Speed/Kimberly: Pace decreased (<100 feet/min)  Gait Abnormalities: Ataxic;Decreased step clearance; Path deviations; Shuffling gait  Distance (ft): 30 Feet (ft)  Assistive Device:  (HHA X 2)  Ambulation - Level of Assistance: Minimal assistance; Moderate assistance; Additional time;Assist x1;Assist x2  Interventions: Manual cues; Safety awareness training;Verbal cues  Home Environment: Private residence  Home Situation  Home Environment: Private residence  # Steps to Enter: 3  Rails to Enter: Yes  Hand Rails : Bilateral  One/Two Story Residence: One story  Support Systems: Spouse/Significant Other  Patient Expects to be Discharged to[de-identified] Skilled nursing facility  Current DME Used/Available at Home: Sofie Souzak, straight,Walker, rollator      Body Structures Involved:  1. Nerves  2. Muscles Body Functions Affected:  1. Neuromusculoskeletal  2. Movement Related Activities and Participation Affected:  1.  General Tasks and Demands  2. Mobility   Number of elements that affect the Plan of Care: 3: MODERATE COMPLEXITY   CLINICAL PRESENTATION:   Presentation: Evolving clinical presentation with changing clinical characteristics: MODERATE COMPLEXITY   CLINICAL DECISION MAKIN [facility] Inpatient Short Form  How much difficulty does the patient currently have. .. Unable A Lot A Little None   1. Turning over in bed (including adjusting bedclothes, sheets and blankets)? [] 1   [] 2   [x] 3   [] 4   2. Sitting down on and standing up from a chair with arms ( e.g., wheelchair, bedside commode, etc.)   [] 1   [] 2   [x] 3   [] 4   3. Moving from lying on back to sitting on the side of the bed? [] 1   [] 2   [] 3   [x] 4   How much help from another person does the patient currently need. .. Total A Lot A Little None   4. Moving to and from a bed to a chair (including a wheelchair)? [] 1   [] 2   [x] 3   [] 4   5. Need to walk in hospital room? [] 1   [x] 2   [] 3   [] 4   6. Climbing 3-5 steps with a railing? [] 1   [x] 2   [] 3   [] 4   © , Trustees of [address] [place] [address], under license to [employer]. All rights reserved      Score:  Initial: 17 Most Recent: X (Date: -- )    Interpretation of Tool:  Represents activities that are increasingly more difficult (i.e. Bed mobility, Transfers, Gait). Medical Necessity:     · Patient demonstrates fair rehab potential due to higher previous functional level. Reason for Services/Other Comments:  · Patient continues to require present interventions due to patient's inability to perform functional mobility safely and independently. .   Use of outcome tool(s) and clinical judgement create a POC that gives a: Clear prediction of patient's progress: LOW COMPLEXITY            TREATMENT:   (In addition to Assessment/Re-Assessment sessions the following treatments were rendered)   Pre-treatment Symptoms/Complaints:  Neck and back pain, dizziness, unsteadiness  Pain: Initial:   Pain Intensity 1: 10  Pain Location 1: Back,Neck  Post Session:  8     Therapeutic Activity: (  25 Minutes ):  Therapeutic activities including Bed mobility, sit to stand transfers, toilet transfers and Ambulation on level ground to improve mobility, strength, balance and coordination. Required minimal  To moderate Manual cues; Safety awareness training;Verbal cues . Braces/Orthotics/Lines/Etc:   · telemetry; ICU monitors  · O2 Device: None (Room air)  Treatment/Session Assessment:    · Response to Treatment:  Tolerated fair  · Interdisciplinary Collaboration:   o Physical Therapist  o Occupational Therapist  o Registered Nurse  · After treatment position/precautions:   o Supine in bed  o Bed/Chair-wheels locked  o Bed in low position  o Call light within reach  o RN notified  o Family at bedside  o Side rails x 2   · Compliance with Program/Exercises: Compliant most of the time, Will assess as treatment progresses  · Recommendations/Intent for next treatment session: \"Next visit will focus on advancements to more challenging activities\".   Total Treatment Duration:  PT Patient Time In/Time Out  Time In: 1055  Time Out: [address]

## 2022-04-14 NOTE — PROGRESS NOTES
Patient complaining of consistent pain, Dr. Christie Carl notified, orders placed by MD for oxycodone.

## 2022-04-14 NOTE — DISCHARGE INSTRUCTIONS

## 2022-04-14 NOTE — PROGRESS NOTES
Care Management Interventions  PCP Verified by CM: Yes (The patient sees a physician at [facility])  Transition of Care Consult (CM Consult): SNF  Physical Therapy Consult: Yes  Occupational Therapy Consult: Yes  Support Systems: Spouse/Significant Other,Child(bruna),Other Family Member(s)  The Patient and/or Patient Representative was Provided with a Choice of Provider and Agrees with the Discharge Plan?: Yes  Freedom of Choice List was Provided with Basic Dialogue that Supports the Patient's Individualized Plan of Care/Goals, Treatment Preferences and Shares the Quality Data Associated with the Providers?: Yes  Discharge Location  Patient Expects to be Discharged to[de-identified] Skilled nursing facility    RN CM met with the patient at the bedside and discussed discharge planning. She lives with her spouse, son, daughter in law, and children. She reports she was performing her ADLs but they were difficult to complete without assistance. She owns a rollator and a CPAP. Post acute stay has been recommended. RN CM provided her with a list of skilled nursing facilities and their quality metrics. She selected [facility], [address], and formerly [facility]. RN CM sent referrals to the facilities.

## 2022-04-14 NOTE — PROGRESS NOTES
Hospitalist Progress Note   Admit Date:  2022  6:23 PM   Name:  Samia Cannon   Age:  80 y.o. Sex:  female  :  1950   MRN:  [id number]   Room:  [facility]    Presenting Complaint: Dizziness and Dysarthria    Reason(s) for Admission: Slurring of speech [R47.81]  Disequilibrium [R42]  Hypertension [I10]     Hospital Course & Interval History:     Patient with past medical history of    Bipolar disorder   Fibromyalgia  Hypertension  Dyslipidemia    Patient presented to ophthalmology office with blurred vision, slurred speech, dizziness, and feeling off balance. Symptoms were present for 3 days prior to admission. Patient was sent to emergency room for further evaluation with suspicion of having a stroke. CT brain did not show acute abnormalities. Subjective/24hr Events (22):    22   Patient reports still feeling some dizziness. Slight improvement in terms of overall weakness. No fever  No chest pain  No chills  No shortness of breath  Still slow in terms of finding words to answer questions. She is complaining of body pain, back pain. She states this is a chronic problem from fibromyalgia. ROS:  10 systems reviewed and negative except as noted above. Assessment & Plan:     Principal Problem:    Slurring of speech (2022)  Possibility of stroke  CT brain is so far negative for acute findings  Will check report from MRI brain  Check report from echocardiogram  Physical therapy  Patient is on aspirin 81 mg p.o. twice a day  Atorvastatin 20 mg p.o. once a day. We will increase the dose to 40 mg p.o. once a day    Active Problems:    Hypothyroidism ()  On levothyroxine 125 mcg p.o. once a day      Hypertension ()  Blood pressure is controlled at 129/65 mmHg.    Monitor closely      Hyperlipidemia ()  On atorvastatin 40 mg p.o. once a day now      Disequilibrium (2022)  Physical therapy  Monitoring    I have discussed the plan of care with patient and care team.          Discharge Planning:    to be determined     Diet:  ADULT DIET Regular  DVT PPx: heparin SC   Code status: Full Code    Hospital Problems as of 4/14/2022 Date Reviewed: 4/7/2022          Codes Class Noted - Resolved POA    Disequilibrium ICD-10-CM: R42  ICD-9-CM: 780.4  4/13/2022 - Present Unknown        * (Principal) Slurring of speech ICD-10-CM: R47.81  ICD-9-CM: 784.59  4/13/2022 - Present Unknown        Hypothyroidism ICD-10-CM: E03.9  ICD-9-CM: 244.9  Unknown - Present Yes    Overview Addendum 2/7/2022  3:09 PM by Sosa Michael MD     1/11/22 TSH 1.050 on levothyroxine 125 mcg daily. Labs were reviewed and discussed with the patient. The patient will continue the current treatment. Hypertension ICD-10-CM: I10  ICD-9-CM: 401.9  Unknown - Present Unknown    Overview Signed 1/7/2022  1:34 PM by Sosa Michael MD     Well-controlled on metoprolol succinate which she also takes for her symptomatic premature beats. The patient will continue the current treatment. Hyperlipidemia ICD-10-CM: E78.5  ICD-9-CM: 272.4  Unknown - Present Yes    Overview Addendum 2/7/2022  2:59 PM by Sosa Michael MD     1/11/21 total 185 HDL 41   on atorvastatin 40 mg daily. Reviewed the most recent lipid panel with the patient, and interpreted the results within the context of the patient's personal cardiovascular risk factors. Discussed/reinforced a low-cholesterol diet. The patient will continue current intensity statin therapy.                     Objective:     Patient Vitals for the past 24 hrs:   Temp Pulse Resp BP SpO2   04/14/22 1000 97.8 °F (36.6 °C) 61 17 129/65 98 %   04/14/22 0900  64 20 111/61 97 %   04/14/22 0800  67 13 121/71 97 %   04/14/22 0755 97.3 °F (36.3 °C) 65 14 (!) 112/48 97 %   04/14/22 0500  64 16 (!) 110/49 95 %   04/14/22 0400 98 °F (36.7 °C) 69 16 (!) 122/48 98 %   04/14/22 0353  71      04/14/22 0300  74 14 (!) 89/68 99 %   04/14/22 0200  70 19 121/61 97 %   04/14/22 0100  68 18 127/60 97 %   04/14/22 0018     98 %   04/14/22 0006 98.2 °F (36.8 °C) 69 18 126/61 98 %   04/14/22 0000  69 21  96 %   04/13/22 2321 98.2 °F (36.8 °C) 64 13 131/62 98 %   04/13/22 2101  68 28  97 %   04/13/22 2100  63  (!) 114/53    04/13/22 1904  75 19     04/13/22 1828  71 22 (!) 130/57 100 %   04/13/22 1823 98.2 °F (36.8 °C) 73 18 (!) 130/57 99 %     Oxygen Therapy  O2 Sat (%): 98 % (04/14/22 1000)  Pulse via Oximetry: 61 beats per minute (04/14/22 1000)  O2 Device: CPAP mask (04/14/22 0400)  FIO2 (%): 21 % (04/14/22 0400)    Estimated body mass index is 25.25 kg/m² as calculated from the following:    Height as of this encounter: 5' 10\" (1.778 m). Weight as of this encounter: 79.8 kg (176 lb). Intake/Output Summary (Last 24 hours) at 4/14/2022 1153  Last data filed at 4/14/2022 0353  Gross per 24 hour   Intake    Output 800 ml   Net -800 ml         Physical Exam:     Blood pressure 129/65, pulse 61, temperature 97.8 °F (36.6 °C), resp. rate 17, height 5' 10\" (1.778 m), weight 79.8 kg (176 lb), SpO2 98 %, not currently breastfeeding. General:    Well nourished. No overt distress. Seems sluggish in getting back to respond verbally. No abnormal movements. Head:  Normocephalic, atraumatic  Eyes:  Sclerae appear normal.  Pupils equally round. ENT:  Nares appear normal, no drainage. Moist oral mucosa  Neck:  No restricted ROM. Trachea midline   CV:   RRR. No m/r/g. No jugular venous distension. Lungs:   CTAB. No wheezing, rhonchi, or rales. Respirations even, unlabored  Abdomen: Bowel sounds present. Soft, nontender, nondistended. Extremities: No cyanosis or clubbing. No edema  Skin:     No rashes and normal coloration. Warm and dry. Neuro:  CN II-XII grossly intact. Sensation intact. A&Ox3  Psych:  Normal mood and affect.       I have reviewed ordered lab tests and independently visualized imaging below:    Recent Labs:  Recent Results (from the past 48 hour(s))   CBC WITH AUTOMATED DIFF    Collection Time: 04/13/22  6:21 PM   Result Value Ref Range    WBC 8.1 4.3 - 11.1 K/uL    RBC 4.42 4.05 - 5.2 M/uL    HGB 13.3 11.7 - 15.4 g/dL    HCT 41.4 35.8 - 46.3 %    MCV 93.7 79.6 - 97.8 FL    MCH 30.1 26.1 - 32.9 PG    MCHC 32.1 31.4 - 35.0 g/dL    RDW 12.2 11.9 - 14.6 %    PLATELET 753 173 - 815 K/uL    MPV 11.3 9.4 - 12.3 FL    ABSOLUTE NRBC 0.00 0.0 - 0.2 K/uL    DF AUTOMATED      NEUTROPHILS 50 43 - 78 %    LYMPHOCYTES 38 13 - 44 %    MONOCYTES 8 4.0 - 12.0 %    EOSINOPHILS 3 0.5 - 7.8 %    BASOPHILS 1 0.0 - 2.0 %    IMMATURE GRANULOCYTES 0 0.0 - 5.0 %    ABS. NEUTROPHILS 4.0 1.7 - 8.2 K/UL    ABS. LYMPHOCYTES 3.0 0.5 - 4.6 K/UL    ABS. MONOCYTES 0.7 0.1 - 1.3 K/UL    ABS. EOSINOPHILS 0.3 0.0 - 0.8 K/UL    ABS. BASOPHILS 0.1 0.0 - 0.2 K/UL    ABS. IMM. GRANS. 0.0 0.0 - 0.5 K/UL   PROTHROMBIN TIME + INR    Collection Time: 04/13/22  6:21 PM   Result Value Ref Range    Prothrombin time 12.6 12.6 - 14.5 sec    INR 0.9     METABOLIC PANEL, COMPREHENSIVE    Collection Time: 04/13/22  6:21 PM   Result Value Ref Range    Sodium 138 136 - 145 mmol/L    Potassium 3.9 3.5 - 5.1 mmol/L    Chloride 102 98 - 107 mmol/L    CO2 31 21 - 32 mmol/L    Anion gap 5 (L) 7 - 16 mmol/L    Glucose 102 (H) 65 - 100 mg/dL    BUN 15 8 - 23 MG/DL    Creatinine 0.81 0.6 - 1.0 MG/DL    GFR est AA >60 >60 ml/min/1.73m2    GFR est non-AA >60 >60 ml/min/1.73m2    Calcium 9.1 8.3 - 10.4 MG/DL    Bilirubin, total 0.2 0.2 - 1.1 MG/DL    ALT (SGPT) 25 12 - 65 U/L    AST (SGOT) 18 15 - 37 U/L    Alk.  phosphatase 103 50 - 136 U/L    Protein, total 8.2 6.3 - 8.2 g/dL    Albumin 3.6 3.2 - 4.6 g/dL    Globulin 4.6 (H) 2.3 - 3.5 g/dL    A-G Ratio 0.8 (L) 1.2 - 3.5     TROPONIN-HIGH SENSITIVITY    Collection Time: 04/13/22  6:21 PM   Result Value Ref Range    Troponin-High Sensitivity 6.4 0 - 14 pg/mL   GLUCOSE, POC    Collection Time: 04/13/22  6:22 PM   Result Value Ref Range    Glucose (POC) 100 65 - 100 mg/dL    Performed by [facility]    LIPID PANEL    Collection Time: 04/14/22  3:29 AM   Result Value Ref Range    Cholesterol, total 176 <200 MG/DL    Triglyceride 151 (H) 35 - 150 MG/DL    HDL Cholesterol 38 (L) 40 - 60 MG/DL    LDL, calculated 107.8 (H) <100 MG/DL    VLDL, calculated 30.2 (H) 6.0 - 23.0 MG/DL    CHOL/HDL Ratio 4.6     HEMOGLOBIN A1C WITH EAG    Collection Time: 04/14/22  3:29 AM   Result Value Ref Range    Hemoglobin A1c 6.0 4.20 - 6.30 %    Est. average glucose 126 mg/dL   ECHO ADULT COMPLETE    Collection Time: 04/14/22  9:40 AM   Result Value Ref Range    LV EDV A2C 66 mL    LV EDV A4C 90 mL    LV ESV A4C 40 mL    IVSd 0.8 0.6 - 0.9 cm    LVIDd 4.3 3.9 - 5.3 cm    LVIDs 3.1 cm    LVOT Diameter 2.0 cm    LVOT Mean Gradient 2 mmHg    LVOT VTI 23.8 cm    LVOT Peak Velocity 1.0 m/s    LVOT Peak Gradient 4 mmHg    LVPWd 0.9 0.6 - 0.9 cm    LV E' Lateral Velocity 11 cm/s    LV E' Septal Velocity 10 cm/s    LV Ejection Fraction A4C 56 %    LVOT Area 3.1 cm2    LVOT SV 74.7 ml    LA Minor Axis 6.6 cm    LA Major Axis 5.5 cm    LA Area 2C 26.4 cm2    LA Area 4C 21.7 cm2    LA Volume BP 86 (A) 22 - 52 mL    LA Diameter 2.8 cm    AV Mean Gradient 4 mmHg    AV VTI 32.9 cm    AV Mean Velocity 0.9 m/s    AV Peak Velocity 1.3 m/s    AV Peak Gradient 7 mmHg    AV Area by VTI 2.3 cm2    AV Area by Peak Velocity 2.4 cm2    Aortic Root 2.9 cm    Ascending Aorta 2.7 cm    MV E Wave Deceleration Time 280.0 ms    MV A Velocity 1.15 m/s    MV E Velocity 1.48 m/s    MV Mean Gradient 3 mmHg    MV VTI 53.0 cm    MV Mean Velocity 0.8 m/s    MV Max Velocity 1.6 m/s    MV Peak Gradient 10 mmHg    MV Area by VTI 1.4 cm2    RV Basal Dimension 3.3 cm    TAPSE 2.4 1.7 cm    Fractional Shortening 2D 28 28 - 44 %    LV ESV Index A4C 20 mL/m2    LV EDV Index A4C 45 mL/m2    LV EDV Index A2C 33 mL/m2    LVIDd Index 2.17 cm/m2    LVIDs Index 1.57 cm/m2    LV RWT Ratio 0.42     LV Mass 2D 114.2 67 - 162 g LV Mass 2D Index 57.7 43 - 95 g/m2    MV E/A 1.29     E/E' Ratio (Averaged) 14.13     E/E' Lateral 13.45     E/E' Septal 14.80     LA Volume Index BP 43 (A) 16 - 34 ml/m2    LVOT Stroke Volume Index 37.7 mL/m2    LA Size Index 1.41 cm/m2    LA/AO Root Ratio 0.97     Ao Root Index 1.46 cm/m2    Ascending Aorta Index 1.36 cm/m2    AV Velocity Ratio 0.77     LVOT:AV VTI Index 0.72     JAY JAY/BSA VTI 1.2 cm2/m2    JAY JAY/BSA Peak Velocity 1.2 cm2/m2    MV:LVOT VTI Index 2.23        All Micro Results     None          Other Studies:  CT HEAD WO CONT    Result Date: 4/13/2022  Noncontrast CT of the brain. COMPARISON: July 2021 INDICATION: Slurred speech TECHNIQUE: Contiguous axial images were obtained from the skull base through the vertex without IV contrast. Radiation dose reduction techniques were used for this study:  Our CT scanners use one or all of the following: Automated exposure control, adjustment of the mA and/or kVp according to patient's size, iterative reconstruction. FINDINGS: There is no acute intracranial hemorrhage or evidence for acute territorial infarction. There is no mass effect, midline shift or hydrocephalus. There is no extra-axial fluid collection. The cerebellum and brainstem are grossly unremarkable. Periventricular diffuse hypodensities are nonspecific and likely secondary to chronic small vessel changes. Included globes appear intact. The partially visualized paranasal sinuses and the right mastoid air cells are aerated. There is no skull fracture. 1. No acute intracranial hemorrhage or CT evidence of acute territorial infarction. Note that MRI is more sensitive for detection of acute/subacute infarct.     CTA HEAD NECK W WO CONT    Result Date: 4/14/2022  History: Patient is at the eye doctor and started to have slurred speech and dizziness Comments: CT ANGIOGRAM OF THE NECK AND CT ANGIOGRAM OF THE [place] [facility] [place] was obtained following the administration of IV contrast. IV contrast was administered to evaluate the arterial vasculature. Reformatted images in the coronal and sagittal planes as well as 3-D imaging was obtained and reviewed on a dedicated PACS and [address]. Radiation reduction dose techniques were used for the study. Our CT scanner use one or all of the following- Automated exposure control, adjustment of the mA and/or KV according the patient size, iterative reconstruction. All measurements are based upon NASCET criteria if appropriate. This study was analyzed by the [address] [place] [address] [facility] [employer].Algorithm Findings: CT ANGIOGRAM OF THE NECK: The arch and proximal great vessels are patent. The carotid bulbs are patent with mild eccentric calcified plaque disease on the left. Degree of stenosis is less than 50%. The proximal vertebral arteries are patent with mild to moderate stenosis at the origin on the left. The lung apices are clear. The thyroid gland is unremarkable. CT ANGIOGRAM OF [place] [facility] [place]: The petrous, cavernous, and supraclinoid internal carotid arteries are patent. The anterior circulation is patent. The middle cerebral arteries are patent. The distal vertebral arteries posterior inferior cerebellar arteries, basilar artery superior cerebellar arteries and posterior cerebral arteries are patent. The dural venous sinuses are patent. 1. No evidence of large vessel occlusive disease or high-grade stenosis     CT PERF W CBF    Result Date: 4/13/2022  CT Perfusion Imaging INDICATION:  Slurred speech CT perfusion imaging of the brain was performed after the administration of intravenous contrast.  Perfusion maps and perfusion analysis output were generated using the vis ai perfusion processing software algorithm. Radiation dose reduction techniques were used for this study: All CT scans performed at this facility use one or all of the following: Automated exposure control, adjustment of the mA and/or kVp according to patient's size, iterative reconstruction.  FINDINGS: vis ai Output Values: CBF < 30% volume:  0 ml   (core infarction volume greater than 50 cc associated with poor outcomes) Tmax > 6 seconds: 0 ml Tmax/CBF Mismatch Volume: 0 ml Tmax/CBF Mismatch Ratio: N/A Hypoperfusion Intensity Ratio: 0   (values greater than 0.5 associated with poor outcome) Tmax > 10 seconds Volume: 0 ml   (volume greater than 100 mL is associated with poor outcome)     No evidence of core infarct or ischemic penumbra. ECHO ADULT COMPLETE    Result Date: 4/14/2022    Left Ventricle: Left ventricle size is normal. Normal wall thickness. Normal wall motion. Normal left ventricular systolic function with a visually estimated EF of 55 - 60%. Normal diastolic function.   Left Atrium: Left atrium is mild to moderately dilated.   Interatrial Septum: Agitated saline study was negative with and without provocation.        Current Meds:  Current Facility-Administered Medications   Medication Dose Route Frequency    celecoxib (CELEBREX) capsule 100 mg  100 mg Oral BID    OXcarbazepine (TRILEPTAL) tablet 300 mg  300 mg Oral BID    sodium chloride (NS) flush 5-10 mL  5-10 mL IntraVENous Q8H    sodium chloride (NS) flush 5-10 mL  5-10 mL IntraVENous PRN    sodium chloride (NS) flush 5-40 mL  5-40 mL IntraVENous Q8H    sodium chloride (NS) flush 5-40 mL  5-40 mL IntraVENous PRN    heparin (porcine) injection 5,000 Units  5,000 Units SubCUTAneous Q8H    diazePAM (VALIUM) tablet 10 mg  10 mg Oral Q8H PRN    sucralfate (CARAFATE) tablet 1 g  1 g Oral QID    gabapentin (NEURONTIN) capsule 300 mg  300 mg Oral TID    atorvastatin (LIPITOR) tablet 20 mg  20 mg Oral DAILY    buPROPion SR (WELLBUTRIN SR) tablet 150 mg  150 mg Oral BID    aspirin delayed-release tablet 81 mg  81 mg Oral BID    traZODone (DESYREL) tablet 100 mg  100 mg Oral QHS    levothyroxine (SYNTHROID) tablet 125 mcg  125 mcg Oral 6am       Signed:  Antione Thomas MD    Part of this note may have been written by using a voice dictation software. The note has been proof read but may still contain some grammatical/other typographical errors.

## 2022-04-15 ENCOUNTER — APPOINTMENT (OUTPATIENT)
Dept: GENERAL RADIOLOGY | Age: 72
DRG: 066 | End: 2022-04-15
Attending: INTERNAL MEDICINE
Payer: MEDICARE

## 2022-04-15 LAB
MM INDURATION POC: 0 MM (ref 0–5)
PPD POC: NEGATIVE NEGATIVE

## 2022-04-15 PROCEDURE — 74011250636 HC RX REV CODE- 250/636: Performed by: HOSPITALIST

## 2022-04-15 PROCEDURE — 65270000029 HC RM PRIVATE

## 2022-04-15 PROCEDURE — 92611 MOTION FLUOROSCOPY/SWALLOW: CPT

## 2022-04-15 PROCEDURE — 74011250637 HC RX REV CODE- 250/637: Performed by: INTERNAL MEDICINE

## 2022-04-15 PROCEDURE — 74011000250 HC RX REV CODE- 250: Performed by: INTERNAL MEDICINE

## 2022-04-15 PROCEDURE — 74230 X-RAY XM SWLNG FUNCJ C+: CPT

## 2022-04-15 PROCEDURE — 74011000250 HC RX REV CODE- 250: Performed by: STUDENT IN AN ORGANIZED HEALTH CARE EDUCATION/TRAINING PROGRAM

## 2022-04-15 PROCEDURE — 94760 N-INVAS EAR/PLS OXIMETRY 1: CPT

## 2022-04-15 PROCEDURE — 74011250637 HC RX REV CODE- 250/637: Performed by: HOSPITALIST

## 2022-04-15 PROCEDURE — 74011000250 HC RX REV CODE- 250: Performed by: HOSPITALIST

## 2022-04-15 PROCEDURE — 77010033678 HC OXYGEN DAILY

## 2022-04-15 RX ORDER — POLYETHYLENE GLYCOL 3350 17 G/17G
17 POWDER, FOR SOLUTION ORAL DAILY
Status: DISCONTINUED | OUTPATIENT
Start: 2022-04-15 | End: 2022-04-19 | Stop reason: HOSPADM

## 2022-04-15 RX ADMIN — HEPARIN SODIUM 5000 UNITS: 5000 INJECTION INTRAVENOUS; SUBCUTANEOUS at 06:23

## 2022-04-15 RX ADMIN — SODIUM CHLORIDE, PRESERVATIVE FREE 10 ML: 5 INJECTION INTRAVENOUS at 12:29

## 2022-04-15 RX ADMIN — TRAZODONE HYDROCHLORIDE 100 MG: 50 TABLET ORAL at 21:36

## 2022-04-15 RX ADMIN — OXCARBAZEPINE 300 MG: 150 TABLET, FILM COATED ORAL at 09:50

## 2022-04-15 RX ADMIN — GABAPENTIN 300 MG: 300 CAPSULE ORAL at 21:36

## 2022-04-15 RX ADMIN — SUCRALFATE 1 G: 1 TABLET ORAL at 12:27

## 2022-04-15 RX ADMIN — BARIUM SULFATE 15 ML: 400 SUSPENSION ORAL at 13:45

## 2022-04-15 RX ADMIN — OXYCODONE 5 MG: 5 TABLET ORAL at 19:35

## 2022-04-15 RX ADMIN — Medication 81 MG: at 17:05

## 2022-04-15 RX ADMIN — SUCRALFATE 1 G: 1 TABLET ORAL at 09:50

## 2022-04-15 RX ADMIN — GABAPENTIN 300 MG: 300 CAPSULE ORAL at 09:50

## 2022-04-15 RX ADMIN — SODIUM CHLORIDE, PRESERVATIVE FREE 10 ML: 5 INJECTION INTRAVENOUS at 21:36

## 2022-04-15 RX ADMIN — OXYCODONE 5 MG: 5 TABLET ORAL at 12:27

## 2022-04-15 RX ADMIN — BUPROPION HYDROCHLORIDE 150 MG: 150 TABLET, EXTENDED RELEASE ORAL at 17:05

## 2022-04-15 RX ADMIN — LEVOTHYROXINE SODIUM 125 MCG: 0.12 TABLET ORAL at 06:23

## 2022-04-15 RX ADMIN — DIAZEPAM 10 MG: 5 TABLET ORAL at 10:02

## 2022-04-15 RX ADMIN — POLYETHYLENE GLYCOL 3350 17 G: 17 POWDER, FOR SOLUTION ORAL at 17:05

## 2022-04-15 RX ADMIN — BARIUM SULFATE 45 ML: 980 POWDER, FOR SUSPENSION ORAL at 13:40

## 2022-04-15 RX ADMIN — GABAPENTIN 300 MG: 300 CAPSULE ORAL at 17:05

## 2022-04-15 RX ADMIN — SUCRALFATE 1 G: 1 TABLET ORAL at 17:05

## 2022-04-15 RX ADMIN — HEPARIN SODIUM 5000 UNITS: 5000 INJECTION INTRAVENOUS; SUBCUTANEOUS at 21:36

## 2022-04-15 RX ADMIN — SODIUM CHLORIDE, PRESERVATIVE FREE 10 ML: 5 INJECTION INTRAVENOUS at 06:23

## 2022-04-15 RX ADMIN — Medication 81 MG: at 09:50

## 2022-04-15 RX ADMIN — HEPARIN SODIUM 5000 UNITS: 5000 INJECTION INTRAVENOUS; SUBCUTANEOUS at 12:27

## 2022-04-15 RX ADMIN — BARIUM SULFATE 15 ML: 400 PASTE ORAL at 13:43

## 2022-04-15 RX ADMIN — BUPROPION HYDROCHLORIDE 150 MG: 150 TABLET, EXTENDED RELEASE ORAL at 09:50

## 2022-04-15 RX ADMIN — OXYCODONE 5 MG: 5 TABLET ORAL at 06:25

## 2022-04-15 RX ADMIN — SUCRALFATE 1 G: 1 TABLET ORAL at 21:36

## 2022-04-15 RX ADMIN — ATORVASTATIN CALCIUM 40 MG: 40 TABLET, FILM COATED ORAL at 21:36

## 2022-04-15 NOTE — PROGRESS NOTES
Hospitalist Progress Note   Admit Date:  2022  6:23 PM   Name:  Vilma Grant   Age:  80 y.o. Sex:  female  :  1950   MRN:  [id number]   Room:  [facility]    Presenting Complaint: Dizziness and Dysarthria    Reason(s) for Admission: Slurring of speech [R47.81]  Disequilibrium [R42]  Hypertension [I10]  Stroke-like episode [R29.90]     Hospital Course & Interval History:     Patient with past medical history of    Bipolar disorder   Fibromyalgia  Hypertension  Dyslipidemia    Patient presented to ophthalmology office with blurred vision, slurred speech, dizziness, and feeling off balance. Symptoms were present for 3 days prior to admission. Patient was sent to emergency room for further evaluation with suspicion of having a stroke. CT brain did not show acute abnormalities. Subjective/24hr Events (04/15/22):    22   Patient reports still feeling some dizziness. Slight improvement in terms of overall weakness. No fever  No chest pain  No chills  No shortness of breath  Still slow in terms of finding words to answer questions. She is complaining of body pain, back pain. She states this is a chronic problem from fibromyalgia. 4/15/22   Patient continues to complain of feeling weak overall. Although she reports some improvement. She has been moved out of ICU to medical floor. She reports eating well. She reports being able to ambulate well in her room. Still with body ache, in general.   No fever. No chills. No shortness of breath. No chest pain. No abdominal pain. ROS:  10 systems reviewed and negative except as noted above. Assessment & Plan:     Principal Problem:    Slurring of speech (2022) on admission  Possibility of stroke  CT brain is so far negative for acute findings  MRI does not show acute findings of abnormalities  Echocardiogram result is reviewed. No interseptal defect. Good ejection fraction.    Physical therapy to continue  Patient is on aspirin 81 mg p.o. twice a day  Atorvastatin 40 mg p.o. once a day    Active Problems:    Hypothyroidism   On levothyroxine 125 mcg p.o. once a day      Hypertension   Blood pressure is controlled at 112/61 mmHg. Monitor closely      Hyperlipidemia   On atorvastatin 40 mg p.o. once a day now      Disequilibrium (4/13/2022)  Physical therapy  Monitoring    I have discussed the plan of care with patient and care team.      Discharge Planning:    Possible to be discharged home tomorrow. Diet:  ADULT DIET Full Liquid  DVT PPx: heparin SC   Code status: Full Code    Hospital Problems as of 4/15/2022 Date Reviewed: 4/7/2022          Codes Class Noted - Resolved POA    Stroke-like episode ICD-10-CM: R29.90  ICD-9-CM: 781.99  4/14/2022 - Present Unknown        Disequilibrium ICD-10-CM: R42  ICD-9-CM: 780.4  4/13/2022 - Present Unknown        * (Principal) Slurring of speech ICD-10-CM: R47.81  ICD-9-CM: 784.59  4/13/2022 - Present Unknown        Hypothyroidism ICD-10-CM: E03.9  ICD-9-CM: 244.9  Unknown - Present Yes    Overview Addendum 2/7/2022  3:09 PM by Mitchel Harirs MD     1/11/22 TSH 1.050 on levothyroxine 125 mcg daily. Labs were reviewed and discussed with the patient. The patient will continue the current treatment. Hypertension ICD-10-CM: I10  ICD-9-CM: 401.9  Unknown - Present Unknown    Overview Signed 1/7/2022  1:34 PM by Mitchel Harris MD     Well-controlled on metoprolol succinate which she also takes for her symptomatic premature beats. The patient will continue the current treatment. Hyperlipidemia ICD-10-CM: E78.5  ICD-9-CM: 272.4  Unknown - Present Yes    Overview Addendum 2/7/2022  2:59 PM by Mitchel Harris MD     1/11/21 total 185 HDL 41   on atorvastatin 40 mg daily. Reviewed the most recent lipid panel with the patient, and interpreted the results within the context of the patient's personal cardiovascular risk factors.   Discussed/reinforced a low-cholesterol diet. The patient will continue current intensity statin therapy. Objective:     Patient Vitals for the past 24 hrs:   Temp Pulse Resp BP SpO2   04/15/22 0744 98.4 °F (36.9 °C) 84 12 112/61 94 %   04/15/22 0620    125/61    04/15/22 0328 98.1 °F (36.7 °C) 70 17 (!) 116/50 97 %   04/15/22 0110     94 %   04/14/22 2318 98.4 °F (36.9 °C) 72 17 (!) 107/54 92 %   04/14/22 1955    117/64    04/14/22 1935 98 °F (36.7 °C) 62 17 (!) 122/59 95 %   04/14/22 1606 98.2 °F (36.8 °C) 61 18 125/60 98 %   04/14/22 1600  62 18 (!) 126/56 97 %   04/14/22 1514 97.9 °F (36.6 °C) 67 16 121/61 97 %   04/14/22 1400  72 25 (!) 134/57 99 %   04/14/22 1300  72 14 122/64 99 %   04/14/22 1000 97.8 °F (36.6 °C) 61 17 129/65 98 %   04/14/22 0900  64 20 111/61 97 %     Oxygen Therapy  O2 Sat (%): 94 % (04/15/22 0744)  Pulse via Oximetry: 68 beats per minute (04/15/22 0110)  O2 Device: Nasal cannula (04/15/22 0744)  O2 Flow Rate (L/min): 3 l/min (04/15/22 0110)  FIO2 (%): 21 % (04/14/22 0400)    Estimated body mass index is 25.25 kg/m² as calculated from the following:    Height as of this encounter: 5' 10\" (1.778 m). Weight as of this encounter: 79.8 kg (176 lb). Intake/Output Summary (Last 24 hours) at 4/15/2022 0841  Last data filed at 4/14/2022 1613  Gross per 24 hour   Intake    Output 550 ml   Net -550 ml         Physical Exam:     Blood pressure 112/61, pulse 84, temperature 98.4 °F (36.9 °C), resp. rate 12, height 5' 10\" (1.778 m), weight 79.8 kg (176 lb), SpO2 94 %, not currently breastfeeding. General:    Well nourished. No overt distress. Seems more spontaneous in getting back to respond verbally. No abnormal movements. Head:  Normocephalic, atraumatic  Eyes:  Sclerae appear normal.  Pupils equally round. ENT:  Nares appear normal, no drainage. Moist oral mucosa  Neck:  No restricted ROM. Trachea midline   CV:   RRR. No m/r/g. No jugular venous distension.   Lungs: CTAB.  No wheezing, rhonchi, or rales. Respirations even, unlabored  Abdomen: Bowel sounds present. Soft, nontender, nondistended. Extremities: No cyanosis or clubbing. No edema  Skin:     No rashes and normal coloration. Warm and dry. Neuro:  CN II-XII grossly intact. Sensation intact. A&Ox3  Psych:  Normal mood and affect. I have reviewed ordered lab tests and independently visualized imaging below:    Recent Labs:  Recent Results (from the past 48 hour(s))   CBC WITH AUTOMATED DIFF    Collection Time: 04/13/22  6:21 PM   Result Value Ref Range    WBC 8.1 4.3 - 11.1 K/uL    RBC 4.42 4.05 - 5.2 M/uL    HGB 13.3 11.7 - 15.4 g/dL    HCT 41.4 35.8 - 46.3 %    MCV 93.7 79.6 - 97.8 FL    MCH 30.1 26.1 - 32.9 PG    MCHC 32.1 31.4 - 35.0 g/dL    RDW 12.2 11.9 - 14.6 %    PLATELET 975 828 - 081 K/uL    MPV 11.3 9.4 - 12.3 FL    ABSOLUTE NRBC 0.00 0.0 - 0.2 K/uL    DF AUTOMATED      NEUTROPHILS 50 43 - 78 %    LYMPHOCYTES 38 13 - 44 %    MONOCYTES 8 4.0 - 12.0 %    EOSINOPHILS 3 0.5 - 7.8 %    BASOPHILS 1 0.0 - 2.0 %    IMMATURE GRANULOCYTES 0 0.0 - 5.0 %    ABS. NEUTROPHILS 4.0 1.7 - 8.2 K/UL    ABS. LYMPHOCYTES 3.0 0.5 - 4.6 K/UL    ABS. MONOCYTES 0.7 0.1 - 1.3 K/UL    ABS. EOSINOPHILS 0.3 0.0 - 0.8 K/UL    ABS. BASOPHILS 0.1 0.0 - 0.2 K/UL    ABS. IMM.  GRANS. 0.0 0.0 - 0.5 K/UL   PROTHROMBIN TIME + INR    Collection Time: 04/13/22  6:21 PM   Result Value Ref Range    Prothrombin time 12.6 12.6 - 14.5 sec    INR 0.9     METABOLIC PANEL, COMPREHENSIVE    Collection Time: 04/13/22  6:21 PM   Result Value Ref Range    Sodium 138 136 - 145 mmol/L    Potassium 3.9 3.5 - 5.1 mmol/L    Chloride 102 98 - 107 mmol/L    CO2 31 21 - 32 mmol/L    Anion gap 5 (L) 7 - 16 mmol/L    Glucose 102 (H) 65 - 100 mg/dL    BUN 15 8 - 23 MG/DL    Creatinine 0.81 0.6 - 1.0 MG/DL    GFR est AA >60 >60 ml/min/1.73m2    GFR est non-AA >60 >60 ml/min/1.73m2    Calcium 9.1 8.3 - 10.4 MG/DL    Bilirubin, total 0.2 0.2 - 1.1 MG/DL ALT (SGPT) 25 12 - 65 U/L    AST (SGOT) 18 15 - 37 U/L    Alk.  phosphatase 103 50 - 136 U/L    Protein, total 8.2 6.3 - 8.2 g/dL    Albumin 3.6 3.2 - 4.6 g/dL    Globulin 4.6 (H) 2.3 - 3.5 g/dL    A-G Ratio 0.8 (L) 1.2 - 3.5     TROPONIN-HIGH SENSITIVITY    Collection Time: 04/13/22  6:21 PM   Result Value Ref Range    Troponin-High Sensitivity 6.4 0 - 14 pg/mL   GLUCOSE, POC    Collection Time: 04/13/22  6:22 PM   Result Value Ref Range    Glucose (POC) 100 65 - 100 mg/dL    Performed by [facility]    LIPID PANEL    Collection Time: 04/14/22  3:29 AM   Result Value Ref Range    Cholesterol, total 176 <200 MG/DL    Triglyceride 151 (H) 35 - 150 MG/DL    HDL Cholesterol 38 (L) 40 - 60 MG/DL    LDL, calculated 107.8 (H) <100 MG/DL    VLDL, calculated 30.2 (H) 6.0 - 23.0 MG/DL    CHOL/HDL Ratio 4.6     HEMOGLOBIN A1C WITH EAG    Collection Time: 04/14/22  3:29 AM   Result Value Ref Range    Hemoglobin A1c 6.0 4.20 - 6.30 %    Est. average glucose 126 mg/dL   ECHO ADULT COMPLETE    Collection Time: 04/14/22  9:40 AM   Result Value Ref Range    LV EDV A2C 66 mL    LV EDV A4C 90 mL    LV ESV A4C 40 mL    IVSd 0.8 0.6 - 0.9 cm    LVIDd 4.3 3.9 - 5.3 cm    LVIDs 3.1 cm    LVOT Diameter 2.0 cm    LVOT Mean Gradient 2 mmHg    LVOT VTI 23.8 cm    LVOT Peak Velocity 1.0 m/s    LVOT Peak Gradient 4 mmHg    LVPWd 0.9 0.6 - 0.9 cm    LV E' Lateral Velocity 11 cm/s    LV E' Septal Velocity 10 cm/s    LV Ejection Fraction A4C 56 %    LVOT Area 3.1 cm2    LVOT SV 74.7 ml    LA Minor Axis 6.6 cm    LA Major Axis 5.5 cm    LA Area 2C 26.4 cm2    LA Area 4C 21.7 cm2    LA Volume BP 86 (A) 22 - 52 mL    LA Diameter 2.8 cm    AV Mean Gradient 4 mmHg    AV VTI 32.9 cm    AV Mean Velocity 0.9 m/s    AV Peak Velocity 1.3 m/s    AV Peak Gradient 7 mmHg    AV Area by VTI 2.3 cm2    AV Area by Peak Velocity 2.4 cm2    Aortic Root 2.9 cm    Ascending Aorta 2.7 cm    MV E Wave Deceleration Time 280.0 ms    MV A Velocity 1.15 m/s    MV E Velocity 1.48 m/s    MV Mean Gradient 3 mmHg    MV VTI 53.0 cm    MV Mean Velocity 0.8 m/s    MV Max Velocity 1.6 m/s    MV Peak Gradient 10 mmHg    MV Area by VTI 1.4 cm2    RV Basal Dimension 3.3 cm    TAPSE 2.4 1.7 cm    Fractional Shortening 2D 28 28 - 44 %    LV ESV Index A4C 20 mL/m2    LV EDV Index A4C 45 mL/m2    LV EDV Index A2C 33 mL/m2    LVIDd Index 2.17 cm/m2    LVIDs Index 1.57 cm/m2    LV RWT Ratio 0.42     LV Mass 2D 114.2 67 - 162 g    LV Mass 2D Index 57.7 43 - 95 g/m2    MV E/A 1.29     E/E' Ratio (Averaged) 14.13     E/E' Lateral 13.45     E/E' Septal 14.80     LA Volume Index BP 43 (A) 16 - 34 ml/m2    LVOT Stroke Volume Index 37.7 mL/m2    LA Size Index 1.41 cm/m2    LA/AO Root Ratio 0.97     Ao Root Index 1.46 cm/m2    Ascending Aorta Index 1.36 cm/m2    AV Velocity Ratio 0.77     LVOT:AV VTI Index 0.72     JAY JAY/BSA VTI 1.2 cm2/m2    JAY JAY/BSA Peak Velocity 1.2 cm2/m2    MV:LVOT VTI Index 2.23        All Micro Results     None          Other Studies:  MRI BRAIN WO CONT    Result Date: 4/14/2022  EXAMINATION: BRAIN MRI 4/14/2022 2:54 PM ACCESSION NUMBER: [id number] INDICATION: Blurred vision slurred speech dizziness evaluate for cerebrovascular accident COMPARISON: Head CT, CTA head and neck, and CT perfusion 4/13/2022 TECHNIQUE: Multiplanar multisequence MRI of the brain without the administration of intravenous contrast. FINDINGS: Midline structures including the corpus callosum, pituitary gland, optic nerves, and cerebellum are well developed. The ventricles are within normal limits for the degree of global brain parenchymal volume loss. There is no midline shift. The basilar cisterns are patent. There is no cerebellar tonsillar ectopia or herniation. Prior bilateral lens replacement. Minimal scattered paranasal sinus mucosal thickening. Trace left mastoid effusion. Diffusion imaging shows no evidence of acute infarction or other acute abnormality.   The expected large intracranial vascular flow voids are preserved. There is no evidence of intracranial blood products. There are no suspicious osseous lesions. Unremarkable noncontrast MRI of the brain for patient age, including no evidence of acute ischemic infarction. ECHO ADULT COMPLETE    Result Date: 4/14/2022    Left Ventricle: Left ventricle size is normal. Normal wall thickness. Normal wall motion. Normal left ventricular systolic function with a visually estimated EF of 55 - 60%. Normal diastolic function.   Left Atrium: Left atrium is mild to moderately dilated.   Interatrial Septum: Agitated saline study was negative with and without provocation. Current Meds:  Current Facility-Administered Medications   Medication Dose Route Frequency    OXcarbazepine (TRILEPTAL) tablet 300 mg  300 mg Oral BID    atorvastatin (LIPITOR) tablet 40 mg  40 mg Oral QHS    tuberculin injection 5 Units  5 Units IntraDERMal ONCE    oxyCODONE IR (ROXICODONE) tablet 5 mg  5 mg Oral Q6H PRN    sodium chloride (NS) flush 5-10 mL  5-10 mL IntraVENous Q8H    sodium chloride (NS) flush 5-10 mL  5-10 mL IntraVENous PRN    sodium chloride (NS) flush 5-40 mL  5-40 mL IntraVENous Q8H    sodium chloride (NS) flush 5-40 mL  5-40 mL IntraVENous PRN    heparin (porcine) injection 5,000 Units  5,000 Units SubCUTAneous Q8H    diazePAM (VALIUM) tablet 10 mg  10 mg Oral Q8H PRN    sucralfate (CARAFATE) tablet 1 g  1 g Oral QID    gabapentin (NEURONTIN) capsule 300 mg  300 mg Oral TID    buPROPion SR (WELLBUTRIN SR) tablet 150 mg  150 mg Oral BID    aspirin delayed-release tablet 81 mg  81 mg Oral BID    traZODone (DESYREL) tablet 100 mg  100 mg Oral QHS    levothyroxine (SYNTHROID) tablet 125 mcg  125 mcg Oral 6am       Signed:  Cher Arrington MD    Part of this note may have been written by using a voice dictation software. The note has been proof read but may still contain some grammatical/other typographical errors.

## 2022-04-15 NOTE — PROGRESS NOTES
Problem: Falls - Risk of  Goal: *Absence of Falls  Description: Document Mariamaadan Camejo Fall Risk and appropriate interventions in the flowsheet.   Outcome: Progressing Towards Goal  Note: Fall Risk Interventions:            Medication Interventions: Patient to call before getting OOB,Teach patient to arise slowly    Elimination Interventions: Call light in reach,Patient to call for help with toileting needs    History of Falls Interventions: Bed/chair exit alarm,Evaluate medications/consider consulting pharmacy,Consult care management for discharge planning         Problem: Patient Education: Go to Patient Education Activity  Goal: Patient/Family Education  Outcome: Progressing Towards Goal

## 2022-04-15 NOTE — PROGRESS NOTES
Bedside report given to [facility] RN. NIH scale completed at shift change. Resting in bed.

## 2022-04-15 NOTE — PROGRESS NOTES
LTG: Patient will tolerate least restrictive diet without overt signs or symptoms of airway compromise. STG: Patient will tolerate  soft/bite sized diet and thin liquids without overt signs or symptoms of airway compromise. Advanced 4/15  STG: Patient will participate in modified barium swallow study as clinically indicated. MET 4/15  STG: Patient will participate in laryngeal strengthening exercises with 90% accuracy given min-mod cues. Added 4/15      SPEECH LANGUAGE PATHOLOGY: MODIFIED BARIUM SWALLOW STUDY  Initial Assessment    NAME/AGE/GENDER: Eliecer Davidson is a 80 y.o. female  DATE: 4/15/2022  PRIMARY DIAGNOSIS: Slurring of speech [R47.81]  Disequilibrium [R42]  Hypertension [I10]  Stroke-like episode [R29.90]      ICD-10: Treatment Diagnosis: Oropharyngeal dysphagia (R13.12)  INTERDISCIPLINARY COLLABORATION: Radiologist, Dr. Mcfarlane Level   PRECAUTIONS/ALLERGIES: Fentanyl and Effexor [venlafaxine]     RECOMMENDATIONS/PLAN   DIET:    Soft and Bite-Sized   Thin Liquids    MEDICATIONS: floated or crushed in puree/applesauce     COMPENSATORY STRATEGIES/MODIFICATIONS INCLUDING:  · Fully awake/alert  · Upright for all PO  · Alternate liquids/solids  · Small bites and sips  · Clear throat periodically and dry swallow  · Remain upright for 20-30 min after any PO     OTHER RECOMMENDATIONS (including follow up treatment recommendations):   · GI Consult  · Training in laryngeal strengthening and coordination exercises  · Training in use of compensatory safe swallowing strategies/feeding guidelines  · Patient/family education     RECOMMENDATIONS for CONTINUED SPEECH THERAPY:   YES: Anticipate need for ongoing speech therapy during this hospitalization and at next level of care. FREQUENCY/DURATION: Continue to follow patient 3 times a week for duration of hospital stay to address above goals.  Recommendations for next treatment session: Next treatment session will address diet tolerance and dysphagia exercises ASSESSMENT   Ms. Matt Sandoval presents with mild oropharyngeal dysphagia. Slowed mastication and A-P propulsion with chewables. Reduced epiglottic inversion due to cervical osteophyte at ~ C3-C4 and delayed swallow initiation resulted in penetration of both thin and mildly thick liquid trials. Only trace residuals remained in laryngeal vestibule post swallow when penetration occurred with thin liquids by cup/straw. Penetration persisted with mildly thick liquids; however, increased residuals remained in laryngeal vestibule than when compared to thin liquids. No aspiration/penetration with pudding, mixed fruit, or cracker. Patient did exhibit mild vallecular and pyriform residue intermittently with thin liquids and moderate vallecular residue post swallow with cracker, which was reduced, but not cleared with liquid rinse. Patient endorsing significant globus sensation despite limited pharyngeal residuals throughout assessment. Did have minimal retrograde visualized x1 following liquids. Recommend soft/bite sized diet and thin liquids utilizing safe swallowing precautions listed above. Meds floated or crushed in puree/applesauce. Per care everywhere, patient seen as an outpatient by GI earlier this month with recommendations for an esophagram and EGD. Agree with further workup to assess for potential esophageal dysphagia. Will continue to follow for diet tolerance, patient/caregiver education, and laryngeal strengthening exercises. SUBJECTIVE   Patient alert upright in [facility] chair for assessment. Pleasant and friendly. Follows commands.      History of Present Injury/Illness: Ms. Matt Sandoval  has a past medical history of Allergic rhinitis (6/28/2013), Asthma, Bipolar affective disorder ([facility] [address] Disturbance of skin sensation, Fibromyalgia, GERD (gastroesophageal reflux disease), History of miscarriage, History of peptic ulcer, Hyperlipidemia, Hypertension, Hypothyroidism, IBS (irritable bowel syndrome), Impaired fasting glucose (2/7/2022), Lumbar disc disease, Mixed stress and urge urinary incontinence, LINDA on CPAP, Osteoarthritis, Premature beats, and Right renal mass. . She also  has a past surgical history that includes hx cholecystectomy (1980s); hx tonsillectomy (1980); hx total abdominal hysterectomy (2003); hx bladder suspension (2003); hx oophorectomy; hx cystocele repair (11/12/2009); and hx rectocele repair (11/12/2009). Pain:  Pain Intensity 1: 0  Pain Location 1: Head,Neck  Pain Orientation 1: Anterior,Upper  Pain Intervention(s) 1: Medication (see MAR)    Current dietary status prior to evaluation today:  Full liquids    Previous Modified Barium Swallow studies: n/a    Current Medications:   No current facility-administered medications on file prior to encounter. Current Outpatient Medications on File Prior to Encounter   Medication Sig Dispense Refill    sucralfate (CARAFATE) 1 gram tablet Take 1 g by mouth four (4) times daily.  gabapentin (NEURONTIN) 300 mg capsule Take one in AM and 4 at hs (Patient taking differently: Take one in AM and four at bedtime) 150 Capsule 5    OXcarbazepine (TRILEPTAL) 300 mg tablet Take  by mouth two (2) times a day.  diazePAM (VALIUM) 10 mg tablet Take 10 mg by mouth every eight (8) hours as needed for Anxiety. 1/2 to 1 tablet TID PRN      montelukast (SINGULAIR) 10 mg tablet TAKE ONE TABLET BY MOUTH ONE TIME DAILY 120 Tablet 0    cetirizine (ZyrTEC) 10 mg tablet Take  by mouth.  estradioL (VAGIFEM) 10 mcg tab vaginal tablet       atorvastatin (LIPITOR) 40 mg tablet Take  by mouth daily.  ciclopirox (PENLAC) 8 % solution Apply  to affected area nightly.  buPROPion XL (WELLBUTRIN XL) 150 mg tablet Take 150 mg by mouth every evening.  albuterol (ProAir HFA) 90 mcg/actuation inhaler Take 2 Puffs by inhalation every four (4) hours as needed for Wheezing.  calcium carbonate (CORAL CALCIUM PO) Take  by mouth three (3) times daily.       buPROPion XL (WELLBUTRIN XL) 300 mg XL tablet Take 30 mg by mouth. Every morning      pantoprazole (PROTONIX) 20 mg tablet TAKE 1 TABLET BY MOUTH EVERY DAY  1    aspirin delayed-release 81 mg tablet Take  by mouth two (2) times a day.  multivitamin (ONE A DAY) tablet Take 1 Tablet by mouth daily.  cholecalciferol, VITAMIN D3, (VITAMIN D3) 5,000 unit tab tablet Take 5,000 Units by mouth daily.  traZODone (DESYREL) 100 mg tablet Take 100 mg by mouth nightly.  levothyroxine (LEVOTHROID) 100 mcg tablet Take 125 mcg by mouth daily. OBJECTIVE   Orientation:    Person   Place    Vocal Quality: WFL    Modified barium swallow study was performed in the radiology suite using c-arm with Ms. Louis Orozco in the lateral plane seated upright 90° in the chair. To evaluate her swallow function, barium coated liquid and food was administered in the form of thin liquids (by spoon, cup sip and straw sip), mildly-thick liquids (by cup sip), pudding, mixed consistency and cracker. Oral phase of swallow:    anterior bolus holding   prolonged bolus manipulation   prolonged mastication   bolus residual on tongue    Pharyngeal phase of swallow:    Thin by teaspoon- swallow triggered at posterior epiglottis. Transient penetration occurred during the swallow.  Thin by cup- swallow delayed at pyriforms. Penetration occurred during the swallow. Trace residuals remained in laryngeal vestibule post swallow.  Thin by straw- swallow delayed at pyriforms. Transient penetration occurred during the swallow. Minimal vallecular and pyriform residue post swallow. Trace retrograde flow post swallow.  Thin by straw- swallows delayed at pyriforms. Min-mild vallecular and pyriforms residue post swallow. ? Penetration of residuals post swallow as increased residuals noted in laryngeal vestibule   Pudding- swallow triggered at vallecula. No aspiration/penetration. Mild vallecular residue post swallow.  Patient reporting significant globus sensation and requesting liquid rinse.  Thin by cup as liquid rinse- nonclearing penetration during the swallow- only trace residuals in laryngeal vestibule post swallow. Min-mild vallecular residue persists   Mixed fruit- swallow triggered at vallecula. No aspiration/penetration. Adequate pharyngeal clearing post swallow.  Cracker- swallow triggered at vallecula. No aspiration/penetration. Mild-mod vallecular residue post swallow   Thin by cup-  Swallow triggered at pyriforms. Transient penetration during the swallow. Persistent mild vallecula residue from cracker.  Thin by cup- swallow triggered at pyriforms. Nonclearing penetration during the swallow. Only trace residuals in laryngeal vestibule post swallow. Mild vallecular and pyriform residue post swallow with trace penetration occurring during subsequent swallow of residuals.  Mildly thick by cup- swallow triggered at pyriforms. Nonclearing penetration during the swallow. More significant residuals in laryngeal vestibule than with thin liquids. Min-mild vallecular/pyriform residue post swallow. Pharyngeal characteristics:   delayed pharyngeal swallow initiation   decreased retraction of base of tongue   delayed and reduced hyolaryngeal elevation/excursion   delayed and reduced epiglottic inversion   adequate constriction of posterior pharyngeal wall   delayed and reduced laryngeal closure  Attempted strategies:    none  Effective strategies:    none  Aspiration/Penetration Scale: 3 (Penetration/visible residue. Contrast remains above the folds/cords, but is not cleared.)    Cervical esophageal phase of swallow:    adequate and timely clearance of all boluses through cervical esophagus  **Distal esophagus not assessed due to limitations of MBS study. Assessment/Reassessment only, no treatment provided today.       Tool Used: Dysphagia Outcome and Severity Scale (JAMES)    Comments   Normal Diet With no strategies or extra time needed   Functional Swallow May have mild oral or pharyngeal delay   Mild Dysphagia Which may require one diet consistency restricted    Mild-Moderate Dysphagia With 1-2 diet consistencies restricted   Moderate Dysphagia With 2 or more diet consistencies restricted   Moderately Severe Dysphagia With partial PO strategies (trials with ST only)   Severe Dysphagia With inability to tolerate any PO safely      Score:  Initial: 5 Most Recent: x (Date:4/15/2022)   Interpretation of Tool: The Dysphagia Outcome and Severity Scale (JAMES) is a simple, easy-to-use, 7-point scale developed to systematically rate the functional severity of dysphagia based on objective assessment and make recommendations for diet level, independence level, and type of nutrition. [place] / [place] / Product Type: Medicare /     Education:  · Recommendations discussed with patient and RN. Safety:   After treatment position/precautions:  · Patient waiting in radiology holding bay for transport back to room.     Total Treatment Duration:  Time In: 1330   Time Out: 100 [facility], [address] [place] [address]

## 2022-04-15 NOTE — PROGRESS NOTES
Pt to xray at 1320, via bed accompanied by transport personnel. Pt back to room and resting in bed. Spouse at bedside.

## 2022-04-15 NOTE — PROGRESS NOTES
[facility] offered a bed. SW met with patient who is agreeable. Accepted and selected in CC link. Advised liaison Macie and asked when bed would be available.      Juan Francisco Reynoso LMSW    [facility]    * [email or web address]

## 2022-04-16 ENCOUNTER — APPOINTMENT (OUTPATIENT)
Dept: GENERAL RADIOLOGY | Age: 72
DRG: 066 | End: 2022-04-16
Attending: INTERNAL MEDICINE
Payer: MEDICARE

## 2022-04-16 LAB
MM INDURATION POC: 0 MM (ref 0–5)
PPD POC: NEGATIVE NEGATIVE

## 2022-04-16 PROCEDURE — 94760 N-INVAS EAR/PLS OXIMETRY 1: CPT

## 2022-04-16 PROCEDURE — 65270000029 HC RM PRIVATE

## 2022-04-16 PROCEDURE — 97530 THERAPEUTIC ACTIVITIES: CPT

## 2022-04-16 PROCEDURE — 77010033678 HC OXYGEN DAILY

## 2022-04-16 PROCEDURE — 74011250637 HC RX REV CODE- 250/637: Performed by: HOSPITALIST

## 2022-04-16 PROCEDURE — 74011000250 HC RX REV CODE- 250: Performed by: STUDENT IN AN ORGANIZED HEALTH CARE EDUCATION/TRAINING PROGRAM

## 2022-04-16 PROCEDURE — 74011250637 HC RX REV CODE- 250/637: Performed by: INTERNAL MEDICINE

## 2022-04-16 PROCEDURE — 72070 X-RAY EXAM THORAC SPINE 2VWS: CPT

## 2022-04-16 PROCEDURE — 74011250636 HC RX REV CODE- 250/636: Performed by: HOSPITALIST

## 2022-04-16 PROCEDURE — 74011000250 HC RX REV CODE- 250: Performed by: HOSPITALIST

## 2022-04-16 RX ADMIN — OXYCODONE 5 MG: 5 TABLET ORAL at 11:39

## 2022-04-16 RX ADMIN — SODIUM CHLORIDE, PRESERVATIVE FREE 10 ML: 5 INJECTION INTRAVENOUS at 14:00

## 2022-04-16 RX ADMIN — TRAZODONE HYDROCHLORIDE 100 MG: 50 TABLET ORAL at 21:14

## 2022-04-16 RX ADMIN — OXYCODONE 5 MG: 5 TABLET ORAL at 05:49

## 2022-04-16 RX ADMIN — SODIUM CHLORIDE, PRESERVATIVE FREE 10 ML: 5 INJECTION INTRAVENOUS at 21:19

## 2022-04-16 RX ADMIN — GABAPENTIN 300 MG: 300 CAPSULE ORAL at 16:53

## 2022-04-16 RX ADMIN — ATORVASTATIN CALCIUM 40 MG: 40 TABLET, FILM COATED ORAL at 21:14

## 2022-04-16 RX ADMIN — HEPARIN SODIUM 5000 UNITS: 5000 INJECTION INTRAVENOUS; SUBCUTANEOUS at 05:49

## 2022-04-16 RX ADMIN — POLYETHYLENE GLYCOL 3350 17 G: 17 POWDER, FOR SOLUTION ORAL at 09:42

## 2022-04-16 RX ADMIN — OXYCODONE 5 MG: 5 TABLET ORAL at 21:14

## 2022-04-16 RX ADMIN — Medication 81 MG: at 09:42

## 2022-04-16 RX ADMIN — SODIUM CHLORIDE, PRESERVATIVE FREE 10 ML: 5 INJECTION INTRAVENOUS at 05:49

## 2022-04-16 RX ADMIN — BUPROPION HYDROCHLORIDE 150 MG: 150 TABLET, EXTENDED RELEASE ORAL at 18:16

## 2022-04-16 RX ADMIN — SUCRALFATE 1 G: 1 TABLET ORAL at 09:43

## 2022-04-16 RX ADMIN — GABAPENTIN 300 MG: 300 CAPSULE ORAL at 09:43

## 2022-04-16 RX ADMIN — SUCRALFATE 1 G: 1 TABLET ORAL at 21:14

## 2022-04-16 RX ADMIN — LEVOTHYROXINE SODIUM 125 MCG: 0.12 TABLET ORAL at 05:49

## 2022-04-16 RX ADMIN — SUCRALFATE 1 G: 1 TABLET ORAL at 13:45

## 2022-04-16 RX ADMIN — BUPROPION HYDROCHLORIDE 150 MG: 150 TABLET, EXTENDED RELEASE ORAL at 09:42

## 2022-04-16 RX ADMIN — Medication 81 MG: at 18:17

## 2022-04-16 RX ADMIN — HEPARIN SODIUM 5000 UNITS: 5000 INJECTION INTRAVENOUS; SUBCUTANEOUS at 13:45

## 2022-04-16 RX ADMIN — GABAPENTIN 300 MG: 300 CAPSULE ORAL at 21:14

## 2022-04-16 RX ADMIN — SUCRALFATE 1 G: 1 TABLET ORAL at 18:17

## 2022-04-16 RX ADMIN — HEPARIN SODIUM 5000 UNITS: 5000 INJECTION INTRAVENOUS; SUBCUTANEOUS at 21:14

## 2022-04-16 RX ADMIN — ZINC 1 TABLET: TAB ORAL at 09:42

## 2022-04-16 NOTE — PROGRESS NOTES
Problem: Mobility Impaired (Adult and Pediatric)  Goal: *Acute Goals and Plan of Care (Insert Text)  Outcome: Progressing Towards Goal  Note: STG:  (1.)Ms. Josselyn Solis will move from supine to sit and sit to supine with MODIFIED INDEPENDENCE within 1-3 treatment day(s). (2.)Ms. Josselyn Solis will transfer from bed to chair and chair to bed with MINIMAL ASSIST using the least restrictive device within 1-3 treatment day(s). met   (3.)Ms. Josselyn Solis will ambulate with MINIMAL ASSIST X 1-2 for 60 feet with the least restrictive device within 1-3 treatment day(s). met    LTG:  (1.)Ms. Josselyn Solis will move from supine to sit and sit to supine  in bed with INDEPENDENT within 4-6 treatment day(s). (2.)Ms. Josselyn Solis will transfer from bed to chair and chair to bed with CONTACT GUARD ASSIST using the least restrictive device within 4-6 treatment day(s). met  (3.)Ms. Josselyn Solis will ambulate with MINIMAL ASSIST for 100 feet with the least restrictive device within 1-3 treatment day(s). ________________________________________________________________________________________________      PHYSICAL THERAPY: Daily Note and AM 4/16/2022  INPATIENT: PT Visit Days : 2  [place] / Product Type: Medicare /       NAME/AGE/GENDER: Jayro Tran is a 80 y.o. female   PRIMARY DIAGNOSIS: Slurring of speech [R47.81]  Disequilibrium [R42]  Hypertension [I10]  Stroke-like episode [R29.90] Slurring of speech Slurring of speech       ICD-10: Treatment Diagnosis:    · Difficulty in walking, Not elsewhere classified (R26.2)  · Other abnormalities of gait and mobility (R26.89)  · History of falling (Z91.81)   Precaution/Allergies:  Fentanyl and Effexor [venlafaxine]      ASSESSMENT:     Ms. Josselyn Solis presents supine in bed with spouse present. Per spouse, patient has had dizziness for the last few weeks than has worsened and she had slurred speech yesterday.  Restless UE/LE movements while in bed; facial grimacing and movements- tardive dyskinesia? Reports neuropathy but presentation is more ataxic and whole body. History of chronic neck and LBP . Gait is unsafe and unsteady. Required min to mod assist of 1-2 for gait and transfers. Spouse states patient previously used a cane or RW but has not used either for last few years. Only ambulating short distances. History of falls. Reports spending most of her time at home in bed due to poor balance. Lives with spouse, children and grandchildren. Will follow to address the below problem list.   Chart review shows neurologist recommended physical therapy for severe shoulder, neck, and posterior upper back pain. Recommend SNF as she is getting worse at home. 4/16/22:  Patient just returned from x-ray of spine with complaints of back pain. Patient was independent with bed mobility. CGA-min A for transfers and gait. Patient still unsteady with gait at times. Used railing on walls for support intermittently. Patient moving within her room with CGA without a device. Patient planning on STR at d/c. Not sure that her back pain is anything acute; could be due to time in hospital bed. This section established at most recent assessment   PROBLEM LIST (Impairments causing functional limitations):  1. Decreased Strength  2. Decreased ADL/Functional Activities  3. Decreased Transfer Abilities  4. Decreased Ambulation Ability/Technique  5. Decreased Balance  6. Decreased Activity Tolerance  7. Increased Fatigue   INTERVENTIONS PLANNED: (Benefits and precautions of physical therapy have been discussed with the patient.)  1. Balance Exercise  2. Bed Mobility  3. Gait Training  4. Neuromuscular Re-education/Strengthening  5. Therapeutic Activites  6. Therapeutic Exercise/Strengthening  7.  Transfer Training     TREATMENT PLAN: Frequency/Duration: daily for duration of hospital stay  Rehabilitation Potential For Stated Goals: 52 [facility] (at time of discharge pending progress):    Placement: It is my opinion, based on this patient's performance to date, that Ms. Prerna Kruger may benefit from intensive therapy at a [facility] after discharge due to the functional deficits listed above that are likely to improve with skilled rehabilitation and concerns that he/she may be unsafe to be unsupervised at home due to poor functional mobility and history of falls. .  Equipment:    None at this time              HISTORY:   History of Present Injury/Illness (Reason for Referral): per H&P:   80years old female with past medical history of bipolar disorder, fibromyalgia, hypertension, dyslipidemia presented to ophthalmology office with blurred vision, slurring speech, feeling dizzy patient described as off balance. Patient reports she was having symptoms for the past 3 days, evaluated at ophthalmology office, suspected a stroke, patient was sent to the emergency room for further evaluation and management. In emergency room patient was evaluated, CT head, CTA head and neck did not show any acute intracranial changes or large vessel occlusion. Patient denies any fever, chills, cough, sputum production. Denies any localized weakness or generalized weakness. Patient reports due to her gait disturbance she almost fell yesterday morning. Past Medical History/Comorbidities:   Ms. Prerna Kruger  has a past medical history of Allergic rhinitis (6/28/2013), Asthma, Bipolar affective disorder ([address] Disturbance of skin sensation, Fibromyalgia, GERD (gastroesophageal reflux disease), History of miscarriage, History of peptic ulcer, Hyperlipidemia, Hypertension, Hypothyroidism, IBS (irritable bowel syndrome), Impaired fasting glucose (2/7/2022), Lumbar disc disease, Mixed stress and urge urinary incontinence, LINDA on CPAP, Osteoarthritis, Premature beats, and Right renal mass.   Ms. Perrna Kruger  has a past surgical history that includes hx cholecystectomy (1980s); hx tonsillectomy (1980); hx total abdominal hysterectomy (2003); hx bladder suspension (2003); hx oophorectomy; hx cystocele repair (11/12/2009); and hx rectocele repair (11/12/2009). Social History/Living Environment:   Home Environment: Private residence  # Steps to Enter: 3  Rails to Enter: Yes  Hand Rails : Bilateral  One/Two Story Residence: One story  Living Alone: No  Support Systems: Spouse/Significant Other,Child(bruna),Other Family Member(s)  Patient Expects to be Discharged to[de-identified] Skilled nursing facility  Current DME Used/Available at Home: Robb Dienes, straight,Walker, rollator  Prior Level of Function/Work/Activity:  Ambulating short distances without AD; history of falls, spouse does housework. Helps patient with ADLs as needed,     Number of Personal Factors/Comorbidities that affect the Plan of Care: 1-2: MODERATE COMPLEXITY   EXAMINATION:   Most Recent Physical Functioning:   Gross Assessment:  AROM: Generally decreased, functional  Strength: Generally decreased, functional  Coordination: Generally decreased, functional               Posture:     Balance:  Sitting: Intact  Standing: Impaired  Standing - Static: Good;Fair  Standing - Dynamic : Fair Bed Mobility:  Supine to Sit: Independent  Sit to Supine: Independent  Wheelchair Mobility:     Transfers:  Sit to Stand: Stand-by assistance  Stand to Sit: Stand-by assistance  Bed to Chair: Contact guard assistance  Gait:     Speed/Kimberly: Pace decreased (<100 feet/min)  Gait Abnormalities: Path deviations;Trunk sway increased  Distance (ft): 100 Feet (ft)  Assistive Device: Gait belt (handrail intermittent)  Ambulation - Level of Assistance: Contact guard assistance;Minimal assistance  Interventions: Safety awareness training;Verbal cues         Body Structures Involved:  1. Nerves  2. Muscles Body Functions Affected:  1. Neuromusculoskeletal  2. Movement Related Activities and Participation Affected:  1. General Tasks and Demands  2.  Mobility   Number of elements that affect the Plan of Care: 3: MODERATE COMPLEXITY   CLINICAL PRESENTATION:   Presentation: Evolving clinical presentation with changing clinical characteristics: MODERATE COMPLEXITY   CLINICAL DECISION MAKIN [facility] Mobility Inpatient Short Form  How much difficulty does the patient currently have. .. Unable A Lot A Little None   1. Turning over in bed (including adjusting bedclothes, sheets and blankets)? [] 1   [] 2   [x] 3   [] 4   2. Sitting down on and standing up from a chair with arms ( e.g., wheelchair, bedside commode, etc.)   [] 1   [] 2   [x] 3   [] 4   3. Moving from lying on back to sitting on the side of the bed? [] 1   [] 2   [] 3   [x] 4   How much help from another person does the patient currently need. .. Total A Lot A Little None   4. Moving to and from a bed to a chair (including a wheelchair)? [] 1   [] 2   [x] 3   [] 4   5. Need to walk in hospital room? [] 1   [x] 2   [] 3   [] 4   6. Climbing 3-5 steps with a railing? [] 1   [x] 2   [] 3   [] 4   © , Trustees of [facility], under license to [employer]. All rights reserved      Score:  Initial: 17 Most Recent: X (Date: -- )    Interpretation of Tool:  Represents activities that are increasingly more difficult (i.e. Bed mobility, Transfers, Gait). Medical Necessity:     · Patient demonstrates fair rehab potential due to higher previous functional level. Reason for Services/Other Comments:  · Patient continues to require present interventions due to patient's inability to perform functional mobility safely and independently. .   Use of outcome tool(s) and clinical judgement create a POC that gives a: Clear prediction of patient's progress: LOW COMPLEXITY            TREATMENT:   (In addition to Assessment/Re-Assessment sessions the following treatments were rendered)   Pre-treatment Symptoms/Complaints:  Back pain-just had an x-ray.   Pain: Initial:   Pain Intensity 1: 5  Pain Location 1: Back  Post Session:  5     Therapeutic Activity: (   25 minutes): Therapeutic activities including Bed mobility, sit to stand transfers, toilet transfers, chair transfers, and Ambulation on level ground to improve mobility, strength, balance and coordination. Required minimal Safety awareness training;Verbal cues to promote static and dynamic balance in standing. Braces/Orthotics/Lines/Etc:   · telemetry  · O2 Device: None (Room air)  Treatment/Session Assessment:    · Response to Treatment:  Participated well. Less assist for mobility but still unsteady. · Interdisciplinary Collaboration:   o Physical Therapist  o Registered Nurse  · After treatment position/precautions:   o Supine in bed  o Bed/Chair-wheels locked  o Bed in low position  o Caregiver at bedside  o Call light within reach   · Compliance with Program/Exercises: Compliant most of the time, Will assess as treatment progresses  · Recommendations/Intent for next treatment session: \"Next visit will focus on advancements to more challenging activities\".   Total Treatment Duration:  PT Patient Time In/Time Out  Time In: 1140  Time Out: [address], PT

## 2022-04-16 NOTE — PROGRESS NOTES
Hospitalist Progress Note   Admit Date:  2022  6:23 PM   Name:  Sima Altamirano   Age:  80 y.o. Sex:  female  :  1950   MRN:  [id number]   Room:  [facility]    Presenting Complaint: Dizziness and Dysarthria    Reason(s) for Admission: Slurring of speech [R47.81]  Disequilibrium [R42]  Hypertension [I10]  Stroke-like episode [R29.90]     Hospital Course & Interval History:     Patient with past medical history of    Bipolar disorder   Fibromyalgia  Hypertension  Dyslipidemia    Patient presented to ophthalmology office with blurred vision, slurred speech, dizziness, and feeling off balance. Symptoms were present for 3 days prior to admission. Patient was sent to emergency room for further evaluation with suspicion of having a stroke. CT brain did not show acute abnormalities. MRI did not show evidence of acute ischemic infarction     Subjective/24hr Events (22):    22   Patient reports still feeling some dizziness. Slight improvement in terms of overall weakness. No fever  No chest pain  No chills  No shortness of breath  Still slow in terms of finding words to answer questions. She is complaining of body pain, back pain. She states this is a chronic problem from fibromyalgia. 4/15/22   Patient continues to complain of feeling weak overall. Although she reports some improvement. She has been moved out of ICU to medical floor. She reports eating well. She reports being able to ambulate well in her room. Still with body ache, in general.   No fever. No chills. No shortness of breath. No chest pain. No abdominal pain. 22   Patient complains of back pain, especially at the thoracic level. She has had it for months. She denies having injury. No fever. No chest pain. No shortness of breath. Oxcarbazepine level is still pending. ROS:  10 systems reviewed and negative except as noted above.      Assessment & Plan:     Principal Problem: Slurring of speech (4/13/2022) on admission  Possibility of stroke  CT brain is so far negative for acute findings  MRI does not show acute findings of abnormalities  Echocardiogram result is reviewed. No interseptal defect. Good ejection fraction. Physical therapy to continue  Patient is on aspirin 81 mg p.o. twice a day  Atorvastatin 40 mg p.o. once a day  No slurred speech now. Active Problems:    Hypothyroidism   On levothyroxine 125 mcg p.o. once a day      Hypertension   Blood pressure is controlled at 114/65 mmHg. Monitor closely      Hyperlipidemia   On atorvastatin 40 mg p.o. once a day now      Disequilibrium (4/13/2022)  Physical therapy  Monitoring  Improved     Back pain   No history of accidents or injury. Will check x-ray of thoracic spines. I have discussed the plan of care with patient. Discharge Planning:    Pending referral     Diet:  ADULT DIET Dysphagia - Soft & Bite Sized  DVT PPx: heparin SC   Code status: Full Code    Hospital Problems as of 4/16/2022 Date Reviewed: 4/7/2022          Codes Class Noted - Resolved POA    Stroke-like episode ICD-10-CM: R29.90  ICD-9-CM: 781.99  4/14/2022 - Present Unknown        Disequilibrium ICD-10-CM: R42  ICD-9-CM: 780.4  4/13/2022 - Present Unknown        * (Principal) Slurring of speech ICD-10-CM: R47.81  ICD-9-CM: 784.59  4/13/2022 - Present Unknown        Hypothyroidism ICD-10-CM: E03.9  ICD-9-CM: 244.9  Unknown - Present Yes    Overview Addendum 2/7/2022  3:09 PM by Sherrie Pulido MD     1/11/22 TSH 1.050 on levothyroxine 125 mcg daily. Labs were reviewed and discussed with the patient. The patient will continue the current treatment. Hypertension ICD-10-CM: I10  ICD-9-CM: 401.9  Unknown - Present Unknown    Overview Signed 1/7/2022  1:34 PM by Sherrie Pulido MD     Well-controlled on metoprolol succinate which she also takes for her symptomatic premature beats. The patient will continue the current treatment. Hyperlipidemia ICD-10-CM: E78.5  ICD-9-CM: 272.4  Unknown - Present Yes    Overview Addendum 2/7/2022  2:59 PM by Hortencia Chiu MD     1/11/21 total 185 HDL 41   on atorvastatin 40 mg daily. Reviewed the most recent lipid panel with the patient, and interpreted the results within the context of the patient's personal cardiovascular risk factors. Discussed/reinforced a low-cholesterol diet. The patient will continue current intensity statin therapy. Objective:     Patient Vitals for the past 24 hrs:   Temp Pulse Resp BP SpO2   04/16/22 0718 98.4 °F (36.9 °C) 76 20 114/65 91 %   04/16/22 0325 98.3 °F (36.8 °C) 70 18 138/61 95 %   04/15/22 2316 98.3 °F (36.8 °C) 77 16 109/72 92 %   04/15/22 1923 98.3 °F (36.8 °C) 80 16 (!) 131/59 96 %   04/15/22 1528 98.8 °F (37.1 °C) 79 16 (!) 122/57 94 %   04/15/22 1251  80  (!) 127/57 93 %   04/15/22 1249 98.5 °F (36.9 °C) 81 16 (!) 133/49 94 %     Oxygen Therapy  O2 Sat (%): 91 % (04/16/22 0718)  Pulse via Oximetry: 68 beats per minute (04/15/22 0110)  O2 Device: None (Room air) (04/16/22 0325)  O2 Flow Rate (L/min): 3 l/min (04/15/22 0110)  FIO2 (%): 21 % (04/14/22 0400)    Estimated body mass index is 25.25 kg/m² as calculated from the following:    Height as of this encounter: 5' 10\" (1.778 m). Weight as of this encounter: 79.8 kg (176 lb). No intake or output data in the 24 hours ending 04/16/22 0931      Physical Exam:     Blood pressure 114/65, pulse 76, temperature 98.4 °F (36.9 °C), resp. rate 20, height 5' 10\" (1.778 m), weight 79.8 kg (176 lb), SpO2 91 %, not currently breastfeeding. General:    Well nourished. No overt distress. No abnormal movements. Appears anxious   Head:  Normocephalic, atraumatic  Eyes:  Sclerae appear normal.  Pupils equally round. ENT:  Nares appear normal, no drainage. Moist oral mucosa  Neck:  No restricted ROM. Trachea midline   CV:   RRR. No m/r/g. No jugular venous distension.   Lungs: CTAB.  No wheezing, rhonchi, or rales. Respirations even, unlabored  Abdomen: Bowel sounds present. Soft, nontender, nondistended. Extremities: No cyanosis or clubbing. No edema  Tender in general at thoracic spine and paraspinal muscle area. Skin:     No rashes and normal coloration. Warm and dry. Neuro:  CN II-XII grossly intact. Sensation intact. A&Ox3  Psych:  Normal mood and affect.       I have reviewed ordered lab tests and independently visualized imaging below:    Recent Labs:  Recent Results (from the past 48 hour(s))   ECHO ADULT COMPLETE    Collection Time: 04/14/22  9:40 AM   Result Value Ref Range    LV EDV A2C 66 mL    LV EDV A4C 90 mL    LV ESV A4C 40 mL    IVSd 0.8 0.6 - 0.9 cm    LVIDd 4.3 3.9 - 5.3 cm    LVIDs 3.1 cm    LVOT Diameter 2.0 cm    LVOT Mean Gradient 2 mmHg    LVOT VTI 23.8 cm    LVOT Peak Velocity 1.0 m/s    LVOT Peak Gradient 4 mmHg    LVPWd 0.9 0.6 - 0.9 cm    LV E' Lateral Velocity 11 cm/s    LV E' Septal Velocity 10 cm/s    LV Ejection Fraction A4C 56 %    LVOT Area 3.1 cm2    LVOT SV 74.7 ml    LA Minor Axis 6.6 cm    LA Major Axis 5.5 cm    LA Area 2C 26.4 cm2    LA Area 4C 21.7 cm2    LA Volume BP 86 (A) 22 - 52 mL    LA Diameter 2.8 cm    AV Mean Gradient 4 mmHg    AV VTI 32.9 cm    AV Mean Velocity 0.9 m/s    AV Peak Velocity 1.3 m/s    AV Peak Gradient 7 mmHg    AV Area by VTI 2.3 cm2    AV Area by Peak Velocity 2.4 cm2    Aortic Root 2.9 cm    Ascending Aorta 2.7 cm    MV E Wave Deceleration Time 280.0 ms    MV A Velocity 1.15 m/s    MV E Velocity 1.48 m/s    MV Mean Gradient 3 mmHg    MV VTI 53.0 cm    MV Mean Velocity 0.8 m/s    MV Max Velocity 1.6 m/s    MV Peak Gradient 10 mmHg    MV Area by VTI 1.4 cm2    RV Basal Dimension 3.3 cm    TAPSE 2.4 1.7 cm    Fractional Shortening 2D 28 28 - 44 %    LV ESV Index A4C 20 mL/m2    LV EDV Index A4C 45 mL/m2    LV EDV Index A2C 33 mL/m2    LVIDd Index 2.17 cm/m2    LVIDs Index 1.57 cm/m2    LV RWT Ratio 0.42     LV Mass 2D 114.2 67 - 162 g    LV Mass 2D Index 57.7 43 - 95 g/m2    MV E/A 1.29     E/E' Ratio (Averaged) 14.13     E/E' Lateral 13.45     E/E' Septal 14.80     LA Volume Index BP 43 (A) 16 - 34 ml/m2    LVOT Stroke Volume Index 37.7 mL/m2    LA Size Index 1.41 cm/m2    LA/AO Root Ratio 0.97     Ao Root Index 1.46 cm/m2    Ascending Aorta Index 1.36 cm/m2    AV Velocity Ratio 0.77     LVOT:AV VTI Index 0.72     JAY JAY/BSA VTI 1.2 cm2/m2    JAY JAY/BSA Peak Velocity 1.2 cm2/m2    MV:LVOT VTI Index 2.23    PLEASE READ & DOCUMENT PPD TEST IN 24 HRS    Collection Time: 04/14/22  1:01 PM   Result Value Ref Range    PPD Negative Negative    mm Induration 0 0 - 5 mm       All Micro Results     None          Other Studies:  XR SWALLOW FUNC VIDEO    Result Date: 4/15/2022  EXAMINATION: XR SWALLOW FUNC VIDEO 4/15/2022 1:53 PM ACCESSION NUMBER: [id number] COMPARISON: None available INDICATION: stroke rule out, oropharyngeal dysphagia  TECHNIQUE: Patient was fluoroscopically observed while swallowing a variety of thicknesses and consistencies. The examination was performed in conjunction with the Speech Pathologist.  The examination was videotaped. Fluoroscopic time: 2.2 minutes Total fluoroscopic images: 11 cine clips FINDINGS: Delayed initiation of swallowing with premature spillage to the vallecula. Deep laryngeal penetration with thin liquid barium. Laryngeal penetration with nectar thickened barium. Semisolid barium was swallowed without difficulty. Mild residuals. 1.  Laryngeal penetration with thin and nectar thickened barium. 2.  No tracheal aspiration. Please refer to the Speech Pathology report for further detail.        Current Meds:  Current Facility-Administered Medications   Medication Dose Route Frequency    multivitamin w ZN (STRESSTABS W ZINC) tablet  1 Tablet Oral DAILY    polyethylene glycol (MIRALAX) packet 17 g  17 g Oral DAILY    [Held by provider] OXcarbazepine (TRILEPTAL) tablet 300 mg  300 mg Oral BID    atorvastatin (LIPITOR) tablet 40 mg  40 mg Oral QHS    oxyCODONE IR (ROXICODONE) tablet 5 mg  5 mg Oral Q6H PRN    sodium chloride (NS) flush 5-10 mL  5-10 mL IntraVENous Q8H    sodium chloride (NS) flush 5-10 mL  5-10 mL IntraVENous PRN    sodium chloride (NS) flush 5-40 mL  5-40 mL IntraVENous Q8H    sodium chloride (NS) flush 5-40 mL  5-40 mL IntraVENous PRN    heparin (porcine) injection 5,000 Units  5,000 Units SubCUTAneous Q8H    diazePAM (VALIUM) tablet 10 mg  10 mg Oral Q8H PRN    sucralfate (CARAFATE) tablet 1 g  1 g Oral QID    gabapentin (NEURONTIN) capsule 300 mg  300 mg Oral TID    buPROPion SR (WELLBUTRIN SR) tablet 150 mg  150 mg Oral BID    aspirin delayed-release tablet 81 mg  81 mg Oral BID    traZODone (DESYREL) tablet 100 mg  100 mg Oral QHS    levothyroxine (SYNTHROID) tablet 125 mcg  125 mcg Oral 6am       Signed:  Myrtle Kline MD    Part of this note may have been written by using a voice dictation software. The note has been proof read but may still contain some grammatical/other typographical errors.

## 2022-04-16 NOTE — PROGRESS NOTES
Outreach Follow Up Note    Samia Cannon was seen and assessed. MEWS Score: 1 (04/16/22 0325)  Vitals:    04/15/22 1528 04/15/22 1923 04/15/22 2316 04/16/22 0325   BP: (!) 122/57 (!) 131/59 109/72 138/61   Pulse: 79 80 77 70   Resp: 16 16 16 18   Temp: 98.8 °F (37.1 °C) 98.3 °F (36.8 °C) 98.3 °F (36.8 °C) 98.3 °F (36.8 °C)   SpO2: 94% 96% 92% 95%   Weight:       Height:             Pain Assessment  Pain Intensity 1: 3 (04/15/22 2035)  Pain Location 1: Head  Pain Intervention(s) 1: Medication (see MAR)  Patient Stated Pain Goal: 4      Patient reviewed and discussed with primary nurse. There have been no significant clinical changes since the completion of the last dated Outreach assessment. Will continue to follow up per outreach protocol.     Signed By:   Odalis Lang RN    April 16, 2022 5:10 AM

## 2022-04-16 NOTE — PROGRESS NOTES
OUTREACH NURSE PROGRESS REPORT    SUBJECTIVE: In to assess patient secondary to follow up stroke on transfer from ICU. Alok Jacob was seen and focused assessment complete. MEWS Score: 1 (04/15/22 1923)  Vitals:    04/15/22 1249 04/15/22 1251 04/15/22 1528 04/15/22 1923   BP: (!) 133/49 (!) 127/57 (!) 122/57 (!) 131/59   Pulse: 81 80 79 80   Resp: 16  16 16   Temp: 98.5 °F (36.9 °C)  98.8 °F (37.1 °C) 98.3 °F (36.8 °C)   SpO2: 94% 93% 94% 96%   Weight:       Height:              OBJECTIVE: On arrival to room, I found patient to be sitting up in bed,watching TV. Patient alert and oriented. Follow commands. Complaints of blurred vision. This was present prior to admission. ASSESSMENT:   Neuro: alert/oriented. speech is slightly slurred. No drift. Cardiac: S1 S2  Lungs: clear,respiration even and unlabored. Pain Assessment  Pain Intensity 1: 3 (04/15/22 2035)  Pain Location 1: Head  Pain Intervention(s) 1: Medication (see MAR)  Patient Stated Pain Goal: 4      PLAN: Continue to follow per outreach protocol. Instructed patient to call for assistance to bathroom. Verbalize an understanding. Call light within reach. Bed in low/lock position.

## 2022-04-16 NOTE — PROGRESS NOTES
Problem: Falls - Risk of  Goal: *Absence of Falls  Description: Document Alis Corbin Fall Risk and appropriate interventions in the flowsheet. Outcome: Progressing Towards Goal  Note: Fall Risk Interventions:            Medication Interventions: Teach patient to arise slowly,Patient to call before getting OOB    Elimination Interventions: Call light in reach,Patient to call for help with toileting needs    History of Falls Interventions:  Investigate reason for fall,Door open when patient unattended

## 2022-04-17 PROCEDURE — 74011000250 HC RX REV CODE- 250: Performed by: HOSPITALIST

## 2022-04-17 PROCEDURE — 94760 N-INVAS EAR/PLS OXIMETRY 1: CPT

## 2022-04-17 PROCEDURE — 74011250637 HC RX REV CODE- 250/637: Performed by: INTERNAL MEDICINE

## 2022-04-17 PROCEDURE — 65270000029 HC RM PRIVATE

## 2022-04-17 PROCEDURE — 77010033678 HC OXYGEN DAILY

## 2022-04-17 PROCEDURE — 74011250637 HC RX REV CODE- 250/637: Performed by: HOSPITALIST

## 2022-04-17 PROCEDURE — 74011000250 HC RX REV CODE- 250: Performed by: STUDENT IN AN ORGANIZED HEALTH CARE EDUCATION/TRAINING PROGRAM

## 2022-04-17 PROCEDURE — 74011250636 HC RX REV CODE- 250/636: Performed by: HOSPITALIST

## 2022-04-17 RX ADMIN — SUCRALFATE 1 G: 1 TABLET ORAL at 21:24

## 2022-04-17 RX ADMIN — SODIUM CHLORIDE, PRESERVATIVE FREE 10 ML: 5 INJECTION INTRAVENOUS at 22:00

## 2022-04-17 RX ADMIN — OXYCODONE 5 MG: 5 TABLET ORAL at 19:16

## 2022-04-17 RX ADMIN — HEPARIN SODIUM 5000 UNITS: 5000 INJECTION INTRAVENOUS; SUBCUTANEOUS at 13:24

## 2022-04-17 RX ADMIN — Medication 81 MG: at 17:29

## 2022-04-17 RX ADMIN — SUCRALFATE 1 G: 1 TABLET ORAL at 17:29

## 2022-04-17 RX ADMIN — SODIUM CHLORIDE, PRESERVATIVE FREE 10 ML: 5 INJECTION INTRAVENOUS at 13:24

## 2022-04-17 RX ADMIN — ATORVASTATIN CALCIUM 40 MG: 40 TABLET, FILM COATED ORAL at 21:24

## 2022-04-17 RX ADMIN — SODIUM CHLORIDE, PRESERVATIVE FREE 10 ML: 5 INJECTION INTRAVENOUS at 13:25

## 2022-04-17 RX ADMIN — ZINC 1 TABLET: TAB ORAL at 08:58

## 2022-04-17 RX ADMIN — SODIUM CHLORIDE, PRESERVATIVE FREE 10 ML: 5 INJECTION INTRAVENOUS at 05:13

## 2022-04-17 RX ADMIN — SUCRALFATE 1 G: 1 TABLET ORAL at 08:58

## 2022-04-17 RX ADMIN — POLYETHYLENE GLYCOL 3350 17 G: 17 POWDER, FOR SOLUTION ORAL at 08:58

## 2022-04-17 RX ADMIN — GABAPENTIN 300 MG: 300 CAPSULE ORAL at 17:29

## 2022-04-17 RX ADMIN — LEVOTHYROXINE SODIUM 125 MCG: 0.12 TABLET ORAL at 05:12

## 2022-04-17 RX ADMIN — HEPARIN SODIUM 5000 UNITS: 5000 INJECTION INTRAVENOUS; SUBCUTANEOUS at 05:12

## 2022-04-17 RX ADMIN — BUPROPION HYDROCHLORIDE 150 MG: 150 TABLET, EXTENDED RELEASE ORAL at 08:58

## 2022-04-17 RX ADMIN — TRAZODONE HYDROCHLORIDE 100 MG: 50 TABLET ORAL at 21:24

## 2022-04-17 RX ADMIN — SUCRALFATE 1 G: 1 TABLET ORAL at 13:24

## 2022-04-17 RX ADMIN — HEPARIN SODIUM 5000 UNITS: 5000 INJECTION INTRAVENOUS; SUBCUTANEOUS at 21:25

## 2022-04-17 RX ADMIN — OXYCODONE 5 MG: 5 TABLET ORAL at 11:18

## 2022-04-17 RX ADMIN — GABAPENTIN 300 MG: 300 CAPSULE ORAL at 21:24

## 2022-04-17 RX ADMIN — BUPROPION HYDROCHLORIDE 150 MG: 150 TABLET, EXTENDED RELEASE ORAL at 17:29

## 2022-04-17 RX ADMIN — OXYCODONE 5 MG: 5 TABLET ORAL at 05:12

## 2022-04-17 RX ADMIN — GABAPENTIN 300 MG: 300 CAPSULE ORAL at 08:58

## 2022-04-17 RX ADMIN — Medication 81 MG: at 08:58

## 2022-04-17 NOTE — PROGRESS NOTES
Outreach Follow Up Note    Mohini Lynch was seen and assessed. MEWS Score: 1 (04/17/22 0346)  Vitals:    04/16/22 1935 04/16/22 2152 04/16/22 2316 04/17/22 0346   BP: (!) 133/57  (!) 112/58 (!) 128/51   Pulse: 73  74 68   Resp: 16  20 16   Temp: 98.1 °F (36.7 °C)  97.7 °F (36.5 °C) 97.6 °F (36.4 °C)   SpO2: 94% 97% 99% 99%   Weight:       Height:             Pain Assessment  Pain Intensity 1: 0 (04/16/22 2210)  Pain Location 1: Back  Pain Intervention(s) 1: Medication (see MAR)  Patient Stated Pain Goal: 0      Patient reviewed and discussed with primary nurse. There have been no significant clinical changes since the completion of the last dated Outreach assessment. Will continue to follow up per outreach protocol.     Signed By:   Rd Pack RN    April 17, 2022 5:25 AM

## 2022-04-17 NOTE — PROGRESS NOTES
Hospitalist Progress Note   Admit Date:  2022  6:23 PM   Name:  Karli Burnett   Age:  80 y.o. Sex:  female  :  1950   MRN:  [id number]   Room:  [facility]    Presenting Complaint: Dizziness and Dysarthria    Reason(s) for Admission: Slurring of speech [R47.81]  Disequilibrium [R42]  Hypertension [I10]  Stroke-like episode [R29.90]     Hospital Course & Interval History:     Patient with past medical history of    Bipolar disorder   Fibromyalgia  Hypertension  Dyslipidemia    Patient presented to ophthalmology office with blurred vision, slurred speech, dizziness, and feeling off balance. Symptoms were present for 3 days prior to admission. Patient was sent to emergency room for further evaluation with suspicion of having a stroke. CT brain did not show acute abnormalities. MRI did not show evidence of acute ischemic infarction     Subjective/24hr Events (22):    22   Patient reports still feeling some dizziness. Slight improvement in terms of overall weakness. No fever  No chest pain  No chills  No shortness of breath  Still slow in terms of finding words to answer questions. She is complaining of body pain, back pain. She states this is a chronic problem from fibromyalgia. 4/15/22   Patient continues to complain of feeling weak overall. Although she reports some improvement. She has been moved out of ICU to medical floor. She reports eating well. She reports being able to ambulate well in her room. Still with body ache, in general.   No fever. No chills. No shortness of breath. No chest pain. No abdominal pain. 22   Patient complains of back pain, especially at the thoracic level. She has had it for months. She denies having injury. No fever. No chest pain. No shortness of breath. Oxcarbazepine level is still pending. 22   Thoracic spine x-rays negative for any fracture.   Patient continues to complain of body aches and pains overall. No fever. No shortness of breath. No chest pain. Her appetite is fair    ROS:  10 systems reviewed and negative except as noted above. Assessment & Plan:     Principal Problem:    Slurring of speech (4/13/2022) on admission. Speech has improved. No slurring now. Possibility of stroke  CT brain is so far negative for acute findings  MRI does not show acute findings of abnormalities  Echocardiogram result is reviewed. No interseptal defect. Good ejection fraction. Physical therapy to continue  Patient is on aspirin 81 mg p.o. twice a day  Atorvastatin 40 mg p.o. once a day    Active Problems:    Hypothyroidism   On levothyroxine 125 mcg p.o. once a day      Hypertension   Blood pressure is controlled at 130/48 mmHg. Monitor closely      Hyperlipidemia   On atorvastatin 40 mg p.o. once a day now      Disequilibrium (4/13/2022)  Physical therapy  Monitoring  Improved     Back pain   No history of accidents or injury. Thoracic spine x-ray does not show any fracture. I have discussed the plan of care with patient. Discharge Planning:    Pending referral     Diet:  ADULT DIET Dysphagia - Soft & Bite Sized  DVT PPx: heparin SC   Code status: Full Code    Hospital Problems as of 4/17/2022 Date Reviewed: 4/7/2022          Codes Class Noted - Resolved POA    Stroke-like episode ICD-10-CM: R29.90  ICD-9-CM: 781.99  4/14/2022 - Present Unknown        Disequilibrium ICD-10-CM: R42  ICD-9-CM: 780.4  4/13/2022 - Present Unknown        * (Principal) Slurring of speech ICD-10-CM: R47.81  ICD-9-CM: 784.59  4/13/2022 - Present Unknown        Hypothyroidism ICD-10-CM: E03.9  ICD-9-CM: 244.9  Unknown - Present Yes    Overview Addendum 2/7/2022  3:09 PM by Catherine Casillas MD     1/11/22 TSH 1.050 on levothyroxine 125 mcg daily. Labs were reviewed and discussed with the patient. The patient will continue the current treatment.               Hypertension ICD-10-CM: I10  ICD-9-CM: 401.9  Unknown - Present Unknown    Overview Signed 1/7/2022  1:34 PM by Inocencia García MD     Well-controlled on metoprolol succinate which she also takes for her symptomatic premature beats. The patient will continue the current treatment. Hyperlipidemia ICD-10-CM: E78.5  ICD-9-CM: 272.4  Unknown - Present Yes    Overview Addendum 2/7/2022  2:59 PM by Inocencia García MD     1/11/21 total 185 HDL 41   on atorvastatin 40 mg daily. Reviewed the most recent lipid panel with the patient, and interpreted the results within the context of the patient's personal cardiovascular risk factors. Discussed/reinforced a low-cholesterol diet. The patient will continue current intensity statin therapy. Objective:     Patient Vitals for the past 24 hrs:   Temp Pulse Resp BP SpO2   04/17/22 0803 97.7 °F (36.5 °C) 67 14 (!) 130/48 97 %   04/17/22 0346 97.6 °F (36.4 °C) 68 16 (!) 128/51 99 %   04/16/22 2316 97.7 °F (36.5 °C) 74 20 (!) 112/58 99 %   04/16/22 2152     97 %   04/16/22 1935 98.1 °F (36.7 °C) 73 16 (!) 133/57 94 %   04/16/22 1600  75      04/16/22 1509 97.8 °F (36.6 °C) 67 16 110/64 98 %   04/16/22 1124 98.4 °F (36.9 °C) 80 20 121/62 96 %     Oxygen Therapy  O2 Sat (%): 97 % (04/17/22 0803)  Pulse via Oximetry: 68 beats per minute (04/16/22 2152)  O2 Device: Nasal cannula (04/17/22 0803)  O2 Flow Rate (L/min): 3 l/min (04/15/22 0110)  FIO2 (%): 21 % (04/14/22 0400)    Estimated body mass index is 25.25 kg/m² as calculated from the following:    Height as of this encounter: 5' 10\" (1.778 m). Weight as of this encounter: 79.8 kg (176 lb). No intake or output data in the 24 hours ending 04/17/22 0918      Physical Exam:     Blood pressure (!) 130/48, pulse 67, temperature 97.7 °F (36.5 °C), resp. rate 14, height 5' 10\" (1.778 m), weight 79.8 kg (176 lb), SpO2 97 %, not currently breastfeeding. General:    Well nourished. No overt distress. No abnormal movements. Appears anxious.   Patient is complaining of body aches in general.   Head:  Normocephalic, atraumatic  Eyes:  Sclerae appear normal.  Pupils equally round. ENT:  Nares appear normal, no drainage. Moist oral mucosa  Neck:  No restricted ROM. Trachea midline   CV:   RRR. No m/r/g. No jugular venous distension. Lungs:   CTAB. No wheezing, rhonchi, or rales. Respirations even, unlabored  Abdomen: Bowel sounds present. Soft, nontender, nondistended. Extremities: No cyanosis or clubbing. No edema  Tender in general at thoracic spine and paraspinal muscle area. Skin:     No rashes and normal coloration. Warm and dry. Neuro:  CN II-XII grossly intact. Sensation intact. A&Ox3  Psych:  Normal mood and affect. I have reviewed ordered lab tests and independently visualized imaging below:    Recent Labs:  Recent Results (from the past 48 hour(s))   PLEASE READ & DOCUMENT PPD TEST IN 72 HRS    Collection Time: 04/16/22  1:40 PM   Result Value Ref Range    PPD Negative Negative    mm Induration 0 0 - 5 mm       All Micro Results     None          Other Studies:  XR SPINE THORAC 2 V    Result Date: 4/16/2022  Thoracic spine x-rays, 2 views INDICATION: Back pain COMPARISON: 6/9/2016 FINDINGS: No acute fracture. Normal alignment is maintained. Vertebral body heights are preserved. Multilevel degenerative changes, characterized by intervertebral disc space narrowing, endplate osteophyte development, and facet arthrosis. No acute radiographic and amounted.       Current Meds:  Current Facility-Administered Medications   Medication Dose Route Frequency    multivitamin w ZN (STRESSTABS W ZINC) tablet  1 Tablet Oral DAILY    polyethylene glycol (MIRALAX) packet 17 g  17 g Oral DAILY    [Held by provider] OXcarbazepine (TRILEPTAL) tablet 300 mg  300 mg Oral BID    atorvastatin (LIPITOR) tablet 40 mg  40 mg Oral QHS    oxyCODONE IR (ROXICODONE) tablet 5 mg  5 mg Oral Q6H PRN    sodium chloride (NS) flush 5-10 mL  5-10 mL IntraVENous Q8H    sodium chloride (NS) flush 5-10 mL  5-10 mL IntraVENous PRN    sodium chloride (NS) flush 5-40 mL  5-40 mL IntraVENous Q8H    sodium chloride (NS) flush 5-40 mL  5-40 mL IntraVENous PRN    heparin (porcine) injection 5,000 Units  5,000 Units SubCUTAneous Q8H    diazePAM (VALIUM) tablet 10 mg  10 mg Oral Q8H PRN    sucralfate (CARAFATE) tablet 1 g  1 g Oral QID    gabapentin (NEURONTIN) capsule 300 mg  300 mg Oral TID    buPROPion SR (WELLBUTRIN SR) tablet 150 mg  150 mg Oral BID    aspirin delayed-release tablet 81 mg  81 mg Oral BID    traZODone (DESYREL) tablet 100 mg  100 mg Oral QHS    levothyroxine (SYNTHROID) tablet 125 mcg  125 mcg Oral 6am       Signed:  Krista Chambers MD    Part of this note may have been written by using a voice dictation software. The note has been proof read but may still contain some grammatical/other typographical errors.

## 2022-04-17 NOTE — PROGRESS NOTES
Patient had a bed offer from [facility] on Friday. SW checked today, patient now declined. SW reaching out to facility for clarification. NINFA called [facility].  NINFA also reached out to Macie who states that she will look into it and asked NINFA to resend the referral.     Juana Muñoz, [address] Work   [address] [facility]    * [email or web address]

## 2022-04-17 NOTE — PROGRESS NOTES
OUTREACH NURSE PROGRESS REPORT    SUBJECTIVE: In to assess patient secondary to outreach protocol. Anthony Isabel was seen and focused assessment complete. MEWS Score: 1 (04/16/22 1935)  Vitals:    04/16/22 1509 04/16/22 1600 04/16/22 1935 04/16/22 2152   BP: 110/64  (!) 133/57    Pulse: 67 75 73    Resp: 16  16    Temp: 97.8 °F (36.6 °C)  98.1 °F (36.7 °C)    SpO2: 98%  94% 97%   Weight:       Height:              OBJECTIVE: On arrival to room, I found patient sitting up in bed. Alert and oriented. Complaints of back pain. Talk with primary. Patient has been medicated. ASSESSMENT:   Neuro: Alert and oriented. Follow commands. Speech remain slightly slurred. No drift. Cardiac: S1 S2       Pain Assessment  Pain Intensity 1: 5 (04/16/22 1212)  Pain Location 1: Back  Pain Intervention(s) 1: Medication (see MAR)  Patient Stated Pain Goal: 0      PLAN:  Continue to follow per outreach protocol.

## 2022-04-17 NOTE — PROGRESS NOTES
Problem: Falls - Risk of  Goal: *Absence of Falls  Description: Document Vijaya Bowen Fall Risk and appropriate interventions in the flowsheet. Outcome: Progressing Towards Goal  Note: Fall Risk Interventions:            Medication Interventions: Teach patient to arise slowly,Patient to call before getting OOB    Elimination Interventions: Call light in reach,Patient to call for help with toileting needs    History of Falls Interventions:  Investigate reason for fall,Door open when patient unattended         Problem: Patient Education: Go to Patient Education Activity  Goal: Patient/Family Education  Outcome: Progressing Towards Goal     Problem: Discharge Planning  Goal: *Knowledge of medication management  Outcome: Progressing Towards Goal

## 2022-04-17 NOTE — PROGRESS NOTES
Problem: Falls - Risk of  Goal: *Absence of Falls  Description: Document Harika Chau Fall Risk and appropriate interventions in the flowsheet. Outcome: Progressing Towards Goal  Note: Fall Risk Interventions:            Medication Interventions: Teach patient to arise slowly,Patient to call before getting OOB    Elimination Interventions: Call light in reach,Patient to call for help with toileting needs    History of Falls Interventions:  Investigate reason for fall,Door open when patient unattended         Problem: Patient Education: Go to Patient Education Activity  Goal: Patient/Family Education  Outcome: Progressing Towards Goal

## 2022-04-18 LAB
COVID-19 RAPID TEST, COVR: NOT DETECTED
MM INDURATION POC: 0 MM (ref 0–5)
OXCARBAZEPINE SERPL-MCNC: 6 UG/ML (ref 10–35)
PPD POC: NEGATIVE NEGATIVE
SOURCE, COVRS: NORMAL

## 2022-04-18 PROCEDURE — 94760 N-INVAS EAR/PLS OXIMETRY 1: CPT

## 2022-04-18 PROCEDURE — 97530 THERAPEUTIC ACTIVITIES: CPT

## 2022-04-18 PROCEDURE — 74011250636 HC RX REV CODE- 250/636: Performed by: HOSPITALIST

## 2022-04-18 PROCEDURE — 87635 SARS-COV-2 COVID-19 AMP PRB: CPT

## 2022-04-18 PROCEDURE — 74011250637 HC RX REV CODE- 250/637: Performed by: INTERNAL MEDICINE

## 2022-04-18 PROCEDURE — 77010033678 HC OXYGEN DAILY

## 2022-04-18 PROCEDURE — 74011000250 HC RX REV CODE- 250: Performed by: STUDENT IN AN ORGANIZED HEALTH CARE EDUCATION/TRAINING PROGRAM

## 2022-04-18 PROCEDURE — 92526 ORAL FUNCTION THERAPY: CPT

## 2022-04-18 PROCEDURE — 65270000029 HC RM PRIVATE

## 2022-04-18 PROCEDURE — 74011000250 HC RX REV CODE- 250: Performed by: HOSPITALIST

## 2022-04-18 PROCEDURE — 74011250637 HC RX REV CODE- 250/637: Performed by: HOSPITALIST

## 2022-04-18 PROCEDURE — 97112 NEUROMUSCULAR REEDUCATION: CPT

## 2022-04-18 RX ORDER — ADHESIVE BANDAGE
30 BANDAGE TOPICAL DAILY PRN
Status: DISCONTINUED | OUTPATIENT
Start: 2022-04-18 | End: 2022-04-19 | Stop reason: HOSPADM

## 2022-04-18 RX ADMIN — HEPARIN SODIUM 5000 UNITS: 5000 INJECTION INTRAVENOUS; SUBCUTANEOUS at 14:04

## 2022-04-18 RX ADMIN — ATORVASTATIN CALCIUM 40 MG: 40 TABLET, FILM COATED ORAL at 21:21

## 2022-04-18 RX ADMIN — SODIUM CHLORIDE, PRESERVATIVE FREE 10 ML: 5 INJECTION INTRAVENOUS at 05:14

## 2022-04-18 RX ADMIN — HEPARIN SODIUM 5000 UNITS: 5000 INJECTION INTRAVENOUS; SUBCUTANEOUS at 05:14

## 2022-04-18 RX ADMIN — BUPROPION HYDROCHLORIDE 150 MG: 150 TABLET, EXTENDED RELEASE ORAL at 17:21

## 2022-04-18 RX ADMIN — GABAPENTIN 300 MG: 300 CAPSULE ORAL at 08:22

## 2022-04-18 RX ADMIN — GABAPENTIN 300 MG: 300 CAPSULE ORAL at 17:21

## 2022-04-18 RX ADMIN — SODIUM CHLORIDE, PRESERVATIVE FREE 10 ML: 5 INJECTION INTRAVENOUS at 17:21

## 2022-04-18 RX ADMIN — SODIUM CHLORIDE, PRESERVATIVE FREE 10 ML: 5 INJECTION INTRAVENOUS at 22:00

## 2022-04-18 RX ADMIN — OXYCODONE 5 MG: 5 TABLET ORAL at 19:32

## 2022-04-18 RX ADMIN — LEVOTHYROXINE SODIUM 125 MCG: 0.12 TABLET ORAL at 05:16

## 2022-04-18 RX ADMIN — Medication 81 MG: at 17:21

## 2022-04-18 RX ADMIN — HEPARIN SODIUM 5000 UNITS: 5000 INJECTION INTRAVENOUS; SUBCUTANEOUS at 21:21

## 2022-04-18 RX ADMIN — GABAPENTIN 300 MG: 300 CAPSULE ORAL at 21:21

## 2022-04-18 RX ADMIN — OXYCODONE 5 MG: 5 TABLET ORAL at 00:57

## 2022-04-18 RX ADMIN — Medication 81 MG: at 08:22

## 2022-04-18 RX ADMIN — SUCRALFATE 1 G: 1 TABLET ORAL at 17:21

## 2022-04-18 RX ADMIN — POLYETHYLENE GLYCOL 3350 17 G: 17 POWDER, FOR SOLUTION ORAL at 08:22

## 2022-04-18 RX ADMIN — TRAZODONE HYDROCHLORIDE 100 MG: 50 TABLET ORAL at 21:20

## 2022-04-18 RX ADMIN — BUPROPION HYDROCHLORIDE 150 MG: 150 TABLET, EXTENDED RELEASE ORAL at 08:22

## 2022-04-18 RX ADMIN — OXYCODONE 5 MG: 5 TABLET ORAL at 11:57

## 2022-04-18 RX ADMIN — MAGNESIUM HYDROXIDE 30 ML: 2400 SUSPENSION ORAL at 11:57

## 2022-04-18 RX ADMIN — SUCRALFATE 1 G: 1 TABLET ORAL at 21:21

## 2022-04-18 RX ADMIN — ZINC 1 TABLET: TAB ORAL at 08:22

## 2022-04-18 RX ADMIN — SUCRALFATE 1 G: 1 TABLET ORAL at 14:04

## 2022-04-18 RX ADMIN — SUCRALFATE 1 G: 1 TABLET ORAL at 08:22

## 2022-04-18 NOTE — PROGRESS NOTES
Problem: Self Care Deficits Care Plan (Adult)  Goal: *Acute Goals and Plan of Care (Insert Text)  Outcome: Progressing Towards Goal  Note:   1. Patient will complete lower body dressing with stand by assist to increase self care independence. 2. Patient will complete toileting with stand by assist to increase self care independence. 3. Patient will improve static standing at sink for 5 minutes to improve independence with transfers and self cares. 4. Patient will complete all functional transfers with stand by assist using adaptive equipment as needed. 5. Patient will complete UE exercises with stand by assist to increase overall activity tolerance and strength. Timeframe: 7 visits          OCCUPATIONAL THERAPY: Initial Assessment and Daily Note 4/18/2022  INPATIENT: OT Visit Days: 2  [place] / [place] / Product Type: Medicare /      NAME/AGE/GENDER: Eliecer Dvaidson is a 80 y.o. female   PRIMARY DIAGNOSIS:  Slurring of speech [R47.81]  Disequilibrium [R42]  Hypertension [I10]  Stroke-like episode [R29.90] Slurring of speech Slurring of speech       ICD-10: Treatment Diagnosis:    · Generalized Muscle Weakness (M62.81)  · Other lack of cordination (R27.8)  · Difficulty in walking, Not elsewhere classified (R26.2)   Precautions/Allergies:     Fentanyl and Effexor [venlafaxine]      ASSESSMENT:     Ms. Darrell Marrero presents with severe neurological movement patterns from unknown dx. Reports neuropathy but presentation is more ataxic and whole body. Strength and ROM are actually [facility]. Unable to sit, lay, or stand still with constant fidgeting for all extremities. Reports spending most time in bed at home due to poor balance. Spouse reports assisting with all IADL's. Vision and perception are normal. Well below baseline for ADL's and transfers. Chart review shows neurologist recommended physical therapy for severe shoulder, neck, and posterior upper back pain.      4/18/2021 Some improvement in neurological ataxia when up moving on feet as well as less spontaneous fidgeting in bed. Focus on session was on shoulder/scapular joint mobilization and ROM exercises to reduce pain in thoracic spine. Some positive results noted by patient. Ended session in chair. Continue to recommend SNF due to decline at home. Continue OT. This section established at most recent assessment   PROBLEM LIST (Impairments causing functional limitations):  1. Decreased Strength  2. Decreased ADL/Functional Activities  3. Decreased Transfer Abilities  4. Decreased Ambulation Ability/Technique   INTERVENTIONS PLANNED: (Benefits and precautions of occupational therapy have been discussed with the patient.)  1. Activities of daily living training  2. Adaptive equipment training  3. Neuromuscular re-eduation  4. Therapeutic activity  5. Therapeutic exercise     TREATMENT PLAN: Frequency/Duration: Follow patient 3x a week to address above goals. Rehabilitation Potential For Stated Goals: Good     REHAB RECOMMENDATIONS (at time of discharge pending progress):    Placement: It is my opinion, based on this patient's performance to date, that Ms. Edison Valadez may benefit from intensive therapy at a [address] [place] [address] after discharge due to the functional deficits listed above that are likely to improve with skilled rehabilitation and concerns that he/she may be unsafe to be unsupervised at home due to deficits noted above .   Equipment:    None at this time              OCCUPATIONAL PROFILE AND HISTORY:   History of Present Injury/Illness (Reason for Referral):  See H&P  Past Medical History/Comorbidities:   Ms. Edison Valadez  has a past medical history of Allergic rhinitis (6/28/2013), Asthma, Bipolar affective disorder ([facility] [address] Disturbance of skin sensation, Fibromyalgia, GERD (gastroesophageal reflux disease), History of miscarriage, History of peptic ulcer, Hyperlipidemia, Hypertension, Hypothyroidism, IBS (irritable bowel syndrome), Impaired fasting glucose (2/7/2022), Lumbar disc disease, Mixed stress and urge urinary incontinence, LINDA on CPAP, Osteoarthritis, Premature beats, and Right renal mass. Ms. Rip Salter  has a past surgical history that includes hx cholecystectomy (1980s); hx tonsillectomy (1980); hx total abdominal hysterectomy (2003); hx bladder suspension (2003); hx oophorectomy; hx cystocele repair (11/12/2009); and hx rectocele repair (11/12/2009). Social History/Living Environment:   Home Environment: Private residence  # Steps to Enter: 3  Rails to Enter: Yes  Hand Rails : Bilateral  One/Two Story Residence: One story  Living Alone: No  Support Systems: Spouse/Significant Other  Patient Expects to be Discharged to[de-identified] Skilled nursing facility  Current DME Used/Available at Home: Cane, straight  Prior Level of Function/Work/Activity:  Decline for months, assist IADL's. Supposedly independent ADL's. Walks only in home furniture walking recently. .      Number of Personal Factors/Comorbidities that affect the Plan of Care: Expanded review of therapy/medical records (1-2):  MODERATE COMPLEXITY   ASSESSMENT OF OCCUPATIONAL PERFORMANCE[de-identified]   Activities of Daily Living:   Basic ADLs (From Assessment) Complex ADLs (From Assessment)   Feeding: Stand-by assistance  Oral Facial Hygiene/Grooming: Contact guard assistance  Bathing: Minimum assistance  Upper Body Dressing: Moderate assistance  Lower Body Dressing: Moderate assistance  Toileting:  Moderate assistance     Grooming/Bathing/Dressing Activities of Daily Living                       Functional Transfers  Bathroom Mobility: Minimum assistance  Toilet Transfer : Minimum assistance     Bed/Mat Mobility  Supine to Sit: Supervision  Sit to Supine: Supervision  Sit to Stand: Stand-by assistance  Stand to Sit: Stand-by assistance  Bed to Chair: Contact guard assistance  Scooting: Supervision     Most Recent Physical Functioning:   Gross Assessment:                  Posture: Balance:  Sitting: Intact Bed Mobility:  Supine to Sit: Supervision  Sit to Supine: Supervision  Scooting: Supervision  Wheelchair Mobility:     Transfers:  Sit to Stand: Stand-by assistance  Stand to Sit: Stand-by assistance  Bed to Chair: Contact guard assistance            Patient Vitals for the past 6 hrs:   BP BP Patient Position SpO2 Pulse   22 0739 128/62 Supine 99 % 69       Mental Status  Neurologic State: Alert  Orientation Level: Oriented X4  Cognition: Appropriate decision making                          Physical Skills Involved:  1. Range of Motion  2. Balance  3. Strength  4. Activity Tolerance  5. Fine Motor Control  6. Gross Motor Control  7. Pain (acute) Cognitive Skills Affected (resulting in the inability to perform in a timely and safe manner):  1. Perception  2. Executive Function  3. Short Term Recall Psychosocial Skills Affected:  1. Habits/Routines  2. Environmental Adaptation  3. Social Interaction  4. Emotional Regulation   Number of elements that affect the Plan of Care: 3-5:  MODERATE COMPLEXITY   CLINICAL DECISION MAKIN[address] [place] [id number] AM-PAC 6 Clicks   Daily Activity Inpatient Short Form  How much help from another person does the patient currently need. .. Total A Lot A Little None   1. Putting on and taking off regular lower body clothing? [] 1   [x] 2   [] 3   [] 4   2. Bathing (including washing, rinsing, drying)? [] 1   [x] 2   [] 3   [] 4   3. Toileting, which includes using toilet, bedpan or urinal?   [] 1   [x] 2   [] 3   [] 4   4. Putting on and taking off regular upper body clothing? [] 1   [] 2   [x] 3   [] 4   5. Taking care of personal grooming such as brushing teeth? [] 1   [] 2   [x] 3   [] 4   6. Eating meals? [] 1   [] 2   [x] 3   [] 4   © , Trustees of [address] [place] [id number], under license to [employer].  All rights reserved      Score:  Initial: 15 Most Recent: X (Date: -- )    Interpretation of Tool:  Represents activities that are increasingly more difficult (i.e. Bed mobility, Transfers, Gait). Medical Necessity:     · Skilled intervention continues to be required due to Deficits noted above. Reason for Services/Other Comments:  · Patient continues to require skilled intervention due to   · Dx above  · . Use of outcome tool(s) and clinical judgement create a POC that gives a: MODERATE COMPLEXITY         TREATMENT:   (In addition to Assessment/Re-Assessment sessions the following treatments were rendered)     Pre-treatment Symptoms/Complaints:    Pain: Initial:   Pain Intensity 1: 5  Pain Location 1: Spine, thoracic  Post Session:  10     Therapeutic Activity (  15 Minutes): Therapeutic activities per grid below to improve mobility, balance and coordination. Required minimal verbal and tactile cues to promote motor control of bilateral, upper extremity(s). Bilateral GH posterior and inferior joint mobilization. Neuromuscular Re-education ( 15): Balance around room and at edge of bed with SBA/CGA. Braces/Orthotics/Lines/Etc:   · O2 Device: None (Room air)  Treatment/Session Assessment:    · Response to Treatment:  Fair, supine in bed. · Interdisciplinary Collaboration:   o Physical Therapist  o Occupational Therapist  o Registered Nurse  · After treatment position/precautions:   o Supine in bed  o Call light within reach  o RN notified  o Family at bedside   · Compliance with Program/Exercises: Will assess as treatment progresses. · Recommendations/Intent for next treatment session: \"Next visit will focus on advancements to more challenging activities and reduction in assistance provided\".   Total Treatment Duration:  OT Patient Time In/Time Out  Time In: 0820  Time Out: 38[address] Herman Ospina OT

## 2022-04-18 NOTE — PROGRESS NOTES
LTG: Patient will tolerate least restrictive diet without overt signs or symptoms of airway compromise. STG: Patient will tolerate  soft/bite sized diet and thin liquids without overt signs or symptoms of airway compromise. Advanced 4/15  STG: Patient will participate in modified barium swallow study as clinically indicated.  MET 4/15  STG: Patient will participate in laryngeal strengthening exercises with 90% accuracy given min-mod cues. Added 4/15      SPEECH LANGUAGE PATHOLOGY: DYSPHAGIA- Daily Note 1    NAME/AGE/GENDER: Kassidy Edwards is a 80 y.o. female  DATE: 4/18/2022  PRIMARY DIAGNOSIS: Slurring of speech [R47.81]  Disequilibrium [R42]  Hypertension [I10]  Stroke-like episode [R29.90]      ICD-10: Treatment Diagnosis: R13.12 Dysphagia, Oropharyngeal Phase    RECOMMENDATIONS   DIET:    Soft and Bite-Sized   Thin Liquids    MEDICATIONS: floated or crushed in puree/applesauce     ASPIRATION PRECAUTIONS  · Fully awake/alert  · Upright for all PO  · Alternate liquids/solids  · Small bites and sips  · Clear throat periodically and dry swallow  · Remain upright for 20-30 min after any PO     COMPENSATORY STRATEGIES/MODIFICATIONS  · None     EDUCATION:  · Recommendations discussed with Nursing  · Patient     CONTINUATION OF SKILLED SERVICES/MEDICAL NECESSITY:   Patient is expected to demonstrate progress in  swallow strength, swallow timeliness, swallow function, diet tolerance and swallow safety in order to  improve swallow safety, work toward diet advancement and decrease aspiration risk.  Patient continues to require skilled intervention due to dysphagia. RECOMMENDATIONS for CONTINUED SPEECH THERAPY:   YES: Anticipate need for ongoing speech therapy during this hospitalization and at next level of care. ASSESSMENT   Patient presents with mild oropharyngeal dysphagia per modified barium swallow study completed 4/15.  Completed effortful swallows targeting increased laryngeal closure given minimal verbal cues. No overt s/sx airway compromise observed with thin liquids by straw and puree trials utilized during completion of effortful swallows. COMPLIANCE WITH PROGRAM/EXERCISES: Compliant all of the time  REHABILITATION POTENTIAL FOR STATED GOALS: Excellent     PLAN    FREQUENCY/DURATION: Continue to follow patient 3 times a week for duration of hospital stay to address above goals. Recommendations for next treatment session: Next treatment session will address diet tolerance and dysphagia exercises    - Recommendations for next treatment session: Next treatment session will address diet tolerance    SUBJECTIVE   Patient alert upright in bed for session. Pleasant and agreeable to therapy. Problem List:  (Impairments causing functional limitations):  1. Oropharyngeal dysphagia    Orientation:   Person  Place  Time  Situation    Pain: Pain Scale 1: Adult Nonverbal Pain Scale  Pain Intensity 1: 0  Pain Location 1: Generalized  Pain Intervention(s) 1: Medication (see MAR)    OBJECTIVE   Patient seen for laryngeal exercises. Patient participated in the following dysphagia exercises to maximize swallow function and improve safety with po intake. Effortful Swallow (2 sets of 10) to address hyolaryngeal excursion/elevation,epiglottic inversion, and laryngeal closure. Completed with trials of thin by straw and puree trials to increase resistance. No overt s/sx airway compromise observed.      INTERDISCIPLINARY COLLABORATION: Registered Nurse  PRECAUTIONS/ALLERGIES: Fentanyl and Effexor [venlafaxine]     Tool Used: Dysphagia Outcome and Severity Scale (JAMES)    Score Comments   Normal Diet  [] 7 With no strategies or extra time needed   Functional Swallow  [] 6 May have mild oral or pharyngeal delay   Mild Dysphagia  [x] 5 Which may require one diet consistency restricted    Mild-Moderate Dysphagia  [] 4 With 1-2 diet consistencies restricted   Moderate Dysphagia  [] 3 With 2 or more diet consistencies restricted   Moderate-Severe Dysphagia  [] 2 With partial PO strategies (trials with ST only)   Severe Dysphagia  [] 1 With inability to tolerate any PO safely      Score:  Initial: 5 Most Recent: 5 (Date 04/18/22 )   Interpretation of Tool: The Dysphagia Outcome and Severity Scale (JAMES) is a simple, easy-to-use, 7-point scale developed to systematically rate the functional severity of dysphagia based on objective assessment and make recommendations for diet level, independence level, and type of nutrition.      After treatment position/precautions:  · Upright in bed  · RN notified  · Call light within reach    Total Treatment Duration:   Time In: 8387  Time Out: [address] [facility] [address], [address] [facility]

## 2022-04-18 NOTE — PROGRESS NOTES
Problem: Mobility Impaired (Adult and Pediatric)  Goal: *Acute Goals and Plan of Care (Insert Text)  Outcome: Progressing Towards Goal  Note: STG:  (1.)Ms. Marry Miner will move from supine to sit and sit to supine with MODIFIED INDEPENDENCE within 1-3 treatment day(s). (2.)Ms. Marry Miner will transfer from bed to chair and chair to bed with MINIMAL ASSIST using the least restrictive device within 1-3 treatment day(s). met   (3.)Ms. Marry Miner will ambulate with MINIMAL ASSIST X 1-2 for 60 feet with the least restrictive device within 1-3 treatment day(s). met    LTG:  (1.)Ms. Marry Miner will move from supine to sit and sit to supine  in bed with INDEPENDENT within 4-6 treatment day(s). (2.)Ms. Marry Miner will transfer from bed to chair and chair to bed with CONTACT GUARD ASSIST using the least restrictive device within 4-6 treatment day(s). met  (3.)Ms. Marry Miner will ambulate with MINIMAL ASSIST for 100 feet with the least restrictive device within 1-3 treatment day(s). ________________________________________________________________________________________________      PHYSICAL THERAPY: Daily Note and PM 4/18/2022  INPATIENT: PT Visit Days : 3  [place] / [place] / Product Type: Medicare /       NAME/AGE/GENDER: Gini Acevedo is a 80 y.o. female   PRIMARY DIAGNOSIS: Slurring of speech [R47.81]  Disequilibrium [R42]  Hypertension [I10]  Stroke-like episode [R29.90] Slurring of speech Slurring of speech       ICD-10: Treatment Diagnosis:    · Difficulty in walking, Not elsewhere classified (R26.2)  · Other abnormalities of gait and mobility (R26.89)  · History of falling (Z91.81)   Precaution/Allergies:  Fentanyl and Effexor [venlafaxine]      ASSESSMENT:     Ms. Marry Miner presents supine in bed with spouse present. Per spouse, patient has had dizziness for the last few weeks than has worsened and she had slurred speech yesterday.  Restless UE/LE movements while in bed; facial grimacing and movements- tardive dyskinesia? Reports neuropathy but presentation is more ataxic and whole body. History of chronic neck and LBP . Gait is unsafe and unsteady. Required min to mod assist of 1-2 for gait and transfers. Spouse states patient previously used a cane or RW but has not used either for last few years. Only ambulating short distances. History of falls. Reports spending most of her time at home in bed due to poor balance. Lives with spouse, children and grandchildren. Will follow to address the below problem list.   Chart review shows neurologist recommended physical therapy for severe shoulder, neck, and posterior upper back pain. Recommend SNF as she is getting worse at home. 4/16/22:  Patient just returned from x-ray of spine with complaints of back pain. Patient was independent with bed mobility. CGA-min A for transfers and gait. Patient still unsteady with gait at times. Used railing on walls for support intermittently. Patient moving within her room with CGA without a device. Patient planning on STR at d/c. Not sure that her back pain is anything acute; could be due to time in hospital bed.      4/18 supine upon arrival.  She is supervision for all bed mobility. Sit>stand with supervision. Ambulated 125 ft with gait belt, CGA and some like the rail, tends to be unsteady some. Return to the eob and performs B LE exercises without LOB. Return to supine with needs in reach and instructed to call for assist.         This section established at most recent assessment   PROBLEM LIST (Impairments causing functional limitations):  1. Decreased Strength  2. Decreased ADL/Functional Activities  3. Decreased Transfer Abilities  4. Decreased Ambulation Ability/Technique  5. Decreased Balance  6. Decreased Activity Tolerance  7. Increased Fatigue   INTERVENTIONS PLANNED: (Benefits and precautions of physical therapy have been discussed with the patient.)  1. Balance Exercise  2.  Bed Mobility  3. Gait Training  4. Neuromuscular Re-education/Strengthening  5. Therapeutic Activites  6. Therapeutic Exercise/Strengthening  7. Transfer Training     TREATMENT PLAN: Frequency/Duration: daily for duration of hospital stay  Rehabilitation Potential For Stated Goals: [address] (at time of discharge pending progress):    Placement: It is my opinion, based on this patient's performance to date, that Ms. Manny Mullins may benefit from intensive therapy at a [address] [place] [address] after discharge due to the functional deficits listed above that are likely to improve with skilled rehabilitation and concerns that he/she may be unsafe to be unsupervised at home due to poor functional mobility and history of falls. .  Equipment:    None at this time              HISTORY:   History of Present Injury/Illness (Reason for Referral): per H&P:   80years old female with past medical history of bipolar disorder, fibromyalgia, hypertension, dyslipidemia presented to ophthalmology office with blurred vision, slurring speech, feeling dizzy patient described as off balance. Patient reports she was having symptoms for the past 3 days, evaluated at ophthalmology office, suspected a stroke, patient was sent to the emergency room for further evaluation and management. In emergency room patient was evaluated, CT head, CTA head and neck did not show any acute intracranial changes or large vessel occlusion. Patient denies any fever, chills, cough, sputum production. Denies any localized weakness or generalized weakness. Patient reports due to her gait disturbance she almost fell yesterday morning.      Past Medical History/Comorbidities:   Ms. Manny Mullins  has a past medical history of Allergic rhinitis (6/28/2013), Asthma, Bipolar affective disorder ([facility] [address] Disturbance of skin sensation, Fibromyalgia, GERD (gastroesophageal reflux disease), History of miscarriage, History of peptic ulcer, Hyperlipidemia, Hypertension, Hypothyroidism, IBS (irritable bowel syndrome), Impaired fasting glucose (2/7/2022), Lumbar disc disease, Mixed stress and urge urinary incontinence, LINDA on CPAP, Osteoarthritis, Premature beats, and Right renal mass. Ms. Austyn Chin  has a past surgical history that includes hx cholecystectomy (1980s); hx tonsillectomy (1980); hx total abdominal hysterectomy (2003); hx bladder suspension (2003); hx oophorectomy; hx cystocele repair (11/12/2009); and hx rectocele repair (11/12/2009). Social History/Living Environment:   Home Environment: Private residence  # Steps to Enter: 3  Rails to Enter: Yes  Hand Rails : Bilateral  One/Two Story Residence: One story  Living Alone: No  Support Systems: Spouse/Significant Other  Patient Expects to be Discharged to[de-identified] Skilled nursing facility  Current DME Used/Available at Home: Cane, straight  Prior Level of Function/Work/Activity:  Ambulating short distances without AD; history of falls, spouse does housework. Helps patient with ADLs as needed,     Number of Personal Factors/Comorbidities that affect the Plan of Care: 1-2: MODERATE COMPLEXITY   EXAMINATION:   Most Recent Physical Functioning:   Gross Assessment:                  Posture:     Balance:  Sitting: Intact Bed Mobility:  Supine to Sit: Supervision  Sit to Supine: Supervision  Scooting: Supervision  Wheelchair Mobility:     Transfers:  Sit to Stand: Stand-by assistance  Stand to Sit: Stand-by assistance  Bed to Chair: Contact guard assistance  Duration: 30 Minutes  Gait:     Speed/Kimberly: Pace decreased (<100 feet/min)  Gait Abnormalities: Decreased step clearance  Distance (ft): 125 Feet (ft)  Assistive Device: Gait belt  Ambulation - Level of Assistance: Contact guard assistance         Body Structures Involved:  1. Nerves  2. Muscles Body Functions Affected:  1. Neuromusculoskeletal  2. Movement Related Activities and Participation Affected:  1. General Tasks and Demands  2.  Mobility   Number of elements that affect the Plan of Care: 3: MODERATE COMPLEXITY   CLINICAL PRESENTATION:   Presentation: Evolving clinical presentation with changing clinical characteristics: MODERATE COMPLEXITY   CLINICAL DECISION MAKIN [facility] Inpatient Short Form  How much difficulty does the patient currently have. .. Unable A Lot A Little None   1. Turning over in bed (including adjusting bedclothes, sheets and blankets)? [] 1   [] 2   [x] 3   [] 4   2. Sitting down on and standing up from a chair with arms ( e.g., wheelchair, bedside commode, etc.)   [] 1   [] 2   [x] 3   [] 4   3. Moving from lying on back to sitting on the side of the bed? [] 1   [] 2   [] 3   [x] 4   How much help from another person does the patient currently need. .. Total A Lot A Little None   4. Moving to and from a bed to a chair (including a wheelchair)? [] 1   [] 2   [x] 3   [] 4   5. Need to walk in hospital room? [] 1   [x] 2   [] 3   [] 4   6. Climbing 3-5 steps with a railing? [] 1   [x] 2   [] 3   [] 4   © , Trustees of [address] [place] [address], under license to [employer]. All rights reserved      Score:  Initial: 17 Most Recent: X (Date: -- )    Interpretation of Tool:  Represents activities that are increasingly more difficult (i.e. Bed mobility, Transfers, Gait). Medical Necessity:     · Patient demonstrates fair rehab potential due to higher previous functional level. Reason for Services/Other Comments:  · Patient continues to require present interventions due to patient's inability to perform functional mobility safely and independently. .   Use of outcome tool(s) and clinical judgement create a POC that gives a: Clear prediction of patient's progress: LOW COMPLEXITY            TREATMENT:   (In addition to Assessment/Re-Assessment sessions the following treatments were rendered)   Pre-treatment Symptoms/Complaints:  Back pain-just had an x-ray.   Pain: Initial:   Pain Intensity 1: 3  Post Session:  5     Therapeutic Activity: (  30 MinutesTherapeutic activities including Bed mobility, sit to stand transfers,  transfers, chair transfers, and Ambulation on level ground to improve mobility, strength, balance and coordination. Required minimal   to promote static and dynamic balance in standing. Gait Training ( ):  Gait training to improve and/or restore physical functioning as related to mobility. Ambulated 125 Feet (ft) with Contact guard assistance using a Gait belt and minimal      Date:  4/18 B LE Date:   Date:     Activity/Exercise Parameters Parameters Parameters   Marching in place 12     Hip ab/ad 12     LAQ 12                               Braces/Orthotics/Lines/Etc:   · telemetry  · O2 Device: None (Room air)  Treatment/Session Assessment:    · Response to Treatment:  Participated well. · Interdisciplinary Collaboration:   o Physical Therapy Assistant  o Registered Nurse  · After treatment position/precautions:   o Supine in bed  o Bed/Chair-wheels locked  o Bed in low position  o Caregiver at bedside  o Call light within reach   · Compliance with Program/Exercises: Compliant most of the time, Will assess as treatment progresses  · Recommendations/Intent for next treatment session: \"Next visit will focus on advancements to more challenging activities\".   Total Treatment Duration:  PT Patient Time In/Time Out  Time In: 1300  Time Out: 48[address] Percy Whalen PTA

## 2022-04-18 NOTE — PROGRESS NOTES
Hospitalist Progress Note   Admit Date:  2022  6:23 PM   Name:  Kassidy Edwards   Age:  80 y.o. Sex:  female  :  1950   MRN:  [id number]   Room:  [facility]    Presenting Complaint: Dizziness and Dysarthria    Reason(s) for Admission: Slurring of speech [R47.81]  Disequilibrium [R42]  Hypertension [I10]  Stroke-like episode [R29.90]     Hospital Course & Interval History:     Patient with past medical history of    Bipolar disorder   Fibromyalgia  Hypertension  Dyslipidemia    Patient presented to ophthalmology office with blurred vision, slurred speech, dizziness, and feeling off balance. Symptoms were present for 3 days prior to admission. Patient was sent to emergency room for further evaluation with suspicion of having a stroke. CT brain did not show acute abnormalities. MRI did not show evidence of acute ischemic infarction     Subjective/24hr Events (22):    22   Patient reports still feeling some dizziness. Slight improvement in terms of overall weakness. No fever  No chest pain  No chills  No shortness of breath  Still slow in terms of finding words to answer questions. She is complaining of body pain, back pain. She states this is a chronic problem from fibromyalgia. 4/15/22   Patient continues to complain of feeling weak overall. Although she reports some improvement. She has been moved out of ICU to medical floor. She reports eating well. She reports being able to ambulate well in her room. Still with body ache, in general.   No fever. No chills. No shortness of breath. No chest pain. No abdominal pain. 22   Patient complains of back pain, especially at the thoracic level. She has had it for months. She denies having injury. No fever. No chest pain. No shortness of breath. Oxcarbazepine level is still pending. 22   Thoracic spine x-rays negative for any fracture.   Patient continues to complain of body aches and pains overall. No fever. No shortness of breath. No chest pain. Her appetite is fair    4/18/22   Continue to complain of body aches and pain. Ambulating in he room. BP has been in 110-130/50-60 mmHg. No fever. No shaking. No chills. No shortness of breath. No chest pain. No nausea. No vomiting. ROS:  10 systems reviewed and negative except as noted above. Assessment & Plan:     Principal Problem:    Slurring of speech (4/13/2022) on admission. Speech has improved. No slurring now. Possibility of stroke  CT brain is so far negative for acute findings  MRI does not show acute findings of abnormalities  Echocardiogram result is reviewed. No interseptal defect. Good ejection fraction. Physical therapy to continue  Patient is on aspirin 81 mg p.o. twice a day  Atorvastatin 40 mg p.o. once a day  Continue current regimen. Active Problems:    Hypothyroidism   On levothyroxine 125 mcg p.o. once a day      Hypertension   Blood pressure is controlled at 127/64 mmHg. Monitor closely      Hyperlipidemia   On atorvastatin 40 mg p.o. once a day now      Disequilibrium (4/13/2022)  Physical therapy  Monitoring  Improved     Back pain   No history of accidents or injury. Thoracic spine x-ray does not show any fracture. I have informed and reassured the patient. I have discussed the plan of care with patient. Discharge Planning:    Pending referral   Likely available tomorrow. Check rapid COVID test today.      Diet:  ADULT DIET Dysphagia - Soft & Bite Sized  DVT PPx: heparin SC   Code status: Full Code    Hospital Problems as of 4/18/2022 Date Reviewed: 4/7/2022          Codes Class Noted - Resolved POA    Stroke-like episode ICD-10-CM: R29.90  ICD-9-CM: 781.99  4/14/2022 - Present Unknown        Disequilibrium ICD-10-CM: R42  ICD-9-CM: 780.4  4/13/2022 - Present Unknown        * (Principal) Slurring of speech ICD-10-CM: R47.81  ICD-9-CM: 784.59  4/13/2022 - Present Unknown Hypothyroidism ICD-10-CM: E03.9  ICD-9-CM: 244.9  Unknown - Present Yes    Overview Addendum 2/7/2022  3:09 PM by Miriam Banks MD     1/11/22 TSH 1.050 on levothyroxine 125 mcg daily. Labs were reviewed and discussed with the patient. The patient will continue the current treatment. Hypertension ICD-10-CM: I10  ICD-9-CM: 401.9  Unknown - Present Unknown    Overview Signed 1/7/2022  1:34 PM by Miriam Banks MD     Well-controlled on metoprolol succinate which she also takes for her symptomatic premature beats. The patient will continue the current treatment. Hyperlipidemia ICD-10-CM: E78.5  ICD-9-CM: 272.4  Unknown - Present Yes    Overview Addendum 2/7/2022  2:59 PM by Miriam Banks MD     1/11/21 total 185 HDL 41   on atorvastatin 40 mg daily. Reviewed the most recent lipid panel with the patient, and interpreted the results within the context of the patient's personal cardiovascular risk factors. Discussed/reinforced a low-cholesterol diet. The patient will continue current intensity statin therapy. Objective:     Patient Vitals for the past 24 hrs:   Temp Pulse Resp BP SpO2   04/18/22 1124  68 18 127/64 97 %   04/18/22 0739 97.6 °F (36.4 °C) 69 16 128/62 99 %   04/18/22 0314 97.4 °F (36.3 °C) 67 16 123/60 100 %   04/17/22 2315 97.3 °F (36.3 °C) 66 14 (!) 113/56 98 %   04/17/22 2042     93 %   04/17/22 2039     93 %   04/17/22 2004    122/62    04/17/22 1929 97.2 °F (36.2 °C) 71 20 (!) 120/49 96 %   04/17/22 1420 98 °F (36.7 °C) 71 16 129/61 98 %     Oxygen Therapy  O2 Sat (%): 97 % (04/18/22 1124)  Pulse via Oximetry: 80 beats per minute (04/17/22 2042)  O2 Device: None (Room air) (04/18/22 1124)  O2 Flow Rate (L/min): 3 l/min (04/17/22 2042)  FIO2 (%): 21 % (04/14/22 0400)    Estimated body mass index is 25.25 kg/m² as calculated from the following:    Height as of this encounter: 5' 10\" (1.778 m).     Weight as of this encounter: 79.8 kg (176 lb). Intake/Output Summary (Last 24 hours) at 4/18/2022 1130  Last data filed at 4/18/2022 0114  Gross per 24 hour   Intake 660 ml   Output    Net 660 ml         Physical Exam:     Blood pressure 127/64, pulse 68, temperature 97.6 °F (36.4 °C), resp. rate 18, height 5' 10\" (1.778 m), weight 79.8 kg (176 lb), SpO2 97 %, not currently breastfeeding. General:    Well nourished. No overt distress. No abnormal movements. Appears anxious. Patient is complaining of body aches in general.   Head:  Normocephalic, atraumatic  Eyes:  Sclerae appear normal.  Pupils equally round. ENT:  Nares appear normal, no drainage. Moist oral mucosa  Neck:  No restricted ROM. Trachea midline   CV:   RRR. No m/r/g. No jugular venous distension. Lungs:   CTAB. No wheezing, rhonchi, or rales. Respirations even, unlabored  Abdomen: Bowel sounds present. Soft, nontender, nondistended. Extremities: No cyanosis or clubbing. No edema  Skin:     No rashes and normal coloration. Warm and dry. Neuro:  CN II-XII grossly intact. Sensation intact. A&Ox3  Psych:  Normal mood and affect. I have reviewed ordered lab tests and independently visualized imaging below:    Recent Labs:  Recent Results (from the past 48 hour(s))   PLEASE READ & DOCUMENT PPD TEST IN 72 HRS    Collection Time: 04/16/22  1:40 PM   Result Value Ref Range    PPD Negative Negative    mm Induration 0 0 - 5 mm       All Micro Results     Procedure Component Value Units Date/Time    COVID-19 RAPID TEST [[id number]] Collected: 04/18/22 1119    Order Status: Completed Updated: 04/18/22 1123          Other Studies:  No results found.     Current Meds:  Current Facility-Administered Medications   Medication Dose Route Frequency    multivitamin w ZN (STRESSTABS W ZINC) tablet  1 Tablet Oral DAILY    polyethylene glycol (MIRALAX) packet 17 g  17 g Oral DAILY    [Held by provider] OXcarbazepine (TRILEPTAL) tablet 300 mg  300 mg Oral BID    atorvastatin (LIPITOR) tablet 40 mg  40 mg Oral QHS    oxyCODONE IR (ROXICODONE) tablet 5 mg  5 mg Oral Q6H PRN    sodium chloride (NS) flush 5-10 mL  5-10 mL IntraVENous Q8H    sodium chloride (NS) flush 5-10 mL  5-10 mL IntraVENous PRN    sodium chloride (NS) flush 5-40 mL  5-40 mL IntraVENous Q8H    sodium chloride (NS) flush 5-40 mL  5-40 mL IntraVENous PRN    heparin (porcine) injection 5,000 Units  5,000 Units SubCUTAneous Q8H    diazePAM (VALIUM) tablet 10 mg  10 mg Oral Q8H PRN    sucralfate (CARAFATE) tablet 1 g  1 g Oral QID    gabapentin (NEURONTIN) capsule 300 mg  300 mg Oral TID    buPROPion SR (WELLBUTRIN SR) tablet 150 mg  150 mg Oral BID    aspirin delayed-release tablet 81 mg  81 mg Oral BID    traZODone (DESYREL) tablet 100 mg  100 mg Oral QHS    levothyroxine (SYNTHROID) tablet 125 mcg  125 mcg Oral 6am       Signed:  Christopher Mcpherson MD    Part of this note may have been written by using a voice dictation software. The note has been proof read but may still contain some grammatical/other typographical errors.

## 2022-04-18 NOTE — PROGRESS NOTES
NINFA spoke with Macie, patient accepted and selected in [address] [place] [address] link again. Bed available on Tuesday.      Christina Geiger, NINFA    [facility]    * [email or web address]

## 2022-04-18 NOTE — PROGRESS NOTES
Problem: Falls - Risk of  Goal: *Absence of Falls  Description: Document Harika Chau Fall Risk and appropriate interventions in the flowsheet. Outcome: Progressing Towards Goal  Note: Fall Risk Interventions:       Mentation Interventions: Adequate sleep, hydration, pain control    Medication Interventions: Teach patient to arise slowly    Elimination Interventions: Call light in reach    History of Falls Interventions:  Investigate reason for fall,Door open when patient unattended         Problem: Patient Education: Go to Patient Education Activity  Goal: Patient/Family Education  Outcome: Progressing Towards Goal     Problem: Discharge Planning  Goal: *Discharge to safe environment  Outcome: Progressing Towards Goal  Goal: *Knowledge of medication management  Outcome: Progressing Towards Goal  Goal: *Knowledge of discharge instructions  Outcome: Progressing Towards Goal     Problem: Patient Education: Go to Patient Education Activity  Goal: Patient/Family Education  Outcome: Progressing Towards Goal     Problem: Patient Education: Go to Patient Education Activity  Goal: Patient/Family Education  Outcome: Progressing Towards Goal     Problem: Patient Education: Go to Patient Education Activity  Goal: Patient/Family Education  Outcome: Progressing Towards Goal     Problem: TIA/CVA Stroke: 0-24 hours  Goal: Off Pathway (Use only if patient is Off Pathway)  Outcome: Progressing Towards Goal  Goal: Activity/Safety  Outcome: Progressing Towards Goal  Goal: Consults, if ordered  Outcome: Progressing Towards Goal  Goal: Diagnostic Test/Procedures  Outcome: Progressing Towards Goal  Goal: Nutrition/Diet  Outcome: Progressing Towards Goal  Goal: Discharge Planning  Outcome: Progressing Towards Goal  Goal: Medications  Outcome: Progressing Towards Goal  Goal: Respiratory  Outcome: Progressing Towards Goal  Goal: Treatments/Interventions/Procedures  Outcome: Progressing Towards Goal  Goal: Minimize risk of bleeding post-thrombolytic infusion  Outcome: Progressing Towards Goal  Goal: Monitor for complications post-thrombolytic infusion  Outcome: Progressing Towards Goal  Goal: Psychosocial  Outcome: Progressing Towards Goal  Goal: *Hemodynamically stable  Outcome: Progressing Towards Goal  Goal: *Neurologically stable  Description: Absence of additional neurological deficits    Outcome: Progressing Towards Goal  Goal: *Verbalizes anxiety and depression are reduced or absent  Outcome: Progressing Towards Goal  Goal: *Absence of Signs of Aspiration on Current Diet  Outcome: Progressing Towards Goal  Goal: *Absence of deep venous thrombosis signs and symptoms(Stroke Metric)  Outcome: Progressing Towards Goal  Goal: *Ability to perform ADLs and demonstrates progressive mobility and function  Outcome: Progressing Towards Goal  Goal: *Stroke education started(Stroke Metric)  Outcome: Progressing Towards Goal  Goal: *Dysphagia screen performed(Stroke Metric)  Outcome: Progressing Towards Goal  Goal: *Rehab consulted(Stroke Metric)  Outcome: Progressing Towards Goal     Problem: TIA/CVA Stroke: Day 2 Until Discharge  Goal: Off Pathway (Use only if patient is Off Pathway)  Outcome: Progressing Towards Goal  Goal: Activity/Safety  Outcome: Progressing Towards Goal  Goal: Diagnostic Test/Procedures  Outcome: Progressing Towards Goal  Goal: Nutrition/Diet  Outcome: Progressing Towards Goal  Goal: Discharge Planning  Outcome: Progressing Towards Goal  Goal: Medications  Outcome: Progressing Towards Goal  Goal: Respiratory  Outcome: Progressing Towards Goal  Goal: Treatments/Interventions/Procedures  Outcome: Progressing Towards Goal  Goal: Psychosocial  Outcome: Progressing Towards Goal  Goal: *Verbalizes anxiety and depression are reduced or absent  Outcome: Progressing Towards Goal  Goal: *Absence of aspiration  Outcome: Progressing Towards Goal  Goal: *Absence of deep venous thrombosis signs and symptoms(Stroke Metric)  Outcome: Progressing Towards Goal  Goal: *Optimal pain control at patient's stated goal  Outcome: Progressing Towards Goal  Goal: *Tolerating diet  Outcome: Progressing Towards Goal  Goal: *Ability to perform ADLs and demonstrates progressive mobility and function  Outcome: Progressing Towards Goal  Goal: *Stroke education continued(Stroke Metric)  Outcome: Progressing Towards Goal     Problem: Ischemic Stroke: Discharge Outcomes  Goal: *Verbalizes anxiety and depression are reduced or absent  Outcome: Progressing Towards Goal  Goal: *Verbalize understanding of risk factor modification(Stroke Metric)  Outcome: Progressing Towards Goal  Goal: *Hemodynamically stable  Outcome: Progressing Towards Goal  Goal: *Absence of aspiration pneumonia  Outcome: Progressing Towards Goal  Goal: *Aware of needed dietary changes  Outcome: Progressing Towards Goal  Goal: *Verbalize understanding of prescribed medications including anti-coagulants, anti-lipid, and/or anti-platelets(Stroke Metric)  Outcome: Progressing Towards Goal  Goal: *Tolerating diet  Outcome: Progressing Towards Goal  Goal: *Aware of follow-up diagnostics related to anticoagulants  Outcome: Progressing Towards Goal  Goal: *Ability to perform ADLs and demonstrates progressive mobility and function  Outcome: Progressing Towards Goal  Goal: *Absence of DVT(Stroke Metric)  Outcome: Progressing Towards Goal  Goal: *Absence of aspiration  Outcome: Progressing Towards Goal  Goal: *Optimal pain control at patient's stated goal  Outcome: Progressing Towards Goal  Goal: *Home safety concerns addressed  Outcome: Progressing Towards Goal  Goal: *Describes available resources and support systems  Outcome: Progressing Towards Goal  Goal: *Verbalizes understanding of activation of EMS(911) for stroke symptoms(Stroke Metric)  Outcome: Progressing Towards Goal  Goal: *Understands and describes signs and symptoms to report to providers(Stroke Metric)  Outcome: Progressing Towards Goal  Goal: *Neurolgocially stable (absence of additional neurological deficits)  Outcome: Progressing Towards Goal  Goal: *Verbalizes importance of follow-up with primary care physician(Stroke Metric)  Outcome: Progressing Towards Goal  Goal: *Smoking cessation discussed,if applicable(Stroke Metric)  Outcome: Progressing Towards Goal  Goal: *Depression screening completed(Stroke Metric)  Outcome: Progressing Towards Goal

## 2022-04-19 VITALS
RESPIRATION RATE: 16 BRPM | OXYGEN SATURATION: 95 % | HEIGHT: 70 IN | SYSTOLIC BLOOD PRESSURE: 131 MMHG | BODY MASS INDEX: 25.2 KG/M2 | WEIGHT: 176 LBS | DIASTOLIC BLOOD PRESSURE: 48 MMHG | TEMPERATURE: 97.5 F | HEART RATE: 75 BPM

## 2022-04-19 PROCEDURE — 94760 N-INVAS EAR/PLS OXIMETRY 1: CPT

## 2022-04-19 PROCEDURE — 74011250637 HC RX REV CODE- 250/637: Performed by: INTERNAL MEDICINE

## 2022-04-19 PROCEDURE — 74011250637 HC RX REV CODE- 250/637: Performed by: HOSPITALIST

## 2022-04-19 PROCEDURE — 74011000250 HC RX REV CODE- 250: Performed by: STUDENT IN AN ORGANIZED HEALTH CARE EDUCATION/TRAINING PROGRAM

## 2022-04-19 PROCEDURE — 74011250636 HC RX REV CODE- 250/636: Performed by: HOSPITALIST

## 2022-04-19 PROCEDURE — 74011000250 HC RX REV CODE- 250: Performed by: HOSPITALIST

## 2022-04-19 RX ORDER — GABAPENTIN 300 MG/1
300 CAPSULE ORAL 3 TIMES DAILY
Qty: 45 CAPSULE | Refills: 0 | Status: SHIPPED | OUTPATIENT
Start: 2022-04-19 | End: 2022-05-04

## 2022-04-19 RX ORDER — DIAZEPAM 10 MG/1
10 TABLET ORAL
Qty: 15 TABLET | Refills: 0 | Status: SHIPPED | OUTPATIENT
Start: 2022-04-19 | End: 2022-04-24

## 2022-04-19 RX ORDER — BUPROPION HYDROCHLORIDE 150 MG/1
150 TABLET ORAL
Qty: 30 TABLET | Refills: 0 | Status: SHIPPED | OUTPATIENT
Start: 2022-04-19 | End: 2022-05-19

## 2022-04-19 RX ORDER — LEVOTHYROXINE SODIUM 100 UG/1
125 TABLET ORAL DAILY
Qty: 45 TABLET | Refills: 0 | Status: SHIPPED | OUTPATIENT
Start: 2022-04-19 | End: 2022-05-19

## 2022-04-19 RX ADMIN — LEVOTHYROXINE SODIUM 125 MCG: 0.12 TABLET ORAL at 05:15

## 2022-04-19 RX ADMIN — HEPARIN SODIUM 5000 UNITS: 5000 INJECTION INTRAVENOUS; SUBCUTANEOUS at 05:15

## 2022-04-19 RX ADMIN — POLYETHYLENE GLYCOL 3350 17 G: 17 POWDER, FOR SOLUTION ORAL at 08:17

## 2022-04-19 RX ADMIN — SODIUM CHLORIDE, PRESERVATIVE FREE 10 ML: 5 INJECTION INTRAVENOUS at 05:15

## 2022-04-19 RX ADMIN — ZINC 1 TABLET: TAB ORAL at 08:18

## 2022-04-19 RX ADMIN — Medication 81 MG: at 08:18

## 2022-04-19 RX ADMIN — GABAPENTIN 300 MG: 300 CAPSULE ORAL at 08:18

## 2022-04-19 RX ADMIN — OXYCODONE 5 MG: 5 TABLET ORAL at 01:15

## 2022-04-19 RX ADMIN — PHENOL 1 SPRAY: 1.5 LIQUID ORAL at 01:15

## 2022-04-19 RX ADMIN — SUCRALFATE 1 G: 1 TABLET ORAL at 08:18

## 2022-04-19 RX ADMIN — BUPROPION HYDROCHLORIDE 150 MG: 150 TABLET, EXTENDED RELEASE ORAL at 08:18

## 2022-04-19 RX ADMIN — MAGNESIUM HYDROXIDE 30 ML: 2400 SUSPENSION ORAL at 11:06

## 2022-04-19 RX ADMIN — OXYCODONE 5 MG: 5 TABLET ORAL at 11:11

## 2022-04-19 NOTE — PROGRESS NOTES
Called and [facility]INFA De Dios at facility. States bed is ready and will go to [address]. Per Dr. Guillermo Segundo, patient ready to discharge today. Spoke with patient and her spouse. Questions answered. Transport will be here at noon to .

## 2022-04-19 NOTE — PROGRESS NOTES
Problem: Falls - Risk of  Goal: *Absence of Falls  Description: Document Eryn Burroughs Fall Risk and appropriate interventions in the flowsheet. Outcome: Progressing Towards Goal  Note: Fall Risk Interventions:       Mentation Interventions: Door open when patient unattended    Medication Interventions: Teach patient to arise slowly    Elimination Interventions: Call light in reach    History of Falls Interventions:  Investigate reason for fall,Door open when patient unattended         Problem: Discharge Planning  Goal: *Discharge to safe environment  Outcome: Progressing Towards Goal  Goal: *Knowledge of medication management  Outcome: Progressing Towards Goal  Goal: *Knowledge of discharge instructions  Outcome: Progressing Towards Goal

## 2022-04-19 NOTE — DISCHARGE SUMMARY
Hospitalist Discharge Summary   Admit Date:  2022  6:23 PM   DC Note date: 2022  Name:  Brandi Rizo   Age:  80 y.o. Sex:  female  :  1950   MRN:  [id number]   Room:  [facility]  PCP:  Inocencia García MD    Presenting Complaint: Dizziness and Dysarthria    Initial Admission Diagnosis: Slurring of speech [R47.81]  Disequilibrium [R42]  Hypertension [I10]  Stroke-like episode [R29.90]     Problem List for this Hospitalization:  Hospital Problems as of 2022 Date Reviewed: 2022          Codes Class Noted - Resolved POA    Stroke-like episode ICD-10-CM: R29.90  ICD-9-CM: 781.99  2022 - Present Unknown        Disequilibrium ICD-10-CM: R42  ICD-9-CM: 780.4  2022 - Present Unknown        * (Principal) Slurring of speech ICD-10-CM: R47.81  ICD-9-CM: 784.59  2022 - Present Unknown        Hypothyroidism ICD-10-CM: E03.9  ICD-9-CM: 244.9  Unknown - Present Yes    Overview Addendum 2022  3:09 PM by Inocencia García MD     22 TSH 1.050 on levothyroxine 125 mcg daily. Labs were reviewed and discussed with the patient. The patient will continue the current treatment. Hypertension ICD-10-CM: I10  ICD-9-CM: 401.9  Unknown - Present Unknown    Overview Signed 2022  1:34 PM by Inocencia García MD     Well-controlled on metoprolol succinate which she also takes for her symptomatic premature beats. The patient will continue the current treatment. Hyperlipidemia ICD-10-CM: E78.5  ICD-9-CM: 272.4  Unknown - Present Yes    Overview Addendum 2022  2:59 PM by Inocencia García MD     21 total 185 HDL 41   on atorvastatin 40 mg daily. Reviewed the most recent lipid panel with the patient, and interpreted the results within the context of the patient's personal cardiovascular risk factors. Discussed/reinforced a low-cholesterol diet. The patient will continue current intensity statin therapy.                   Did Patient have Sepsis (YES OR NO): No Hospital Course:    Patient with past medical history of    Bipolar disorder   Fibromyalgia  Hypertension  Dyslipidemia     Patient presented to ophthalmology office with blurred vision, slurred speech, dizziness, and feeling off balance. Symptoms were present for 3 days prior to admission.      Patient was sent to emergency room for further evaluation with suspicion of having a stroke.      CT brain did not show acute abnormalities. MRI did not show evidence of acute ischemic infarction   Echocardiogram result is reviewed. No interseptal defect.  ejection fraction of 55-60%. Normal wall motion. Patient was complaining of some pain in her back at the thoracic level. X-ray of the thoracic spines were done and it shows no acute findings. Patient gradually was improving. She has been working with physical therapy. She is being discharged to rehab facility to help improve her strength, pain, ambulation. Disposition: Rehab Facility  Diet: ADULT DIET Dysphagia - Soft & Bite Sized  Code Status: Full Code    Follow Up Orders: Follow-up Appointments   Procedures    FOLLOW UP VISIT Appointment in: 3 - 5 Days See your primary doctor. See your primary doctor. Standing Status:   Standing     Number of Occurrences:   1     Order Specific Question:   Appointment in     Answer:   3 - 5 Days       Follow-up Information     Follow up With Specialties Details Why Contact Info    [address] [place] [address] [address] [facility]   [address] [facility]    Yadira Hyatt MD Internal Medicine   [address] [place] [address] [place] [address]  [phone]            Follow up labs/diagnostics (ultimately defer to outpatient provider):  See your primary doctor. Time spent in patient discharge and coordination 35 minutes. Plan was discussed with patient,  and care team.  All questions answered.   Patient was stable at time of discharge. Instructions given to call a physician or return if any concerns. Discharge Info:   Current Discharge Medication List      CONTINUE these medications which have CHANGED    Details   levothyroxine (Levothroid) 100 mcg tablet Take 1.5 Tablets by mouth daily for 30 days. Qty: 45 Tablet, Refills: 0  Start date: 4/19/2022, End date: 5/19/2022      buPROPion XL (WELLBUTRIN XL) 150 mg tablet Take 1 Tablet by mouth every morning for 30 days. Qty: 30 Tablet, Refills: 0  Start date: 4/19/2022, End date: 5/19/2022         CONTINUE these medications which have NOT CHANGED    Details   sucralfate (CARAFATE) 1 gram tablet Take 1 g by mouth four (4) times daily. gabapentin (NEURONTIN) 300 mg capsule Take one in AM and 4 at hs  Qty: 150 Capsule, Refills: 5      OXcarbazepine (TRILEPTAL) 300 mg tablet Take  by mouth two (2) times a day. diazePAM (VALIUM) 10 mg tablet Take 10 mg by mouth every eight (8) hours as needed for Anxiety. 1/2 to 1 tablet TID PRN      montelukast (SINGULAIR) 10 mg tablet TAKE ONE TABLET BY MOUTH ONE TIME DAILY  Qty: 120 Tablet, Refills: 0      cetirizine (ZyrTEC) 10 mg tablet Take  by mouth.      estradioL (VAGIFEM) 10 mcg tab vaginal tablet       atorvastatin (LIPITOR) 40 mg tablet Take  by mouth daily. ciclopirox (PENLAC) 8 % solution Apply  to affected area nightly. albuterol (ProAir HFA) 90 mcg/actuation inhaler Take 2 Puffs by inhalation every four (4) hours as needed for Wheezing. calcium carbonate (CORAL CALCIUM PO) Take  by mouth three (3) times daily. pantoprazole (PROTONIX) 20 mg tablet TAKE 1 TABLET BY MOUTH EVERY DAY  Refills: 1      aspirin delayed-release 81 mg tablet Take  by mouth two (2) times a day. multivitamin (ONE A DAY) tablet Take 1 Tablet by mouth daily. cholecalciferol, VITAMIN D3, (VITAMIN D3) 5,000 unit tab tablet Take 5,000 Units by mouth daily.          STOP taking these medications       traZODone (DESYREL) 100 mg tablet Comments:   Reason for Stopping:             Correction :     Neurontin dose is 150 mg orally three times per day. Procedures done this admission:  * No surgery found *    Consults this admission:  IP CONSULT TO PHYSIATRIST(REHAB MEDICINE)    Echocardiogram/EKG results:  Results from Hospital Encounter encounter on 04/13/22    ECHO ADULT COMPLETE    Interpretation Summary    Left Ventricle: Left ventricle size is normal. Normal wall thickness. Normal wall motion. Normal left ventricular systolic function with a visually estimated EF of 55 - 60%. Normal diastolic function.   Left Atrium: Left atrium is mild to moderately dilated.   Interatrial Septum: Agitated saline study was negative with and without provocation. EKG Results     Procedure 720 Value Units Date/Time    Initial ECG [[id number]]     Order Status: Completed           Diagnostic Imaging/Tests:   MRI BRAIN WO CONT    Result Date: 4/14/2022  EXAMINATION: BRAIN MRI 4/14/2022 2:54 PM ACCESSION NUMBER: [id number] INDICATION: Blurred vision slurred speech dizziness evaluate for cerebrovascular accident COMPARISON: Head CT, CTA head and neck, and CT perfusion 4/13/2022 TECHNIQUE: Multiplanar multisequence MRI of the brain without the administration of intravenous contrast. FINDINGS: Midline structures including the corpus callosum, pituitary gland, optic nerves, and cerebellum are well developed. The ventricles are within normal limits for the degree of global brain parenchymal volume loss. There is no midline shift. The basilar cisterns are patent. There is no cerebellar tonsillar ectopia or herniation. Prior bilateral lens replacement. Minimal scattered paranasal sinus mucosal thickening. Trace left mastoid effusion. Diffusion imaging shows no evidence of acute infarction or other acute abnormality. The expected large intracranial vascular flow voids are preserved. There is no evidence of intracranial blood products.  There are no suspicious osseous lesions. Unremarkable noncontrast MRI of the brain for patient age, including no evidence of acute ischemic infarction. CT HEAD WO CONT    Result Date: 4/13/2022  Noncontrast CT of the brain. COMPARISON: July 2021 INDICATION: Slurred speech TECHNIQUE: Contiguous axial images were obtained from the skull base through the vertex without IV contrast. Radiation dose reduction techniques were used for this study:  Our CT scanners use one or all of the following: Automated exposure control, adjustment of the mA and/or kVp according to patient's size, iterative reconstruction. FINDINGS: There is no acute intracranial hemorrhage or evidence for acute territorial infarction. There is no mass effect, midline shift or hydrocephalus. There is no extra-axial fluid collection. The cerebellum and brainstem are grossly unremarkable. Periventricular diffuse hypodensities are nonspecific and likely secondary to chronic small vessel changes. Included globes appear intact. The partially visualized paranasal sinuses and the right mastoid air cells are aerated. There is no skull fracture. 1. No acute intracranial hemorrhage or CT evidence of acute territorial infarction. Note that MRI is more sensitive for detection of acute/subacute infarct. CTA HEAD NECK W WO CONT    Result Date: 4/14/2022  History: Patient is at the eye doctor and started to have slurred speech and dizziness Comments: CT ANGIOGRAM OF THE NECK AND CT ANGIOGRAM OF THE [place] was obtained following the administration of IV contrast. IV contrast was administered to evaluate the arterial vasculature. Reformatted images in the coronal and sagittal planes as well as 3-D imaging was obtained and reviewed on a dedicated PACS and [address]. Radiation reduction dose techniques were used for the study.  Our CT scanner use one or all of the following- Automated exposure control, adjustment of the mA and/or KV according the patient size, iterative reconstruction. All measurements are based upon NASCET criteria if appropriate. This study was analyzed by the [facility] [address] magana.Algorithm Findings: CT ANGIOGRAM OF THE NECK: The arch and proximal great vessels are patent. The carotid bulbs are patent with mild eccentric calcified plaque disease on the left. Degree of stenosis is less than 50%. The proximal vertebral arteries are patent with mild to moderate stenosis at the origin on the left. The lung apices are clear. The thyroid gland is unremarkable. CT ANGIOGRAM OF [place]: The petrous, cavernous, and supraclinoid internal carotid arteries are patent. The anterior circulation is patent. The middle cerebral arteries are patent. The distal vertebral arteries posterior inferior cerebellar arteries, basilar artery superior cerebellar arteries and posterior cerebral arteries are patent. The dural venous sinuses are patent. 1. No evidence of large vessel occlusive disease or high-grade stenosis     CT PERF W CBF    Result Date: 4/13/2022  CT Perfusion Imaging INDICATION:  Slurred speech CT perfusion imaging of the brain was performed after the administration of intravenous contrast.  Perfusion maps and perfusion analysis output were generated using the vis ai perfusion processing software algorithm. Radiation dose reduction techniques were used for this study: All CT scans performed at this facility use one or all of the following: Automated exposure control, adjustment of the mA and/or kVp according to patient's size, iterative reconstruction.  FINDINGS: vis ai Output Values: CBF < 30% volume:  0 ml   (core infarction volume greater than 50 cc associated with poor outcomes) Tmax > 6 seconds: 0 ml Tmax/CBF Mismatch Volume: 0 ml Tmax/CBF Mismatch Ratio: N/A Hypoperfusion Intensity Ratio: 0   (values greater than 0.5 associated with poor outcome) Tmax > 10 seconds Volume: 0 ml   (volume greater than 100 mL is associated with poor outcome) No evidence of core infarct or ischemic penumbra. ECHO ADULT COMPLETE    Result Date: 4/14/2022    Left Ventricle: Left ventricle size is normal. Normal wall thickness. Normal wall motion. Normal left ventricular systolic function with a visually estimated EF of 55 - 60%. Normal diastolic function.   Left Atrium: Left atrium is mild to moderately dilated.   Interatrial Septum: Agitated saline study was negative with and without provocation. All Micro Results     Procedure Component Value Units Date/Time    COVID-19 RAPID TEST [[id number]] Collected: 04/18/22 1119    Order Status: Completed Specimen: Nasopharyngeal Updated: 04/18/22 1150     Specimen source NASAL SWAB        COVID-19 rapid test Not detected        Comment:      The specimen is NEGATIVE for SARS-CoV-2, the novel coronavirus associated with COVID-19. A negative result does not rule out COVID-19. This test has been authorized by the FDA under an Emergency Use Authorization (EUA) for use by authorized laboratories. Fact sheet for Healthcare Providers: [employer]  Fact sheet for Patients: [employer]       Methodology: Isothermal Nucleic Acid Amplification               Labs: Results:       BMP, Mg, Phos No results for input(s): NA, K, CL, CO2, AGAP, BUN, CREA, CA, GLU, MG, PHOS in the last 72 hours. CBC No results for input(s): WBC, RBC, HGB, HCT, PLT, GRANS, LYMPH, EOS, MONOS, BASOS, IG, ANEU, ABL, STEVEN, ABM, ABB, AIG, HGBEXT, HCTEXT, PLTEXT in the last 72 hours. LFT No results for input(s): ALT, TBIL, AP, TP, ALB, GLOB, AGRAT in the last 72 hours.     No lab exists for component: SGOT, GPT   Cardiac Testing Lab Results   Component Value Date/Time    BNP 25 02/21/2018 04:14 PM    BNP 4 06/10/2013 07:30 PM    CK 57 12/17/2018 07:27 PM    Troponin-I, Qt. <0.02 (L) 02/21/2018 04:14 PM      Coagulation Tests Lab Results   Component Value Date/Time    Prothrombin time 12.6 04/13/2022 06:21 PM    INR 0.9 04/13/2022 06:21 PM      A1c Lab Results   Component Value Date/Time    Hemoglobin A1c 6.0 04/14/2022 03:29 AM    Hemoglobin A1c 5.9 (H) 01/11/2022 09:16 AM    Hemoglobin A1c 5.9 (H) 11/28/2017 03:49 PM      Lipid Panel Lab Results   Component Value Date/Time    Cholesterol, total 176 04/14/2022 03:29 AM    HDL Cholesterol 38 (L) 04/14/2022 03:29 AM    LDL,Direct 107 (H) 07/13/2011 08:57 AM    LDL, calculated 107.8 (H) 04/14/2022 03:29 AM    VLDL, calculated 30.2 (H) 04/14/2022 03:29 AM    Triglyceride 151 (H) 04/14/2022 03:29 AM    CHOL/HDL Ratio 4.6 04/14/2022 03:29 AM      Thyroid Panel Lab Results   Component Value Date/Time    TSH 1.050 01/11/2022 09:16 AM    TSH 1.120 07/01/2021 05:35 PM        Most Recent UA Lab Results   Component Value Date/Time    Color YELLOW 07/01/2021 09:00 PM    Appearance CLEAR 07/01/2021 09:00 PM    Specific gravity 1.008 07/01/2021 09:00 PM    pH (UA) 6.5 07/01/2021 09:00 PM    Protein Negative 07/01/2021 09:00 PM    Glucose Negative 07/01/2021 09:00 PM    Ketone Negative 07/01/2021 09:00 PM    Bilirubin Negative 07/01/2021 09:00 PM    Blood Negative 07/01/2021 09:00 PM    Urobilinogen 0.2 07/01/2021 09:00 PM    Nitrites Negative 07/01/2021 09:00 PM    Leukocyte Esterase Negative 07/01/2021 09:00 PM          All Labs from Last 24 Hrs:  Recent Results (from the past 24 hour(s))   COVID-19 RAPID TEST    Collection Time: 04/18/22 11:19 AM   Result Value Ref Range    Specimen source NASAL SWAB      COVID-19 rapid test Not detected NOTD     PLEASE READ & DOCUMENT PPD TEST IN 48 HRS    Collection Time: 04/18/22  1:00 PM   Result Value Ref Range    PPD Negative Negative    mm Induration 0 0 - 5 mm       Current Med List in Hospital:   Current Facility-Administered Medications   Medication Dose Route Frequency    phenol throat spray (CHLORASEPTIC) 1 Spray  1 Spray Oral PRN    magnesium hydroxide (MILK OF MAGNESIA) 400 mg/5 mL oral suspension 30 mL  30 mL Oral DAILY PRN    multivitamin w ZN (STRESSTABS W ZINC) tablet  1 Tablet Oral DAILY    polyethylene glycol (MIRALAX) packet 17 g  17 g Oral DAILY    [Held by provider] OXcarbazepine (TRILEPTAL) tablet 300 mg  300 mg Oral BID    atorvastatin (LIPITOR) tablet 40 mg  40 mg Oral QHS    oxyCODONE IR (ROXICODONE) tablet 5 mg  5 mg Oral Q6H PRN    sodium chloride (NS) flush 5-10 mL  5-10 mL IntraVENous Q8H    sodium chloride (NS) flush 5-10 mL  5-10 mL IntraVENous PRN    sodium chloride (NS) flush 5-40 mL  5-40 mL IntraVENous Q8H    sodium chloride (NS) flush 5-40 mL  5-40 mL IntraVENous PRN    heparin (porcine) injection 5,000 Units  5,000 Units SubCUTAneous Q8H    diazePAM (VALIUM) tablet 10 mg  10 mg Oral Q8H PRN    sucralfate (CARAFATE) tablet 1 g  1 g Oral QID    gabapentin (NEURONTIN) capsule 300 mg  300 mg Oral TID    buPROPion SR (WELLBUTRIN SR) tablet 150 mg  150 mg Oral BID    aspirin delayed-release tablet 81 mg  81 mg Oral BID    traZODone (DESYREL) tablet 100 mg  100 mg Oral QHS    levothyroxine (SYNTHROID) tablet 125 mcg  125 mcg Oral 6am       Allergies   Allergen Reactions    Fentanyl Other (comments) and Rash     Not sure of reaction  Doesn't remember reaction    Not sure of reaction    Effexor [Venlafaxine] Unknown (comments)     Denies allergy       Immunization History   Administered Date(s) Administered    Influenza Vaccine 01/01/2009    Influenza Vaccine (Quad) PF (>6 Mo Flulaval, Fluarix, and >3 Yrs Afluria, Fluzone 27813) 01/07/2014, 11/04/2014    Pneumococcal Conjugate (PCV-13) 05/02/2018    Pneumococcal Vaccine (Unspecified Type) 01/01/2006    TB Skin Test (PPD) Intradermal 04/14/2022    Zoster Recombinant 04/25/2018, 06/03/2019       Recent Vital Data:  Patient Vitals for the past 24 hrs:   Temp Pulse Resp BP SpO2   04/19/22 0912 97.5 °F (36.4 °C) 75 16 (!) 131/48 95 %   04/19/22 0838     96 %   04/19/22 0323 98.9 °F (37.2 °C) 92 16 128/66 97 %   04/18/22 4958 98.1 °F (36.7 °C) 74 12 (!) 124/59 98 %   04/18/22 2151     96 %   04/18/22 1948 97.6 °F (36.4 °C) 78 18 (!) 120/56 99 %   04/18/22 1652 97.8 °F (36.6 °C) 75 18 (!) 113/48 95 %   04/18/22 1124  68 18 127/64 97 %     Oxygen Therapy  O2 Sat (%): 95 % (04/19/22 0912)  Pulse via Oximetry: 74 beats per minute (04/19/22 0838)  O2 Device: None (Room air) (04/19/22 0912)  O2 Flow Rate (L/min): 0 l/min (04/19/22 0838)  FIO2 (%): 21 % (04/14/22 0400)    Estimated body mass index is 25.25 kg/m² as calculated from the following:    Height as of this encounter: 5' 10\" (1.778 m). Weight as of this encounter: 79.8 kg (176 lb). Intake/Output Summary (Last 24 hours) at 4/19/2022 1040  Last data filed at 4/19/2022 0115  Gross per 24 hour   Intake 450 ml   Output    Net 450 ml         Physical Exam:    Visit Vitals  BP (!) 131/48 (BP 1 Location: Left upper arm, BP Patient Position: Sitting)   Pulse 75   Temp 97.5 °F (36.4 °C)   Resp 16   Ht 5' 10\" (1.778 m)   Wt 79.8 kg (176 lb)   SpO2 95%   Breastfeeding No   BMI 25.25 kg/m²      General:          Well nourished. No overt distress. No abnormal movements. Appears pleasant and less anxious today.  is at bedside. Head:               Normocephalic, atraumatic  Eyes:               Sclerae appear normal.  Pupils equally round. ENT:                Nares appear normal, no drainage. Moist oral mucosa  Neck:               No restricted ROM. Trachea midline   CV:                  RRR. No m/r/g. No jugular venous distension. Lungs:             CTAB. No wheezing, rhonchi, or rales. Respirations even, unlabored  Abdomen: Bowel sounds present. Soft, nontender, nondistended. Extremities:     No cyanosis or clubbing. No edema  Skin:                No rashes and normal coloration. Warm and dry. Neuro:             CN II-XII grossly intact. Sensation intact. A&Ox3  Psych:             Normal mood and affect.           Signed:  Gladis Verdugo MD    Part of this note may have been written by using a voice dictation software. The note has been proof read but may still contain some grammatical/other typographical errors.

## 2022-04-19 NOTE — PROGRESS NOTES
Problem: Falls - Risk of  Goal: *Absence of Falls  Description: Document James Perez Fall Risk and appropriate interventions in the flowsheet. Outcome: Progressing Towards Goal  Note: Fall Risk Interventions:       Mentation Interventions: Adequate sleep, hydration, pain control    Medication Interventions: Teach patient to arise slowly    Elimination Interventions: Call light in reach,Patient to call for help with toileting needs    History of Falls Interventions:  Investigate reason for fall,Door open when patient unattended         Problem: Patient Education: Go to Patient Education Activity  Goal: Patient/Family Education  Outcome: Progressing Towards Goal     Problem: Discharge Planning  Goal: *Discharge to safe environment  Outcome: Progressing Towards Goal  Goal: *Knowledge of medication management  Outcome: Progressing Towards Goal  Goal: *Knowledge of discharge instructions  Outcome: Progressing Towards Goal     Problem: Patient Education: Go to Patient Education Activity  Goal: Patient/Family Education  Outcome: Progressing Towards Goal     Problem: Patient Education: Go to Patient Education Activity  Goal: Patient/Family Education  Outcome: Progressing Towards Goal     Problem: Patient Education: Go to Patient Education Activity  Goal: Patient/Family Education  Outcome: Progressing Towards Goal     Problem: TIA/CVA Stroke: 0-24 hours  Goal: Off Pathway (Use only if patient is Off Pathway)  Outcome: Progressing Towards Goal  Goal: Activity/Safety  Outcome: Progressing Towards Goal  Goal: Consults, if ordered  Outcome: Progressing Towards Goal  Goal: Diagnostic Test/Procedures  Outcome: Progressing Towards Goal  Goal: Nutrition/Diet  Outcome: Progressing Towards Goal  Goal: Discharge Planning  Outcome: Progressing Towards Goal  Goal: Medications  Outcome: Progressing Towards Goal  Goal: Respiratory  Outcome: Progressing Towards Goal  Goal: Treatments/Interventions/Procedures  Outcome: Progressing Towards Goal  Goal: Minimize risk of bleeding post-thrombolytic infusion  Outcome: Progressing Towards Goal  Goal: Monitor for complications post-thrombolytic infusion  Outcome: Progressing Towards Goal  Goal: Psychosocial  Outcome: Progressing Towards Goal  Goal: *Hemodynamically stable  Outcome: Progressing Towards Goal  Goal: *Neurologically stable  Description: Absence of additional neurological deficits    Outcome: Progressing Towards Goal  Goal: *Verbalizes anxiety and depression are reduced or absent  Outcome: Progressing Towards Goal  Goal: *Absence of Signs of Aspiration on Current Diet  Outcome: Progressing Towards Goal  Goal: *Absence of deep venous thrombosis signs and symptoms(Stroke Metric)  Outcome: Progressing Towards Goal  Goal: *Ability to perform ADLs and demonstrates progressive mobility and function  Outcome: Progressing Towards Goal  Goal: *Stroke education started(Stroke Metric)  Outcome: Progressing Towards Goal  Goal: *Dysphagia screen performed(Stroke Metric)  Outcome: Progressing Towards Goal  Goal: *Rehab consulted(Stroke Metric)  Outcome: Progressing Towards Goal     Problem: TIA/CVA Stroke: Day 2 Until Discharge  Goal: Off Pathway (Use only if patient is Off Pathway)  Outcome: Progressing Towards Goal  Goal: Activity/Safety  Outcome: Progressing Towards Goal  Goal: Diagnostic Test/Procedures  Outcome: Progressing Towards Goal  Goal: Nutrition/Diet  Outcome: Progressing Towards Goal  Goal: Discharge Planning  Outcome: Progressing Towards Goal  Goal: Medications  Outcome: Progressing Towards Goal  Goal: Respiratory  Outcome: Progressing Towards Goal  Goal: Treatments/Interventions/Procedures  Outcome: Progressing Towards Goal  Goal: Psychosocial  Outcome: Progressing Towards Goal  Goal: *Verbalizes anxiety and depression are reduced or absent  Outcome: Progressing Towards Goal  Goal: *Absence of aspiration  Outcome: Progressing Towards Goal  Goal: *Absence of deep venous thrombosis signs and symptoms(Stroke Metric)  Outcome: Progressing Towards Goal  Goal: *Optimal pain control at patient's stated goal  Outcome: Progressing Towards Goal  Goal: *Tolerating diet  Outcome: Progressing Towards Goal  Goal: *Ability to perform ADLs and demonstrates progressive mobility and function  Outcome: Progressing Towards Goal  Goal: *Stroke education continued(Stroke Metric)  Outcome: Progressing Towards Goal     Problem: Ischemic Stroke: Discharge Outcomes  Goal: *Verbalizes anxiety and depression are reduced or absent  Outcome: Progressing Towards Goal  Goal: *Verbalize understanding of risk factor modification(Stroke Metric)  Outcome: Progressing Towards Goal  Goal: *Hemodynamically stable  Outcome: Progressing Towards Goal  Goal: *Absence of aspiration pneumonia  Outcome: Progressing Towards Goal  Goal: *Aware of needed dietary changes  Outcome: Progressing Towards Goal  Goal: *Verbalize understanding of prescribed medications including anti-coagulants, anti-lipid, and/or anti-platelets(Stroke Metric)  Outcome: Progressing Towards Goal  Goal: *Tolerating diet  Outcome: Progressing Towards Goal  Goal: *Aware of follow-up diagnostics related to anticoagulants  Outcome: Progressing Towards Goal  Goal: *Ability to perform ADLs and demonstrates progressive mobility and function  Outcome: Progressing Towards Goal  Goal: *Absence of DVT(Stroke Metric)  Outcome: Progressing Towards Goal  Goal: *Absence of aspiration  Outcome: Progressing Towards Goal  Goal: *Optimal pain control at patient's stated goal  Outcome: Progressing Towards Goal  Goal: *Home safety concerns addressed  Outcome: Progressing Towards Goal  Goal: *Describes available resources and support systems  Outcome: Progressing Towards Goal  Goal: *Verbalizes understanding of activation of EMS(911) for stroke symptoms(Stroke Metric)  Outcome: Progressing Towards Goal  Goal: *Understands and describes signs and symptoms to report to providers(Stroke Metric)  Outcome: Progressing Towards Goal  Goal: *Neurolgocially stable (absence of additional neurological deficits)  Outcome: Progressing Towards Goal  Goal: *Verbalizes importance of follow-up with primary care physician(Stroke Metric)  Outcome: Progressing Towards Goal  Goal: *Smoking cessation discussed,if applicable(Stroke Metric)  Outcome: Progressing Towards Goal  Goal: *Depression screening completed(Stroke Metric)  Outcome: Progressing Towards Goal

## 2022-04-19 NOTE — PROGRESS NOTES
TRANSFER - OUT REPORT:    Verbal report given to nurse Erica(name) on Roseline Nur  being transferred to [facility](unit) for routine progression of care       Report consisted of patients Situation, Background, Assessment and   Recommendations(SBAR). Information from the following report(s) SBAR and Kardex was reviewed with the receiving nurse. Opportunity for questions and clarification was provided.

## 2022-04-25 NOTE — PROGRESS NOTES
Physician Progress Note      PATIENT:               Manohar Mccarty  CSN #:                  [id number]  :                       1950  ADMIT DATE:       2022 6:23 PM  Mary Campos DATE:        2022 12:27 PM  RESPONDING  PROVIDER #:        Jamie Perez MD          QUERY TEXT:    Patient admitted direct from ophthalmology with blurred vision, slurred speech, dizziness, and feeling off balance. Symptoms were present for 3 days prior to admission. Noted documentation of possible CVA. If possible, please document in progress notes and discharge summary if you are evaluating and /or treating any of the following: The medical record reflects the following:  Risk Factors: HTN  Clinical Indicators: blurred vision, slurred speech, dizziness, and feeling off balance- Symptoms present for 3 days prior to admission. CT brain- no acute abnormalities. MRI- no evidence of acute ischemic infarction. Echo- No interseptal defect. Treatment: Neuro assessments, Tele monitoring, PT/OT/ST  Options provided:  -- TIA symptoms due to CVA  -- TIA symptoms due to Syncope  -- Other - I will add my own diagnosis  -- Disagree - Not applicable / Not valid  -- Disagree - Clinically unable to determine / Unknown  -- Refer to Clinical Documentation Reviewer    PROVIDER RESPONSE TEXT:    TIA symptoms are due to CVA.     Query created by: irwin harrison on 2022 9:00 AM      Electronically signed by:  Jamie Perez MD 2022 11:15 AM

## 2022-05-23 ENCOUNTER — APPOINTMENT (RX ONLY)
Dept: URBAN - METROPOLITAN AREA CLINIC 24 | Facility: CLINIC | Age: 72
Setting detail: DERMATOLOGY
End: 2022-05-23

## 2022-05-23 DIAGNOSIS — L30.9 DERMATITIS, UNSPECIFIED: ICD-10-CM | Status: INADEQUATELY CONTROLLED

## 2022-05-23 PROCEDURE — ? COUNSELING

## 2022-05-23 PROCEDURE — 99214 OFFICE O/P EST MOD 30 MIN: CPT

## 2022-05-23 PROCEDURE — ? PRESCRIPTION

## 2022-05-23 PROCEDURE — ? OTHER

## 2022-05-23 PROCEDURE — ? ORDER TESTS

## 2022-05-23 RX ORDER — MUPIROCIN 20 MG/G
OINTMENT TOPICAL
Qty: 22 | Refills: 3 | Status: ERX

## 2022-05-23 RX ORDER — CLINDAMYCIN PHOSPHATE 10 MG/ML
SOLUTION TOPICAL BID
Qty: 60 | Refills: 6 | Status: ERX | COMMUNITY
Start: 2022-05-23

## 2022-05-23 RX ADMIN — CLINDAMYCIN PHOSPHATE: 10 SOLUTION TOPICAL at 00:00

## 2022-05-23 ASSESSMENT — LOCATION DETAILED DESCRIPTION DERM
LOCATION DETAILED: RIGHT PROXIMAL POSTERIOR UPPER ARM
LOCATION DETAILED: LEFT DISTAL POSTERIOR UPPER ARM
LOCATION DETAILED: STERNAL NOTCH
LOCATION DETAILED: LEFT PROXIMAL POSTERIOR UPPER ARM

## 2022-05-23 ASSESSMENT — LOCATION ZONE DERM
LOCATION ZONE: ARM
LOCATION ZONE: TRUNK

## 2022-05-23 ASSESSMENT — LOCATION SIMPLE DESCRIPTION DERM
LOCATION SIMPLE: LEFT POSTERIOR UPPER ARM
LOCATION SIMPLE: RIGHT POSTERIOR UPPER ARM
LOCATION SIMPLE: CHEST

## 2022-05-23 NOTE — PROCEDURE: OTHER
Other (Free Text): Lesions are scratched at, advised to keep nails trimmed short, culture performed\\n\\nIt sounds like her granddaughter was treated for scabies but I don’t think that is the culprit for her case
Detail Level: Simple
Note Text (......Xxx Chief Complaint.): This diagnosis correlates with the
Render Risk Assessment In Note?: no

## 2022-05-24 ENCOUNTER — HOSPITAL ENCOUNTER (EMERGENCY)
Age: 72
Discharge: HOME | End: 2022-05-24
Attending: STUDENT IN AN ORGANIZED HEALTH CARE EDUCATION/TRAINING PROGRAM
Payer: MEDICARE

## 2022-05-24 VITALS
SYSTOLIC BLOOD PRESSURE: 128 MMHG | HEIGHT: 70 IN | WEIGHT: 139 LBS | OXYGEN SATURATION: 100 % | TEMPERATURE: 97.9 F | BODY MASS INDEX: 19.9 KG/M2 | RESPIRATION RATE: 16 BRPM | DIASTOLIC BLOOD PRESSURE: 60 MMHG | HEART RATE: 56 BPM

## 2022-05-24 DIAGNOSIS — M79.641 HAND PAIN, RIGHT: Primary | ICD-10-CM

## 2022-05-24 DIAGNOSIS — L03.113 CELLULITIS OF RIGHT UPPER EXTREMITY: ICD-10-CM

## 2022-05-24 PROCEDURE — 99283 EMERGENCY DEPT VISIT LOW MDM: CPT

## 2022-05-24 RX ORDER — CEPHALEXIN 500 MG/1
500 CAPSULE ORAL 4 TIMES DAILY
Qty: 28 CAPSULE | Refills: 0 | Status: SHIPPED | OUTPATIENT
Start: 2022-05-24 | End: 2022-05-31

## 2022-05-24 ASSESSMENT — PAIN SCALES - GENERAL: PAINLEVEL_OUTOF10: 6

## 2022-05-24 ASSESSMENT — ENCOUNTER SYMPTOMS
NAUSEA: 0
COUGH: 0
VOMITING: 0

## 2022-05-24 ASSESSMENT — PAIN - FUNCTIONAL ASSESSMENT: PAIN_FUNCTIONAL_ASSESSMENT: 0-10

## 2022-05-24 NOTE — ED TRIAGE NOTES
Pt presents to ED c/o right hand pain, swelling and redness. Pt denies injury. Pt states she woke up with swelling and pain today. Masked.

## 2022-05-24 NOTE — ED NOTES
I have reviewed discharge instructions with the patient. The patient verbalized understanding. Patient left ED via Discharge Method: ambulatory to Home with self. Opportunity for questions and clarification provided. Patient given 1 scripts. To continue your aftercare when you leave the hospital, you may receive an automated call from our care team to check in on how you are doing. This is a free service and part of our promise to provide the best care and service to meet your aftercare needs.  If you have questions, or wish to unsubscribe from this service please call [phone]. Thank you for Choosing our [facility] Emergency Department.         Elyse Esparza RN  05/24/22 [id number]

## 2022-05-24 NOTE — ED PROVIDER NOTES
[employer] Emergency Department Provider Note                   PCP:                Dayami Caceres MD               Age: 80 y.o. Sex: female     No diagnosis found. DISPOSITION         New Prescriptions    No medications on file       No orders of the defined types were placed in this encounter. MDM  Number of Diagnoses or Management Options  Cellulitis of right upper extremity  Hand pain, right  Diagnosis management comments: 67-year-old female presents with right hand swelling. Gout versus cellulitis. Patient does not have history of gout, no known kidney disease. Also pain is very minimal with flexion/extension of wrist. Has no tender joints. Gout less likely. Will cover for cellulitic changes with oral antibiotics. Patient understands occasions which to return here. She has normal vital signs, afebrile, no nausea or vomiting. Do not see any medication for laboratory work-up this time. Conservative treatment. Patient amenable to this course of treatment. Swelling is only of the hand, does not have any circumferential right upper extremity swelling. No history of clots in the past so also minimal concern for upper extremity DVT in this patient. Risk of Complications, Morbidity, and/or Mortality  Presenting problems: low  Diagnostic procedures: low  Management options: pat Laguerre is a 80 y.o. female who presents to the Emergency Department with chief complaint of    Chief Complaint   Patient presents with    Hand Pain      67-year-old female presents to the emergency room with chief complaint of right hand swelling. States she woke up with swelling. Does not recall any specific injury. Has some healing wounds on the right ring finger. Not worsened or alleviated by anything. All other systems reviewed and are negative. Review of Systems   Constitutional: Negative for chills and fever. Respiratory: Negative for cough.     Cardiovascular: Negative for chest pain. Gastrointestinal: Negative for nausea and vomiting. Musculoskeletal: Positive for myalgias. Negative for arthralgias. All other systems reviewed and are negative. Past Medical History:   Diagnosis Date    Allergic rhinitis 6/28/2013    Asthma     Bipolar affective disorder (HCC)     Disturbance of skin sensation     Fibromyalgia     GERD (gastroesophageal reflux disease)     History of miscarriage     History of peptic ulcer     Hyperlipidemia     Hypertension     Hypothyroidism     IBS (irritable bowel syndrome)     Impaired fasting glucose 2/7/2022    Lumbar disc disease     Mixed stress and urge urinary incontinence     ANUPAM on CPAP     Osteoarthritis     Premature beats     PACs / PVCs    Right renal mass         Past Surgical History:   Procedure Laterality Date    BLADDER SUSPENSION  2003    CHOLECYSTECTOMY  1980s    CYSTOCELE REPAIR  11/12/2009    HYSTERECTOMY, TOTAL ABDOMINAL  2003    OVARY REMOVAL      RECTOCELE REPAIR  11/12/2009    TONSILLECTOMY  1980        Family History   Problem Relation Age of Onset    Breast Cancer Mother 61    Heart Disease Father     Prostate Cancer Father            Social Connections:     Frequency of Communication with Friends and Family: Not on file    Frequency of Social Gatherings with Friends and Family: Not on file    Attends Holiness Services: Not on file    Active Member of Clubs or Organizations: Not on file    Attends Club or Organization Meetings: Not on file    Marital Status: Not on file        Allergies   Allergen Reactions    Fentanyl Other (See Comments) and Rash     Not sure of reaction  Doesn't remember reaction    Not sure of reaction    Venlafaxine Other (See Comments)     Denies allergy        Vitals signs and nursing note reviewed.    Patient Vitals for the past 4 hrs:   Temp Pulse Resp BP SpO2   05/24/22 1755 97.9 °F (36.6 °C) 56 16 128/60 100 %          Physical Exam  Vitals and nursing note reviewed. Constitutional:       General: She is not in acute distress. Appearance: Normal appearance. She is normal weight. She is not ill-appearing, toxic-appearing or diaphoretic. HENT:      Head: Normocephalic and atraumatic. Nose: Nose normal.      Mouth/Throat:      Mouth: Mucous membranes are moist.   Eyes:      Pupils: Pupils are equal, round, and reactive to light. Cardiovascular:      Rate and Rhythm: Normal rate. Pulmonary:      Effort: Pulmonary effort is normal.   Abdominal:      Palpations: Abdomen is soft. Musculoskeletal:         General: Normal range of motion. Skin:     Comments: Some redness, erythema and swelling of the dorsum of the right hand. Small amount of cellulitic changes advancing up the right arm, it does not follow a pattern of lymphadenitis. Neurological:      General: No focal deficit present. Mental Status: She is alert. Psychiatric:         Mood and Affect: Mood normal.          Procedures    Labs Reviewed - No data to display     No orders to display            Gregory Coma Scale  Eye Opening: Spontaneous  Best Verbal Response: Oriented  Best Motor Response: Obeys commands  Gregory Coma Scale Score: 15                    Voice dictation software was used during the making of this note. This software is not perfect and grammatical and other typographical errors may be present. This note has not been completely proofread for errors.  MAU Guzman 6:14 PM        MAU Guzman  05/24/22 [place], [place]  05/24/22 [id number]

## 2022-06-13 RX ORDER — MONTELUKAST SODIUM 10 MG/1
TABLET ORAL
Qty: 120 TABLET | Refills: 0 | OUTPATIENT
Start: 2022-06-13

## 2022-07-13 ENCOUNTER — APPOINTMENT (RX ONLY)
Dept: URBAN - METROPOLITAN AREA CLINIC 24 | Facility: CLINIC | Age: 72
Setting detail: DERMATOLOGY
End: 2022-07-13

## 2022-07-13 DIAGNOSIS — L30.9 DERMATITIS, UNSPECIFIED: ICD-10-CM

## 2022-07-13 DIAGNOSIS — L85.3 XEROSIS CUTIS: ICD-10-CM

## 2022-07-13 DIAGNOSIS — L98.1 FACTITIAL DERMATITIS: ICD-10-CM

## 2022-07-13 PROCEDURE — ? PRESCRIPTION MEDICATION MANAGEMENT

## 2022-07-13 PROCEDURE — 99214 OFFICE O/P EST MOD 30 MIN: CPT | Mod: 25

## 2022-07-13 PROCEDURE — ? COUNSELING

## 2022-07-13 PROCEDURE — ? PRESCRIPTION

## 2022-07-13 PROCEDURE — 11104 PUNCH BX SKIN SINGLE LESION: CPT

## 2022-07-13 PROCEDURE — ? BIOPSY BY PUNCH METHOD

## 2022-07-13 RX ORDER — MUPIROCIN 20 MG/G
OINTMENT TOPICAL
Qty: 44 | Refills: 1 | Status: ERX

## 2022-07-13 ASSESSMENT — LOCATION ZONE DERM
LOCATION ZONE: LEG
LOCATION ZONE: TRUNK
LOCATION ZONE: ARM
LOCATION ZONE: HAND

## 2022-07-13 ASSESSMENT — LOCATION DETAILED DESCRIPTION DERM
LOCATION DETAILED: LEFT DISTAL DORSAL FOREARM
LOCATION DETAILED: LEFT DISTAL POSTERIOR UPPER ARM
LOCATION DETAILED: LEFT PROXIMAL DORSAL FOREARM
LOCATION DETAILED: LEFT SUPERIOR LATERAL LOWER BACK
LOCATION DETAILED: RIGHT POSTERIOR SHOULDER
LOCATION DETAILED: RIGHT RIB CAGE
LOCATION DETAILED: RIGHT INFERIOR UPPER BACK
LOCATION DETAILED: LEFT INFERIOR MEDIAL MIDBACK
LOCATION DETAILED: LEFT ANTERIOR DISTAL UPPER ARM
LOCATION DETAILED: RIGHT PROXIMAL POSTERIOR UPPER ARM
LOCATION DETAILED: RIGHT ANTERIOR PROXIMAL UPPER ARM
LOCATION DETAILED: LEFT PROXIMAL POSTERIOR UPPER ARM
LOCATION DETAILED: LEFT DISTAL POSTERIOR THIGH
LOCATION DETAILED: RIGHT DISTAL DORSAL FOREARM
LOCATION DETAILED: RIGHT RADIAL DORSAL HAND
LOCATION DETAILED: RIGHT ANTERIOR LATERAL DISTAL UPPER ARM
LOCATION DETAILED: RIGHT ANTERIOR LATERAL PROXIMAL UPPER ARM
LOCATION DETAILED: LEFT PROXIMAL LATERAL POSTERIOR THIGH
LOCATION DETAILED: RIGHT LATERAL ABDOMEN

## 2022-07-13 ASSESSMENT — LOCATION SIMPLE DESCRIPTION DERM
LOCATION SIMPLE: RIGHT HAND
LOCATION SIMPLE: RIGHT POSTERIOR UPPER ARM
LOCATION SIMPLE: RIGHT FOREARM
LOCATION SIMPLE: LEFT POSTERIOR UPPER ARM
LOCATION SIMPLE: ABDOMEN
LOCATION SIMPLE: RIGHT SHOULDER
LOCATION SIMPLE: LEFT POSTERIOR THIGH
LOCATION SIMPLE: LEFT UPPER ARM
LOCATION SIMPLE: RIGHT UPPER BACK
LOCATION SIMPLE: RIGHT UPPER ARM
LOCATION SIMPLE: LEFT LOWER BACK
LOCATION SIMPLE: LEFT FOREARM

## 2022-07-13 NOTE — PROCEDURE: MIPS QUALITY
Quality 111:Pneumonia Vaccination Status For Older Adults: Pneumococcal vaccine was not administered on or after patient’s 60th birthday and before the end of the measurement period, reason not otherwise specified
Quality 402: Tobacco Use And Help With Quitting Among Adolescents: Patient screened for tobacco and is an ex-smoker
Quality 431: Preventive Care And Screening: Unhealthy Alcohol Use - Screening: Patient not identified as an unhealthy alcohol user when screened for unhealthy alcohol use using a systematic screening method
Quality 110: Preventive Care And Screening: Influenza Immunization: Influenza immunization was not ordered or administered, reason not given
Detail Level: Detailed
Quality 130: Documentation Of Current Medications In The Medical Record: Current Medications Documented

## 2022-07-13 NOTE — PROCEDURE: BIOPSY BY PUNCH METHOD
Billing Type: Third-Party Bill
X Depth Of Punch In Cm (Optional): 0
Biopsy Type: H and E
Validate Anticipated Plan: No
Epidermal Sutures: 4-0 Prolene
Information: Selecting Yes will display possible errors in your note based on the variables you have selected. This validation is only offered as a suggestion for you. PLEASE NOTE THAT THE VALIDATION TEXT WILL BE REMOVED WHEN YOU FINALIZE YOUR NOTE. IF YOU WANT TO FAX A PRELIMINARY NOTE YOU WILL NEED TO TOGGLE THIS TO 'NO' IF YOU DO NOT WANT IT IN YOUR FAXED NOTE.
Dressing: pressure dressing with telfa
Post-Care Instructions: I reviewed with the patient in detail post-care instructions. Patient is to keep the biopsy site dry overnight, and then apply bacitracin twice daily until healed. Patient may apply hydrogen peroxide soaks to remove any crusting.
Punch Size In Mm: 4
Path Notes (To The Dermatopathologist): Had 2 previous biopsies, majority of findings are excoriated but new pink papule biopsied today
Detail Level: Detailed
Suture Removal: 14 days
Anesthesia Type: 1% lidocaine with epinephrine
Accession #: [id number]
Notification Instructions: Patient will be notified of biopsy results. However, patient instructed to call the office if not contacted within 2 weeks.
Validate Note Data (See Information Below): Yes
Wound Care: Vaseline
Consent: Written consent was obtained and risks were reviewed including but not limited to scarring, infection, bleeding, scabbing, incomplete removal, nerve damage and allergy to anesthesia.
Anesthesia Volume In Cc: 0.5
Hemostasis: None
Home Suture Removal Text: Patient was provided a home suture removal kit and will remove their sutures at home.  If they have any questions or difficulties they will call the office.

## 2022-07-22 ENCOUNTER — HOSPITAL ENCOUNTER (EMERGENCY)
Age: 72
Discharge: HOME | End: 2022-07-22
Attending: EMERGENCY MEDICINE | Admitting: EMERGENCY MEDICINE
Payer: MEDICARE

## 2022-07-22 VITALS
DIASTOLIC BLOOD PRESSURE: 110 MMHG | OXYGEN SATURATION: 99 % | BODY MASS INDEX: 25.77 KG/M2 | HEART RATE: 55 BPM | HEIGHT: 70 IN | TEMPERATURE: 98.2 F | RESPIRATION RATE: 18 BRPM | SYSTOLIC BLOOD PRESSURE: 123 MMHG | WEIGHT: 180 LBS

## 2022-07-22 DIAGNOSIS — S91.209A AVULSION OF TOENAIL OF LEFT FOOT: Primary | ICD-10-CM

## 2022-07-22 DIAGNOSIS — L29.9 PRURITIC DISORDER: ICD-10-CM

## 2022-07-22 PROCEDURE — 99283 EMERGENCY DEPT VISIT LOW MDM: CPT

## 2022-07-22 RX ORDER — HYDROCODONE BITARTRATE AND ACETAMINOPHEN 5; 325 MG/1; MG/1
1 TABLET ORAL EVERY 8 HOURS PRN
Qty: 10 TABLET | Refills: 0 | Status: SHIPPED | OUTPATIENT
Start: 2022-07-22 | End: 2022-07-27

## 2022-07-22 RX ORDER — HYDROXYZINE HYDROCHLORIDE 25 MG/1
25 TABLET, FILM COATED ORAL EVERY 8 HOURS PRN
Qty: 30 TABLET | Refills: 1 | Status: SHIPPED | OUTPATIENT
Start: 2022-07-22 | End: 2022-08-01

## 2022-07-22 NOTE — ED NOTES
I have reviewed discharge instructions with the patient. The patient verbalized understanding. Patient left ED via Discharge Method: ambulatory to Home with self. Opportunity for questions and clarification provided. Patient given 1 scripts. To continue your aftercare when you leave the hospital, you may receive an automated call from our care team to check in on how you are doing. This is a free service and part of our promise to provide the best care and service to meet your aftercare needs.  If you have questions, or wish to unsubscribe from this service please call [phone]. Thank you for Choosing our [facility] Emergency Department.         Satish Reynaga RN  07/22/22 4892

## 2022-07-22 NOTE — ED TRIAGE NOTES
Pt states she hit left great toe on couch the other day, hit it again today and nail is coming off. Also has sores coming up on left arm.

## 2022-07-22 NOTE — DISCHARGE INSTRUCTIONS
Consider wearing postop shoe and taping nail down UNTIL you see podiatrist next week. Elevate and rest.  Medication for pain if needed. Recheck for signs of infection. Hydroxyzine will help out with rash until you are able to recheck with a dermatologist for the biopsy results.

## 2022-07-23 ASSESSMENT — ENCOUNTER SYMPTOMS: COLOR CHANGE: 0

## 2022-07-23 NOTE — ED PROVIDER NOTES
Subjective   Patient ID: Kelechi is a 57 year old male.    Chief Complaint   Patient presents with   • Consultation   • Chest Pain     HPI  History of hypertension, hyperlipidemia and Wegener's who states 3 weeks ago he started having occasional left parasternal and at times left lateral dull chest pain which is rated a level of about 3 out of 10 lasting 10 to 60 minutes in duration.  He denies associated shortness of breath or diaphoresis.  He states with an episode 3 weeks ago he did have some associated lightheadedness.  He denies radiation of the chest pain.  He states at times he can get short of breath with a flight of stairs.  He states occasional isolated skipped beats.  He denies syncope or near syncope.    Past History:  Kelechi has a past medical history of Essential (primary) hypertension, GERD (gastroesophageal reflux disease), and Hyperlipoproteinemia.  Pericarditis 2014.  Wegener's involving kidneys and sinuses in diagnosed 2015.    Kelechi has a past surgical history that includes Sinus surgery (2017) and Cataract extraction, bilateral.    Family History   Problem Relation Age of Onset   • Stroke Mother    • Stroke Father        Social History     Tobacco Use   • Smoking status: Former Smoker     Packs/day: 0.00   • Smokeless tobacco: Never Used   • Tobacco comment: Quit 33 years ago    Substance Use Topics   • Alcohol use: Yes     Comment: Social Drinker.    • Drug use: Not on file       Kelechi has no allergies on file.    Review of Systems   Constitution: Negative for chills, fever, malaise/fatigue, weight gain and weight loss.   HENT: Negative for hearing loss.    Eyes:        Patient denies significant visual changes   Cardiovascular: Positive for chest pain (left side chest discomfort on and off began 3 weeks ago) and palpitations (ocassional). Negative for claudication, dyspnea on exertion and leg swelling.        Negative except for what's indicated in HPI   Respiratory: Negative for cough and  [facility] Emergency Department Provider Note                   PCP:                Irwin Arroyo MD               Age: 71 y.o. Sex: female       ICD-10-CM    1. Avulsion of toenail of left foot  S91.209A HYDROcodone-acetaminophen (NORCO) 5-325 MG per tablet      2. Pruritic disorder  L29.9           DISPOSITION Decision To Discharge 07/22/2022 06:13:25 PM        MDM  Number of Diagnoses or Management Options  Avulsion of toenail of left foot  Pruritic disorder  Diagnosis management comments: No particular bony tenderness of the toe. X-ray imaging not needed. Rash without red flags of fever pustules or vesicles. No obvious bacterial infection. Risk of Complications, Morbidity, and/or Mortality  Presenting problems: moderate  Diagnostic procedures: minimal  Management options: low    Patient Progress  Patient progress: stable       Orders Placed This Encounter   Procedures    Post-op shoe        David Rice is a 71 y.o. female who presents to the Emergency Department with chief complaint of    Chief Complaint   Patient presents with    Toe Pain    Skin Problem      64-year-old female presents with 2 complaints. The first is right toe pain. She struck her toe on an object a few days ago with some bleeding on the nail. She struck it again today and pulled the nail back. Is painful to move. Does have some pain with bearing weight. Second is a rash that is diffuse and itchy. She states the rash has been going on for about 4 weeks. Describes as itchy sores on her back and extremities. She has seen dermatologist and a biopsy has been done. Is currently using what may be an antibiotic cream for it. Did not bring medications with her. Past history of sleep apnea and reflux. No fever chills. No shortness of breath. No vomiting. The history is provided by the patient. Review of Systems   Constitutional:  Negative for chills and fever. Skin:  Positive for rash.  Negative for color change. Neurological:  Negative for weakness and numbness. Past Medical History:   Diagnosis Date    Allergic rhinitis 6/28/2013    Asthma     Bipolar affective disorder (HCC)     Disturbance of skin sensation     Fibromyalgia     GERD (gastroesophageal reflux disease)     History of miscarriage     History of peptic ulcer     Hyperlipidemia     Hypertension     Hypothyroidism     IBS (irritable bowel syndrome)     Impaired fasting glucose 2/7/2022    Lumbar disc disease     Mixed stress and urge urinary incontinence     ANUPAM on CPAP     Osteoarthritis     Premature beats     PACs / PVCs    Right renal mass         Past Surgical History:   Procedure Laterality Date    BLADDER SUSPENSION  2003    CHOLECYSTECTOMY  1980s    CYSTOCELE REPAIR  11/12/2009    OOPHORECTOMY      RECTOCELE REPAIR  11/12/2009    TONSILLECTOMY  1980    TOTAL ABDOMINAL HYSTERECTOMY  2003        Family History   Problem Relation Age of Onset    Breast Cancer Mother 61    Heart Disease Father     Prostate Cancer Father         Social History     Socioeconomic History    Marital status:    Tobacco Use    Smoking status: Never    Smokeless tobacco: Never   Substance and Sexual Activity    Alcohol use: No    Drug use: No   Social History Narrative     and lives with her --has a son and daughter. Disabled and has history of work in the [employer] No pets.                Fentanyl and Venlafaxine     Discharge Medication List as of 7/22/2022  6:18 PM        CONTINUE these medications which have NOT CHANGED    Details   albuterol sulfate  (90 Base) MCG/ACT inhaler Inhale 2 puffs into the lungs every 4 hours as neededHistorical Med      aspirin 81 MG EC tablet Take by mouth 2 times dailyHistorical Med      atorvastatin (LIPITOR) 40 MG tablet Take by mouth dailyHistorical Med      !! buPROPion (WELLBUTRIN XL) 150 MG extended release tablet Take 150 mg by mouth every eveningHistorical Med      !! buPROPion hemoptysis.    Hematologic/Lymphatic: Does not bruise/bleed easily.   Skin: Negative for rash and suspicious lesions.   Musculoskeletal: Negative for arthritis.   Gastrointestinal: Negative for hematochezia and melena.   Genitourinary: Negative for hematuria.   Neurological: Positive for light-headedness (one episode happened 3 weeks ago. ). Negative for dizziness and headaches.        No localized deficits   Allergic/Immunologic: Negative for environmental allergies.        No new food allergies       Visit Vitals  /76 (BP Location: LUE - Left upper extremity, Patient Position: Sitting, Cuff Size: Regular)   Pulse 72   Ht 6' (1.829 m)   Wt 113.5 kg (250 lb 3.2 oz)   SpO2 96%   BMI 33.93 kg/m²       Objective   Physical Exam  Vital signs reviewed.  Constitutional: Well-developed, well-nourished.  Obese.  No apparent distress.  Weight: BMI reviewed and discussed.  HENT: Normocephalic.  No trauma.  Nose: Nose normal.  Mouth/Throat: Oropharynx is clear.  Eyes: Conjunctiva are normal.  Neck: Neck supple.  No elevated jugular venous distention.  No carotid bruits.  Cardiovascular: PMI is not displaced.  S1, S2 normal.  No S3.  No murmur.   Abdominal aorta not enlarged.  No carotid bruits.  Pedal pulses intact.  Chest: Effort and excursion normal.  Lungs clear.  No rales or wheezing.  Abdomen: Soft.  Nontender.  No hepatomegaly.    Musculoskeletal:: Adequate gait for exercise/stress testing.  Extremities:.  No clubbing, cyanosis or edema.    Neurologic: Alert and oriented to place, time and person.  Skin: No rash.    Laboratory Data:   EKG 10/24/2019: Normal sinus rhythm, incomplete right bundle branch block, abnormal EKG.  Stress echo treadmill November 2014: Normal.  Echocardiogram 9/23/2014: LVEF 60 to 65%.  No pericardial effusion.  Echocardiogram 10/23/2014: LVEF 65%.  No pericardial effusion.    Assessment   Problem List Items Addressed This Visit        Urinary    Chronic renal insufficiency      Other Visit  Diagnoses     Precordial pain    -  Primary    Relevant Orders    ELECTROCARDIOGRAM 12-LEAD    STRESS TEST WITH MYOCARDIAL PERFUSION    TRANSTHORACIC ECHO(TTE) COMPLETE W/ W/O IMAGING AGENT    Shortness of breath on exertion        Relevant Orders    STRESS TEST WITH MYOCARDIAL PERFUSION    Palpitations        Essential hypertension        Mixed hyperlipidemia            Assessment and Plan: Patient with chest pain which is not exertional.  Still need to rule out angina as patient with risk factors for coronary artery disease.  Patient does have history of pericarditis in 2014 at which time EKG did have  J-point and ST segment elevation although there was no pericardial effusion present by echocardiogram.  Patient occasionally has shortness of breath with a flight of stairs.  His hypertension is controlled.  He states he will be having a lipid profile done at work beginning November the rest of a copy could be sent to me.  Patient with rare palpitations.  Patient did have one episode of lightheadedness which was associated with chest pain 3 weeks ago.  Patient with chronic renal insufficiency related to Wegener's in which patient states his last creatinine was 1.6.    PLAN: Cardiolite treadmill.  2D echo with Doppler.  Have copy lipid profile sent to me.  Return office visit in 6 months.      Kelechi   Current Medications    ALLOPURINOL (ZYLOPRIM) 300 MG TABLET    Take 300 mg by mouth daily.     CHOLECALCIFEROL (VITAMIN D3) 1000 UNITS TABLET    Take 1,000 Units by mouth daily.    HYDROCHLOROTHIAZIDE (HYDRODIURIL) 25 MG TABLET    Take 25 mg by mouth daily.     OLMESARTAN (BENICAR) 20 MG TABLET    TAKE ONE TABLET DAILY AS DIRECTED    OMEPRAZOLE (PRILOSEC) 20 MG CAPSULE    Take 20 mg by mouth daily.    PREDNISONE (DELTASONE) 2.5 MG TABLET    Take 2.5 mg by mouth daily.     ROSUVASTATIN (CRESTOR) 10 MG TABLET    Take 10 mg by mouth daily.     SULFAMETHOXAZOLE-TRIMETHOPRIM (BACTRIM DS) 800-160 MG PER TABLET    Take 1  orders to display                  Discussed with patient's options of treatment. I have no further offerings with regard to her rash other than treating the itching with hydroxyzine. Evidently rash is confusing enough that is required dermatology consultation with biopsy. No red flags regarding the rash. Regarding the toenail. Patient offered option of taping the nail in place to allow it to fall off. Option also to place an postop shoe after taping and patient has a podiatrist that he can see. Offered option of digital block of toe with removal of nail. Patient declines this option. Voice dictation software was used during the making of this note. This software is not perfect and grammatical and other typographical errors may be present. This note has not been completely proofread for errors.      Magda Amezquita MD  07/23/22 7085 tablet by mouth 3 times daily. Monday, Wednesday, and Friday.    TADALAFIL (CIALIS) 5 MG TABLET    TAKE 1 TABLET DAILY AS NEEDED

## 2022-07-27 ENCOUNTER — APPOINTMENT (RX ONLY)
Dept: URBAN - METROPOLITAN AREA CLINIC 24 | Facility: CLINIC | Age: 72
Setting detail: DERMATOLOGY
End: 2022-07-27

## 2022-07-27 DIAGNOSIS — Z48.01 ENCOUNTER FOR CHANGE OR REMOVAL OF SURGICAL WOUND DRESSING: ICD-10-CM

## 2022-07-27 DIAGNOSIS — L30.9 DERMATITIS, UNSPECIFIED: ICD-10-CM

## 2022-07-27 PROCEDURE — ? SUTURE REMOVAL (GLOBAL PERIOD)

## 2022-07-27 PROCEDURE — ? OTHER

## 2022-07-27 PROCEDURE — ? PRESCRIPTION

## 2022-07-27 PROCEDURE — ? COUNSELING

## 2022-07-27 PROCEDURE — 99214 OFFICE O/P EST MOD 30 MIN: CPT

## 2022-07-27 RX ORDER — MUPIROCIN 20 MG/G
OINTMENT TOPICAL
Qty: 44 | Refills: 6 | Status: ERX

## 2022-07-27 RX ORDER — PERMETHRIN 50 MG/G
CREAM TOPICAL
Qty: 120 | Refills: 1 | Status: ERX | COMMUNITY
Start: 2022-07-27

## 2022-07-27 RX ADMIN — PERMETHRIN: 50 CREAM TOPICAL at 00:00

## 2022-07-27 ASSESSMENT — LOCATION DETAILED DESCRIPTION DERM
LOCATION DETAILED: RIGHT ANTERIOR LATERAL DISTAL UPPER ARM
LOCATION DETAILED: LEFT RADIAL DORSAL HAND
LOCATION DETAILED: RIGHT PROXIMAL DORSAL FOREARM
LOCATION DETAILED: LEFT ANTERIOR DISTAL THIGH
LOCATION DETAILED: LEFT PROXIMAL DORSAL FOREARM
LOCATION DETAILED: RIGHT RADIAL DORSAL HAND
LOCATION DETAILED: RIGHT ANTERIOR DISTAL THIGH
LOCATION DETAILED: RIGHT ANTERIOR LATERAL PROXIMAL UPPER ARM
LOCATION DETAILED: RIGHT ANTERIOR PROXIMAL UPPER ARM

## 2022-07-27 ASSESSMENT — LOCATION SIMPLE DESCRIPTION DERM
LOCATION SIMPLE: LEFT FOREARM
LOCATION SIMPLE: RIGHT THIGH
LOCATION SIMPLE: LEFT THIGH
LOCATION SIMPLE: LEFT HAND
LOCATION SIMPLE: RIGHT UPPER ARM
LOCATION SIMPLE: RIGHT HAND
LOCATION SIMPLE: RIGHT FOREARM

## 2022-07-27 ASSESSMENT — LOCATION ZONE DERM
LOCATION ZONE: LEG
LOCATION ZONE: ARM
LOCATION ZONE: HAND

## 2022-07-27 NOTE — PROCEDURE: OTHER
Render Risk Assessment In Note?: no
Detail Level: Simple
Note Text (......Xxx Chief Complaint.): This diagnosis correlates with the
Other (Free Text): Biopsy results discussed with patient at great length. Possible bug bites vs medications. Will treat with permethrin to treat possible scabies and see if that helps improve rash. She stated  and baby at home have current rash and itching. Pediatrician has treated baby with scabies medication last month from pediatrician. Given instructions for permethrin cream and washing all clothes, blankets, sheets, and avoiding snuggling with the baby.\\n\\n\\nThey do not have any pets in the home or pets that have lived inside before. Depending on this clinical course we may need to coordinate treating the baby in addition to her  and her.

## 2022-07-27 NOTE — PROCEDURE: SUTURE REMOVAL (GLOBAL PERIOD)
Add 17408 Cpt? (Important Note: In 2017 The Use Of 87416 Is Being Tracked By Cms To Determine Future Global Period Reimbursement For Global Periods): no
Detail Level: Detailed

## 2022-08-05 ENCOUNTER — RX ONLY (OUTPATIENT)
Age: 72
Setting detail: RX ONLY
End: 2022-08-05

## 2022-08-05 RX ORDER — TRIAMCINOLONE ACETONIDE 1 MG/G
CREAM TOPICAL
Qty: 80 | Refills: 0 | Status: ERX | COMMUNITY
Start: 2022-08-05

## 2022-08-18 ENCOUNTER — HOSPITAL ENCOUNTER (EMERGENCY)
Age: 72
Discharge: HOME | End: 2022-08-18
Attending: EMERGENCY MEDICINE
Payer: MEDICARE

## 2022-08-18 ENCOUNTER — APPOINTMENT (OUTPATIENT)
Dept: CT IMAGING | Age: 72
End: 2022-08-18
Payer: MEDICARE

## 2022-08-18 VITALS
SYSTOLIC BLOOD PRESSURE: 135 MMHG | HEIGHT: 70 IN | BODY MASS INDEX: 25.77 KG/M2 | WEIGHT: 180 LBS | TEMPERATURE: 98.4 F | HEART RATE: 62 BPM | RESPIRATION RATE: 20 BRPM | DIASTOLIC BLOOD PRESSURE: 65 MMHG | OXYGEN SATURATION: 98 %

## 2022-08-18 DIAGNOSIS — H81.10 BENIGN PAROXYSMAL POSITIONAL VERTIGO, UNSPECIFIED LATERALITY: Primary | ICD-10-CM

## 2022-08-18 LAB
ANION GAP SERPL CALC-SCNC: 10 MMOL/L (ref 7–16)
BASOPHILS # BLD: 0.1 K/UL (ref 0–0.2)
BASOPHILS NFR BLD: 1 % (ref 0–2)
BUN SERPL-MCNC: 22 MG/DL (ref 7–18)
CALCIUM SERPL-MCNC: 9.8 MG/DL (ref 8.3–10.4)
CHLORIDE SERPL-SCNC: 102 MMOL/L (ref 98–107)
CO2 SERPL-SCNC: 30 MMOL/L (ref 21–32)
CREAT SERPL-MCNC: 0.61 MG/DL (ref 0.6–1)
DIFFERENTIAL METHOD BLD: NORMAL
EOSINOPHIL # BLD: 0.4 K/UL (ref 0–0.8)
EOSINOPHIL NFR BLD: 4 % (ref 0.5–7.8)
ERYTHROCYTE [DISTWIDTH] IN BLOOD BY AUTOMATED COUNT: 12.3 % (ref 11.9–14.6)
GLUCOSE SERPL-MCNC: 101 MG/DL (ref 65–100)
HCT VFR BLD AUTO: 45.7 % (ref 35.8–46.3)
HGB BLD-MCNC: 14.9 G/DL (ref 11.7–15.4)
IMM GRANULOCYTES # BLD AUTO: 0 K/UL (ref 0–0.5)
IMM GRANULOCYTES NFR BLD AUTO: 0 % (ref 0–5)
LYMPHOCYTES # BLD: 2.8 K/UL (ref 0.5–4.6)
LYMPHOCYTES NFR BLD: 28 % (ref 13–44)
MCH RBC QN AUTO: 30 PG (ref 26.1–32.9)
MCHC RBC AUTO-ENTMCNC: 32.6 G/DL (ref 31.4–35)
MCV RBC AUTO: 92.1 FL (ref 79.6–97.8)
MONOCYTES # BLD: 0.9 K/UL (ref 0.1–1.3)
MONOCYTES NFR BLD: 8 % (ref 4–12)
NEUTS SEG # BLD: 6 K/UL (ref 1.7–8.2)
NEUTS SEG NFR BLD: 59 % (ref 43–78)
NRBC # BLD: 0 K/UL (ref 0–0.2)
PLATELET # BLD AUTO: 267 K/UL (ref 150–450)
PMV BLD AUTO: 10.2 FL (ref 9.4–12.3)
POTASSIUM SERPL-SCNC: 4.2 MMOL/L (ref 3.5–5.1)
RBC # BLD AUTO: 4.96 M/UL (ref 4.05–5.2)
SARS-COV-2 RDRP RESP QL NAA+PROBE: NOT DETECTED
SODIUM SERPL-SCNC: 142 MMOL/L (ref 136–145)
SOURCE: NORMAL
WBC # BLD AUTO: 10.3 K/UL (ref 4.3–11.1)

## 2022-08-18 PROCEDURE — 96374 THER/PROPH/DIAG INJ IV PUSH: CPT

## 2022-08-18 PROCEDURE — 87635 SARS-COV-2 COVID-19 AMP PRB: CPT

## 2022-08-18 PROCEDURE — 2580000003 HC RX 258: Performed by: EMERGENCY MEDICINE

## 2022-08-18 PROCEDURE — 6360000002 HC RX W HCPCS: Performed by: EMERGENCY MEDICINE

## 2022-08-18 PROCEDURE — 99284 EMERGENCY DEPT VISIT MOD MDM: CPT

## 2022-08-18 PROCEDURE — 80048 BASIC METABOLIC PNL TOTAL CA: CPT

## 2022-08-18 PROCEDURE — 6370000000 HC RX 637 (ALT 250 FOR IP): Performed by: EMERGENCY MEDICINE

## 2022-08-18 PROCEDURE — 85025 COMPLETE CBC W/AUTO DIFF WBC: CPT

## 2022-08-18 PROCEDURE — 70450 CT HEAD/BRAIN W/O DYE: CPT

## 2022-08-18 RX ORDER — DIAZEPAM 5 MG/ML
5 INJECTION, SOLUTION INTRAMUSCULAR; INTRAVENOUS EVERY 4 HOURS PRN
Status: DISCONTINUED | OUTPATIENT
Start: 2022-08-18 | End: 2022-08-18 | Stop reason: HOSPADM

## 2022-08-18 RX ORDER — 0.9 % SODIUM CHLORIDE 0.9 %
1000 INTRAVENOUS SOLUTION INTRAVENOUS
Status: COMPLETED | OUTPATIENT
Start: 2022-08-18 | End: 2022-08-18

## 2022-08-18 RX ORDER — MECLIZINE HYDROCHLORIDE 25 MG/1
25 TABLET ORAL
Status: COMPLETED | OUTPATIENT
Start: 2022-08-18 | End: 2022-08-18

## 2022-08-18 RX ORDER — ONDANSETRON 4 MG/1
4 TABLET, FILM COATED ORAL 3 TIMES DAILY PRN
Qty: 15 TABLET | Refills: 0 | Status: SHIPPED | OUTPATIENT
Start: 2022-08-18

## 2022-08-18 RX ORDER — MAGNESIUM HYDROXIDE/ALUMINUM HYDROXICE/SIMETHICONE 120; 1200; 1200 MG/30ML; MG/30ML; MG/30ML
30 SUSPENSION ORAL
Status: COMPLETED | OUTPATIENT
Start: 2022-08-18 | End: 2022-08-18

## 2022-08-18 RX ORDER — MECLIZINE HYDROCHLORIDE 25 MG/1
25 TABLET ORAL 3 TIMES DAILY PRN
Qty: 30 TABLET | Refills: 0 | Status: SHIPPED | OUTPATIENT
Start: 2022-08-18 | End: 2022-08-28

## 2022-08-18 RX ORDER — LIDOCAINE HYDROCHLORIDE 20 MG/ML
15 SOLUTION OROPHARYNGEAL
Status: COMPLETED | OUTPATIENT
Start: 2022-08-18 | End: 2022-08-18

## 2022-08-18 RX ADMIN — MECLIZINE HYDROCHLORIDE 25 MG: 25 TABLET ORAL at 15:32

## 2022-08-18 RX ADMIN — SODIUM CHLORIDE 1000 ML: 9 INJECTION, SOLUTION INTRAVENOUS at 13:13

## 2022-08-18 RX ADMIN — ALUMINUM HYDROXIDE, MAGNESIUM HYDROXIDE, AND SIMETHICONE 30 ML: 200; 200; 20 SUSPENSION ORAL at 12:34

## 2022-08-18 RX ADMIN — LIDOCAINE HYDROCHLORIDE 15 ML: 20 SOLUTION ORAL; TOPICAL at 12:34

## 2022-08-18 RX ADMIN — DIAZEPAM 5 MG: 5 INJECTION INTRAMUSCULAR; INTRAVENOUS at 13:13

## 2022-08-18 ASSESSMENT — ENCOUNTER SYMPTOMS
TROUBLE SWALLOWING: 0
VOICE CHANGE: 0
ABDOMINAL PAIN: 1
NAUSEA: 1
VOMITING: 0

## 2022-08-18 ASSESSMENT — PAIN - FUNCTIONAL ASSESSMENT
PAIN_FUNCTIONAL_ASSESSMENT: 0-10
PAIN_FUNCTIONAL_ASSESSMENT: 0-10

## 2022-08-18 ASSESSMENT — PAIN SCALES - GENERAL
PAINLEVEL_OUTOF10: 5
PAINLEVEL_OUTOF10: 6
PAINLEVEL_OUTOF10: 5

## 2022-08-18 ASSESSMENT — PAIN DESCRIPTION - LOCATION
LOCATION: THROAT;ABDOMEN
LOCATION: THROAT;GENERALIZED

## 2022-08-18 ASSESSMENT — PAIN DESCRIPTION - DESCRIPTORS: DESCRIPTORS: ACHING

## 2022-08-18 NOTE — ED PROVIDER NOTES
[facility] Emergency Department Provider Note                   PCP:                Jah Varner MD               Age: 71 y.o. Sex: female       ICD-10-CM    1. Benign paroxysmal positional vertigo, unspecified laterality  H81.10           DISPOSITION Decision To Discharge 08/18/2022 03:30:22 PM       MDM  Number of Diagnoses or Management Options  Benign paroxysmal positional vertigo, unspecified laterality  Diagnosis management comments: Vertigo,    TIA, CVA, ischemic stroke, Bell's palsy, intracranial hemorrhage,  subdural hematoma, subarachnoid hemorrhage,    tension headache, migraine headache, brain tumor, cluster headache, sinusitis    electrolyte abnormality, renal failure, malignant hypertension, UTI             Amount and/or Complexity of Data Reviewed  Clinical lab tests: ordered and reviewed  Tests in the radiology section of CPT®: reviewed and ordered  Tests in the medicine section of CPT®: reviewed and ordered  Review and summarize past medical records: yes  Independent visualization of images, tracings, or specimens: yes         Orders Placed This Encounter   Procedures    COVID-19, Rapid    CT HEAD WO CONTRAST    CBC with Auto Differential    Basic Metabolic Panel    EKG 12 Lead        Roberto Bonilla is a 71 y.o. female who presents to the Emergency Department with chief complaint of    Chief Complaint   Patient presents with    Dizziness    Pharyngitis      Patient is a 67-year-old female presenting to the emergency department today complaining of dizziness. The patient's dizziness has been intermittent for the last few days but much worse today. She describes it as a room spinning sensation made worse with position changes. She is also complaining of pain with swallowing. The patient has a history of acid reflux and is on Prilosec. She had to have an esophageal dilation done recently because of esophageal strictures. All other systems reviewed and are negative.     Review of Systems   HENT:  Negative for trouble swallowing and voice change. Gastrointestinal:  Positive for abdominal pain and nausea. Negative for vomiting. Neurological:  Positive for dizziness. All other systems reviewed and are negative. Past Medical History:   Diagnosis Date    Allergic rhinitis 6/28/2013    Asthma     Bipolar affective disorder ([facility]     Disturbance of skin sensation     Fibromyalgia     GERD (gastroesophageal reflux disease)     History of miscarriage     History of peptic ulcer     Hyperlipidemia     Hypertension     Hypothyroidism     IBS (irritable bowel syndrome)     Impaired fasting glucose 2/7/2022    Lumbar disc disease     Mixed stress and urge urinary incontinence     ANUPAM on CPAP     Osteoarthritis     Premature beats     PACs / PVCs    Right renal mass         Past Surgical History:   Procedure Laterality Date    BLADDER SUSPENSION  2003    CHOLECYSTECTOMY  1980s    CYSTOCELE REPAIR  11/12/2009    HYSTERECTOMY, TOTAL ABDOMINAL (CERVIX REMOVED)  2003    OVARY REMOVAL      RECTOCELE REPAIR  11/12/2009    TONSILLECTOMY  1980        Family History   Problem Relation Age of Onset    Breast Cancer Mother 61    Heart Disease Father     Prostate Cancer Father         Social History     Socioeconomic History    Marital status:      Spouse name: None    Number of children: None    Years of education: None    Highest education level: None   Tobacco Use    Smoking status: Never    Smokeless tobacco: Never   Substance and Sexual Activity    Alcohol use: No    Drug use: No   Social History Narrative     and lives with her --has a son and daughter. Disabled and has history of work in the [employer] No pets.               Allergies: Fentanyl and Venlafaxine    Previous Medications    ALBUTEROL SULFATE  (90 BASE) MCG/ACT INHALER    Inhale 2 puffs into the lungs every 4 hours as needed    ASPIRIN 81 MG EC TABLET    Take by mouth 2 times daily    ATORVASTATIN (LIPITOR) 40 MG TABLET    Take by mouth daily    BUPROPION (WELLBUTRIN XL) 150 MG EXTENDED RELEASE TABLET    Take 150 mg by mouth every evening    BUPROPION (WELLBUTRIN XL) 300 MG EXTENDED RELEASE TABLET    Take 30 mg by mouth    CETIRIZINE (ZYRTEC) 10 MG TABLET    Take by mouth    CICLOPIROX (PENLAC) 8 % SOLUTION    Apply topically    DIAZEPAM (VALIUM) 10 MG TABLET    Take 10 mg by mouth every 8 hours as needed. GABAPENTIN (NEURONTIN) 300 MG CAPSULE    Take one in AM and 4 at hs    LEVOTHYROXINE (SYNTHROID) 100 MCG TABLET    Take 125 mcg by mouth daily    MONTELUKAST (SINGULAIR) 10 MG TABLET    TAKE ONE TABLET BY MOUTH ONE TIME DAILY    OXCARBAZEPINE (TRILEPTAL) 300 MG TABLET    Take by mouth 2 times daily    PANTOPRAZOLE (PROTONIX) 20 MG TABLET    TAKE 1 TABLET BY MOUTH EVERY DAY    SUCRALFATE (CARAFATE) 1 GM TABLET    Take 1 g by mouth 4 times daily    TRAZODONE (DESYREL) 100 MG TABLET    Take 100 mg by mouth    VITAMIN D3 (CHOLECALCIFEROL) 125 MCG (5000 UT) TABS TABLET    Take 5,000 Units by mouth daily        Vitals signs and nursing note reviewed. Patient Vitals for the past 4 hrs:   Pulse Resp BP SpO2   08/18/22 1519 57 20 (!) 137/56 98 %          Physical Exam     GENERAL:The patient has Body mass index is 25.83 kg/m². Well-hydrated. VITAL SIGNS: Heart rate, blood pressure, respiratory rate reviewed as recorded in  nurse's notes  EYES: Pupils reactive. Extraocular motion intact. No conjunctival redness or drainage. Positive Megan-Hallpike  MOUTH/THROAT: Pharynx clear; airway patent. NECK: Supple, no meningeal signs. Trachea midline. No masses or thyromegaly. LUNGS: Breath sounds clear and equal bilaterally no accessory muscle use. CHEST: No deformity  CARDIOVASCULAR: Regular rate and rhythm  ABDOMEN: Soft without tenderness. No palpable masses or organomegaly. No  peritoneal signs. No rigidity. EXTREMITIES: No clubbing or cyanosis. No joint swelling. Normal muscle tone.  No  restricted range of motion appreciated. NEUROLOGIC: Sensation is grossly intact. Cranial nerve exam reveals face is  symmetrical, tongue is midline speech is clear. SKIN: No rash or petechiae. Good skin turgor palpated. PSYCHIATRIC: Alert and oriented. Appropriate behavior and judgment. Procedures    ED EKG Interpretation  EKG was interpreted in the absence of a cardiologist.    Rate: Rate: Normal  EKG Interpretation: EKG Interpretation: sinus rhythm  ST Segments: Nonspecific ST segments - NO STEMI    Labs Reviewed   BASIC METABOLIC PANEL - Abnormal; Notable for the following components:       Result Value    Glucose 101 (*)     BUN 22 (*)     All other components within normal limits   COVID-19, RAPID   CBC WITH AUTO DIFFERENTIAL        CT HEAD WO CONTRAST   Final Result   Stable CT head without contrast.                               ED Course as of 08/18/22 1534   Thu Aug 18, 2022   1125 SARS-CoV-2, Rapid: Not detected [KH]   1321 CT HEAD WO CONTRAST  IMPRESSION:  Stable CT head without contrast. [KH]   1410 Patient is starting to feel better but still has three quarters of her IV fluids remaining. She will be reevaluated once these are completed. [RN]   2930 Patient was able to stand and walk to the end of the hallway and back without much trouble. The patient says she feels like she has improved enough to go home and does not want to be admitted to the hospital at this time. We did talk about close return criteria. Patient understands and is agreeable with this. [KH]      ED Course User Index  [KH] Sofia Section, DO        Voice dictation software was used during the making of this note. This software is not perfect and grammatical and other typographical errors may be present. This note has not been completely proofread for errors.        Sofia Section, DO  08/18/22 Chel. Aleksandra 56, DO  08/18/22 1534

## 2022-08-18 NOTE — ED TRIAGE NOTES
Pt presents to ED c/o 2 day hx of dizziness, sore throat and soreness through chest when swallowing. States chills but unsure of fever. Runny nose. Unknown if she has been around anyone with COVID.

## 2022-08-18 NOTE — ED NOTES
I have reviewed discharge instructions with the patient. The patient verbalized understanding. Patient left ED via Discharge Method: wheelchair to Home with family      Opportunity for questions and clarification provided. Patient given 2 scripts. To continue your aftercare when you leave the hospital, you may receive an automated call from our care team to check in on how you are doing. This is a free service and part of our promise to provide the best care and service to meet your aftercare needs.  If you have questions, or wish to unsubscribe from this service please call [phone]. Thank you for Choosing our [facility] Emergency Department.         Edith Torres RN  08/18/22 [id number]

## 2022-09-20 ENCOUNTER — APPOINTMENT (RX ONLY)
Dept: URBAN - METROPOLITAN AREA CLINIC 23 | Facility: CLINIC | Age: 72
Setting detail: DERMATOLOGY
End: 2022-09-20

## 2022-09-20 DIAGNOSIS — T07XXXA ABRASION OR FRICTION BURN OF OTHER, MULTIPLE, AND UNSPECIFIED SITES, WITHOUT MENTION OF INFECTION: ICD-10-CM

## 2022-09-20 DIAGNOSIS — L30.9 DERMATITIS, UNSPECIFIED: ICD-10-CM

## 2022-09-20 PROBLEM — S10.81XA ABRASION OF OTHER SPECIFIED PART OF NECK, INITIAL ENCOUNTER: Status: ACTIVE | Noted: 2022-09-20

## 2022-09-20 PROCEDURE — 99213 OFFICE O/P EST LOW 20 MIN: CPT

## 2022-09-20 PROCEDURE — ? ORDER TESTS

## 2022-09-20 PROCEDURE — ? COUNSELING

## 2022-09-20 PROCEDURE — ? REFERRAL

## 2022-09-20 PROCEDURE — ? TREATMENT REGIMEN

## 2022-09-20 ASSESSMENT — LOCATION ZONE DERM
LOCATION ZONE: ARM
LOCATION ZONE: NECK
LOCATION ZONE: HAND
LOCATION ZONE: LEG

## 2022-09-20 ASSESSMENT — LOCATION DETAILED DESCRIPTION DERM
LOCATION DETAILED: LEFT RADIAL DORSAL HAND
LOCATION DETAILED: RIGHT ANTERIOR LATERAL PROXIMAL UPPER ARM
LOCATION DETAILED: RIGHT ANTERIOR PROXIMAL UPPER ARM
LOCATION DETAILED: RIGHT ANTERIOR DISTAL THIGH
LOCATION DETAILED: LEFT CENTRAL LATERAL NECK
LOCATION DETAILED: LEFT PROXIMAL DORSAL FOREARM
LOCATION DETAILED: LEFT ANTERIOR DISTAL THIGH
LOCATION DETAILED: RIGHT RADIAL DORSAL HAND
LOCATION DETAILED: RIGHT PROXIMAL DORSAL FOREARM

## 2022-09-20 ASSESSMENT — LOCATION SIMPLE DESCRIPTION DERM
LOCATION SIMPLE: LEFT THIGH
LOCATION SIMPLE: LEFT FOREARM
LOCATION SIMPLE: NECK
LOCATION SIMPLE: LEFT HAND
LOCATION SIMPLE: RIGHT HAND
LOCATION SIMPLE: RIGHT UPPER ARM
LOCATION SIMPLE: RIGHT FOREARM
LOCATION SIMPLE: RIGHT THIGH

## 2022-09-20 NOTE — PROCEDURE: TREATMENT REGIMEN
Plan: Referral to Angelina Lombardi at [facility] due to urticarial pattern on biopsy and complex history
Detail Level: Zone
Continue Regimen: Mupirocin to open wounds

## 2022-10-01 ENCOUNTER — HOSPITAL ENCOUNTER (OUTPATIENT)
Dept: MAMMOGRAPHY | Age: 72
Discharge: HOME | End: 2022-10-04

## 2022-10-01 DIAGNOSIS — Z12.31 ENCOUNTER FOR SCREENING MAMMOGRAM FOR MALIGNANT NEOPLASM OF BREAST: ICD-10-CM

## 2022-12-02 ENCOUNTER — TELEPHONE (OUTPATIENT)
Dept: NEUROLOGY | Age: 72
End: 2022-12-02

## 2022-12-02 NOTE — TELEPHONE ENCOUNTER
Patient requesting a different doctor due to being seen on 04/07 and then going to the ER on 04/13. States that when she went to her opthomologist they told her to go to the ER and she feels she should have been told here. Based on office notes from our visit and the ER visit there are different symptoms. Pls adv if you are willing to switch.

## 2022-12-09 ENCOUNTER — OFFICE VISIT (OUTPATIENT)
Dept: UROLOGY | Age: 72
End: 2022-12-09

## 2022-12-09 DIAGNOSIS — N28.89 RENAL MASS: Primary | ICD-10-CM

## 2022-12-09 DIAGNOSIS — N39.46 MIXED INCONTINENCE: ICD-10-CM

## 2022-12-09 LAB
BILIRUBIN, URINE, POC: NEGATIVE
BLOOD URINE, POC: NEGATIVE
GLUCOSE URINE, POC: NEGATIVE
KETONES, URINE, POC: NEGATIVE
LEUKOCYTE ESTERASE, URINE, POC: NEGATIVE
NITRITE, URINE, POC: NEGATIVE
PH, URINE, POC: 7 (ref 4.6–8)
PROTEIN,URINE, POC: NEGATIVE
SPECIFIC GRAVITY, URINE, POC: 1.01 (ref 1–1.03)
URINALYSIS CLARITY, POC: NORMAL
URINALYSIS COLOR, POC: NORMAL
UROBILINOGEN, POC: NORMAL

## 2022-12-09 RX ORDER — TROSPIUM CHLORIDE 20 MG/1
20 TABLET, FILM COATED ORAL 2 TIMES DAILY
Qty: 180 TABLET | Refills: 3 | Status: SHIPPED | OUTPATIENT
Start: 2022-12-09

## 2022-12-09 NOTE — PROGRESS NOTES
[facility]  [place], [address] [facility]  [phone]    Shakir Saenz  : 1950     HPI   71 y.o., female returns in follow up for R renal mass. Pt recently discovered to have an incidental RUP 15mm enhancing lesion by CT with contrast on 21 after presenting with RLQ pain. MRI on 21 confirmed a solid and enhancing 15mm RUP mass. CT repeated on 12/3/21 which showed a stable 1.3cm RUP lesion (hypodensity) and stable 5mm RLL lung nodule. She denies flank pain or gross hematuria. Cr was 0.79 on 21. Previously followed by NP for OAB. Reports success with Yanira Legacy in past but has run out with increased freq and mixed incontinence. Neg hematuria work up in 2019. CECELIA scheduled for 22.       Past Medical History:   Diagnosis Date    Allergic rhinitis 2013    Asthma     Bipolar affective disorder ([facility] [address]     Disturbance of skin sensation     Fibromyalgia     GERD (gastroesophageal reflux disease)     History of miscarriage     History of peptic ulcer     Hyperlipidemia     Hypertension     Hypothyroidism     IBS (irritable bowel syndrome)     Impaired fasting glucose 2022    Lumbar disc disease     Mixed stress and urge urinary incontinence     ANUPAM on CPAP     Osteoarthritis     Premature beats     PACs / PVCs    Right renal mass      Past Surgical History:   Procedure Laterality Date    BLADDER SUSPENSION      CHOLECYSTECTOMY  1980s    CYSTOCELE REPAIR  2009    HYSTERECTOMY, TOTAL ABDOMINAL (CERVIX REMOVED)      OVARY REMOVAL      RECTOCELE REPAIR  2009    TONSILLECTOMY  1980     Current Outpatient Medications   Medication Sig Dispense Refill    trospium (SANCTURA) 20 MG tablet Take 1 tablet by mouth 2 times daily 180 tablet 3    albuterol sulfate  (90 Base) MCG/ACT inhaler Inhale 2 puffs into the lungs every 4 hours as needed      aspirin 81 MG EC tablet Take by mouth 2 times daily      atorvastatin (LIPITOR) 40 MG tablet Take by mouth daily      buPROPion (WELLBUTRIN XL) 150 MG extended release tablet Take 150 mg by mouth every evening      buPROPion (WELLBUTRIN XL) 300 MG extended release tablet Take 30 mg by mouth      cetirizine (ZYRTEC) 10 MG tablet Take by mouth      vitamin D3 (CHOLECALCIFEROL) 125 MCG (5000 UT) TABS tablet Take 5,000 Units by mouth daily      ciclopirox (PENLAC) 8 % solution Apply topically      diazePAM (VALIUM) 10 MG tablet Take 10 mg by mouth every 8 hours as needed. gabapentin (NEURONTIN) 300 MG capsule Take one in AM and 4 at hs      levothyroxine (SYNTHROID) 100 MCG tablet Take 125 mcg by mouth daily      montelukast (SINGULAIR) 10 MG tablet TAKE ONE TABLET BY MOUTH ONE TIME DAILY      OXcarbazepine (TRILEPTAL) 300 MG tablet Take by mouth 2 times daily      pantoprazole (PROTONIX) 20 MG tablet TAKE 1 TABLET BY MOUTH EVERY DAY      sucralfate (CARAFATE) 1 GM tablet Take 1 g by mouth 4 times daily      traZODone (DESYREL) 100 MG tablet Take 100 mg by mouth      ondansetron (ZOFRAN) 4 MG tablet Take 1 tablet by mouth 3 times daily as needed for Nausea or Vomiting (Patient not taking: Reported on 12/9/2022) 15 tablet 0     No current facility-administered medications for this visit.      Allergies   Allergen Reactions    Fentanyl Other (See Comments) and Rash     Not sure of reaction  Doesn't remember reaction    Not sure of reaction    Venlafaxine Other (See Comments)     Denies allergy     Social History     Socioeconomic History    Marital status:      Spouse name: Not on file    Number of children: Not on file    Years of education: Not on file    Highest education level: Not on file   Occupational History    Not on file   Tobacco Use    Smoking status: Never    Smokeless tobacco: Never   Substance and Sexual Activity    Alcohol use: No    Drug use: No    Sexual activity: Not on file   Other Topics Concern    Not on file   Social History Narrative     and lives with her --has a son and daughter. Disabled and has history of work in the [employer] No pets. Social Determinants of Health     Financial Resource Strain: Not on file   Food Insecurity: Not on file   Transportation Needs: Not on file   Physical Activity: Not on file   Stress: Not on file   Social Connections: Not on file   Intimate Partner Violence: Not on file   Housing Stability: Not on file     Family History   Problem Relation Age of Onset    Breast Cancer Mother 61    Heart Disease Father     Prostate Cancer Father        Review of Systems  All systems reviewed and are negative at this time. Physical Exam  There were no vitals taken for this visit. General appearance - alert, well appearing, and in no distress  Mental status - alert, oriented to person, place, and time  Eyes - extraocular eye movements intact, sclera anicteric  Abdomen - soft, nontender, nondistended, no masses or organomegaly  Neurological -  normal speech, no focal findings or movement disorder noted  Skin - normal coloration and turgor      Urinalysis  UA - Dipstick  Results for orders placed or performed in visit on 12/09/22   AMB POC URINALYSIS DIP STICK AUTO W/O MICRO   Result Value Ref Range    Color (UA POC)      Clarity (UA POC)      Glucose, Urine, POC Negative Negative    Bilirubin, Urine, POC Negative Negative    KETONES, Urine, POC Negative Negative    Specific Gravity, Urine, POC 1.015 1.001 - 1.035    Blood (UA POC) Negative Negative    pH, Urine, POC 7.0 4.6 - 8.0    Protein, Urine, POC Negative Negative    Urobilinogen, POC 0.2 mg/dL     Nitrite, Urine, POC Negative Negative    Leukocyte Esterase, Urine, POC Negative Negative         UA - Micro  WBC - 0  RBC - 0  Bacteria - 0  Epith - 0    Assessment/Plan    ICD-10-CM    1. Renal mass  N28.89 AMB POC URINALYSIS DIP STICK AUTO W/O MICRO     CT ABDOMEN RENAL MASS Additional Contrast? None      2. Mixed incontinence  N39.46         [facility] refilled.   Pt instructed to call for CECELIA results. RTO in 12 mo with CT prior.     KRYSTEN MELO, DO

## 2022-12-09 NOTE — PROGRESS NOTES
[facility]  [place], [address] [facility]  [phone]    Eden Moore  : 1950     HPI   71 y.o., female returns in follow up for ***. Past Medical History:   Diagnosis Date    Allergic rhinitis 2013    Asthma     Bipolar affective disorder ([address]     Disturbance of skin sensation     Fibromyalgia     GERD (gastroesophageal reflux disease)     History of miscarriage     History of peptic ulcer     Hyperlipidemia     Hypertension     Hypothyroidism     IBS (irritable bowel syndrome)     Impaired fasting glucose 2022    Lumbar disc disease     Mixed stress and urge urinary incontinence     ANUPAM on CPAP     Osteoarthritis     Premature beats     PACs / PVCs    Right renal mass      Past Surgical History:   Procedure Laterality Date    BLADDER SUSPENSION      CHOLECYSTECTOMY  1980s    CYSTOCELE REPAIR  2009    HYSTERECTOMY, TOTAL ABDOMINAL (CERVIX REMOVED)      OVARY REMOVAL      RECTOCELE REPAIR  2009    TONSILLECTOMY  1980     Current Outpatient Medications   Medication Sig Dispense Refill    ondansetron (ZOFRAN) 4 MG tablet Take 1 tablet by mouth 3 times daily as needed for Nausea or Vomiting 15 tablet 0    albuterol sulfate  (90 Base) MCG/ACT inhaler Inhale 2 puffs into the lungs every 4 hours as needed      aspirin 81 MG EC tablet Take by mouth 2 times daily      atorvastatin (LIPITOR) 40 MG tablet Take by mouth daily      buPROPion (WELLBUTRIN XL) 150 MG extended release tablet Take 150 mg by mouth every evening      buPROPion (WELLBUTRIN XL) 300 MG extended release tablet Take 30 mg by mouth      cetirizine (ZYRTEC) 10 MG tablet Take by mouth      vitamin D3 (CHOLECALCIFEROL) 125 MCG (5000 UT) TABS tablet Take 5,000 Units by mouth daily      ciclopirox (PENLAC) 8 % solution Apply topically      diazePAM (VALIUM) 10 MG tablet Take 10 mg by mouth every 8 hours as needed.       gabapentin (NEURONTIN) 300 MG capsule Take one in AM and 4 at hs levothyroxine (SYNTHROID) 100 MCG tablet Take 125 mcg by mouth daily      montelukast (SINGULAIR) 10 MG tablet TAKE ONE TABLET BY MOUTH ONE TIME DAILY      OXcarbazepine (TRILEPTAL) 300 MG tablet Take by mouth 2 times daily      pantoprazole (PROTONIX) 20 MG tablet TAKE 1 TABLET BY MOUTH EVERY DAY      sucralfate (CARAFATE) 1 GM tablet Take 1 g by mouth 4 times daily      traZODone (DESYREL) 100 MG tablet Take 100 mg by mouth       No current facility-administered medications for this visit. Allergies   Allergen Reactions    Fentanyl Other (See Comments) and Rash     Not sure of reaction  Doesn't remember reaction    Not sure of reaction    Venlafaxine Other (See Comments)     Denies allergy     Social History     Socioeconomic History    Marital status:      Spouse name: Not on file    Number of children: Not on file    Years of education: Not on file    Highest education level: Not on file   Occupational History    Not on file   Tobacco Use    Smoking status: Never    Smokeless tobacco: Never   Substance and Sexual Activity    Alcohol use: No    Drug use: No    Sexual activity: Not on file   Other Topics Concern    Not on file   Social History Narrative     and lives with her --has a son and daughter. Disabled and has history of work in the [employer] No pets. Social Determinants of Health     Financial Resource Strain: Not on file   Food Insecurity: Not on file   Transportation Needs: Not on file   Physical Activity: Not on file   Stress: Not on file   Social Connections: Not on file   Intimate Partner Violence: Not on file   Housing Stability: Not on file     Family History   Problem Relation Age of Onset    Breast Cancer Mother 61    Heart Disease Father     Prostate Cancer Father        Review of Systems  All systems reviewed and are negative at this time. Physical Exam  There were no vitals taken for this visit.   ***    Urinalysis  UA - Dipstick  No results found for this visit on 12/09/22. UA - Micro  WBC - 0  RBC - 0  Bacteria - 0  Epith - 0    Assessment/Plan    ICD-10-CM    1.  Other specified disorders of kidney and ureter  N28.89           Gabriella Perez, MA

## 2022-12-12 ENCOUNTER — HOSPITAL ENCOUNTER (OUTPATIENT)
Dept: ULTRASOUND IMAGING | Age: 72
Discharge: HOME | End: 2022-12-15
Payer: MEDICARE

## 2022-12-12 DIAGNOSIS — N28.89 KIDNEY MASS: ICD-10-CM

## 2022-12-12 PROCEDURE — 76770 US EXAM ABDO BACK WALL COMP: CPT

## 2022-12-15 ENCOUNTER — TELEPHONE (OUTPATIENT)
Dept: NEUROLOGY | Age: 72
End: 2022-12-15

## 2022-12-15 NOTE — TELEPHONE ENCOUNTER
CYNTHIAM with patient stating that the providers agreed to switch her from Dr. Marilou Cleveland to Dr. Aidan Stacy booking a follow up in July.

## 2022-12-31 ENCOUNTER — APPOINTMENT (OUTPATIENT)
Dept: GENERAL RADIOLOGY | Age: 72
End: 2022-12-31
Payer: MEDICARE

## 2022-12-31 ENCOUNTER — HOSPITAL ENCOUNTER (EMERGENCY)
Age: 72
Discharge: HOME | End: 2023-01-01
Attending: EMERGENCY MEDICINE
Payer: MEDICARE

## 2022-12-31 DIAGNOSIS — J10.1 INFLUENZA A: Primary | ICD-10-CM

## 2022-12-31 LAB
ALBUMIN SERPL-MCNC: 4.4 G/DL (ref 3.2–4.6)
ALBUMIN/GLOB SERPL: 1.1 {RATIO} (ref 0.4–1.6)
ALP SERPL-CCNC: 127 U/L (ref 45–117)
ALT SERPL-CCNC: 21 U/L (ref 13–61)
ANION GAP SERPL CALC-SCNC: 12 MMOL/L (ref 2–11)
AST SERPL-CCNC: 27 U/L (ref 15–37)
BASOPHILS # BLD: 0.1 K/UL (ref 0–0.2)
BASOPHILS NFR BLD: 1 % (ref 0–2)
BILIRUB SERPL-MCNC: 0.4 MG/DL (ref 0.2–1.1)
BUN SERPL-MCNC: 14 MG/DL (ref 7–18)
CALCIUM SERPL-MCNC: 9.2 MG/DL (ref 8.3–10.4)
CHLORIDE SERPL-SCNC: 100 MMOL/L (ref 98–107)
CO2 SERPL-SCNC: 26 MMOL/L (ref 21–32)
CREAT SERPL-MCNC: 0.83 MG/DL (ref 0.6–1)
DIFFERENTIAL METHOD BLD: ABNORMAL
EOSINOPHIL # BLD: 0.1 K/UL (ref 0–0.8)
EOSINOPHIL NFR BLD: 1 % (ref 0.5–7.8)
ERYTHROCYTE [DISTWIDTH] IN BLOOD BY AUTOMATED COUNT: 13.2 % (ref 11.9–14.6)
FLUAV AG NPH QL IA: POSITIVE
FLUBV AG NPH QL IA: NEGATIVE
GLOBULIN SER CALC-MCNC: 4.1 G/DL (ref 2.8–4.5)
GLUCOSE SERPL-MCNC: 116 MG/DL (ref 65–100)
HCT VFR BLD AUTO: 43.4 % (ref 35.8–46.3)
HGB BLD-MCNC: 14.3 G/DL (ref 11.7–15.4)
IMM GRANULOCYTES # BLD AUTO: 0.1 K/UL (ref 0–0.5)
IMM GRANULOCYTES NFR BLD AUTO: 0 % (ref 0–5)
LACTATE SERPL-SCNC: 1.3 MMOL/L (ref 0.4–2)
LYMPHOCYTES # BLD: 1.4 K/UL (ref 0.5–4.6)
LYMPHOCYTES NFR BLD: 10 % (ref 13–44)
MCH RBC QN AUTO: 30.2 PG (ref 26.1–32.9)
MCHC RBC AUTO-ENTMCNC: 32.9 G/DL (ref 31.4–35)
MCV RBC AUTO: 91.8 FL (ref 82–102)
MONOCYTES # BLD: 1.5 K/UL (ref 0.1–1.3)
MONOCYTES NFR BLD: 10 % (ref 4–12)
NEUTS SEG # BLD: 11.2 K/UL (ref 1.7–8.2)
NEUTS SEG NFR BLD: 79 % (ref 43–78)
NRBC # BLD: 0 K/UL (ref 0–0.2)
PLATELET # BLD AUTO: 259 K/UL (ref 150–450)
PMV BLD AUTO: 10.3 FL (ref 9.4–12.3)
POTASSIUM SERPL-SCNC: 3.9 MMOL/L (ref 3.5–5.1)
PROT SERPL-MCNC: 8.5 G/DL (ref 6.4–8.2)
RBC # BLD AUTO: 4.73 M/UL (ref 4.05–5.2)
SARS-COV-2 RDRP RESP QL NAA+PROBE: NOT DETECTED
SODIUM SERPL-SCNC: 138 MMOL/L (ref 133–143)
SOURCE: NORMAL
SPECIMEN SOURCE: ABNORMAL
WBC # BLD AUTO: 14.3 K/UL (ref 4.3–11.1)

## 2022-12-31 PROCEDURE — 71045 X-RAY EXAM CHEST 1 VIEW: CPT

## 2022-12-31 PROCEDURE — 2580000003 HC RX 258: Performed by: EMERGENCY MEDICINE

## 2022-12-31 PROCEDURE — 87040 BLOOD CULTURE FOR BACTERIA: CPT

## 2022-12-31 PROCEDURE — 99285 EMERGENCY DEPT VISIT HI MDM: CPT

## 2022-12-31 PROCEDURE — 80053 COMPREHEN METABOLIC PANEL: CPT

## 2022-12-31 PROCEDURE — 87635 SARS-COV-2 COVID-19 AMP PRB: CPT

## 2022-12-31 PROCEDURE — 87804 INFLUENZA ASSAY W/OPTIC: CPT

## 2022-12-31 PROCEDURE — 84145 PROCALCITONIN (PCT): CPT

## 2022-12-31 PROCEDURE — 6370000000 HC RX 637 (ALT 250 FOR IP): Performed by: EMERGENCY MEDICINE

## 2022-12-31 PROCEDURE — 83605 ASSAY OF LACTIC ACID: CPT

## 2022-12-31 PROCEDURE — 85025 COMPLETE CBC W/AUTO DIFF WBC: CPT

## 2022-12-31 RX ORDER — IBUPROFEN 800 MG/1
800 TABLET ORAL
Status: COMPLETED | OUTPATIENT
Start: 2022-12-31 | End: 2022-12-31

## 2022-12-31 RX ORDER — SODIUM CHLORIDE, SODIUM LACTATE, POTASSIUM CHLORIDE, AND CALCIUM CHLORIDE .6; .31; .03; .02 G/100ML; G/100ML; G/100ML; G/100ML
1000 INJECTION, SOLUTION INTRAVENOUS ONCE
Status: COMPLETED | OUTPATIENT
Start: 2022-12-31 | End: 2023-01-01

## 2022-12-31 RX ADMIN — IBUPROFEN 800 MG: 800 TABLET, FILM COATED ORAL at 23:19

## 2022-12-31 RX ADMIN — SODIUM CHLORIDE, POTASSIUM CHLORIDE, SODIUM LACTATE AND CALCIUM CHLORIDE 1000 ML: 600; 310; 30; 20 INJECTION, SOLUTION INTRAVENOUS at 23:38

## 2022-12-31 ASSESSMENT — PAIN - FUNCTIONAL ASSESSMENT: PAIN_FUNCTIONAL_ASSESSMENT: 0-10

## 2022-12-31 ASSESSMENT — PAIN DESCRIPTION - PAIN TYPE: TYPE: ACUTE PAIN

## 2022-12-31 ASSESSMENT — PAIN DESCRIPTION - LOCATION: LOCATION: GENERALIZED

## 2022-12-31 ASSESSMENT — PAIN SCALES - GENERAL: PAINLEVEL_OUTOF10: 9

## 2022-12-31 ASSESSMENT — PAIN DESCRIPTION - ONSET: ONSET: PROGRESSIVE

## 2022-12-31 ASSESSMENT — PAIN DESCRIPTION - FREQUENCY: FREQUENCY: CONTINUOUS

## 2023-01-01 VITALS
SYSTOLIC BLOOD PRESSURE: 117 MMHG | RESPIRATION RATE: 16 BRPM | OXYGEN SATURATION: 93 % | HEART RATE: 75 BPM | BODY MASS INDEX: 30.06 KG/M2 | TEMPERATURE: 97.9 F | WEIGHT: 210 LBS | DIASTOLIC BLOOD PRESSURE: 54 MMHG | HEIGHT: 70 IN

## 2023-01-01 LAB
APPEARANCE UR: CLEAR
BILIRUB UR QL: NEGATIVE
COLOR UR: YELLOW
GLUCOSE UR STRIP.AUTO-MCNC: NEGATIVE MG/DL
HGB UR QL STRIP: NEGATIVE
KETONES UR QL STRIP.AUTO: NEGATIVE MG/DL
LEUKOCYTE ESTERASE UR QL STRIP.AUTO: NEGATIVE
NITRITE UR QL STRIP.AUTO: NEGATIVE
PH UR STRIP: 7.5 [PH] (ref 5–9)
PROCALCITONIN SERPL-MCNC: 0.07 NG/ML (ref 0–0.49)
PROT UR STRIP-MCNC: NEGATIVE MG/DL
SP GR UR REFRACTOMETRY: 1.02 (ref 1–1.02)
UROBILINOGEN UR QL STRIP.AUTO: 0.2 EU/DL (ref 0.2–1)

## 2023-01-01 PROCEDURE — 81003 URINALYSIS AUTO W/O SCOPE: CPT

## 2023-01-01 ASSESSMENT — ENCOUNTER SYMPTOMS
SHORTNESS OF BREATH: 0
EYE REDNESS: 0
BACK PAIN: 0
EYE ITCHING: 0
NAUSEA: 0
ABDOMINAL PAIN: 0
WHEEZING: 0
VOMITING: 0
TROUBLE SWALLOWING: 0
COUGH: 1
EYE PAIN: 0
CONSTIPATION: 0
DIARRHEA: 0
SINUS PAIN: 0

## 2023-01-01 ASSESSMENT — PAIN SCALES - GENERAL: PAINLEVEL_OUTOF10: 0

## 2023-01-01 NOTE — ED PROVIDER NOTES
Emergency Department Provider Note                   PCP:                Scotty Snyder MD               Age: 71 y.o. Sex: female     No diagnosis found. DISPOSITION         MDM  Number of Diagnoses or Management Options  Influenza A: new, needed workup  Diagnosis management comments: Patient's controlled substance prescription records reviewed @ the [address] [place] [address] Aware website. Information will be utilized for accurate and appropriate patient care.      12/22/2022 12/21/2022   1  Pregabalin 100 Mg Capsule 60.00  30  Je Law  [id number]   Pub (0570)  0  1.34 LME  Comm Ins  [place]    12/04/2022  09/15/2022   1  Diazepam 10 Mg Tablet 60.00  30  Ba Mye  [id number]   Pub (0570)  1  2.00 LME  Comm Ins  [place]    11/09/2022 11/09/2022   1  Pregabalin 50 Mg Capsule 60.00  30  Je Law  [id number]   Pub (0570)  0  0.67 LME  Comm Ins  [place]    10/29/2022  09/15/2022   1  Diazepam 10 Mg Tablet 60.00  30  Ba Mye  [id number]   Pub (0570)  0  2.00 LME  Comm Ins  [place]    09/30/2022 07/28/2022   1  Diazepam 10 Mg Tablet 60.00  30  Ba Mye  [id number]   Pub (0570)  2  2.00 LME  Comm Ins  [place]    08/31/2022 07/28/2022   1  Diazepam 10 Mg Tablet 60.00  30  Ba Mye  [id number]   Pub (0570)  1  2.00 LME  Comm Ins  [place]    07/28/2022 07/28/2022   1  Diazepam 10 Mg Tablet 60.00  30  Ba Mye  [id number]   Pub (0570)  0  2.00 LME  Comm Ins  [place]    07/22/2022 07/22/2022   1  Hydrocodone-Acetamin 5-325 Mg 10.00  3  Wi Fin  [id number]   Pub (0570)  0  16.67 MME  Comm Ins  [place]    06/12/2022 02/17/2022   1  Diazepam 10 Mg Tablet 60.00  30  Ba Mye  [id number]   Pub (0570)  2  2.00 LME  Comm Ins  [place]    05/10/2022  02/17/2022   1  Diazepam 10 Mg Tablet 60.00  30  Ba Mye  [id number]   Pub (0570)  1  2.00 LME  Comm Ins  [place]    05/09/2022 05/09/2022   2  Tramadol Hcl 50 Mg Tablet 30.00  8  No Whi  [id number]   Nut (8250)  0  18.75 MME  Private Pay  [place]            Amount and/or Complexity of Data Reviewed  Clinical lab tests: ordered and reviewed  Tests in the radiology section of CPT®: ordered and reviewed  Tests in the medicine section of CPT®: ordered and reviewed  Decide to obtain previous medical records or to obtain history from someone other than the patient: yes  Obtain history from someone other than the patient: yes  Review and summarize past medical records: yes  Independent visualization of images, tracings, or specimens: yes    Risk of Complications, Morbidity, and/or Mortality  Presenting problems: moderate  Diagnostic procedures: moderate  Management options: moderate  General comments: Elements of this note have been dictated via voice recognition software. Text and phrases may be limited by the accuracy of the software. The chart has been reviewed, but errors may still be present. Patient Progress  Patient progress: improved     Complexity of Problem: 1 acute illness with systemic symptoms. (4)  The patients assessment required an independent historian: I spoke with a family member. I have conducted an independent ordering and review of Labs. I have conducted an independent ordering and review of EKG. I have conducted an independent ordering and review of X-rays. I have reviewed records from an external source: provider visit notes from PCP. Patient was discharged risks and benefits of hospitalization were discussed.          Orders Placed This Encounter   Procedures    Culture, Blood 1    Culture, Blood 2    Rapid influenza A/B antigens    COVID-19, Rapid    XR CHEST PORTABLE    Lactate, Sepsis    CBC with Auto Differential    CMP    Procalcitonin    Cardiac Monitor - ED Only    Straight Cath (Select if patient is unable to provide a sample)    EKG 12 Lead    Saline lock IV        Medications   ibuprofen (ADVIL;MOTRIN) tablet 800 mg (has no administration in time range)       New Prescriptions    No medications on file        Azalea Barrera is a 71 y.o. female who presents to the Emergency Department with chief complaint of    Chief Complaint   Patient presents with    Fatigue      67-year-old female patient presents to the ER with complaints of generalized body aches fatigue  Found to have a fever of 102 here  She does report ill contacts at home  Does have a history of asthma but is not overtly short of breath    The history is provided by the patient and the spouse. Fatigue  Severity:  Moderate  Onset quality:  Gradual  Duration:  2 days  Timing:  Constant  Progression:  Worsening  Chronicity:  New  Context: recent infection    Context: not change in medication, not decreased sleep and not drug use    Relieved by:  None tried  Worsened by:  Standing, stress and activity  Ineffective treatments:  None tried  Associated symptoms: arthralgias, cough and fever    Associated symptoms: no abdominal pain, no chest pain, no diarrhea, no dizziness, no dysphagia, no dysuria, no falls, no frequency, no myalgias, no nausea, no shortness of breath, no syncope and no vomiting      All other systems reviewed and are negative unless otherwise stated in the history of present illness section. Review of Systems   Constitutional:  Positive for fatigue and fever. Negative for chills. HENT:  Negative for ear pain, hearing loss, sinus pain, sneezing and trouble swallowing. Eyes:  Negative for pain, redness and itching. Respiratory:  Positive for cough. Negative for shortness of breath and wheezing. Cardiovascular:  Negative for chest pain, palpitations and syncope. Gastrointestinal:  Negative for abdominal pain, constipation, diarrhea, dysphagia, nausea and vomiting. Endocrine: Negative for polydipsia, polyphagia and polyuria. Genitourinary:  Negative for dysuria, flank pain, frequency and hematuria. Musculoskeletal:  Positive for arthralgias. Negative for back pain, falls and myalgias. Skin:  Negative for rash and wound. Allergic/Immunologic: Negative for food allergies and immunocompromised state.    Neurological:  Negative for dizziness, syncope, speech difficulty and light-headedness. Hematological:  Negative for adenopathy. Does not bruise/bleed easily. Psychiatric/Behavioral:  Negative for dysphoric mood and self-injury. The patient is not nervous/anxious. All other systems reviewed and are negative. Past Medical History:   Diagnosis Date    Allergic rhinitis 6/28/2013    Asthma     Bipolar affective disorder ([facility] [address]     Disturbance of skin sensation     Fibromyalgia     GERD (gastroesophageal reflux disease)     History of miscarriage     History of peptic ulcer     Hyperlipidemia     Hypertension     Hypothyroidism     IBS (irritable bowel syndrome)     Impaired fasting glucose 2/7/2022    Lumbar disc disease     Mixed stress and urge urinary incontinence     ANUPAM on CPAP     Osteoarthritis     Premature beats     PACs / PVCs    Right renal mass         Past Surgical History:   Procedure Laterality Date    BLADDER SUSPENSION  2003    CHOLECYSTECTOMY  1980s    CYSTOCELE REPAIR  11/12/2009    HYSTERECTOMY, TOTAL ABDOMINAL (CERVIX REMOVED)  2003    OVARY REMOVAL      RECTOCELE REPAIR  11/12/2009    TONSILLECTOMY  1980        Family History   Problem Relation Age of Onset    Breast Cancer Mother 61    Heart Disease Father     Prostate Cancer Father         Social History     Socioeconomic History    Marital status:      Spouse name: None    Number of children: None    Years of education: None    Highest education level: None   Tobacco Use    Smoking status: Never    Smokeless tobacco: Never   Substance and Sexual Activity    Alcohol use: No    Drug use: No   Social History Narrative     and lives with her --has a son and daughter. Disabled and has history of work in the [employer] No pets.               Allergies: Fentanyl and Venlafaxine    Previous Medications    ALBUTEROL SULFATE  (90 BASE) MCG/ACT INHALER    Inhale 2 puffs into the lungs every 4 hours as needed    ASPIRIN 81 MG EC TABLET    Take by mouth 2 times daily ATORVASTATIN (LIPITOR) 40 MG TABLET    Take by mouth daily    BUPROPION (WELLBUTRIN XL) 150 MG EXTENDED RELEASE TABLET    Take 150 mg by mouth every evening    BUPROPION (WELLBUTRIN XL) 300 MG EXTENDED RELEASE TABLET    Take 30 mg by mouth    CETIRIZINE (ZYRTEC) 10 MG TABLET    Take by mouth    CICLOPIROX (PENLAC) 8 % SOLUTION    Apply topically    DIAZEPAM (VALIUM) 10 MG TABLET    Take 10 mg by mouth every 8 hours as needed. GABAPENTIN (NEURONTIN) 300 MG CAPSULE    Take one in AM and 4 at hs    LEVOTHYROXINE (SYNTHROID) 100 MCG TABLET    Take 125 mcg by mouth daily    MONTELUKAST (SINGULAIR) 10 MG TABLET    TAKE ONE TABLET BY MOUTH ONE TIME DAILY    ONDANSETRON (ZOFRAN) 4 MG TABLET    Take 1 tablet by mouth 3 times daily as needed for Nausea or Vomiting    OXCARBAZEPINE (TRILEPTAL) 300 MG TABLET    Take by mouth 2 times daily    PANTOPRAZOLE (PROTONIX) 20 MG TABLET    TAKE 1 TABLET BY MOUTH EVERY DAY    SUCRALFATE (CARAFATE) 1 GM TABLET    Take 1 g by mouth 4 times daily    TRAZODONE (DESYREL) 100 MG TABLET    Take 100 mg by mouth    TROSPIUM (SANCTURA) 20 MG TABLET    Take 1 tablet by mouth 2 times daily    VITAMIN D3 (CHOLECALCIFEROL) 125 MCG (5000 UT) TABS TABLET    Take 5,000 Units by mouth daily        Vitals signs and nursing note reviewed. Patient Vitals for the past 4 hrs:   Temp Pulse Resp BP SpO2   12/31/22 2233 (!) 102.4 °F (39.1 °C) 91 22 (!) 128/56 92 %          Physical Exam  Vitals and nursing note reviewed. Constitutional:       General: She is in acute distress. Appearance: Normal appearance. She is normal weight. HENT:      Head: Normocephalic and atraumatic. Right Ear: External ear normal.      Left Ear: External ear normal.      Nose: Nose normal.      Mouth/Throat:      Mouth: Mucous membranes are moist.      Pharynx: Oropharynx is clear. Eyes:      General: No scleral icterus. Extraocular Movements: Extraocular movements intact.       Conjunctiva/sclera: Conjunctivae normal.      Pupils: Pupils are equal, round, and reactive to light. Cardiovascular:      Rate and Rhythm: Normal rate and regular rhythm. Pulses: Normal pulses. Heart sounds: Normal heart sounds. Pulmonary:      Effort: Pulmonary effort is normal. No respiratory distress. Breath sounds: Normal breath sounds. Abdominal:      General: Abdomen is flat. Bowel sounds are normal. There is no distension. Palpations: Abdomen is soft. There is no mass. Tenderness: There is no abdominal tenderness. Musculoskeletal:         General: No deformity or signs of injury. Normal range of motion. Cervical back: Normal range of motion and neck supple. Skin:     General: Skin is warm and dry. Capillary Refill: Capillary refill takes less than 2 seconds. Neurological:      General: No focal deficit present. Mental Status: She is alert and oriented to person, place, and time. Psychiatric:         Mood and Affect: Mood normal. Affect is blunt and flat. Speech: Speech is delayed. Behavior: Behavior is slowed. Behavior is cooperative. Thought Content:  Thought content normal.         Judgment: Judgment normal.        EKG 12 Lead    Date/Time: 1/1/2023 12:20 AM  Performed by: Nora Barbosa MD  Authorized by: Nora Barbosa MD     ECG reviewed by ED Physician in the absence of a cardiologist: yes    Interpretation:     Interpretation: abnormal    Rate:     ECG rate:  88    ECG rate assessment: normal    Rhythm:     Rhythm: sinus rhythm    Ectopy:     Ectopy: none    QRS:     QRS axis:  Left    QRS conduction: normal    ST segments:     ST segments:  Normal  Comments:      Sinus rhythm with left anterior fascicular block  No acute ischemic changes    ED EKG Interpretation  EKG was interpreted in the absence of a cardiologist.        Results for orders placed or performed during the hospital encounter of 12/31/22   XR CHEST PORTABLE    Narrative    EXAM: Chest x-ray.    INDICATION: Fatigue, sepsis. COMPARISON: Prior chest x-ray on July 1, 2021. TECHNIQUE: Frontal view chest x-ray. FINDINGS: Mild cardiomegaly is unchanged. There is new pulmonary vascular  congestion. No pneumothorax, focal consolidation or pleural effusion is seen. Again noted are degenerative changes in the thoracic spine and mild elevation of  the right diaphragm. Impression    Mild pulmonary vascular congestion, suspect for early CHF/fluid  overload patient with an enlarged heart. XR CHEST PORTABLE   Final Result   Mild pulmonary vascular congestion, suspect for early CHF/fluid   overload patient with an enlarged heart. Voice dictation software was used during the making of this note. This software is not perfect and grammatical and other typographical errors may be present. This note has not been completely proofread for errors.      Jessica Schultz MD  01/01/23 [id number]

## 2023-01-01 NOTE — ED TRIAGE NOTES
Pt to the ED from home with c/o of generalized fatigue and fever for the past 48 hours. Pt states that she has not been able to get out of the bed. Pt states that she has been taken acetaminophen for fevers, but unsure temp. Pt states that her last dose of acetaminophen was 1 hour prior to arrival to the ED. Pt is also c/o of cough and a running nose. No known sick exposure, but states that her  is sick as well.

## 2023-01-01 NOTE — ED NOTES
I have reviewed discharge instructions with the patient and spouse. The patient and spouse verbalized understanding. Patient left ED via Discharge Method: wheelchair to Home with . Opportunity for questions and clarification provided. Patient given 0 scripts. To continue your aftercare when you leave the hospital, you may receive an automated call from our care team to check in on how you are doing. This is a free service and part of our promise to provide the best care and service to meet your aftercare needs.  If you have questions, or wish to unsubscribe from this service please call [phone]. Thank you for Choosing our [facility] Emergency Department.         Cat Mcarthur RN  01/01/23 8150

## 2023-01-01 NOTE — DISCHARGE INSTRUCTIONS
Continue all your current medications  Drink plenty of fluids  Recheck in 2 to 3 days with your family doctor  Use tylenol and motrin for fever control.   Alternate doses of each at three hour intervals for temperature over 100.4 F  Return to ER for any worsening symptoms or new problems which may arise

## 2023-01-02 ENCOUNTER — HOSPITAL ENCOUNTER (EMERGENCY)
Age: 73
Discharge: HOME | End: 2023-01-02
Attending: EMERGENCY MEDICINE
Payer: MEDICARE

## 2023-01-02 ENCOUNTER — APPOINTMENT (OUTPATIENT)
Dept: GENERAL RADIOLOGY | Age: 73
End: 2023-01-02
Payer: MEDICARE

## 2023-01-02 VITALS
SYSTOLIC BLOOD PRESSURE: 126 MMHG | HEIGHT: 70 IN | WEIGHT: 210 LBS | DIASTOLIC BLOOD PRESSURE: 65 MMHG | BODY MASS INDEX: 30.06 KG/M2 | OXYGEN SATURATION: 94 % | TEMPERATURE: 97.9 F | HEART RATE: 63 BPM | RESPIRATION RATE: 17 BRPM

## 2023-01-02 DIAGNOSIS — J10.1 INFLUENZA A: Primary | ICD-10-CM

## 2023-01-02 DIAGNOSIS — E86.0 DEHYDRATION: ICD-10-CM

## 2023-01-02 LAB
ANION GAP SERPL CALC-SCNC: 12 MMOL/L (ref 2–11)
APPEARANCE UR: CLEAR
BACTERIA URNS QL MICRO: 0 /HPF
BASOPHILS # BLD: 0.1 K/UL (ref 0–0.2)
BASOPHILS NFR BLD: 1 % (ref 0–2)
BILIRUB UR QL: NEGATIVE
BUN SERPL-MCNC: 10 MG/DL (ref 7–18)
CALCIUM SERPL-MCNC: 9 MG/DL (ref 8.3–10.4)
CASTS URNS QL MICRO: 0 /LPF
CHLORIDE SERPL-SCNC: 102 MMOL/L (ref 98–107)
CO2 SERPL-SCNC: 28 MMOL/L (ref 21–32)
COLOR UR: YELLOW
CREAT SERPL-MCNC: 0.75 MG/DL (ref 0.6–1)
CRYSTALS URNS QL MICRO: 0 /LPF
DIFFERENTIAL METHOD BLD: NORMAL
EOSINOPHIL # BLD: 0.4 K/UL (ref 0–0.8)
EOSINOPHIL NFR BLD: 5 % (ref 0.5–7.8)
EPI CELLS #/AREA URNS HPF: NORMAL /HPF
ERYTHROCYTE [DISTWIDTH] IN BLOOD BY AUTOMATED COUNT: 13.2 % (ref 11.9–14.6)
GLUCOSE SERPL-MCNC: 112 MG/DL (ref 65–100)
GLUCOSE UR STRIP.AUTO-MCNC: NEGATIVE MG/DL
HCT VFR BLD AUTO: 38.9 % (ref 35.8–46.3)
HGB BLD-MCNC: 12.8 G/DL (ref 11.7–15.4)
HGB UR QL STRIP: NEGATIVE
IMM GRANULOCYTES # BLD AUTO: 0 K/UL (ref 0–0.5)
IMM GRANULOCYTES NFR BLD AUTO: 0 % (ref 0–5)
KETONES UR QL STRIP.AUTO: NEGATIVE MG/DL
LEUKOCYTE ESTERASE UR QL STRIP.AUTO: ABNORMAL
LYMPHOCYTES # BLD: 1.9 K/UL (ref 0.5–4.6)
LYMPHOCYTES NFR BLD: 24 % (ref 13–44)
MCH RBC QN AUTO: 30.5 PG (ref 26.1–32.9)
MCHC RBC AUTO-ENTMCNC: 32.9 G/DL (ref 31.4–35)
MCV RBC AUTO: 92.6 FL (ref 82–102)
MONOCYTES # BLD: 0.9 K/UL (ref 0.1–1.3)
MONOCYTES NFR BLD: 12 % (ref 4–12)
MUCOUS THREADS URNS QL MICRO: 0 /LPF
NEUTS SEG # BLD: 4.7 K/UL (ref 1.7–8.2)
NEUTS SEG NFR BLD: 58 % (ref 43–78)
NITRITE UR QL STRIP.AUTO: NEGATIVE
NRBC # BLD: 0 K/UL (ref 0–0.2)
OTHER OBSERVATIONS: NORMAL
PH UR STRIP: 6 [PH] (ref 5–9)
PLATELET # BLD AUTO: 222 K/UL (ref 150–450)
PMV BLD AUTO: 9.9 FL (ref 9.4–12.3)
POTASSIUM SERPL-SCNC: 3.9 MMOL/L (ref 3.5–5.1)
PROT UR STRIP-MCNC: NEGATIVE MG/DL
RBC # BLD AUTO: 4.2 M/UL (ref 4.05–5.2)
RBC #/AREA URNS HPF: NORMAL /HPF
SODIUM SERPL-SCNC: 142 MMOL/L (ref 133–143)
SP GR UR REFRACTOMETRY: <=1.005 (ref 1–1.02)
UROBILINOGEN UR QL STRIP.AUTO: 0.2 EU/DL (ref 0.2–1)
WBC # BLD AUTO: 8 K/UL (ref 4.3–11.1)
WBC URNS QL MICRO: NORMAL /HPF

## 2023-01-02 PROCEDURE — 85025 COMPLETE CBC W/AUTO DIFF WBC: CPT

## 2023-01-02 PROCEDURE — 80048 BASIC METABOLIC PNL TOTAL CA: CPT

## 2023-01-02 PROCEDURE — 81001 URINALYSIS AUTO W/SCOPE: CPT

## 2023-01-02 PROCEDURE — 99284 EMERGENCY DEPT VISIT MOD MDM: CPT

## 2023-01-02 PROCEDURE — 2580000003 HC RX 258

## 2023-01-02 PROCEDURE — 96360 HYDRATION IV INFUSION INIT: CPT

## 2023-01-02 PROCEDURE — 71046 X-RAY EXAM CHEST 2 VIEWS: CPT

## 2023-01-02 RX ORDER — BENZONATATE 100 MG/1
100 CAPSULE ORAL 2 TIMES DAILY PRN
Qty: 14 CAPSULE | Refills: 0 | Status: SHIPPED | OUTPATIENT
Start: 2023-01-02 | End: 2023-01-09

## 2023-01-02 RX ORDER — 0.9 % SODIUM CHLORIDE 0.9 %
1000 INTRAVENOUS SOLUTION INTRAVENOUS ONCE
Status: COMPLETED | OUTPATIENT
Start: 2023-01-02 | End: 2023-01-02

## 2023-01-02 RX ADMIN — SODIUM CHLORIDE 1000 ML: 9 INJECTION, SOLUTION INTRAVENOUS at 14:32

## 2023-01-02 ASSESSMENT — PAIN DESCRIPTION - LOCATION
LOCATION: MOUTH
LOCATION: GENERALIZED

## 2023-01-02 ASSESSMENT — PAIN - FUNCTIONAL ASSESSMENT: PAIN_FUNCTIONAL_ASSESSMENT: 0-10

## 2023-01-02 ASSESSMENT — PAIN SCALES - GENERAL
PAINLEVEL_OUTOF10: 5
PAINLEVEL_OUTOF10: 7

## 2023-01-02 NOTE — ED NOTES
I have reviewed discharge instructions with the patient. The patient verbalized understanding. Patient left ED via Discharge Method: wheelchair to Home with spouse. Opportunity for questions and clarification provided. Patient given 1 scripts. To continue your aftercare when you leave the hospital, you may receive an automated call from our care team to check in on how you are doing. This is a free service and part of our promise to provide the best care and service to meet your aftercare needs.  If you have questions, or wish to unsubscribe from this service please call [phone]. Thank you for Choosing our [facility] Emergency Department.           Alanis Velázquez RN  01/02/23 1174

## 2023-01-02 NOTE — ED PROVIDER NOTES
[employer] Emergency Department Provider Note                   PCP:                Jarocho Smith MD               Age: 71 y.o. Sex: female       ICD-10-CM    1. Influenza A  J10.1       2. Dehydration  E86.0           DISPOSITION Decision To Discharge 01/02/2023 03:15:43 PM         Orders Placed This Encounter   Procedures    XR CHEST (2 VW)    Basic Metabolic Panel    CBC with Auto Differential    Urinalysis w rflx microscopic    Urinalysis, Micro        Heather Cagle is a 71 y.o. female who presents to the Emergency Department with chief complaint of    Chief Complaint   Patient presents with    Oral Pain      50-year-old female presenting to the emergency department with chief complaint of 3-day history of cough, congestion, headaches, and weakness. She reports that she has increased her fluid intake significantly over the past few days however that she is still feeling dehydrated and like her mouth is dry. She tested positive for flu on 12/31. Septic work-up was performed at that time which was indicated to be negative. She is somnolent throughout obtaining history and exam however she is alert and oriented x4. The history is provided by the patient.        Review of Systems    Past Medical History:   Diagnosis Date    Allergic rhinitis 6/28/2013    Asthma     Bipolar affective disorder ([facility] [address]     Disturbance of skin sensation     Fibromyalgia     GERD (gastroesophageal reflux disease)     History of miscarriage     History of peptic ulcer     Hyperlipidemia     Hypertension     Hypothyroidism     IBS (irritable bowel syndrome)     Impaired fasting glucose 2/7/2022    Lumbar disc disease     Mixed stress and urge urinary incontinence     ANUPAM on CPAP     Osteoarthritis     Premature beats     PACs / PVCs    Right renal mass         Past Surgical History:   Procedure Laterality Date    BLADDER SUSPENSION  2003    CHOLECYSTECTOMY  1980s    CYSTOCELE REPAIR  11/12/2009    HYSTERECTOMY, TOTAL ABDOMINAL (CERVIX REMOVED)  2003    OVARY REMOVAL      RECTOCELE REPAIR  11/12/2009    TONSILLECTOMY  1980        Family History   Problem Relation Age of Onset    Breast Cancer Mother 61    Heart Disease Father     Prostate Cancer Father         Social History     Socioeconomic History    Marital status:    Tobacco Use    Smoking status: Never    Smokeless tobacco: Never   Substance and Sexual Activity    Alcohol use: No    Drug use: No   Social History Narrative     and lives with her --has a son and daughter. Disabled and has history of work in the [employer] No pets. Fentanyl and Venlafaxine     Previous Medications    ALBUTEROL SULFATE  (90 BASE) MCG/ACT INHALER    Inhale 2 puffs into the lungs every 4 hours as needed    ASPIRIN 81 MG EC TABLET    Take by mouth 2 times daily    ATORVASTATIN (LIPITOR) 40 MG TABLET    Take by mouth daily    BUPROPION (WELLBUTRIN XL) 150 MG EXTENDED RELEASE TABLET    Take 150 mg by mouth every evening    BUPROPION (WELLBUTRIN XL) 300 MG EXTENDED RELEASE TABLET    Take 30 mg by mouth    CETIRIZINE (ZYRTEC) 10 MG TABLET    Take by mouth    CICLOPIROX (PENLAC) 8 % SOLUTION    Apply topically    DIAZEPAM (VALIUM) 10 MG TABLET    Take 10 mg by mouth every 8 hours as needed.     GABAPENTIN (NEURONTIN) 300 MG CAPSULE    Take one in AM and 4 at hs    LEVOTHYROXINE (SYNTHROID) 100 MCG TABLET    Take 125 mcg by mouth daily    MONTELUKAST (SINGULAIR) 10 MG TABLET    TAKE ONE TABLET BY MOUTH ONE TIME DAILY    ONDANSETRON (ZOFRAN) 4 MG TABLET    Take 1 tablet by mouth 3 times daily as needed for Nausea or Vomiting    OXCARBAZEPINE (TRILEPTAL) 300 MG TABLET    Take by mouth 2 times daily    PANTOPRAZOLE (PROTONIX) 20 MG TABLET    TAKE 1 TABLET BY MOUTH EVERY DAY    SUCRALFATE (CARAFATE) 1 GM TABLET    Take 1 g by mouth 4 times daily    TRAZODONE (DESYREL) 100 MG TABLET    Take 100 mg by mouth    TROSPIUM (SANCTURA) 20 MG TABLET    Take 1 tablet by mouth 2 times daily    VITAMIN D3 (CHOLECALCIFEROL) 125 MCG (5000 UT) TABS TABLET    Take 5,000 Units by mouth daily        Vitals signs and nursing note reviewed. Patient Vitals for the past 4 hrs:   Temp Pulse Resp BP SpO2   01/02/23 1306 98.6 °F (37 °C) 73 18 (!) 114/54 97 %          Physical Exam  Vitals reviewed. Constitutional:       General: She is not in acute distress. Appearance: Normal appearance. She is normal weight. She is not ill-appearing. HENT:      Head: Normocephalic and atraumatic. Right Ear: Tympanic membrane, ear canal and external ear normal.      Left Ear: Tympanic membrane, ear canal and external ear normal.      Nose: Nose normal. No congestion or rhinorrhea. Mouth/Throat:      Mouth: Mucous membranes are dry. Pharynx: Oropharynx is clear. No oropharyngeal exudate or posterior oropharyngeal erythema. Eyes:      Extraocular Movements: Extraocular movements intact. Conjunctiva/sclera: Conjunctivae normal.      Pupils: Pupils are equal, round, and reactive to light. Cardiovascular:      Rate and Rhythm: Normal rate and regular rhythm. Pulses: Normal pulses. Heart sounds: Normal heart sounds. No murmur heard. Pulmonary:      Effort: Pulmonary effort is normal. No respiratory distress. Breath sounds: Normal breath sounds. No stridor. No wheezing or rhonchi. Abdominal:      General: Abdomen is flat. Bowel sounds are normal. There is no distension. Palpations: Abdomen is soft. Tenderness: There is no abdominal tenderness. There is no guarding or rebound. Musculoskeletal:         General: No swelling, deformity or signs of injury. Normal range of motion. Cervical back: Normal range of motion and neck supple. No rigidity or tenderness. Skin:     General: Skin is warm and dry. Coloration: Skin is not pale. Findings: No erythema, lesion or rash. Neurological:      General: No focal deficit present.       Mental Status: She is alert and oriented to person, place, and time. Mental status is at baseline. Psychiatric:         Mood and Affect: Mood normal.         Behavior: Behavior normal.         Thought Content: Thought content normal.         Judgment: Judgment normal.        MDM  Number of Diagnoses or Management Options  Dehydration  Influenza A  Diagnosis management comments: 71-year-old female presenting to the emergency department chief complaint of generalized weakness, cough, and headache. Influenza A    Upon assessment and evaluation of the patient, she is found to be weak and lethargic appearing. Her mucous membranes are dry. BNP, CBC, chest x-ray, and urinalysis. Clinical suspicion that the patient is experiencing symptoms from influenza A and diagnosis however will obtain basic lab work and chest x-ray imaging for further assessment. Given bolus of IV normal saline 1 L in ED course. BMP indicates anion gap elevation of 12. Urinalysis indicates absence of ketones however there is trace leukocytes present in the urine. Patient asymptomatic for dysuria and hematuria. CBC grossly normal.  1 L IV normal saline administered throughout ED course. Patient is alert and oriented x4 on examination. Clinical suspicion that her symptoms are secondary to influenza A. Advised continued symptomatic treatment at home and continue increase clear fluid intake. Work-up today in ED course was benign. Complexity of Problem: 1 stable, acute illness. (3)  The patients assessment required an independent historian: I spoke with a family member. I have conducted an independent ordering and review of Labs. I have conducted an independent ordering and review of X-rays. Considerations: Shared decision making was utilized in the care of this patient. Patient was discharged risks and benefits of hospitalization were discussed.          Amount and/or Complexity of Data Reviewed  Clinical lab tests: ordered and reviewed  Tests in the radiology section of CPT®: ordered and reviewed    Patient Progress  Patient progress: stable      Procedures      Labs Reviewed   BASIC METABOLIC PANEL - Abnormal; Notable for the following components:       Result Value    Anion Gap 12 (*)     Glucose 112 (*)     All other components within normal limits   URINALYSIS - Abnormal; Notable for the following components:    Leukocyte Esterase, Urine TRACE (*)     All other components within normal limits   CBC WITH AUTO DIFFERENTIAL   URINALYSIS, MICRO        XR CHEST (2 VW)   Final Result   Again-cardiomegaly with probably some increased vascular congestion   but appearance seems mildly improved from the prior. Voice dictation software was used during the making of this note. This software is not perfect and grammatical and other typographical errors may be present. This note has not been completely proofread for errors.      MAU Villalobos  01/02/23 6605

## 2023-01-02 NOTE — DISCHARGE INSTRUCTIONS
Your blood work today in the emergency department was normal.  We gave you fluids and your IV to rehydrate you. Continue to get plenty of rest and increase your clear fluid intake. You may continue take ibuprofen and Tylenol as needed for symptom relief. Follow-up with your primary care provider.

## 2023-01-06 LAB
BACTERIA SPEC CULT: NORMAL
SERVICE CMNT-IMP: NORMAL

## 2023-01-11 ENCOUNTER — OFFICE VISIT (OUTPATIENT)
Dept: ORTHOPEDIC SURGERY | Age: 73
End: 2023-01-11
Payer: MEDICARE

## 2023-01-11 VITALS — HEIGHT: 70 IN | WEIGHT: 208 LBS | BODY MASS INDEX: 29.78 KG/M2

## 2023-01-11 DIAGNOSIS — M51.36 LUMBAR DEGENERATIVE DISC DISEASE: ICD-10-CM

## 2023-01-11 DIAGNOSIS — R26.89 BALANCE DISORDER: ICD-10-CM

## 2023-01-11 DIAGNOSIS — M50.30 DEGENERATIVE DISC DISEASE, CERVICAL: ICD-10-CM

## 2023-01-11 DIAGNOSIS — M54.50 LOW BACK PAIN, UNSPECIFIED BACK PAIN LATERALITY, UNSPECIFIED CHRONICITY, UNSPECIFIED WHETHER SCIATICA PRESENT: Primary | ICD-10-CM

## 2023-01-11 DIAGNOSIS — M47.816 LUMBAR FACET ARTHROPATHY: ICD-10-CM

## 2023-01-11 PROCEDURE — 1090F PRES/ABSN URINE INCON ASSESS: CPT | Performed by: NURSE PRACTITIONER

## 2023-01-11 PROCEDURE — 99203 OFFICE O/P NEW LOW 30 MIN: CPT | Performed by: NURSE PRACTITIONER

## 2023-01-11 PROCEDURE — G8484 FLU IMMUNIZE NO ADMIN: HCPCS | Performed by: NURSE PRACTITIONER

## 2023-01-11 PROCEDURE — 3017F COLORECTAL CA SCREEN DOC REV: CPT | Performed by: NURSE PRACTITIONER

## 2023-01-11 PROCEDURE — 1036F TOBACCO NON-USER: CPT | Performed by: NURSE PRACTITIONER

## 2023-01-11 PROCEDURE — G8417 CALC BMI ABV UP PARAM F/U: HCPCS | Performed by: NURSE PRACTITIONER

## 2023-01-11 PROCEDURE — G8428 CUR MEDS NOT DOCUMENT: HCPCS | Performed by: NURSE PRACTITIONER

## 2023-01-11 PROCEDURE — G8400 PT W/DXA NO RESULTS DOC: HCPCS | Performed by: NURSE PRACTITIONER

## 2023-01-11 PROCEDURE — 1123F ACP DISCUSS/DSCN MKR DOCD: CPT | Performed by: NURSE PRACTITIONER

## 2023-01-11 NOTE — PROGRESS NOTES
Name: Niurka Riley  YOB: 1950  Gender: female  MRN: [id number]    CC: 1. Low back pain   2. Neck pain   3. Balance and dizziness     HPI: This is a 71y.o. year old female who has a past medical history of Anxiety, Bipolar disorder, Depression, Fibromyalgia, GERD (gastroesophageal reflux disease), History of degenerative disc disease, Hyperlipidemia, Hypertension, Irritable bowel syndrome, OCD (obsessive compulsive disorder), Osteoporosis, Panic disorder, and Thyroid disorder. Presents with acute on chronic complaints of low back pain. She is unable to bend over due to her pain complaints. She does have a history of fibromyalgia. Longstanding history of chronic pain. Patient also has constant issues for many years with vertigo, dizziness and TIAs. She has a significant polypharmacy and this is noted through her chart. She is on quite a bit of psychiatric meds along with Lyrica or gabapentin and other medications that can cause dizziness or side effects. She has seen neurosurgery in June 2022 that did not feel that there was anything surgical to offer her. She has pain across her lower back and in between her shoulder blades. I reviewed her MRIs from 2019 which did not reveal any significant pathology but more spondylotic pathology of both the cervical and lumbar spine. She has repetitive falls. She states that physical therapy will even see her because she is at falls risk. At times she feels like \"her spine is tearing apart\". She did not bring her medication list today so she is aware that I will not be prescribing any medications. She is unclear really what she takes. She is a bit of a poor historian. At one point she was asking me if we could put foam between the vertebrae that are degenerated in her back. She does have other dynamics of chronic pain. She was in pain management many years ago and was on oxycodone.       This patient  has not had lumbar surgery in the past. Current pain level: 9  Activities limited by pain: all       AMB PAIN ASSESSMENT 1/11/2023   Location of Pain Back   Location Modifiers Right   Severity of Pain 9   Frequency of Pain Constant   Limiting Behavior Yes   Result of Injury No   Work-Related Injury No   Are there other pain locations you wish to document? No            ROS/Meds/PSH/PMH/FH/SH: I personally reviewed the patient's collected intake data. Below are the pertinents:    Allergies   Allergen Reactions    Fentanyl Other (See Comments) and Rash     Not sure of reaction  Doesn't remember reaction    Not sure of reaction    Venlafaxine Other (See Comments)     Denies allergy         Current Outpatient Medications:     trospium (SANCTURA) 20 MG tablet, Take 1 tablet by mouth 2 times daily, Disp: 180 tablet, Rfl: 3    ondansetron (ZOFRAN) 4 MG tablet, Take 1 tablet by mouth 3 times daily as needed for Nausea or Vomiting, Disp: 15 tablet, Rfl: 0    albuterol sulfate  (90 Base) MCG/ACT inhaler, Inhale 2 puffs into the lungs every 4 hours as needed, Disp: , Rfl:     aspirin 81 MG EC tablet, Take by mouth 2 times daily, Disp: , Rfl:     atorvastatin (LIPITOR) 40 MG tablet, Take by mouth daily, Disp: , Rfl:     buPROPion (WELLBUTRIN XL) 150 MG extended release tablet, Take 150 mg by mouth every evening, Disp: , Rfl:     buPROPion (WELLBUTRIN XL) 300 MG extended release tablet, Take 30 mg by mouth, Disp: , Rfl:     cetirizine (ZYRTEC) 10 MG tablet, Take by mouth, Disp: , Rfl:     vitamin D3 (CHOLECALCIFEROL) 125 MCG (5000 UT) TABS tablet, Take 5,000 Units by mouth daily, Disp: , Rfl:     ciclopirox (PENLAC) 8 % solution, Apply topically, Disp: , Rfl:     diazePAM (VALIUM) 10 MG tablet, Take 10 mg by mouth every 8 hours as needed. , Disp: , Rfl:     gabapentin (NEURONTIN) 300 MG capsule, Take one in AM and 4 at hs, Disp: , Rfl:     levothyroxine (SYNTHROID) 100 MCG tablet, Take 125 mcg by mouth daily, Disp: , Rfl:     montelukast (SINGULAIR) 10 MG tablet, TAKE ONE TABLET BY MOUTH ONE TIME DAILY, Disp: , Rfl:     OXcarbazepine (TRILEPTAL) 300 MG tablet, Take by mouth 2 times daily, Disp: , Rfl:     pantoprazole (PROTONIX) 20 MG tablet, TAKE 1 TABLET BY MOUTH EVERY DAY, Disp: , Rfl:     sucralfate (CARAFATE) 1 GM tablet, Take 1 g by mouth 4 times daily, Disp: , Rfl:     traZODone (DESYREL) 100 MG tablet, Take 100 mg by mouth, Disp: , Rfl:     Past Surgical History:   Procedure Laterality Date    BLADDER SUSPENSION  2003    CHOLECYSTECTOMY  1980s    CYSTOCELE REPAIR  11/12/2009    HYSTERECTOMY, TOTAL ABDOMINAL (CERVIX REMOVED)  2003    OVARY REMOVAL      RECTOCELE REPAIR  11/12/2009    TONSILLECTOMY  1980       Patient Active Problem List   Diagnosis    Premature beats    Breast cancer screening    IBS (irritable bowel syndrome)    GERD (gastroesophageal reflux disease)    ANUPAM on CPAP    Impaired fasting glucose    Fibromyalgia    Osteoarthritis    Hypertension    Asthma    Right renal mass    Lung nodule    Chest pain    Hyperlipidemia    Mixed stress and urge urinary incontinence    Hypothyroidism    Bipolar affective disorder (HCC)    Disequilibrium    Slurring of speech    Stroke-like episode         Tobacco:  reports that she has never smoked. She has never used smokeless tobacco.  Alcohol:   Social History     Substance and Sexual Activity   Alcohol Use No        Physical Exam:   Body mass index is 30.28 kg/m². GENERAL:  Adult in no acute distress, well developed, well nourished Patient is appropriately conversant  MSK:  Examination of the lumbar spine reveals paraspinal tenderness , facet tenderness, sacroiliac tenderness    There is severe tenderness to palpation along the spinous processes and paraspinal musculature. The patient ambulates with a unsteady and ataxic gait. ROM of bilateral hip(s) reveals no irritability. NEURO:  Cranial nerves grossly intact. No motor deficits.     Straight leg testing is positive bilateral  Sensory testing reveals intact sensation to light touch and in the distribution of the L3-S1 dermatomes bilaterally  Ankle jerk is negative for clonus    Reflexes   Right Left   Quadriceps (L4) 2 2   Achilles (S1) 2 2     Strength testing in the lower extremity reveals the following based on the 5 point grading scale:     HF (L2) H Ab (L5) KE (L3/4) ADF (L4) EHL (L5) A Ev (S1) APF (S1)   Right 5 5 5 5 5 5 5   Left 5 5 5 5 5 5 5     PSYCH:  Alert and oriented X 3. Appropriate affect. Intact judgment and insight. Radiographic Studies:     AP, lateral and spot views of the lumbar spine: There is disc degeneration at L5-S1 with retrolisthesis. Large anterior osteophyte spurring is noted at the L3-4 4 level. Disc space is maintained. biateral hip joints are visualized and appear to be preserved. Interpretation: degenerative disc disease and spondylosis. MRI lumbar spine images independently reviewed:   MRI lumbar spine without contrast August 7, 2019       Reference exam: None       Indication: Neurologic claudication with on steady recent gait issues, pain   lumbar region down left hip progressing for one month       Technique: Routine MRI of the lumbar spine was performed using sagittal and   axial imaging. Findings: There appears be partial lumbarization of S1. This places the tip of   the conus at the L1-L2 level and appears normal. L1-L2 shows facet hypertrophy   and posterolateral thickening, but no canal or foraminal stenosis. L2-L3 show   some loss of signal within the disc, with facet hypertrophy and posterior   element thickening, with mild canal narrowing but no neural foraminal stenosis. L3-L4 shows loss of signal with facet hypertrophy and posterior element   thickening but no canal or foraminal stenosis.  L4-L5 shows facet hypertrophy and   posterior element thickening with mild to moderate canal narrowing and mild to   moderate left neural foraminal narrowing but no right neural foraminal narrowing. What is felt to be the L5-S1 level shows decreased disc height and   signal with disc-osteophyte complex and protrusion with facet hypertrophy   contributing to mild canal narrowing but mild right and moderate to severe left   neural foraminal narrowing. There is no abnormal signal to suggest acute bony   injury. Impression   IMPRESSION:   1. What is felt to be partial lumbarization of S1.   2. L2-L3 shows mild canal narrowing. 3. L4-L5 shows mild to moderate canal narrowing and mild to moderate left neural   foraminal narrowing. 4. L5-S1 shows mild canal narrowing with mild right and moderate to severe left   neural foraminal narrowing. EXAM:  MRI thoracic spine - 10/2021    COMPARISON:  None. INDICATION:  Thoracic spine pain. TECHNICAL: Sagittal T1, sagittal T2, sagittal STIR, axial T1, and axial T2 sequences of the thoracic spine performed. FINDINGS:   Osseous structures: There is minimal chronic, anterior wedge deformity of T1. Vertebral heights are otherwise well-maintained. There is no marrow edema focal osseous lesion. No signal abnormality present in the cord. There is no syrinx. No cord edema. No signal abnormality in the visualized mediastinum nor retroperitoneum. Paraspinous musculature is unremarkable. There are posterior disc osteophyte complexes in the lower cervical spine at C5-6 and C6-7, resulting in mild central canal narrowing. These levels are imaged only on the sagittal sequences. There is no significant degenerative disc disease in the thoracic spine. No evidence of central canal or foraminal stenosis in the thoracic spine. Assessment/Plan:       Diagnosis Orders   1. Low back pain, unspecified back pain laterality, unspecified chronicity, unspecified whether sciatica present  XR LUMBAR SPINE (2-3 VIEWS)      2. Lumbar degenerative disc disease  MRI CERVICAL SPINE WO CONTRAST    MRI LUMBAR SPINE WO CONTRAST      3.  Lumbar facet arthropathy  MRI CERVICAL SPINE WO CONTRAST    MRI LUMBAR SPINE WO CONTRAST      4. Degenerative disc disease, cervical  MRI CERVICAL SPINE WO CONTRAST    MRI LUMBAR SPINE WO CONTRAST      5. Balance disorder  MRI CERVICAL SPINE WO CONTRAST    MRI LUMBAR SPINE WO CONTRAST          This patient's clinical history and physical exam is consistent with myofascial, spondylitic , and facetogenic back pain. She has a significant chronic history of pain. She does have lower lumbar degeneration and spondylosis. She could be having an acute episode of this. This is all complicated by fibromyalgia, her neck pain is related to cervical degenerative disc disease again complicated by fibromyalgia. There is a component of this vertigo and dizziness that is constant for her. She is waiting to see neurology again. She has had multiple brain MRIs and had CTs that are negative. She is not a great historian therefore I really does not know what medication she is on on a daily basis. It looks as though there is some issues with polypharmacy with her psychiatric meds and also chronic neuromodulator and pain medications. She was being managed by Dr. Silas Robertson with rheumatology. I told her since she is unclear what medication she is taking I will not be prescribing anything at today's visit. We will however go ahead and obtain up-to-date imaging to rule out any significant pathology. Otherwise we discussed a pain management referral for interventional management. I discussed with her the natural history of this condition in that most episodes are typically self limited. However, the symptoms can last for several months if not even longer. What I currently recommend is that she continues with conservative treatments to help cope with the symptoms and avoid having back surgery at this time. She understands that conservative treatments typically include activity modification, NSAIDs and physical therapy.   Oral and/or epidural steroids could be considered in resistant scenarios. Also, she  may want to explore chiropractic care or acupuncture. I advised to avoid any prolonged bedrest and to try to maintain ADLs as much as possible. The patient was counseled to follow up me should she  develop any neurologic symptoms such as leg pain. - A MRI was ordered to delineate anatomy, confirm the diagnosis and assess the severity. No orders of the defined types were placed in this encounter. Orders Placed This Encounter   Procedures    XR LUMBAR SPINE (2-3 VIEWS)    MRI CERVICAL SPINE WO CONTRAST    MRI LUMBAR SPINE WO CONTRAST        3 This is an acute, uncomplicated illness/injury      Return for MRI results. EVIN Maxwell - CNP  01/11/23      Elements of this note were created using speech recognition software. As such, errors of speech recognition may be present.

## 2023-01-28 ENCOUNTER — HOSPITAL ENCOUNTER (OUTPATIENT)
Dept: MAMMOGRAPHY | Age: 73
End: 2023-01-28
Payer: MEDICARE

## 2023-01-28 PROCEDURE — 77067 SCR MAMMO BI INCL CAD: CPT

## 2023-03-10 ENCOUNTER — TELEPHONE (OUTPATIENT)
Dept: UROLOGY | Age: 73
End: 2023-03-10

## 2023-03-13 ENCOUNTER — OFFICE VISIT (OUTPATIENT)
Dept: UROLOGY | Age: 73
End: 2023-03-13
Payer: MEDICARE

## 2023-03-13 DIAGNOSIS — N39.0 URINARY TRACT INFECTION WITH HEMATURIA, SITE UNSPECIFIED: Primary | ICD-10-CM

## 2023-03-13 DIAGNOSIS — R31.9 URINARY TRACT INFECTION WITH HEMATURIA, SITE UNSPECIFIED: Primary | ICD-10-CM

## 2023-03-13 LAB
BILIRUBIN, URINE, POC: NEGATIVE
BLOOD URINE, POC: NORMAL
GLUCOSE URINE, POC: NEGATIVE
KETONES, URINE, POC: NEGATIVE
LEUKOCYTE ESTERASE, URINE, POC: NORMAL
NITRITE, URINE, POC: POSITIVE
PH, URINE, POC: 5.5 (ref 4.6–8)
PROTEIN,URINE, POC: NEGATIVE
SPECIFIC GRAVITY, URINE, POC: 1.02 (ref 1–1.03)
URINALYSIS CLARITY, POC: NORMAL
URINALYSIS COLOR, POC: NORMAL
UROBILINOGEN, POC: NORMAL

## 2023-03-13 PROCEDURE — 81003 URINALYSIS AUTO W/O SCOPE: CPT | Performed by: UROLOGY

## 2023-03-13 RX ORDER — CIPROFLOXACIN 500 MG/1
500 TABLET, FILM COATED ORAL 2 TIMES DAILY
Qty: 14 TABLET | Refills: 0 | Status: SHIPPED | OUTPATIENT
Start: 2023-03-13 | End: 2023-03-20

## 2023-04-03 ENCOUNTER — RX ONLY (OUTPATIENT)
Age: 73
Setting detail: RX ONLY
End: 2023-04-03

## 2023-04-03 RX ORDER — MUPIROCIN 20 MG/G
OINTMENT TOPICAL
Qty: 44 | Refills: 6 | Status: ERX | COMMUNITY
Start: 2023-04-03

## 2023-05-10 ENCOUNTER — HOSPITAL ENCOUNTER (EMERGENCY)
Age: 73
Discharge: HOME | End: 2023-05-10
Attending: EMERGENCY MEDICINE
Payer: MEDICARE

## 2023-05-10 ENCOUNTER — APPOINTMENT (OUTPATIENT)
Dept: CT IMAGING | Age: 73
End: 2023-05-10
Payer: MEDICARE

## 2023-05-10 VITALS
HEART RATE: 65 BPM | OXYGEN SATURATION: 100 % | WEIGHT: 191 LBS | TEMPERATURE: 97.9 F | SYSTOLIC BLOOD PRESSURE: 124 MMHG | DIASTOLIC BLOOD PRESSURE: 68 MMHG | HEIGHT: 70 IN | RESPIRATION RATE: 17 BRPM | BODY MASS INDEX: 27.35 KG/M2

## 2023-05-10 DIAGNOSIS — R51.9 ACUTE NONINTRACTABLE HEADACHE, UNSPECIFIED HEADACHE TYPE: Primary | ICD-10-CM

## 2023-05-10 DIAGNOSIS — R21 RASH AND OTHER NONSPECIFIC SKIN ERUPTION: ICD-10-CM

## 2023-05-10 LAB
ALBUMIN SERPL-MCNC: 3.9 G/DL (ref 3.2–4.6)
ALBUMIN/GLOB SERPL: 0.9 (ref 0.4–1.6)
ALP SERPL-CCNC: 142 U/L (ref 50–130)
ALT SERPL-CCNC: 26 U/L (ref 12–65)
ANION GAP SERPL CALC-SCNC: 3 MMOL/L (ref 2–11)
AST SERPL-CCNC: 20 U/L (ref 15–37)
BASOPHILS # BLD: 0.1 K/UL (ref 0–0.2)
BASOPHILS NFR BLD: 1 % (ref 0–2)
BILIRUB SERPL-MCNC: 0.3 MG/DL (ref 0.2–1.1)
BUN SERPL-MCNC: 16 MG/DL (ref 8–23)
CALCIUM SERPL-MCNC: 9.6 MG/DL (ref 8.3–10.4)
CHLORIDE SERPL-SCNC: 109 MMOL/L (ref 101–110)
CO2 SERPL-SCNC: 29 MMOL/L (ref 21–32)
CREAT SERPL-MCNC: 0.81 MG/DL (ref 0.6–1)
DIFFERENTIAL METHOD BLD: NORMAL
EOSINOPHIL # BLD: 0.5 K/UL (ref 0–0.8)
EOSINOPHIL NFR BLD: 5 % (ref 0.5–7.8)
ERYTHROCYTE [DISTWIDTH] IN BLOOD BY AUTOMATED COUNT: 13 % (ref 11.9–14.6)
GLOBULIN SER CALC-MCNC: 4.5 G/DL (ref 2.8–4.5)
GLUCOSE SERPL-MCNC: 116 MG/DL (ref 65–100)
HCT VFR BLD AUTO: 44.3 % (ref 35.8–46.3)
HGB BLD-MCNC: 14.3 G/DL (ref 11.7–15.4)
IMM GRANULOCYTES # BLD AUTO: 0 K/UL (ref 0–0.5)
IMM GRANULOCYTES NFR BLD AUTO: 0 % (ref 0–5)
INR PPP: 1
LYMPHOCYTES # BLD: 2.9 K/UL (ref 0.5–4.6)
LYMPHOCYTES NFR BLD: 30 % (ref 13–44)
MCH RBC QN AUTO: 29.7 PG (ref 26.1–32.9)
MCHC RBC AUTO-ENTMCNC: 32.3 G/DL (ref 31.4–35)
MCV RBC AUTO: 91.9 FL (ref 82–102)
MONOCYTES # BLD: 0.8 K/UL (ref 0.1–1.3)
MONOCYTES NFR BLD: 8 % (ref 4–12)
NEUTS SEG # BLD: 5.5 K/UL (ref 1.7–8.2)
NEUTS SEG NFR BLD: 57 % (ref 43–78)
NRBC # BLD: 0 K/UL (ref 0–0.2)
PLATELET # BLD AUTO: 241 K/UL (ref 150–450)
PMV BLD AUTO: 10.9 FL (ref 9.4–12.3)
POTASSIUM SERPL-SCNC: 4 MMOL/L (ref 3.5–5.1)
PROT SERPL-MCNC: 8.4 G/DL (ref 6.3–8.2)
PROTHROMBIN TIME: 12.9 SEC (ref 12.6–14.3)
RBC # BLD AUTO: 4.82 M/UL (ref 4.05–5.2)
SODIUM SERPL-SCNC: 141 MMOL/L (ref 133–143)
WBC # BLD AUTO: 9.6 K/UL (ref 4.3–11.1)

## 2023-05-10 PROCEDURE — 85610 PROTHROMBIN TIME: CPT

## 2023-05-10 PROCEDURE — 80053 COMPREHEN METABOLIC PANEL: CPT

## 2023-05-10 PROCEDURE — 70450 CT HEAD/BRAIN W/O DYE: CPT

## 2023-05-10 PROCEDURE — 96375 TX/PRO/DX INJ NEW DRUG ADDON: CPT

## 2023-05-10 PROCEDURE — 96374 THER/PROPH/DIAG INJ IV PUSH: CPT

## 2023-05-10 PROCEDURE — 94762 N-INVAS EAR/PLS OXIMTRY CONT: CPT

## 2023-05-10 PROCEDURE — 6360000002 HC RX W HCPCS

## 2023-05-10 PROCEDURE — 99284 EMERGENCY DEPT VISIT MOD MDM: CPT

## 2023-05-10 PROCEDURE — 85025 COMPLETE CBC W/AUTO DIFF WBC: CPT

## 2023-05-10 PROCEDURE — 2580000003 HC RX 258

## 2023-05-10 RX ORDER — DIPHENHYDRAMINE HYDROCHLORIDE 50 MG/ML
25 INJECTION INTRAMUSCULAR; INTRAVENOUS
Status: COMPLETED | OUTPATIENT
Start: 2023-05-10 | End: 2023-05-10

## 2023-05-10 RX ORDER — IBUPROFEN 400 MG/1
400 TABLET ORAL EVERY 6 HOURS PRN
Status: DISCONTINUED | OUTPATIENT
Start: 2023-05-10 | End: 2023-05-10

## 2023-05-10 RX ORDER — METOCLOPRAMIDE HYDROCHLORIDE 5 MG/ML
10 INJECTION INTRAMUSCULAR; INTRAVENOUS
Status: COMPLETED | OUTPATIENT
Start: 2023-05-10 | End: 2023-05-10

## 2023-05-10 RX ORDER — 0.9 % SODIUM CHLORIDE 0.9 %
1000 INTRAVENOUS SOLUTION INTRAVENOUS
Status: COMPLETED | OUTPATIENT
Start: 2023-05-10 | End: 2023-05-10

## 2023-05-10 RX ORDER — HYDROCODONE BITARTRATE AND ACETAMINOPHEN 5; 325 MG/1; MG/1
1 TABLET ORAL
Status: DISCONTINUED | OUTPATIENT
Start: 2023-05-10 | End: 2023-05-10

## 2023-05-10 RX ORDER — KETOROLAC TROMETHAMINE 30 MG/ML
30 INJECTION, SOLUTION INTRAMUSCULAR; INTRAVENOUS ONCE
Status: COMPLETED | OUTPATIENT
Start: 2023-05-10 | End: 2023-05-10

## 2023-05-10 RX ADMIN — KETOROLAC TROMETHAMINE 30 MG: 30 INJECTION, SOLUTION INTRAMUSCULAR at 15:35

## 2023-05-10 RX ADMIN — SODIUM CHLORIDE 1000 ML: 900 INJECTION, SOLUTION INTRAVENOUS at 15:36

## 2023-05-10 RX ADMIN — METOCLOPRAMIDE 10 MG: 5 INJECTION, SOLUTION INTRAMUSCULAR; INTRAVENOUS at 15:35

## 2023-05-10 RX ADMIN — DIPHENHYDRAMINE HYDROCHLORIDE 25 MG: 50 INJECTION, SOLUTION INTRAMUSCULAR; INTRAVENOUS at 15:35

## 2023-05-10 ASSESSMENT — PAIN DESCRIPTION - LOCATION: LOCATION: HEAD;HAND;FOOT

## 2023-05-10 ASSESSMENT — PAIN SCALES - GENERAL: PAINLEVEL_OUTOF10: 10

## 2023-05-10 ASSESSMENT — PAIN - FUNCTIONAL ASSESSMENT: PAIN_FUNCTIONAL_ASSESSMENT: 0-10

## 2023-05-10 NOTE — ED NOTES
I have reviewed discharge instructions with the patient. The patient verbalized understanding. Patient left ED via Discharge Method: wheelchair to Home with . Opportunity for questions and clarification provided. Patient given 0 scripts. To continue your aftercare when you leave the hospital, you may receive an automated call from our care team to check in on how you are doing. This is a free service and part of our promise to provide the best care and service to meet your aftercare needs.  If you have questions, or wish to unsubscribe from this service please call [phone]. Thank you for Choosing our [facility] Emergency Department.         Oscar Ambrose RN  05/10/23 0286

## 2023-05-10 NOTE — ED TRIAGE NOTES
Pt ambulatory to triage with spouse. Pt c/o intermittent dizziness for the past week with worsening today with nausea and headache today. Pt denies taking anything for pain today. Pt c/o bilateral hand and bilateral foot pain. Pt with severe cracks in hands and feet. Pt states she has an appointment with dermatologist tomorrow.

## 2023-05-10 NOTE — DISCHARGE INSTRUCTIONS
Please follow-up with dermatology tomorrow. Continue to monitor symptoms at home. Take Excedrin Migraine if your headache returns. If you begin to experience any sudden onset headache, headache with exertion, slurred speech, facial droop, etc. present to the ED immediately.

## 2023-05-10 NOTE — ED PROVIDER NOTES
Emergency Department Provider Note                   PCP:                Sumit Mora MD               Age: 71 y.o. Sex: female     DISPOSITION Decision To Discharge 05/10/2023 04:26:56 PM       ICD-10-CM    1. Acute nonintractable headache, unspecified headache type  R51.9       2. Rash and other nonspecific skin eruption  R21           MEDICAL DECISION MAKING  Complexity of Problems Addressed:  Complexity of Problem: 1 acute, uncomplicated illness or injury. Complexity of Problem: 1 chronic illness with exacerbation. Data Reviewed and Analyzed:  Category 1:   I reviewed external records: ED visit note from an outside group. I reviewed external records: provider visit note from PCP. I reviewed external records: provider visit note from outside specialist.  I reviewed external records: previous lab results from outside ED. I reviewed external records: previous imaging study including radiologist interpretation. I ordered each unique test.  I reviewed the results of each unique test.      Category 2:   I interpreted the CT Scan. And agree with the radiologist that there is no evidence of acute abnormalities. Category 3: Discussion of management or test interpretation. 67-year-old female presents complaining of a rash to bilateral palms and soles with increased pain for the past 3 months. And headache and brain fog/lightheadedness for the past week. Patient is ambulatory into the facility today with a normal gait accompanied by her . On presentation, patient is afebrile, vital signs are stable, and she is well-appearing in no acute distress. She is neurologically intact with no evidence of sensory deficit, motor weakness, cerebellar dysfunction, or any other neurodeficit. NIH scale is 0. Patient states she has had a previous stroke, however per chart review I cannot find any evidence of stroke.   She has had many CTAs and head CTs performed that did not reveal any evidence of

## 2023-05-10 NOTE — CARE COORDINATION
SW met with patient, confirmed demographic information (lives with  and grandson) Patient reports being independent at home, does not use assistive devices to ambulate, and denies any falls. Patient does have a rolling walker, cane, and wheelchair for use as needed but does not require any of them daily. Patient is also established with primary care and is seen regularly. No needs identified from SW at this time.      Hector Chau LMSW    [address] [facility]

## 2023-05-11 ENCOUNTER — APPOINTMENT (RX ONLY)
Dept: URBAN - METROPOLITAN AREA CLINIC 23 | Facility: CLINIC | Age: 73
Setting detail: DERMATOLOGY
End: 2023-05-11

## 2023-05-11 DIAGNOSIS — L30.1 DYSHIDROSIS [POMPHOLYX]: ICD-10-CM

## 2023-05-11 PROCEDURE — ? OTHER

## 2023-05-11 PROCEDURE — ? PRESCRIPTION

## 2023-05-11 PROCEDURE — 99213 OFFICE O/P EST LOW 20 MIN: CPT

## 2023-05-11 PROCEDURE — ? COUNSELING

## 2023-05-11 PROCEDURE — ? ORDER TESTS

## 2023-05-11 RX ORDER — TRIAMCINOLONE ACETONIDE 0.25 MG/G
CREAM TOPICAL
Qty: 454 | Refills: 0 | Status: ERX | COMMUNITY
Start: 2023-05-11

## 2023-05-11 RX ORDER — PREDNISONE 20 MG/1
TABLET ORAL
Qty: 20 | Refills: 0 | Status: ERX | COMMUNITY
Start: 2023-05-11

## 2023-05-11 RX ADMIN — TRIAMCINOLONE ACETONIDE: 0.25 CREAM TOPICAL at 00:00

## 2023-05-11 RX ADMIN — PREDNISONE: 20 TABLET ORAL at 00:00

## 2023-05-11 ASSESSMENT — LOCATION ZONE DERM
LOCATION ZONE: FEET
LOCATION ZONE: FINGER
LOCATION ZONE: HAND

## 2023-05-11 ASSESSMENT — LOCATION SIMPLE DESCRIPTION DERM
LOCATION SIMPLE: RIGHT PLANTAR SURFACE
LOCATION SIMPLE: LEFT PLANTAR SURFACE
LOCATION SIMPLE: LEFT HAND
LOCATION SIMPLE: RIGHT INDEX FINGER

## 2023-05-11 ASSESSMENT — LOCATION DETAILED DESCRIPTION DERM
LOCATION DETAILED: RIGHT INDEX FINGERTIP
LOCATION DETAILED: RIGHT MEDIAL PLANTAR MIDFOOT
LOCATION DETAILED: LEFT MEDIAL PLANTAR MIDFOOT
LOCATION DETAILED: LEFT THENAR EMINENCE

## 2023-05-11 NOTE — PROCEDURE: ORDER TESTS
Bill For Surgical Tray: no
Expected Date Of Service: 05/11/2023
Billing Type: Third-Party Bill
Performing Laboratory: 0

## 2023-05-11 NOTE — HPI: RASH
What Type Of Note Output Would You Prefer (Optional)?: Standard Output
How Severe Is Your Rash?: mild
Is This A New Presentation, Or A Follow-Up?: Rash
Additional History: Patient went to the ER yesterday.  There she got injections and IV. Pt doesn?t recall what was given but it made her sleepy.\\n\\nHas taken singular and Zyrtec but not on those anymore. Has not been to Allergy.

## 2023-05-11 NOTE — PROCEDURE: OTHER
Render Risk Assessment In Note?: no
Other (Free Text): This is different than her previous eruption\\n\\nI feel there is likely a component of irritant contact as well and we have suggested emollients under occlusion followed by TAC once pred taper is over
Detail Level: Zone
Note Text (......Xxx Chief Complaint.): This diagnosis correlates with the

## 2023-06-15 ENCOUNTER — APPOINTMENT (RX ONLY)
Dept: URBAN - METROPOLITAN AREA CLINIC 23 | Facility: CLINIC | Age: 73
Setting detail: DERMATOLOGY
End: 2023-06-15

## 2023-06-15 DIAGNOSIS — L30.1 DYSHIDROSIS [POMPHOLYX]: ICD-10-CM | Status: INADEQUATELY CONTROLLED

## 2023-06-15 PROCEDURE — ? PRESCRIPTION MEDICATION MANAGEMENT

## 2023-06-15 PROCEDURE — ? OTHER

## 2023-06-15 PROCEDURE — ? PRESCRIPTION

## 2023-06-15 PROCEDURE — ? COUNSELING

## 2023-06-15 PROCEDURE — 99214 OFFICE O/P EST MOD 30 MIN: CPT

## 2023-06-15 PROCEDURE — ? REFERRAL CORRESPONDENCE

## 2023-06-15 RX ORDER — BETAMETHASONE DIPROPIONATE 0.5 MG/G
OINTMENT TOPICAL
Qty: 45 | Refills: 5 | Status: ERX | COMMUNITY
Start: 2023-06-15

## 2023-06-15 RX ADMIN — BETAMETHASONE DIPROPIONATE: 0.5 OINTMENT TOPICAL at 00:00

## 2023-06-15 ASSESSMENT — LOCATION SIMPLE DESCRIPTION DERM
LOCATION SIMPLE: RIGHT PLANTAR SURFACE
LOCATION SIMPLE: LEFT HAND
LOCATION SIMPLE: LEFT PLANTAR SURFACE
LOCATION SIMPLE: RIGHT INDEX FINGER

## 2023-06-15 ASSESSMENT — LOCATION DETAILED DESCRIPTION DERM
LOCATION DETAILED: RIGHT INDEX FINGERTIP
LOCATION DETAILED: RIGHT MEDIAL PLANTAR MIDFOOT
LOCATION DETAILED: LEFT THENAR EMINENCE
LOCATION DETAILED: LEFT MEDIAL PLANTAR MIDFOOT

## 2023-06-15 ASSESSMENT — LOCATION ZONE DERM
LOCATION ZONE: FINGER
LOCATION ZONE: HAND
LOCATION ZONE: FEET

## 2023-06-15 NOTE — PROCEDURE: PRESCRIPTION MEDICATION MANAGEMENT
Detail Level: Zone
Plan: Referring to [facility] and Dr. Lujan for light therapy
Initiate Treatment: Beyamethasone ointment bid with occlusion
Render In Strict Bullet Format?: No

## 2023-06-15 NOTE — PROCEDURE: OTHER
Render Risk Assessment In Note?: no
Other (Free Text): Recommending light treatment at Dr. Lujan office, will send referral\\nRecommending allergy testing at [facility], will send referral
Detail Level: Zone
Note Text (......Xxx Chief Complaint.): This diagnosis correlates with the

## 2023-06-27 ENCOUNTER — OFFICE VISIT (OUTPATIENT)
Dept: UROLOGY | Age: 73
End: 2023-06-27
Payer: MEDICARE

## 2023-06-27 DIAGNOSIS — N39.0 URINARY TRACT INFECTION WITH HEMATURIA, SITE UNSPECIFIED: Primary | ICD-10-CM

## 2023-06-27 DIAGNOSIS — R31.9 URINARY TRACT INFECTION WITH HEMATURIA, SITE UNSPECIFIED: Primary | ICD-10-CM

## 2023-06-27 DIAGNOSIS — N28.89 RENAL MASS: ICD-10-CM

## 2023-06-27 DIAGNOSIS — R39.15 URGENCY OF URINATION: ICD-10-CM

## 2023-06-27 LAB
BILIRUBIN, URINE, POC: NEGATIVE
BLOOD URINE, POC: NEGATIVE
GLUCOSE URINE, POC: NEGATIVE
KETONES, URINE, POC: NEGATIVE
LEUKOCYTE ESTERASE, URINE, POC: NEGATIVE
NITRITE, URINE, POC: NEGATIVE
PH, URINE, POC: 5 (ref 4.6–8)
PROTEIN,URINE, POC: NEGATIVE
SPECIFIC GRAVITY, URINE, POC: 1.02 (ref 1–1.03)
URINALYSIS CLARITY, POC: NORMAL
URINALYSIS COLOR, POC: NORMAL
UROBILINOGEN, POC: NORMAL

## 2023-06-27 PROCEDURE — 81003 URINALYSIS AUTO W/O SCOPE: CPT | Performed by: NURSE PRACTITIONER

## 2023-06-27 PROCEDURE — 99214 OFFICE O/P EST MOD 30 MIN: CPT | Performed by: NURSE PRACTITIONER

## 2023-06-27 PROCEDURE — G8400 PT W/DXA NO RESULTS DOC: HCPCS | Performed by: NURSE PRACTITIONER

## 2023-06-27 PROCEDURE — G8428 CUR MEDS NOT DOCUMENT: HCPCS | Performed by: NURSE PRACTITIONER

## 2023-06-27 PROCEDURE — 3017F COLORECTAL CA SCREEN DOC REV: CPT | Performed by: NURSE PRACTITIONER

## 2023-06-27 PROCEDURE — 1090F PRES/ABSN URINE INCON ASSESS: CPT | Performed by: NURSE PRACTITIONER

## 2023-06-27 PROCEDURE — 1036F TOBACCO NON-USER: CPT | Performed by: NURSE PRACTITIONER

## 2023-06-27 PROCEDURE — 1123F ACP DISCUSS/DSCN MKR DOCD: CPT | Performed by: NURSE PRACTITIONER

## 2023-06-27 PROCEDURE — G8417 CALC BMI ABV UP PARAM F/U: HCPCS | Performed by: NURSE PRACTITIONER

## 2023-06-27 ASSESSMENT — ENCOUNTER SYMPTOMS
BACK PAIN: 0
NAUSEA: 0

## 2023-07-10 ENCOUNTER — APPOINTMENT (RX ONLY)
Dept: URBAN - METROPOLITAN AREA CLINIC 24 | Facility: CLINIC | Age: 73
Setting detail: DERMATOLOGY
End: 2023-07-10

## 2023-07-10 DIAGNOSIS — L30.1 DYSHIDROSIS [POMPHOLYX]: ICD-10-CM

## 2023-07-10 PROCEDURE — 99214 OFFICE O/P EST MOD 30 MIN: CPT

## 2023-07-10 PROCEDURE — ? COUNSELING

## 2023-07-10 PROCEDURE — ? PRESCRIPTION

## 2023-07-10 PROCEDURE — ? PRESCRIPTION MEDICATION MANAGEMENT

## 2023-07-10 PROCEDURE — ? OTHER

## 2023-07-10 PROCEDURE — ? REFERRAL CORRESPONDENCE

## 2023-07-10 RX ORDER — CLOBETASOL PROPIONATE 0.5 MG/G
OINTMENT TOPICAL
Qty: 60 | Refills: 3 | Status: ERX | COMMUNITY
Start: 2023-07-10

## 2023-07-10 RX ADMIN — CLOBETASOL PROPIONATE: 0.5 OINTMENT TOPICAL at 00:00

## 2023-07-10 ASSESSMENT — LOCATION DETAILED DESCRIPTION DERM
LOCATION DETAILED: LEFT MEDIAL PLANTAR MIDFOOT
LOCATION DETAILED: RIGHT MEDIAL PLANTAR MIDFOOT
LOCATION DETAILED: LEFT THENAR EMINENCE
LOCATION DETAILED: RIGHT INDEX FINGERTIP

## 2023-07-10 ASSESSMENT — LOCATION ZONE DERM
LOCATION ZONE: HAND
LOCATION ZONE: FINGER
LOCATION ZONE: FEET

## 2023-07-10 ASSESSMENT — BSA RASH: BSA RASH: 3

## 2023-07-10 NOTE — PROCEDURE: PRESCRIPTION MEDICATION MANAGEMENT
Detail Level: Zone
Plan: We send send another referral to Dr. Lujan for light therapy.  Vaseline under occlusion recommended daily.
Discontinue Regimen: Betamethasone ointment bid with occlusion
Initiate Treatment: Clobetasol ointment BID x two weeks and then PRN
Render In Strict Bullet Format?: No

## 2023-07-10 NOTE — PROCEDURE: OTHER
Render Risk Assessment In Note?: no
Other (Free Text): Recommending light treatment at Dr. Lujan office, will send referral\\nRecommending allergy testing at [facility].  Will plan to f/u with the referral to see if she can be seen sooner than October.
Detail Level: Zone
Note Text (......Xxx Chief Complaint.): This diagnosis correlates with the

## 2023-08-14 NOTE — ED NOTES
"Patient ID: Ines Anguiano is a 47year old male. Chief complaint: Patient is here for follow-up on his heart palpitations. Patient states he has been feeling ""decent\"". He was seen here last month, after having an episode of pounding heart palpitations and feeling faint. He states he hasn't had any more of these episodes, but on Friday, he was out with his wife for a walk, and felt short of breath. He states \""it was a hot and humid day\"", and he was talking. Thinking back on it, he doesn't think that he had been very good about hydrating that day. But he states he had to stop and catch his breath for a couple minutes, and was then able to carry on with the walk. Didn't experience any chest pain or palpitations on this occasion. But states she felt his carotid pulse, and it felt \""slightly weakened\"". He had a Holter Monitor done in 2019 that showed a 21 minute of SVT. He then followed up with the cardiologist, Dr. Jonathan Morris, who had ordered an echo and home sleep study. He states he did not do these studies. 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 Review of Systems   Constitutional: Negative for fatigue and unexpected weight change. HENT: Negative for congestion. Respiratory: Positive for shortness of breath (resolved now). Negative for cough. Cardiovascular: Positive for palpitations (overall improved). Negative for chest pain and leg swelling. ALLERGIES:  Seasonal    Patient's past medical, surgical, social and family histories were reviewed and updated as appropriate. Outpatient Medications Marked as Taking for the 8/14/23 encounter (Office Visit) with Kylee Peterson PA-C   Medication Sig Dispense Refill   â¢ DIGESTIVE ENZYMES PO      â¢ Probiotic Product (PROBIOTIC DAILY PO) Take by mouth every other day. â¢ VITAMIN D PO Take by mouth daily.          Past Surgical History:   Procedure Laterality Date   â¢ Colonoscopy w biopsy  07/15/2021 " Report given to BERNICE Castro RN. hyperplastic polyp, mild diverticulosis   â¢ Cyst removal Right     right hand   â¢ Hernia repair     â¢ Meniscectomy  [id number]        Vitals:  Blood pressure 132/88, pulse 68, temperature 97.3 Â°F (36.3 Â°C), temperature source Temporal, weight 78.4 kg (172 lb 14.4 oz), SpO2 100 %. Body mass index is 24.81 kg/mÂ². Physical Exam  Constitutional:       General: He is not in acute distress. Appearance: Normal appearance. Cardiovascular:      Rate and Rhythm: Normal rate and regular rhythm. Pulmonary:      Effort: Pulmonary effort is normal.      Breath sounds: Normal breath sounds. Neurological:      Mental Status: He is alert and oriented to person, place, and time. Psychiatric:         Behavior: Behavior normal.       Problem List Items Addressed This Visit        Cardiac and Vasculature    Palpitations - Primary    Relevant Orders    Holter Monitor Extended Wear    Transthoracic Echo (TTE) Complete   Other Visit Diagnoses     Dyspnea on exertion        Relevant Orders    Transthoracic Echo (TTE) Complete    SERVICE TO SLEEP MEDICINE    SVT (supraventricular tachycardia) (CMD)          Encouraged patient to hydrate appropriately daily. May need follow-up with cardiology. Await test results. Follow up: Return if symptoms worsen or fail to improve. Discussed medication dosage, usage, goals of therapy, and side effects. The patient indicates understanding of these issues and agrees with the plan.     Celena Davis PA-C

## 2023-08-16 ENCOUNTER — OFFICE VISIT (OUTPATIENT)
Dept: NEUROLOGY | Age: 73
End: 2023-08-16
Payer: MEDICARE

## 2023-08-16 VITALS
OXYGEN SATURATION: 96 % | SYSTOLIC BLOOD PRESSURE: 116 MMHG | DIASTOLIC BLOOD PRESSURE: 74 MMHG | HEART RATE: 64 BPM | WEIGHT: 190.4 LBS | RESPIRATION RATE: 12 BRPM | BODY MASS INDEX: 27.26 KG/M2 | HEIGHT: 70 IN

## 2023-08-16 DIAGNOSIS — R29.6 FALLING: ICD-10-CM

## 2023-08-16 DIAGNOSIS — R20.0 SENSORY LOSS: Primary | ICD-10-CM

## 2023-08-16 DIAGNOSIS — E53.9 BURNING FEET SYNDROME: ICD-10-CM

## 2023-08-16 DIAGNOSIS — R42 DYSEQUILIBRIUM: ICD-10-CM

## 2023-08-16 PROCEDURE — 3074F SYST BP LT 130 MM HG: CPT | Performed by: PSYCHIATRY & NEUROLOGY

## 2023-08-16 PROCEDURE — 1036F TOBACCO NON-USER: CPT | Performed by: PSYCHIATRY & NEUROLOGY

## 2023-08-16 PROCEDURE — G8427 DOCREV CUR MEDS BY ELIG CLIN: HCPCS | Performed by: PSYCHIATRY & NEUROLOGY

## 2023-08-16 PROCEDURE — 3078F DIAST BP <80 MM HG: CPT | Performed by: PSYCHIATRY & NEUROLOGY

## 2023-08-16 PROCEDURE — 3017F COLORECTAL CA SCREEN DOC REV: CPT | Performed by: PSYCHIATRY & NEUROLOGY

## 2023-08-16 PROCEDURE — 99215 OFFICE O/P EST HI 40 MIN: CPT | Performed by: PSYCHIATRY & NEUROLOGY

## 2023-08-16 PROCEDURE — 1090F PRES/ABSN URINE INCON ASSESS: CPT | Performed by: PSYCHIATRY & NEUROLOGY

## 2023-08-16 PROCEDURE — G8417 CALC BMI ABV UP PARAM F/U: HCPCS | Performed by: PSYCHIATRY & NEUROLOGY

## 2023-08-16 PROCEDURE — 1123F ACP DISCUSS/DSCN MKR DOCD: CPT | Performed by: PSYCHIATRY & NEUROLOGY

## 2023-08-16 PROCEDURE — G8400 PT W/DXA NO RESULTS DOC: HCPCS | Performed by: PSYCHIATRY & NEUROLOGY

## 2023-08-16 RX ORDER — METOPROLOL SUCCINATE 100 MG/1
TABLET, EXTENDED RELEASE ORAL
COMMUNITY
Start: 2023-08-14

## 2023-08-16 ASSESSMENT — ENCOUNTER SYMPTOMS
CONSTIPATION: 0
BACK PAIN: 1
DOUBLE VISION: 0
BLURRED VISION: 0
NAUSEA: 0

## 2023-08-16 ASSESSMENT — VISUAL ACUITY: OU: 1

## 2023-08-16 NOTE — PROGRESS NOTES
NEUROLOGY  RETURN  OFFICE VISIT [de-identified]                     8/16/2023  Roseann Hong is a 68 y.o. female here for [de-identified] Several problems. Disequilibrium. Burning feet. History of neuropathy. History of many spells of slurred speech and going to emergency room with several years and multiple episodes thought to be TIA with CT scans of the head, CTA, perfusion, MRI evaluations for stroke work-ups both in the [facility] and [facility] and the results were basically negative. Referred by   Gregory Hester MD       Psych     Dr Akosua Yarbrough      Chief Complaint:   Chief Complaint   Patient presents with    Follow-up     Pain in right eye, also mentions she had an episode where she felt an extremely sharp shooting pain go from the back of her neck through her head. Also mentions trouble with balance          78-year-old right-handed  white female comes in with her  complaining of problems including that she is worried about an artery in her neck that might be occluded. Numerous visits to the emergency room over the past 5 years seem to point towards good arteries and negative CTA CT MRI scans. She did have EMG years ago = apparently I last did this in 2019 and has not been repeated. She has disequilibrium. Falling spells. Burning feet. She is on gabapentin. Gabapentin 300 mg 4 daily    1 AM and 4 HS    = this seems to help quite well in suppressing any kind of burning pain in her feet. Active Problems:    * No active hospital problems. *  Resolved Problems:    * No resolved hospital problems.  *    Past Medical History:   Diagnosis Date    Allergic rhinitis 6/28/2013    Asthma     Bipolar affective disorder (HCC)     Disturbance of skin sensation     Fibromyalgia     GERD (gastroesophageal reflux disease)     History of miscarriage     History of peptic ulcer     Hyperlipidemia     Hypertension     Hypothyroidism     IBS (irritable bowel syndrome)     Impaired fasting glucose 2/7/2022

## 2023-08-17 ENCOUNTER — OFFICE VISIT (OUTPATIENT)
Dept: UROLOGY | Age: 73
End: 2023-08-17
Payer: MEDICARE

## 2023-08-17 DIAGNOSIS — N39.0 URINARY TRACT INFECTION WITH HEMATURIA, SITE UNSPECIFIED: ICD-10-CM

## 2023-08-17 DIAGNOSIS — R31.9 URINARY TRACT INFECTION WITH HEMATURIA, SITE UNSPECIFIED: ICD-10-CM

## 2023-08-17 DIAGNOSIS — N39.46 MIXED INCONTINENCE: ICD-10-CM

## 2023-08-17 DIAGNOSIS — R39.15 URGENCY OF URINATION: Primary | ICD-10-CM

## 2023-08-17 LAB
BILIRUBIN, URINE, POC: NEGATIVE
BLOOD URINE, POC: NEGATIVE
GLUCOSE URINE, POC: NEGATIVE
KETONES, URINE, POC: NEGATIVE
LEUKOCYTE ESTERASE, URINE, POC: NORMAL
NITRITE, URINE, POC: NEGATIVE
PH, URINE, POC: 5.5 (ref 4.6–8)
PROTEIN,URINE, POC: NEGATIVE
SPECIFIC GRAVITY, URINE, POC: 1.02 (ref 1–1.03)
URINALYSIS CLARITY, POC: NORMAL
URINALYSIS COLOR, POC: NORMAL
UROBILINOGEN, POC: NORMAL

## 2023-08-17 PROCEDURE — 1123F ACP DISCUSS/DSCN MKR DOCD: CPT | Performed by: NURSE PRACTITIONER

## 2023-08-17 PROCEDURE — G8400 PT W/DXA NO RESULTS DOC: HCPCS | Performed by: NURSE PRACTITIONER

## 2023-08-17 PROCEDURE — G8428 CUR MEDS NOT DOCUMENT: HCPCS | Performed by: NURSE PRACTITIONER

## 2023-08-17 PROCEDURE — 0509F URINE INCON PLAN DOCD: CPT | Performed by: NURSE PRACTITIONER

## 2023-08-17 PROCEDURE — 81003 URINALYSIS AUTO W/O SCOPE: CPT | Performed by: NURSE PRACTITIONER

## 2023-08-17 PROCEDURE — 3017F COLORECTAL CA SCREEN DOC REV: CPT | Performed by: NURSE PRACTITIONER

## 2023-08-17 PROCEDURE — 1036F TOBACCO NON-USER: CPT | Performed by: NURSE PRACTITIONER

## 2023-08-17 PROCEDURE — 1090F PRES/ABSN URINE INCON ASSESS: CPT | Performed by: NURSE PRACTITIONER

## 2023-08-17 PROCEDURE — 99214 OFFICE O/P EST MOD 30 MIN: CPT | Performed by: NURSE PRACTITIONER

## 2023-08-17 PROCEDURE — G8417 CALC BMI ABV UP PARAM F/U: HCPCS | Performed by: NURSE PRACTITIONER

## 2023-08-17 RX ORDER — SOLIFENACIN SUCCINATE 5 MG/1
5 TABLET, FILM COATED ORAL DAILY
Qty: 30 TABLET | Refills: 3 | Status: SHIPPED | OUTPATIENT
Start: 2023-08-17 | End: 2024-08-16

## 2023-08-17 ASSESSMENT — ENCOUNTER SYMPTOMS
NAUSEA: 0
BACK PAIN: 0

## 2023-08-17 NOTE — PROGRESS NOTES
[facility] UROLOGY  [address]  [phone]          Jetta Kayser  : 1950    Chief Complaint   Patient presents with    Follow-up          HPI     Jetta Kayser is a 68 y.o. female      Here today with worsening urinary incontinence. When I saw her 6 months ago she told me that the trospium was not working as well as it used to. We stopped that and add Myrbetriq 50 mg. She tells me she has not had any significant accidents since starting the Myrbetriq, however, still with her bladder is full she will gush urine. She is not sure that she is any better with the Myrbetriq. I was able to go through medical records and she actually had a urodynamic testing done by Emelia Mckeon. After urodynamic testing it appeared that her bladder was consistent with neurogenic bladder. Patient was able to void and empty with Valsalva no significant retention was noted. No incontinence was noted. Jenny Frederick had encouraged her at that time to remain off the oxybutynin as no OAB was noted. She was instructed to follow-up with PCP about possible back problems and/or pain medications being the source of the bladder issue. History as below-  R renal mass. Pt recently discovered to have an incidental RUP 15mm enhancing lesion by CT with contrast on 21 after presenting with RLQ pain. MRI on 21 confirmed a solid and enhancing 15mm RUP mass. CT repeated on 12/3/21 which showed a stable 1.3cm RUP lesion (hypodensity) and stable 5mm RLL lung nodule. She denies flank pain or gross hematuria. Cr was 0.79 on 21. Previously followed by NP for OAB. Reports success with Eluterio Pole in past but has run out with increased freq and mixed incontinence. Neg hematuria work up in 2019. CECELIA scheduled for 22.     Past Medical History:   Diagnosis Date    Allergic rhinitis 2013    Asthma     Bipolar affective disorder (HCC)     Disturbance of skin sensation

## 2023-08-21 RX ORDER — GABAPENTIN 300 MG/1
CAPSULE ORAL
Qty: 540 CAPSULE | Refills: 3 | Status: SHIPPED | OUTPATIENT
Start: 2023-08-21 | End: 2023-11-20

## 2023-09-28 ENCOUNTER — OFFICE VISIT (OUTPATIENT)
Dept: UROLOGY | Age: 73
End: 2023-09-28
Payer: MEDICARE

## 2023-09-28 DIAGNOSIS — R39.15 URGENCY OF URINATION: Primary | ICD-10-CM

## 2023-09-28 DIAGNOSIS — R31.9 URINARY TRACT INFECTION WITH HEMATURIA, SITE UNSPECIFIED: ICD-10-CM

## 2023-09-28 DIAGNOSIS — N39.0 URINARY TRACT INFECTION WITH HEMATURIA, SITE UNSPECIFIED: ICD-10-CM

## 2023-09-28 LAB
BILIRUBIN, URINE, POC: NEGATIVE
BLOOD URINE, POC: NEGATIVE
GLUCOSE URINE, POC: NEGATIVE
KETONES, URINE, POC: NEGATIVE
LEUKOCYTE ESTERASE, URINE, POC: NEGATIVE
NITRITE, URINE, POC: NEGATIVE
PH, URINE, POC: 5.5 (ref 4.6–8)
PROTEIN,URINE, POC: NEGATIVE
PVR, POC: 0 CC
SPECIFIC GRAVITY, URINE, POC: 1.03 (ref 1–1.03)
URINALYSIS CLARITY, POC: NORMAL
URINALYSIS COLOR, POC: NORMAL
UROBILINOGEN, POC: NORMAL

## 2023-09-28 PROCEDURE — 51798 US URINE CAPACITY MEASURE: CPT | Performed by: NURSE PRACTITIONER

## 2023-09-28 PROCEDURE — 99214 OFFICE O/P EST MOD 30 MIN: CPT | Performed by: NURSE PRACTITIONER

## 2023-09-28 PROCEDURE — 81003 URINALYSIS AUTO W/O SCOPE: CPT | Performed by: NURSE PRACTITIONER

## 2023-09-28 PROCEDURE — G8428 CUR MEDS NOT DOCUMENT: HCPCS | Performed by: NURSE PRACTITIONER

## 2023-09-28 PROCEDURE — 1123F ACP DISCUSS/DSCN MKR DOCD: CPT | Performed by: NURSE PRACTITIONER

## 2023-09-28 PROCEDURE — 1090F PRES/ABSN URINE INCON ASSESS: CPT | Performed by: NURSE PRACTITIONER

## 2023-09-28 PROCEDURE — G8400 PT W/DXA NO RESULTS DOC: HCPCS | Performed by: NURSE PRACTITIONER

## 2023-09-28 PROCEDURE — 1036F TOBACCO NON-USER: CPT | Performed by: NURSE PRACTITIONER

## 2023-09-28 PROCEDURE — 3017F COLORECTAL CA SCREEN DOC REV: CPT | Performed by: NURSE PRACTITIONER

## 2023-09-28 PROCEDURE — G8417 CALC BMI ABV UP PARAM F/U: HCPCS | Performed by: NURSE PRACTITIONER

## 2023-09-28 RX ORDER — SOLIFENACIN SUCCINATE 10 MG/1
10 TABLET, FILM COATED ORAL DAILY
Qty: 30 TABLET | Refills: 5 | Status: SHIPPED | OUTPATIENT
Start: 2023-09-28 | End: 2024-09-27

## 2023-09-28 ASSESSMENT — ENCOUNTER SYMPTOMS: NAUSEA: 0

## 2023-09-28 NOTE — PROGRESS NOTES
[facility] UROLOGY  [address]  [phone]          Luis Manuel Najera  : 1950    Chief Complaint   Patient presents with    Urinary Urgency    Urinary Tract Infection          HPI     Luis Manuel Najera is a 68 y.o. female    Here today for follow up on urinary incontinence. When I saw her 6 months ago she told me that the trospium was not working as well as it used to. We stopped that and I added Myrbetriq 50 mg. She tells me she has not had any significant accidents since starting the Myrbetriq, however, still with her bladder is full she will gush urine. She is not sure that she is any better with the Myrbetriq. We switched her over to Vesicare to try it per her wishes at the last visit. She says she is still incontinent of a large amount of urine when she stands her feet and try to make it to the bathroom. She would like to increase the Vesicare to 10 mg and give it a shot. She tells me however the Vesicare has been over $300. I planned her good Rx coupon and she can get 30-day supply for under $20 at [facility]. I discussed Botox and InterStim with her at the last visit as well. I am fearful if Botox is done that she may retain urine. She does have urodynamics in the past that shows some neurogenic tendencies of her bladder. I feel her best option would be Medtronic InterStim therapy. She tells me her sister had this done and had to have it removed because it did not work. She is hesitant to try this. I was able to go through medical records and she actually had a urodynamic testing done by Juan David Chowdhury. After urodynamic testing it appeared that her bladder was consistent with neurogenic bladder. Patient was able to void and empty with Valsalva no significant retention was noted. No incontinence was noted. [facility] her at that time to remain off the oxybutynin as no OAB was noted.   She was instructed to follow-up with PCP about

## 2023-10-27 ENCOUNTER — TELEPHONE (OUTPATIENT)
Dept: UROLOGY | Age: 73
End: 2023-10-27

## 2023-10-27 DIAGNOSIS — R39.15 URGENCY OF URINATION: ICD-10-CM

## 2023-10-27 RX ORDER — SOLIFENACIN SUCCINATE 10 MG/1
10 TABLET, FILM COATED ORAL DAILY
Qty: 30 TABLET | Refills: 5 | Status: SHIPPED | OUTPATIENT
Start: 2023-10-27 | End: 2024-10-26

## 2023-10-27 NOTE — TELEPHONE ENCOUNTER
Patient called with questions about Vesicare. She reports that her dose was increased, but having issues getting at the pharmacy.

## 2023-10-29 ENCOUNTER — HOSPITAL ENCOUNTER (EMERGENCY)
Age: 73
Discharge: HOME | End: 2023-10-29
Payer: MEDICARE

## 2023-10-29 VITALS
WEIGHT: 180 LBS | HEIGHT: 70 IN | RESPIRATION RATE: 17 BRPM | DIASTOLIC BLOOD PRESSURE: 60 MMHG | BODY MASS INDEX: 25.77 KG/M2 | SYSTOLIC BLOOD PRESSURE: 126 MMHG | HEART RATE: 67 BPM | OXYGEN SATURATION: 97 % | TEMPERATURE: 97.8 F

## 2023-10-29 DIAGNOSIS — R09.A2 FOREIGN BODY SENSATION IN THROAT: Primary | ICD-10-CM

## 2023-10-29 PROCEDURE — 6370000000 HC RX 637 (ALT 250 FOR IP)

## 2023-10-29 PROCEDURE — 99283 EMERGENCY DEPT VISIT LOW MDM: CPT

## 2023-10-29 RX ORDER — LIDOCAINE HYDROCHLORIDE 20 MG/ML
15 SOLUTION OROPHARYNGEAL
Status: COMPLETED | OUTPATIENT
Start: 2023-10-29 | End: 2023-10-29

## 2023-10-29 RX ORDER — MAGNESIUM HYDROXIDE/ALUMINUM HYDROXICE/SIMETHICONE 120; 1200; 1200 MG/30ML; MG/30ML; MG/30ML
30 SUSPENSION ORAL
Status: COMPLETED | OUTPATIENT
Start: 2023-10-29 | End: 2023-10-29

## 2023-10-29 RX ADMIN — ALUMINUM HYDROXIDE, MAGNESIUM HYDROXIDE, AND SIMETHICONE 30 ML: 200; 200; 20 SUSPENSION ORAL at 14:33

## 2023-10-29 RX ADMIN — LIDOCAINE HYDROCHLORIDE 15 ML: 20 SOLUTION ORAL at 14:33

## 2023-10-29 ASSESSMENT — PAIN - FUNCTIONAL ASSESSMENT: PAIN_FUNCTIONAL_ASSESSMENT: 0-10

## 2023-10-29 ASSESSMENT — LIFESTYLE VARIABLES
HOW MANY STANDARD DRINKS CONTAINING ALCOHOL DO YOU HAVE ON A TYPICAL DAY: PATIENT DOES NOT DRINK
HOW OFTEN DO YOU HAVE A DRINK CONTAINING ALCOHOL: NEVER

## 2023-10-29 ASSESSMENT — PAIN SCALES - GENERAL: PAINLEVEL_OUTOF10: 5

## 2023-10-29 NOTE — DISCHARGE INSTRUCTIONS
You were evaluated in the emergency department today for feeling like a gabapentin capsule was stuck in your throat. Your physical exam is very very very reassuring. I do not suspect this capsule is stuck in your throat where you think it is. I do suspect this is local irritation giving you the sensation that something is stuck in your throat. I do recommend warm Yi is a just in case or warm beverages such as coffee to help dissolve this capsule.     Follow-up with your primary care provider within the next week or 2 return to the emergency department if you are unable to handle your secretions (drooling) when you drink fluids you immediately vomited back up,

## 2023-10-29 NOTE — ED PROVIDER NOTES
Emergency Department Provider Note       PCP: Roberto Lynn MD   Age: 68 y.o. Sex: female     DISPOSITION Decision To Discharge 10/29/2023 03:15:17 PM       ICD-10-CM    1. Foreign body sensation in throat  R09. A2           Medical Decision Making     Complexity of Problems Addressed:  Complexity of Problem: 1 acute, uncomplicated illness or injury. Data Reviewed and Analyzed:  I independently ordered and reviewed each unique test.  I reviewed external records: provider visit note from PCP. Primary care visit on 7/3/2023 for bipolar disorder          Discussion of management or test interpretation. Vital signs reviewed, patient stable, NAD, afebrile, nontoxic in appearance     O2 saturation 97% on room air. Heart rate 67. In summary this is a 60-year-old female who presents emergency department with chief complaint of feeling like her gabapentin capsule is stuck in her throat. Patient states she took her morning medicines 20 minutes prior to arrival and she felt like her capsule was stuck in the right side of her throat. States she has been drinking fluids without difficulty. States she ate bread and swallowed without difficulty. Denies choking or vomiting when taking her capsule this morning. States she did make herself vomit with her finger but still has a sensation that capsule stuck in the right side of her throat. Overall physical exam is very reassuring. Patient is handling her secretions. No swelling of the posterior oropharynx. Normal phonation. Normal trachea. P.o. challenge patient at bedside and she was able to drink water without difficulty. No regurgitation. Gave patient GI cocktail for symptomatic relief. Patient drinking fluids in the emergency department during her course without any difficulty. Lungs are clear to auscultation bilaterally. No stridor. This is a capsule that patient states is stuck in the right side of her throat.   I do suspect that this

## 2023-10-31 ENCOUNTER — TELEPHONE (OUTPATIENT)
Dept: UROLOGY | Age: 73
End: 2023-10-31

## 2023-10-31 RX ORDER — SOLIFENACIN SUCCINATE 5 MG/1
5 TABLET, FILM COATED ORAL DAILY
Qty: 60 TABLET | Refills: 3 | Status: SHIPPED | OUTPATIENT
Start: 2023-10-31 | End: 2024-10-30

## 2023-10-31 NOTE — TELEPHONE ENCOUNTER
Pharmacy called said pt could not afford vesicare 10mg and wants to know if you would send in vesicare 5 mg and have pt take it two a day

## 2023-12-14 ENCOUNTER — HOSPITAL ENCOUNTER (OUTPATIENT)
Dept: GENERAL RADIOLOGY | Age: 73
Discharge: HOME | End: 2023-12-14
Payer: MEDICARE

## 2023-12-14 DIAGNOSIS — M53.3 SACROILIAC JOINT PAIN: ICD-10-CM

## 2023-12-14 PROCEDURE — 72202 X-RAY EXAM SI JOINTS 3/> VWS: CPT

## 2024-01-05 ENCOUNTER — OFFICE VISIT (OUTPATIENT)
Dept: UROLOGY | Age: 74
End: 2024-01-05
Payer: MEDICARE

## 2024-01-05 DIAGNOSIS — N39.46 MIXED INCONTINENCE: ICD-10-CM

## 2024-01-05 DIAGNOSIS — N28.89 RENAL MASS: ICD-10-CM

## 2024-01-05 DIAGNOSIS — R39.15 URGENCY OF URINATION: Primary | ICD-10-CM

## 2024-01-05 LAB
BILIRUBIN, URINE, POC: NORMAL
BLOOD URINE, POC: NEGATIVE
GLUCOSE URINE, POC: NEGATIVE
KETONES, URINE, POC: NORMAL
LEUKOCYTE ESTERASE, URINE, POC: NEGATIVE
NITRITE, URINE, POC: NEGATIVE
PH, URINE, POC: 5.5 (ref 4.6–8)
PROTEIN,URINE, POC: NORMAL
SPECIFIC GRAVITY, URINE, POC: 1.03 (ref 1–1.03)
URINALYSIS CLARITY, POC: NORMAL
URINALYSIS COLOR, POC: NORMAL
UROBILINOGEN, POC: NORMAL

## 2024-01-05 PROCEDURE — 3017F COLORECTAL CA SCREEN DOC REV: CPT | Performed by: UROLOGY

## 2024-01-05 PROCEDURE — G8400 PT W/DXA NO RESULTS DOC: HCPCS | Performed by: UROLOGY

## 2024-01-05 PROCEDURE — G8484 FLU IMMUNIZE NO ADMIN: HCPCS | Performed by: UROLOGY

## 2024-01-05 PROCEDURE — 1036F TOBACCO NON-USER: CPT | Performed by: UROLOGY

## 2024-01-05 PROCEDURE — G8417 CALC BMI ABV UP PARAM F/U: HCPCS | Performed by: UROLOGY

## 2024-01-05 PROCEDURE — 99214 OFFICE O/P EST MOD 30 MIN: CPT | Performed by: UROLOGY

## 2024-01-05 PROCEDURE — 0509F URINE INCON PLAN DOCD: CPT | Performed by: UROLOGY

## 2024-01-05 PROCEDURE — 1090F PRES/ABSN URINE INCON ASSESS: CPT | Performed by: UROLOGY

## 2024-01-05 PROCEDURE — G8427 DOCREV CUR MEDS BY ELIG CLIN: HCPCS | Performed by: UROLOGY

## 2024-01-05 PROCEDURE — 81003 URINALYSIS AUTO W/O SCOPE: CPT | Performed by: UROLOGY

## 2024-01-05 PROCEDURE — 1123F ACP DISCUSS/DSCN MKR DOCD: CPT | Performed by: UROLOGY

## 2024-01-05 RX ORDER — SOLIFENACIN SUCCINATE 5 MG/1
5 TABLET, FILM COATED ORAL DAILY
Qty: 60 TABLET | Refills: 3 | Status: SHIPPED | OUTPATIENT
Start: 2024-01-05 | End: 2025-01-04

## 2024-01-05 NOTE — PROGRESS NOTES
Additional Contrast? None      3. Mixed incontinence  N39.46 [facility] - Elizabet Levy DO, Urogynecology, [place]        Will reorder CT RMP.  I recommended repeat UDS given her multitude of symptoms.  RTO with Maribel Vasquez NP after UDS.    KRYSTEN MELO DO    Total time for today's encounter including chart review, result review, documentation and face to face encounter was 31 minutes.

## 2024-01-12 ENCOUNTER — OFFICE VISIT (OUTPATIENT)
Dept: ENT CLINIC | Age: 74
End: 2024-01-12
Payer: MEDICARE

## 2024-01-12 VITALS
WEIGHT: 182 LBS | HEIGHT: 70 IN | SYSTOLIC BLOOD PRESSURE: 110 MMHG | DIASTOLIC BLOOD PRESSURE: 68 MMHG | BODY MASS INDEX: 26.05 KG/M2 | RESPIRATION RATE: 18 BRPM

## 2024-01-12 DIAGNOSIS — H73.012 BULLOUS MYRINGITIS OF LEFT EAR: Primary | ICD-10-CM

## 2024-01-12 PROCEDURE — 3017F COLORECTAL CA SCREEN DOC REV: CPT | Performed by: OTOLARYNGOLOGY

## 2024-01-12 PROCEDURE — G8400 PT W/DXA NO RESULTS DOC: HCPCS | Performed by: OTOLARYNGOLOGY

## 2024-01-12 PROCEDURE — 3074F SYST BP LT 130 MM HG: CPT | Performed by: OTOLARYNGOLOGY

## 2024-01-12 PROCEDURE — G8417 CALC BMI ABV UP PARAM F/U: HCPCS | Performed by: OTOLARYNGOLOGY

## 2024-01-12 PROCEDURE — G8484 FLU IMMUNIZE NO ADMIN: HCPCS | Performed by: OTOLARYNGOLOGY

## 2024-01-12 PROCEDURE — 1090F PRES/ABSN URINE INCON ASSESS: CPT | Performed by: OTOLARYNGOLOGY

## 2024-01-12 PROCEDURE — 1123F ACP DISCUSS/DSCN MKR DOCD: CPT | Performed by: OTOLARYNGOLOGY

## 2024-01-12 PROCEDURE — 1036F TOBACCO NON-USER: CPT | Performed by: OTOLARYNGOLOGY

## 2024-01-12 PROCEDURE — 99204 OFFICE O/P NEW MOD 45 MIN: CPT | Performed by: OTOLARYNGOLOGY

## 2024-01-12 PROCEDURE — 3078F DIAST BP <80 MM HG: CPT | Performed by: OTOLARYNGOLOGY

## 2024-01-12 PROCEDURE — G8428 CUR MEDS NOT DOCUMENT: HCPCS | Performed by: OTOLARYNGOLOGY

## 2024-01-12 RX ORDER — FLUTICASONE PROPIONATE 50 MCG
SPRAY, SUSPENSION (ML) NASAL
COMMUNITY
Start: 2023-12-19

## 2024-01-12 RX ORDER — AZITHROMYCIN 250 MG/1
TABLET, FILM COATED ORAL
COMMUNITY
Start: 2024-01-08

## 2024-01-12 RX ORDER — LEVOTHYROXINE SODIUM 0.12 MG/1
TABLET ORAL
COMMUNITY
Start: 2023-12-01

## 2024-01-12 ASSESSMENT — ENCOUNTER SYMPTOMS
SINUS PAIN: 0
COUGH: 0
DIARRHEA: 0
SINUS PRESSURE: 0
SHORTNESS OF BREATH: 0
CHOKING: 0
EYE ITCHING: 0
EYE PAIN: 0
STRIDOR: 0
NAUSEA: 0
WHEEZING: 0
CONSTIPATION: 0
EYE DISCHARGE: 0
FACIAL SWELLING: 0
APNEA: 0

## 2024-01-12 NOTE — PROGRESS NOTES
nasal mucosa appears healthy with no erythema, mucopurulence, or polyps.  Mouth: Moist mucus membranes, normal tongue/palate mobility, no concerning mucosal lesions. Oropharynx clear with no erythema/exudate, no tonsillar hypertrophy.  Ears: Normal appearing auricles, no hematomas. EACs clear with no cerumen impaction, healthy canal skin, normal-appearing right TM with clear middle ear space.  Left side-there is area of raised ecchymosis of central TM but no vesicle present.  Middle ear space is clear.  Neck: Soft, supple, no palpable lateral neck masses. No palpable parotid or submandibular masses. No thyromegaly or palpable thyroid nodules. No surgical scars.  Lymphatics: No palpable cervical LAD.  Resp: No audible stridor or wheezing.  CV: No murmurs, no JVD.  Extremities: No clubbing or cyanosis.        ASSESSMENT and PLAN      ICD-10-CM    1. Bullous myringitis of left ear  H73.012           I suspect that she suffered left-sided bullous myringitis recently.  On exam today, there was a area of ecchymosis centrally of the TM, but no further vesicle or fluid collection.  The left middle ear space was clear as well.  For now, I recommend observation and will recheck her ears in 2 weeks.    Tony Madsen MD  1/12/2024    Electronically signed by Tony Madsen MD on 1/12/2024 at 9:39 AM

## 2024-01-23 NOTE — DISCHARGE INSTRUCTIONS
Make sure you are drinking lots of water and staying well-hydrated. Take the Antivert for the next 2 days up to 3 times a day as needed for dizziness and then as needed afterwards. Take Zofran for nausea as needed. If you develop new or concerning symptoms or your symptoms worsen please return to the ER at that time. Follow-up with your family doctor on Monday or Tuesday this coming week for repeat evaluation. impaired

## 2024-01-29 ENCOUNTER — OFFICE VISIT (OUTPATIENT)
Dept: ENT CLINIC | Age: 74
End: 2024-01-29
Payer: MEDICARE

## 2024-01-29 ENCOUNTER — OFFICE VISIT (OUTPATIENT)
Dept: AUDIOLOGY | Age: 74
End: 2024-01-29
Payer: MEDICARE

## 2024-01-29 VITALS — RESPIRATION RATE: 18 BRPM | HEIGHT: 70 IN | WEIGHT: 179 LBS | BODY MASS INDEX: 25.62 KG/M2

## 2024-01-29 DIAGNOSIS — H90.3 SENSORINEURAL HEARING LOSS, BILATERAL: Primary | ICD-10-CM

## 2024-01-29 DIAGNOSIS — H90.3 SENSORINEURAL HEARING LOSS (SNHL) OF BOTH EARS: Primary | ICD-10-CM

## 2024-01-29 PROCEDURE — G8417 CALC BMI ABV UP PARAM F/U: HCPCS | Performed by: OTOLARYNGOLOGY

## 2024-01-29 PROCEDURE — G8484 FLU IMMUNIZE NO ADMIN: HCPCS | Performed by: OTOLARYNGOLOGY

## 2024-01-29 PROCEDURE — G8400 PT W/DXA NO RESULTS DOC: HCPCS | Performed by: OTOLARYNGOLOGY

## 2024-01-29 PROCEDURE — 1123F ACP DISCUSS/DSCN MKR DOCD: CPT | Performed by: OTOLARYNGOLOGY

## 2024-01-29 PROCEDURE — 1036F TOBACCO NON-USER: CPT | Performed by: OTOLARYNGOLOGY

## 2024-01-29 PROCEDURE — 99213 OFFICE O/P EST LOW 20 MIN: CPT | Performed by: OTOLARYNGOLOGY

## 2024-01-29 PROCEDURE — G8428 CUR MEDS NOT DOCUMENT: HCPCS | Performed by: OTOLARYNGOLOGY

## 2024-01-29 PROCEDURE — 92557 COMPREHENSIVE HEARING TEST: CPT | Performed by: AUDIOLOGIST

## 2024-01-29 PROCEDURE — 1090F PRES/ABSN URINE INCON ASSESS: CPT | Performed by: OTOLARYNGOLOGY

## 2024-01-29 PROCEDURE — 92567 TYMPANOMETRY: CPT | Performed by: AUDIOLOGIST

## 2024-01-29 PROCEDURE — 3017F COLORECTAL CA SCREEN DOC REV: CPT | Performed by: OTOLARYNGOLOGY

## 2024-01-29 ASSESSMENT — ENCOUNTER SYMPTOMS
ABDOMINAL PAIN: 0
SHORTNESS OF BREATH: 0
SORE THROAT: 0

## 2024-01-29 NOTE — PROGRESS NOTES
Chief Complaint   Patient presents with    Follow-up     Pt states that she still can't hear out of her ear.        HPI:  Radha Grant is a 73 y.o. female seen in follow-up for her ears.  I had seen her back on 1/12/24 for suspected left-sided bullous myringitis.  Exam at that time revealed some ecchymosis centrally of the TM, but no further vesicle or fluid collection.  Today, she is doing about the same with no pain or drainage at all.  However, she still reports muffled hearing in her left ear.  She denies any symptoms in the right ear.    Past Medical History, Past Surgical History, Family history, Social History, and Medications were all reviewed with the patient today and updated as necessary.     Allergies   Allergen Reactions    Fentanyl Other (See Comments) and Rash     Not sure of reaction  Doesn't remember reaction    Not sure of reaction    Venlafaxine Other (See Comments)     Denies allergy     Patient Active Problem List   Diagnosis    Premature beats    Breast cancer screening    IBS (irritable bowel syndrome)    GERD (gastroesophageal reflux disease)    ANUPAM on CPAP    Impaired fasting glucose    Fibromyalgia    Osteoarthritis    Hypertension    Asthma    Right renal mass    Lung nodule    Chest pain    Hyperlipidemia    Mixed stress and urge urinary incontinence    Hypothyroidism    Bipolar affective disorder (HCC)    Dysequilibrium    Slurring of speech    Stroke-like episode    Burning feet syndrome    Sensory loss    Falling     Current Outpatient Medications   Medication Sig    levothyroxine (SYNTHROID) 125 MCG tablet     azithromycin (ZITHROMAX) 250 MG tablet TAKE TWO TABLETS BY MOUTH ON DAY 1, THEN TAKE ONE TABLET ONE TIME DAILY ON DAYS 2-5    fluticasone (FLONASE) 50 MCG/ACT nasal spray     solifenacin (VESICARE) 5 MG tablet Take 1 tablet by mouth daily    mirabegron (MYRBETRIQ) 50 MG TB24 Take 50 mg by mouth daily    solifenacin (VESICARE) 10 MG tablet Take 1 tablet by mouth daily

## 2024-01-29 NOTE — PROGRESS NOTES
AUDIOLOGY EVALUATION    Radha Grant had Tympanometry and Audiometry performed today.    The patient reports hearing loss.     Results as follows:    Tympanometry    Type A -  on right  Type C -  on left    Audiometry    Test Performed - Comprehensive Audiogram    Type of Loss - Right Ear: abnormal hearing: degree of loss is mild to severe sensorineural hearing loss                           Left Ear: abnormal hearing: degree of loss is mild to severe sensorineural hearing loss     SRT   Measurement Right Ear Left Ear   Value 50 45   Unit dB dB     Discrimination  Measurement Right Ear Left Ear   Value 84% 80%   Unit dB dB     Recommend  Binaural amplification and annual audios    Wilman Valadez [facility]A  Audiologist

## 2024-03-04 ENCOUNTER — HOSPITAL ENCOUNTER (OUTPATIENT)
Dept: CT IMAGING | Age: 74
Discharge: HOME | End: 2024-03-06
Payer: MEDICARE

## 2024-03-04 DIAGNOSIS — N28.89 RENAL MASS: ICD-10-CM

## 2024-03-04 LAB — CREAT BLD-MCNC: 0.79 MG/DL (ref 0.8–1.5)

## 2024-03-04 PROCEDURE — 82565 ASSAY OF CREATININE: CPT

## 2024-03-04 PROCEDURE — 6360000004 HC RX CONTRAST MEDICATION: Performed by: UROLOGY

## 2024-03-04 PROCEDURE — 74170 CT ABD WO CNTRST FLWD CNTRST: CPT

## 2024-03-04 RX ADMIN — IOPAMIDOL 100 ML: 755 INJECTION, SOLUTION INTRAVENOUS at 13:59

## 2024-03-06 ENCOUNTER — TELEPHONE (OUTPATIENT)
Dept: UROLOGY | Age: 74
End: 2024-03-06

## 2024-03-06 NOTE — TELEPHONE ENCOUNTER
----- Message from Shayne Lyn DO sent at 3/4/2024  9:15 PM EST -----  Please make sure pt has appt with me within the next 2-3 wks.  Thanks.

## 2024-03-07 ENCOUNTER — OFFICE VISIT (OUTPATIENT)
Dept: UROGYNECOLOGY | Age: 74
End: 2024-03-07

## 2024-03-07 VITALS — WEIGHT: 196 LBS | BODY MASS INDEX: 28.06 KG/M2 | HEIGHT: 70 IN

## 2024-03-07 DIAGNOSIS — R10.2 PELVIC PAIN: ICD-10-CM

## 2024-03-07 DIAGNOSIS — N39.41 URGE INCONTINENCE: Primary | ICD-10-CM

## 2024-03-07 LAB
BILIRUBIN, URINE, POC: NEGATIVE
BLOOD URINE, POC: NEGATIVE
GLUCOSE URINE, POC: NEGATIVE
KETONES, URINE, POC: NEGATIVE
LEUKOCYTE ESTERASE, URINE, POC: ABNORMAL
NITRITE, URINE, POC: NEGATIVE
PH, URINE, POC: 7 (ref 4.6–8)
PROTEIN,URINE, POC: NEGATIVE
SPECIFIC GRAVITY, URINE, POC: 1.02 (ref 1–1.03)
URINALYSIS CLARITY, POC: CLEAR
URINALYSIS COLOR, POC: YELLOW
UROBILINOGEN, POC: ABNORMAL

## 2024-03-07 NOTE — PROGRESS NOTES
[facility] UROGYNECOLOGY  [address]  [address]  [facility] [address]  Dept: [phone]        PCP:  Chai Benitez Jr., MD    3/7/2024        HPI:  I am being asked to see this patient in consultation by Dr. Lyn  for incontinence.    Ms. Radha Grant has been experiencing prolapse for approx.10 years. The prolapse does cause her pain and/or pressure when trying to have intercourse. She does have to splint to complete urination, a BM, or for comfort. [place] makes her prolapse symptoms worse. Rest makes her prolapse symptoms better. She has not tried a pessary, she has not tried physical therapy, she has  had surgery for her POP 10-15 years ago.    Ms. Radha Grant  has been leaking urine for a few months. She leaks urine at night time and has had accidents of bed wetting. She does leak urine with cough, laugh, sneeze and activity. She does leak urine with urgency. She  uses medium and goes through 1. She leaks several times per day. When she leaks she leaks medium amounts. She has not tried PT, she is s/p myrbetriq and trospoium, oxybutynin, vesicare. She has had procedures/surgery for this in the past. She has seen urology for this, she also had UDS with Desi Mayes.     Urology sent her to [facility].     With both urge incontinence and stress incontinence, urge incontinence bothers her the most.     Ms. Radha Grant has been having bowel leakage for 3-4 months. When she leaks stool the stool is formed.  She does not leak stool with urgency. She  uses medium and goes through 1. She leaks occasional stool times. When she leaks she leaks small amounts. She does not have blood in her stool.  She has not tried PT, she has not tried medication. She has not had procedures/surgery for this in the past.     Ms. Radha Grant has been having pelvic pain for the past several months but seems to have gotten worse. The pain is primarily located lower abdomen. The pain is worse since having a

## 2024-03-07 NOTE — ASSESSMENT & PLAN NOTE
Patient is not a good historian. She answered no to prior treatments, but in review of the records she has had surgery (possible sling?) and has tried multiple medications for urinary incontinence. She was also sent to PFPT- she did not mention this when asked if she has tried PT in the past.     She has ANUPAM.     She is being seen and managed by urology.

## 2024-03-07 NOTE — ASSESSMENT & PLAN NOTE
Although I see a note from her GYN 6/2023- she denies seeing one for years.     Exam today was very normal. She has absolutely no pain with speculum exam. She has no pain on palpation to the pelvic floor muscles or the introitus.  I tried to illicit the type of pain she is describing and I could not. She states that she has pain with initial penetration during intercourse.     I reminded her of the referral to PT that urology had sent in and highly recommend and encourage her to follow up with this. I also recommended that she follow up with her GYN.

## 2024-03-10 LAB
BACTERIA SPEC CULT: ABNORMAL
SERVICE CMNT-IMP: ABNORMAL

## 2024-03-11 RX ORDER — SULFAMETHOXAZOLE AND TRIMETHOPRIM 400; 80 MG/1; MG/1
1 TABLET ORAL 2 TIMES DAILY
Qty: 10 TABLET | Refills: 0 | Status: SHIPPED | OUTPATIENT
Start: 2024-03-11 | End: 2024-03-16

## 2024-03-12 ENCOUNTER — TELEPHONE (OUTPATIENT)
Dept: UROLOGY | Age: 74
End: 2024-03-12

## 2024-03-12 ENCOUNTER — PATIENT MESSAGE (OUTPATIENT)
Dept: UROGYNECOLOGY | Age: 74
End: 2024-03-12

## 2024-03-12 NOTE — TELEPHONE ENCOUNTER
Called to f/u with Pt about previous VM about her  in regards to his MRI results. While letting her know I am waiting on a response from one of the Nurse Practitioners, Pt was wanting to confirm appt with Dr Lyn for Friday. Confirmed with Pt that she indeed has an appt on Friday 3/15/24 at 12:45 in [facility]. Pt thanked me for letting her know and for f/u about her 's MRI results.

## 2024-03-15 ENCOUNTER — TELEPHONE (OUTPATIENT)
Dept: UROLOGY | Age: 74
End: 2024-03-15

## 2024-03-15 ENCOUNTER — OFFICE VISIT (OUTPATIENT)
Dept: UROLOGY | Age: 74
End: 2024-03-15

## 2024-03-15 DIAGNOSIS — N28.89 RENAL MASS: ICD-10-CM

## 2024-03-15 DIAGNOSIS — N39.46 MIXED INCONTINENCE: ICD-10-CM

## 2024-03-15 DIAGNOSIS — R39.15 URGENCY OF URINATION: Primary | ICD-10-CM

## 2024-03-15 NOTE — PROGRESS NOTES
Urine, POC Negative     KETONES, Urine, POC Negative     Specific Gravity, Urine, POC 1.020 1.001 - 1.035    Blood (UA POC) Negative     pH, Urine, POC 5.5 4.6 - 8.0    Protein, Urine, POC Negative     Urobilinogen, POC 0.2 mg/dL     Nitrite, Urine, POC Negative     Leukocyte Esterase, Urine, POC Trace          UA - Micro  WBC - 0  RBC - 0  Bacteria - 0  Epith - 0    Assessment/Plan    ICD-10-CM    1. Urgency of urination  R39.15 AMB POC URINALYSIS DIP STICK AUTO W/O MICRO      2. Renal mass  N28.89 AMB POC URINALYSIS DIP STICK AUTO W/O MICRO      3. Mixed incontinence  N39.46 AMB POC URINALYSIS DIP STICK AUTO W/O MICRO        Reviewed all tx options for her RUP renal mass including cont observation, biopsy, ablation, partial nephrectomy and radical nephrectomy.  She has elected to proceed with a RIGHT SAMANTHA partial nephrectomy.  All risks, benefits and alternatives to the above mentioned procedure were discussed and the patient is willing to proceed at this time.  Risks of radical nephrectomy, bleeding, cancer recurrence, benign disease, urine leak, adjacent organ injury, ileus, DVT, PE/MI reviewed.    KRYSTEN MELO DO    Total time for today's encounter including chart review, result review, documentation and face to face encounter was 35 minutes.

## 2024-03-15 NOTE — TELEPHONE ENCOUNTER
----- Message from Shayne Lyn DO sent at 3/15/2024  1:19 PM EDT -----  Regarding: surg  RIGHT SAMANTHA partial nephrectomy  2.5h  Need assist  Inpt  Cbc, cmp, pt/ptt/inr, ua, ucx  Cxr-2v  Ancef 2g IV preop  Tyep and cross 2 units

## 2024-03-18 ENCOUNTER — PREP FOR PROCEDURE (OUTPATIENT)
Dept: UROLOGY | Age: 74
End: 2024-03-18

## 2024-03-18 DIAGNOSIS — N28.89 RENAL MASS: Primary | ICD-10-CM

## 2024-03-18 NOTE — TELEPHONE ENCOUNTER
Procedures: Procedure(s):   NEPHRECTOMY PARTIAL LAPAROSCOPIC HAND ASSIST/ RIGHT/FROST ASSIST   Date: 4/29/2024   Time: 0800   Location: [facility] MAIN OR

## 2024-04-01 ENCOUNTER — PROCEDURE VISIT (OUTPATIENT)
Dept: NEUROLOGY | Age: 74
End: 2024-04-01
Payer: MEDICARE

## 2024-04-01 VITALS — HEART RATE: 61 BPM | BODY MASS INDEX: 27.98 KG/M2 | WEIGHT: 195 LBS | OXYGEN SATURATION: 97 %

## 2024-04-01 DIAGNOSIS — R20.0 SENSORY LOSS: ICD-10-CM

## 2024-04-01 DIAGNOSIS — E53.9 BURNING FEET SYNDROME: Primary | ICD-10-CM

## 2024-04-01 DIAGNOSIS — G62.9 POLYNEUROPATHY, UNSPECIFIED: ICD-10-CM

## 2024-04-01 PROCEDURE — G8427 DOCREV CUR MEDS BY ELIG CLIN: HCPCS | Performed by: PSYCHIATRY & NEUROLOGY

## 2024-04-01 PROCEDURE — 1123F ACP DISCUSS/DSCN MKR DOCD: CPT | Performed by: PSYCHIATRY & NEUROLOGY

## 2024-04-01 PROCEDURE — 95885 MUSC TST DONE W/NERV TST LIM: CPT | Performed by: PSYCHIATRY & NEUROLOGY

## 2024-04-01 PROCEDURE — 3017F COLORECTAL CA SCREEN DOC REV: CPT | Performed by: PSYCHIATRY & NEUROLOGY

## 2024-04-01 PROCEDURE — 99213 OFFICE O/P EST LOW 20 MIN: CPT | Performed by: PSYCHIATRY & NEUROLOGY

## 2024-04-01 PROCEDURE — 1090F PRES/ABSN URINE INCON ASSESS: CPT | Performed by: PSYCHIATRY & NEUROLOGY

## 2024-04-01 PROCEDURE — 95911 NRV CNDJ TEST 9-10 STUDIES: CPT | Performed by: PSYCHIATRY & NEUROLOGY

## 2024-04-01 PROCEDURE — G8417 CALC BMI ABV UP PARAM F/U: HCPCS | Performed by: PSYCHIATRY & NEUROLOGY

## 2024-04-01 PROCEDURE — 1036F TOBACCO NON-USER: CPT | Performed by: PSYCHIATRY & NEUROLOGY

## 2024-04-01 PROCEDURE — G8400 PT W/DXA NO RESULTS DOC: HCPCS | Performed by: PSYCHIATRY & NEUROLOGY

## 2024-04-01 RX ORDER — GABAPENTIN 300 MG/1
300 CAPSULE ORAL
Qty: 450 CAPSULE | Refills: 1 | Status: SHIPPED | OUTPATIENT
Start: 2024-04-01 | End: 2024-09-28

## 2024-04-01 ASSESSMENT — VISUAL ACUITY: OU: 1

## 2024-04-01 NOTE — PROGRESS NOTES
EMG/Nerve Conduction Study Procedure Note  [address]    [address]  [place]  [address]   [phone]      Hx:    Exam:     73 y.o.  M W   female  EMG   both LOWERs   hx burning feet neuropathy / polyneuropathy //  gait.  Loss DTR.  High arches and pes cavus with hammertoes *.  Extra movements (dyskinesia - early TD). States she is on gabapentin at 4/day    Sees PCP for left carotid stenosis.  No atrophy of the  Referring:::   Dr Chai Benitez Jr  Tech:    Merry Tijerina  Hgt::  5'10\"         Summary        needle EMG lowers with CV.              Controlled environmental factors / EMG lab.  Temperature.   NCV : sensory segments:    Abnormal legs the right sural = /SNAP.  Left sural is within normal limits.  NCV plantar sensory segments:     Deferred.  NCV Motor MCV segments:     Abnormal = markedly abnormal right peroneal = all segments = ABSENT /no response CMAP.  Left peroneal with markedly reduced and attenuated CMAP.  Tibial nerves the right tibial is basically normal.  Left tibial with markedly reduced and attenuated CMAP.  The proximal peroneal to the tibialis anterior =  F-wave studies:         Abnormal = absent right peroneal F waves.  Other F waves = normal left peroneal and bilateral tibial F waves.  H-REFLEX Studies:    Abnormal = prolonged and delayed bilateral H-reflexes.   NEEDLE EMG:   Tested muscles::    Normal bilateral TA MG VL muscles = some PE with this.  No fibrillation positive sharp waves etc.      INTERPRETATION:     ELECTROPHYSIOLOGIC EVIDENCE OF A LENGTH-DEPENDENT SENSORIMOTOR POLYNEUROPATHY THAT IS ASYMMETRIC AS NOTED ABOVE.  MIXED ETIOLOGY.  NO SPECIFIC AXONAL ACTIVE DENERVATION AT THIS TIME.      CONCLUSION:      Compatible with asymmetric polyneuropathy.      Procedure Details:       Correlates with the clinical history and examination and her notes of some disturbance of burning neuropathic pain for which she is on gabapentin at this time.    Explained to patient she will

## 2024-04-01 NOTE — PROGRESS NOTES
NEUROLOGY  RETURN  OFFICE VISIT ::                     4/1/2024  Radha Grant is a 73 y.o. female here for :: Neuropathy/polyneuropathy.      Referred by       PCP          Chai Benitez Jr., MD     Chief Complaint:   Burning feet.  Imbalance of gait.  Neuropathic.    Taking gabapentin.  Chronic.    73-year-old right-handed  white female with distal feet problems burning feet syndrome.  Undetermined etiology.    Unaccompanied.     Active Problems:    * No active hospital problems. *  Resolved Problems:    * No resolved hospital problems. *    Past Medical History:   Diagnosis Date    Allergic rhinitis 6/28/2013    Asthma     Bipolar affective disorder (HCC)     Disturbance of skin sensation     Fibromyalgia     GERD (gastroesophageal reflux disease)     History of miscarriage     History of peptic ulcer     Hyperlipidemia     Hypertension     Hypothyroidism     IBS (irritable bowel syndrome)     Impaired fasting glucose 2/7/2022    Lumbar disc disease     Mixed stress and urge urinary incontinence     ANUPAM on CPAP     Osteoarthritis     Premature beats     PACs / PVCs    Right renal mass      Past Surgical History:   Procedure Laterality Date    BLADDER SUSPENSION  2003    CHOLECYSTECTOMY  1980s    CYSTOCELE REPAIR  11/12/2009    HYSTERECTOMY, TOTAL ABDOMINAL (CERVIX REMOVED)  2003    OVARY REMOVAL      RECTOCELE REPAIR  11/12/2009    TONSILLECTOMY  1980      Family History   Problem Relation Age of Onset    Breast Cancer Mother 60    Heart Disease Father     Prostate Cancer Father      Social History     Socioeconomic History    Marital status:      Spouse name: None    Number of children: None    Years of education: None    Highest education level: None   Tobacco Use    Smoking status: Never    Smokeless tobacco: Never   Substance and Sexual Activity    Alcohol use: No    Drug use: No   Social History Narrative     and lives with her --has a son and daughter.  Disabled and has

## 2024-04-15 ENCOUNTER — HOSPITAL ENCOUNTER (OUTPATIENT)
Dept: SURGERY | Age: 74
Discharge: HOME | End: 2024-04-18
Payer: MEDICARE

## 2024-04-15 ENCOUNTER — HOSPITAL ENCOUNTER (OUTPATIENT)
Dept: GENERAL RADIOLOGY | Age: 74
Discharge: HOME | End: 2024-04-18
Payer: MEDICARE

## 2024-04-15 VITALS
HEART RATE: 60 BPM | OXYGEN SATURATION: 95 % | HEIGHT: 70 IN | BODY MASS INDEX: 28.07 KG/M2 | SYSTOLIC BLOOD PRESSURE: 123 MMHG | TEMPERATURE: 97.7 F | WEIGHT: 196.1 LBS | DIASTOLIC BLOOD PRESSURE: 63 MMHG | RESPIRATION RATE: 16 BRPM

## 2024-04-15 DIAGNOSIS — N28.89 RENAL MASS: ICD-10-CM

## 2024-04-15 LAB
ANION GAP SERPL CALC-SCNC: 4 MMOL/L (ref 2–11)
APPEARANCE UR: CLEAR
APTT PPP: 27.3 SEC (ref 23.3–37.4)
BILIRUB UR QL: NEGATIVE
BUN SERPL-MCNC: 18 MG/DL (ref 8–23)
CALCIUM SERPL-MCNC: 9.6 MG/DL (ref 8.3–10.4)
CHLORIDE SERPL-SCNC: 108 MMOL/L (ref 103–113)
CO2 SERPL-SCNC: 30 MMOL/L (ref 21–32)
COLOR UR: NORMAL
CREAT SERPL-MCNC: 0.97 MG/DL (ref 0.6–1)
EKG ATRIAL RATE: 52 BPM
EKG DIAGNOSIS: NORMAL
EKG P AXIS: 54 DEGREES
EKG P-R INTERVAL: 194 MS
EKG Q-T INTERVAL: 434 MS
EKG QRS DURATION: 70 MS
EKG QTC CALCULATION (BAZETT): 403 MS
EKG R AXIS: -56 DEGREES
EKG T AXIS: 26 DEGREES
EKG VENTRICULAR RATE: 52 BPM
ERYTHROCYTE [DISTWIDTH] IN BLOOD BY AUTOMATED COUNT: 12.7 % (ref 11.9–14.6)
GLUCOSE SERPL-MCNC: 111 MG/DL (ref 65–100)
GLUCOSE UR STRIP.AUTO-MCNC: NEGATIVE MG/DL
HCT VFR BLD AUTO: 40.4 % (ref 35.8–46.3)
HGB BLD-MCNC: 13.2 G/DL (ref 11.7–15.4)
HGB UR QL STRIP: NEGATIVE
INR PPP: 1
KETONES UR QL STRIP.AUTO: NEGATIVE MG/DL
LEUKOCYTE ESTERASE UR QL STRIP.AUTO: NEGATIVE
MCH RBC QN AUTO: 31.1 PG (ref 26.1–32.9)
MCHC RBC AUTO-ENTMCNC: 32.7 G/DL (ref 31.4–35)
MCV RBC AUTO: 95.3 FL (ref 82–102)
NITRITE UR QL STRIP.AUTO: NEGATIVE
NRBC # BLD: 0 K/UL (ref 0–0.2)
PH UR STRIP: 5.5 (ref 5–9)
PLATELET # BLD AUTO: 212 K/UL (ref 150–450)
PMV BLD AUTO: 11.4 FL (ref 9.4–12.3)
POTASSIUM SERPL-SCNC: 4.2 MMOL/L (ref 3.5–5.1)
PROT UR STRIP-MCNC: NEGATIVE MG/DL
PROTHROMBIN TIME: 12.9 SEC (ref 11.3–14.9)
RBC # BLD AUTO: 4.24 M/UL (ref 4.05–5.2)
SODIUM SERPL-SCNC: 142 MMOL/L (ref 136–146)
SP GR UR REFRACTOMETRY: 1.02 (ref 1–1.02)
UROBILINOGEN UR QL STRIP.AUTO: 0.2 EU/DL (ref 0.2–1)
WBC # BLD AUTO: 8.5 K/UL (ref 4.3–11.1)

## 2024-04-15 PROCEDURE — 71046 X-RAY EXAM CHEST 2 VIEWS: CPT

## 2024-04-15 PROCEDURE — 80048 BASIC METABOLIC PNL TOTAL CA: CPT

## 2024-04-15 PROCEDURE — 85027 COMPLETE CBC AUTOMATED: CPT

## 2024-04-15 PROCEDURE — 85610 PROTHROMBIN TIME: CPT

## 2024-04-15 PROCEDURE — 81003 URINALYSIS AUTO W/O SCOPE: CPT

## 2024-04-15 PROCEDURE — 93010 ELECTROCARDIOGRAM REPORT: CPT | Performed by: INTERNAL MEDICINE

## 2024-04-15 PROCEDURE — 93005 ELECTROCARDIOGRAM TRACING: CPT

## 2024-04-15 PROCEDURE — 87086 URINE CULTURE/COLONY COUNT: CPT

## 2024-04-15 PROCEDURE — 85730 THROMBOPLASTIN TIME PARTIAL: CPT

## 2024-04-15 RX ORDER — SOLIFENACIN SUCCINATE 5 MG/1
10 TABLET, FILM COATED ORAL
COMMUNITY

## 2024-04-15 RX ORDER — ALPHA LIPOIC ACID 600 MG
1 TABLET ORAL DAILY
COMMUNITY

## 2024-04-15 ASSESSMENT — PAIN DESCRIPTION - DESCRIPTORS: DESCRIPTORS: ACHING

## 2024-04-15 ASSESSMENT — PAIN DESCRIPTION - LOCATION: LOCATION: BACK;NECK

## 2024-04-15 ASSESSMENT — PAIN SCALES - GENERAL: PAINLEVEL_OUTOF10: 7

## 2024-04-15 NOTE — PERIOP NOTE
All labs reviewed and  within anesthesia guidelines, no follow-up required. Urine culture and CXR  with pending results. Labs automatically routed to ordering provider via Epic documentation. Spoke with Jaspal in Blood Bank, informed pt to arrive on 4.28.24 for T/C prior to surgery.

## 2024-04-15 NOTE — PERIOP NOTE
Patient verified name and     Order for consent  found in EHR; patient verified.     Type 3 surgery, Walk-in assessment complete.    Labs per surgeon: CBC, BMP, PT, PTT, UA, UA CX, CXR, EKG, and T/C; results pending.  Labs per anesthesia protocol: no additional;   EKG: EKG 4.15.24    Pt voice understanding to return to [facility] on 24 for T/C.    Hospital approved surgical skin cleanser and instructions given per hospital policy.    Patient provided with and instructed on educational handouts including Guide to Surgery, Pain Management, Hand Hygiene, Blood Transfusion Education, and [facility]esthesia Brochure.    Patient answered medical/surgical history questions at their best of ability. All prior to admission medications documented in [facility]. Original medication prescription bottle were not visualized during patient appointment.     Patient instructed to hold all vitamins 7 days prior to surgery and NSAIDS 5 days prior to surgery, patient verbalized understanding.     Patient teach back successful and patient demonstrates knowledge of instructions.

## 2024-04-15 NOTE — PERIOP NOTE
PLEASE CONTINUE TAKING ALL PRESCRIPTION MEDICATIONS UP TO THE DAY OF SURGERY UNLESS OTHERWISE DIRECTED BELOW. You may take Tylenol, allergy,  and/or indigestion medications.     TAKE ONLY THESE MEDICATIONS ON THE DAY OF SURGERY   Bring Albuterol (Proair) inhaler the day of surgery.  Levothyroxine (Synthroid)   Metoprolol (Toprol)  Bupropion (Wellbutrin)   Gabapentin (Neurontin)  Diazepam (Valium)     DISCONTINUE all vitamins and supplements 7 days prior to surgery. DISCONTINUE Non-Steroidal Anti-Inflammatory (NSAIDS) such as Advil, Ibuprofen, Excedrin, Motrin, and Aleve 5 days prior to surgery.     Home Medications to Hold- please continue all other medications except these.    Centrum Silver-Take last dose on 4.21.24  Alpha-Lipoic Acid-Take last dose on 4.21.24   Vitamin D-Take last dose on 4.21.24  Coral Calcium-Take last dose on 4.21.24     Comments     ALAYNA-HEX shower the night prior to surgery and the morning of surgery.   Return to [facility] ([address]) on 4.28.24 for labs.   Continue Aspirin as instructed, unless your received further instructions from your doctor.       Please do not bring home medications with you on the day of surgery unless otherwise directed by your nurse.  If you are instructed to bring home medications, please give them to your nurse as they will be administered by the nursing staff.    If you have any questions, please call [facility] [phone].    A copy of this note was provided to the patient for reference.

## 2024-04-16 ENCOUNTER — TELEPHONE (OUTPATIENT)
Dept: UROLOGY | Age: 74
End: 2024-04-16

## 2024-04-16 NOTE — TELEPHONE ENCOUNTER
4/16/2024      Medication Clearance    Patient name : Radha Grant     Patient YOB: 1950       Physician requesting clearance: Dr Lyn      Reason for request: procedure: nephrectomy       Date of procedure: 4/29       Medication requesting to be held / adjusted:  ASA 5 days prior                                                                                 Y               N  Medication can be held / adjusted           []              []        Additional directives / instructions    ________________________________________      Surgery scheduler: Vane     Phone- [phone]  Fax: [phone]    Please check response, sign  and fax back ASAP

## 2024-04-18 LAB
BACTERIA SPEC CULT: NORMAL
BACTERIA SPEC CULT: NORMAL
SERVICE CMNT-IMP: NORMAL

## 2024-04-18 NOTE — PROGRESS NOTES
XR CHEST (2 VW)  Order: [id number]  Status: Final result       Visible to patient: Yes (not seen)       Next appt: 04/19/2024 at 09:45 AM in Urology (KRYSTEN MELO DO)       Dx: Renal mass    0 Result Notes  Details    Reading Physician Reading Date Result Priority   Chi Johnson MD  [phone] 4/15/2024      Narrative & Impression  Chest X-ray     INDICATION: Preop     COMPARISON: January 2, 2023     TECHNIQUE: PA and lateral views of the chest were obtained.     FINDINGS: The lungs are clear. There are no infiltrates or effusions.  The heart  size is normal.  The bony thorax is intact.       IMPRESSION:  No acute findings in the chest              Specimen Collected: 04/15/24 17:39 EDT Last Resulted: 04/15/24 17:46 EDT

## 2024-04-19 ENCOUNTER — OFFICE VISIT (OUTPATIENT)
Dept: UROLOGY | Age: 74
End: 2024-04-19
Payer: MEDICARE

## 2024-04-19 DIAGNOSIS — N28.89 RENAL MASS: Primary | ICD-10-CM

## 2024-04-19 PROCEDURE — 99214 OFFICE O/P EST MOD 30 MIN: CPT | Performed by: UROLOGY

## 2024-04-19 PROCEDURE — 1123F ACP DISCUSS/DSCN MKR DOCD: CPT | Performed by: UROLOGY

## 2024-04-19 PROCEDURE — 1090F PRES/ABSN URINE INCON ASSESS: CPT | Performed by: UROLOGY

## 2024-04-19 PROCEDURE — G8427 DOCREV CUR MEDS BY ELIG CLIN: HCPCS | Performed by: UROLOGY

## 2024-04-19 PROCEDURE — G8400 PT W/DXA NO RESULTS DOC: HCPCS | Performed by: UROLOGY

## 2024-04-19 PROCEDURE — G8417 CALC BMI ABV UP PARAM F/U: HCPCS | Performed by: UROLOGY

## 2024-04-19 PROCEDURE — 3017F COLORECTAL CA SCREEN DOC REV: CPT | Performed by: UROLOGY

## 2024-04-19 PROCEDURE — 1036F TOBACCO NON-USER: CPT | Performed by: UROLOGY

## 2024-04-19 NOTE — PROGRESS NOTES
[facility]  [address] [place] [address]  [phone]    Radha Grant  : 1950     HPI   73 y.o., female returns in follow up for a R renal mass.  Pt previously discovered to have an incidental RUP 15mm enhancing lesion by CT with contrast on 21 after presenting with RLQ pain.  MRI on 21 confirmed a solid and enhancing 15mm RUP mass.  CT repeated on 12/3/21 which showed a stable 1.3cm RUP lesion (hypodensity) and stable 5mm RLL lung nodule.  She denies flank pain or gross hematuria.  Cont to follow with NP and Dr. Levy for OAB.  Reports success with [place] in past but this became ineffective over time.  No improvement with Myrbetriq.  Now on Vesicare 10mg qhs with complaints of mixed incontinence and hesitancy.  PVR low in past.  Planning to visit with PT soon.  Neg hematuria work up in 2019.  CECELIA 22 showed a stable 1.6cm R renal mass.  CT on 3/4/24 now shows an enlarging 2.5cm RUP enhancing renal mass without RVI or lymphadenopathy.  Prior open jennifer and BEBETO with [facility].       Past Medical History:   Diagnosis Date    Allergic rhinitis 2013    Asthma     Balance problem     Bipolar affective disorder (HCC)     Carotid stenosis     Left carotid stenosis- followed by PCP    Disturbance of skin sensation     Fatty liver     per pt    Fibromyalgia     GERD (gastroesophageal reflux disease)     History of gastric ulcer     History of miscarriage     History of peptic ulcer     Hyperlipidemia     Hypertension     Hypothyroidism     IBS (irritable bowel syndrome)     Impaired fasting glucose 2022    Lumbar disc disease     Mixed stress and urge urinary incontinence     ANUPAM on CPAP     complaint with CPAP    Osteoarthritis     Premature beats     PACs / PVCs    Right renal mass 2024    surgery scheduled on 24     Past Surgical History:   Procedure Laterality Date    BLADDER SUSPENSION      CATARACT REMOVAL Bilateral     with lens implants

## 2024-04-27 ENCOUNTER — ANESTHESIA EVENT (OUTPATIENT)
Dept: SURGERY | Age: 74
End: 2024-04-27
Payer: MEDICARE

## 2024-04-28 ENCOUNTER — HOSPITAL ENCOUNTER (OUTPATIENT)
Dept: LAB | Age: 74
Discharge: HOME | End: 2024-05-01
Payer: MEDICARE

## 2024-04-28 ENCOUNTER — HOSPITAL ENCOUNTER (EMERGENCY)
Age: 74
Discharge: HOME | End: 2024-04-28

## 2024-04-28 PROCEDURE — 86850 RBC ANTIBODY SCREEN: CPT

## 2024-04-28 PROCEDURE — 36415 COLL VENOUS BLD VENIPUNCTURE: CPT

## 2024-04-28 PROCEDURE — 86920 COMPATIBILITY TEST SPIN: CPT

## 2024-04-28 PROCEDURE — 86901 BLOOD TYPING SEROLOGIC RH(D): CPT

## 2024-04-28 PROCEDURE — 86900 BLOOD TYPING SEROLOGIC ABO: CPT

## 2024-04-28 NOTE — ANESTHESIA PRE PROCEDURE
Interatrial Septum: Agitated saline study was negative with and without provocation.       Neuro/Psych:   (+) psychiatric history (Bipolar affective disorde):            GI/Hepatic/Renal:   (+) GERD:, renal disease (Right renal mass):          Endo/Other:    (+) hypothyroidism::..                 Abdominal:             Vascular:   + PVD, aortic or cerebral (L Carotid).       Other Findings:       Anesthesia Plan      general     ASA 3         arterial line    Anesthetic plan and risks discussed with patient.                    BAKARI SAPP MD   4/28/2024

## 2024-04-29 ENCOUNTER — HOSPITAL ENCOUNTER (INPATIENT)
Age: 74
LOS: 4 days | Discharge: NURSING, REHAB OR LONG-TERM CARE | End: 2024-05-03
Attending: UROLOGY | Admitting: UROLOGY
Payer: MEDICARE

## 2024-04-29 ENCOUNTER — ANESTHESIA (OUTPATIENT)
Dept: SURGERY | Age: 74
End: 2024-04-29
Payer: MEDICARE

## 2024-04-29 DIAGNOSIS — N28.89 RENAL MASS: ICD-10-CM

## 2024-04-29 LAB
HCT VFR BLD AUTO: 37.1 % (ref 35.8–46.3)
HGB BLD-MCNC: 12 G/DL (ref 11.7–15.4)
HISTORY CHECK: NORMAL

## 2024-04-29 PROCEDURE — 50545 LAPARO RADICAL NEPHRECTOMY: CPT | Performed by: UROLOGY

## 2024-04-29 PROCEDURE — 85018 HEMOGLOBIN: CPT

## 2024-04-29 PROCEDURE — 7100000000 HC PACU RECOVERY - FIRST 15 MIN: Performed by: UROLOGY

## 2024-04-29 PROCEDURE — 3600000014 HC SURGERY LEVEL 4 ADDTL 15MIN: Performed by: UROLOGY

## 2024-04-29 PROCEDURE — 2580000003 HC RX 258: Performed by: REGISTERED NURSE

## 2024-04-29 PROCEDURE — 1100000000 HC RM PRIVATE

## 2024-04-29 PROCEDURE — 6360000002 HC RX W HCPCS: Performed by: REGISTERED NURSE

## 2024-04-29 PROCEDURE — 2580000003 HC RX 258: Performed by: ANESTHESIOLOGY

## 2024-04-29 PROCEDURE — 7100000001 HC PACU RECOVERY - ADDTL 15 MIN: Performed by: UROLOGY

## 2024-04-29 PROCEDURE — 2500000003 HC RX 250 WO HCPCS: Performed by: REGISTERED NURSE

## 2024-04-29 PROCEDURE — 3700000001 HC ADD 15 MINUTES (ANESTHESIA): Performed by: UROLOGY

## 2024-04-29 PROCEDURE — 2580000003 HC RX 258: Performed by: UROLOGY

## 2024-04-29 PROCEDURE — 2720000010 HC SURG SUPPLY STERILE: Performed by: UROLOGY

## 2024-04-29 PROCEDURE — 0TT04ZG RESECTION OF RIGHT KIDNEY, PERCUTANEOUS ENDOSCOPIC APPROACH, HAND-ASSISTED: ICD-10-PCS | Performed by: UROLOGY

## 2024-04-29 PROCEDURE — 2709999900 HC NON-CHARGEABLE SUPPLY: Performed by: UROLOGY

## 2024-04-29 PROCEDURE — 3700000000 HC ANESTHESIA ATTENDED CARE: Performed by: UROLOGY

## 2024-04-29 PROCEDURE — 6360000002 HC RX W HCPCS: Performed by: UROLOGY

## 2024-04-29 PROCEDURE — 85014 HEMATOCRIT: CPT

## 2024-04-29 PROCEDURE — 6370000000 HC RX 637 (ALT 250 FOR IP): Performed by: UROLOGY

## 2024-04-29 PROCEDURE — 88307 TISSUE EXAM BY PATHOLOGIST: CPT

## 2024-04-29 PROCEDURE — 6360000002 HC RX W HCPCS: Performed by: ANESTHESIOLOGY

## 2024-04-29 PROCEDURE — 3600000004 HC SURGERY LEVEL 4 BASE: Performed by: UROLOGY

## 2024-04-29 RX ORDER — ROCURONIUM BROMIDE 10 MG/ML
INJECTION, SOLUTION INTRAVENOUS PRN
Status: DISCONTINUED | OUTPATIENT
Start: 2024-04-29 | End: 2024-04-29 | Stop reason: SDUPTHER

## 2024-04-29 RX ORDER — OXYCODONE HYDROCHLORIDE 5 MG/1
10 TABLET ORAL PRN
Status: DISCONTINUED | OUTPATIENT
Start: 2024-04-29 | End: 2024-04-29 | Stop reason: HOSPADM

## 2024-04-29 RX ORDER — METOPROLOL SUCCINATE 50 MG/1
100 TABLET, EXTENDED RELEASE ORAL
Status: DISCONTINUED | OUTPATIENT
Start: 2024-04-30 | End: 2024-05-03 | Stop reason: HOSPADM

## 2024-04-29 RX ORDER — SODIUM CHLORIDE 9 MG/ML
INJECTION, SOLUTION INTRAVENOUS PRN
Status: DISCONTINUED | OUTPATIENT
Start: 2024-04-29 | End: 2024-04-29 | Stop reason: HOSPADM

## 2024-04-29 RX ORDER — HYDROMORPHONE HYDROCHLORIDE 2 MG/ML
INJECTION, SOLUTION INTRAMUSCULAR; INTRAVENOUS; SUBCUTANEOUS PRN
Status: DISCONTINUED | OUTPATIENT
Start: 2024-04-29 | End: 2024-04-29 | Stop reason: SDUPTHER

## 2024-04-29 RX ORDER — PROPOFOL 10 MG/ML
INJECTION, EMULSION INTRAVENOUS PRN
Status: DISCONTINUED | OUTPATIENT
Start: 2024-04-29 | End: 2024-04-29 | Stop reason: SDUPTHER

## 2024-04-29 RX ORDER — DEXTROSE AND SODIUM CHLORIDE 5; .45 G/100ML; G/100ML
INJECTION, SOLUTION INTRAVENOUS CONTINUOUS
Status: DISCONTINUED | OUTPATIENT
Start: 2024-04-29 | End: 2024-05-03

## 2024-04-29 RX ORDER — SODIUM CHLORIDE 0.9 % (FLUSH) 0.9 %
5-40 SYRINGE (ML) INJECTION EVERY 12 HOURS SCHEDULED
Status: DISCONTINUED | OUTPATIENT
Start: 2024-04-29 | End: 2024-04-29 | Stop reason: HOSPADM

## 2024-04-29 RX ORDER — DIPHENHYDRAMINE HYDROCHLORIDE 50 MG/ML
6.25 INJECTION INTRAMUSCULAR; INTRAVENOUS
Status: DISCONTINUED | OUTPATIENT
Start: 2024-04-29 | End: 2024-04-29 | Stop reason: HOSPADM

## 2024-04-29 RX ORDER — ONDANSETRON 2 MG/ML
INJECTION INTRAMUSCULAR; INTRAVENOUS PRN
Status: DISCONTINUED | OUTPATIENT
Start: 2024-04-29 | End: 2024-04-29 | Stop reason: SDUPTHER

## 2024-04-29 RX ORDER — OXYBUTYNIN CHLORIDE 5 MG/1
5 TABLET ORAL 3 TIMES DAILY PRN
Status: DISCONTINUED | OUTPATIENT
Start: 2024-04-29 | End: 2024-05-03 | Stop reason: HOSPADM

## 2024-04-29 RX ORDER — SODIUM CHLORIDE 9 MG/ML
INJECTION, SOLUTION INTRAVENOUS CONTINUOUS
Status: DISCONTINUED | OUTPATIENT
Start: 2024-04-29 | End: 2024-04-29 | Stop reason: HOSPADM

## 2024-04-29 RX ORDER — GABAPENTIN 300 MG/1
300 CAPSULE ORAL DAILY
Status: DISCONTINUED | OUTPATIENT
Start: 2024-04-30 | End: 2024-05-03 | Stop reason: HOSPADM

## 2024-04-29 RX ORDER — KETAMINE HYDROCHLORIDE 50 MG/ML
INJECTION, SOLUTION INTRAMUSCULAR; INTRAVENOUS PRN
Status: DISCONTINUED | OUTPATIENT
Start: 2024-04-29 | End: 2024-04-29 | Stop reason: SDUPTHER

## 2024-04-29 RX ORDER — SODIUM CHLORIDE 0.9 % (FLUSH) 0.9 %
5-40 SYRINGE (ML) INJECTION PRN
Status: DISCONTINUED | OUTPATIENT
Start: 2024-04-29 | End: 2024-04-29 | Stop reason: HOSPADM

## 2024-04-29 RX ORDER — HYDROMORPHONE HYDROCHLORIDE 2 MG/ML
0.5 INJECTION, SOLUTION INTRAMUSCULAR; INTRAVENOUS; SUBCUTANEOUS EVERY 5 MIN PRN
Status: DISCONTINUED | OUTPATIENT
Start: 2024-04-29 | End: 2024-04-29 | Stop reason: HOSPADM

## 2024-04-29 RX ORDER — GABAPENTIN 300 MG/1
300 CAPSULE ORAL 2 TIMES DAILY
Status: DISCONTINUED | OUTPATIENT
Start: 2024-04-29 | End: 2024-04-29

## 2024-04-29 RX ORDER — ATORVASTATIN CALCIUM 40 MG/1
40 TABLET, FILM COATED ORAL
Status: DISCONTINUED | OUTPATIENT
Start: 2024-04-29 | End: 2024-05-03 | Stop reason: HOSPADM

## 2024-04-29 RX ORDER — ALBUTEROL SULFATE 90 UG/1
2 AEROSOL, METERED RESPIRATORY (INHALATION) EVERY 4 HOURS PRN
Status: DISCONTINUED | OUTPATIENT
Start: 2024-04-29 | End: 2024-05-03 | Stop reason: HOSPADM

## 2024-04-29 RX ORDER — SODIUM CHLORIDE, SODIUM LACTATE, POTASSIUM CHLORIDE, CALCIUM CHLORIDE 600; 310; 30; 20 MG/100ML; MG/100ML; MG/100ML; MG/100ML
INJECTION, SOLUTION INTRAVENOUS CONTINUOUS PRN
Status: DISCONTINUED | OUTPATIENT
Start: 2024-04-29 | End: 2024-04-29 | Stop reason: SDUPTHER

## 2024-04-29 RX ORDER — HYDROMORPHONE HYDROCHLORIDE 2 MG/ML
0.25 INJECTION, SOLUTION INTRAMUSCULAR; INTRAVENOUS; SUBCUTANEOUS EVERY 5 MIN PRN
Status: DISCONTINUED | OUTPATIENT
Start: 2024-04-29 | End: 2024-04-29 | Stop reason: HOSPADM

## 2024-04-29 RX ORDER — BUPROPION HYDROCHLORIDE 300 MG/1
300 TABLET ORAL EVERY MORNING
Status: DISCONTINUED | OUTPATIENT
Start: 2024-04-30 | End: 2024-05-03 | Stop reason: HOSPADM

## 2024-04-29 RX ORDER — HYDROCODONE BITARTRATE AND ACETAMINOPHEN 5; 325 MG/1; MG/1
1 TABLET ORAL EVERY 4 HOURS PRN
Status: DISCONTINUED | OUTPATIENT
Start: 2024-04-29 | End: 2024-05-03 | Stop reason: HOSPADM

## 2024-04-29 RX ORDER — LIDOCAINE HYDROCHLORIDE 10 MG/ML
1 INJECTION, SOLUTION INFILTRATION; PERINEURAL
Status: DISCONTINUED | OUTPATIENT
Start: 2024-04-29 | End: 2024-04-29 | Stop reason: HOSPADM

## 2024-04-29 RX ORDER — LIDOCAINE HYDROCHLORIDE ANHYDROUS AND DEXTROSE MONOHYDRATE 5; 400 G/100ML; MG/100ML
INJECTION, SOLUTION INTRAVENOUS CONTINUOUS PRN
Status: DISCONTINUED | OUTPATIENT
Start: 2024-04-29 | End: 2024-04-29 | Stop reason: SDUPTHER

## 2024-04-29 RX ORDER — BUPROPION HYDROCHLORIDE 150 MG/1
150 TABLET ORAL EVERY EVENING
Status: DISCONTINUED | OUTPATIENT
Start: 2024-04-29 | End: 2024-05-03 | Stop reason: HOSPADM

## 2024-04-29 RX ORDER — DEXAMETHASONE SODIUM PHOSPHATE 4 MG/ML
INJECTION, SOLUTION INTRA-ARTICULAR; INTRALESIONAL; INTRAMUSCULAR; INTRAVENOUS; SOFT TISSUE PRN
Status: DISCONTINUED | OUTPATIENT
Start: 2024-04-29 | End: 2024-04-29 | Stop reason: SDUPTHER

## 2024-04-29 RX ORDER — DIAZEPAM 5 MG/1
10 TABLET ORAL 2 TIMES DAILY PRN
Status: DISCONTINUED | OUTPATIENT
Start: 2024-04-29 | End: 2024-05-03 | Stop reason: HOSPADM

## 2024-04-29 RX ORDER — FENTANYL CITRATE 50 UG/ML
100 INJECTION, SOLUTION INTRAMUSCULAR; INTRAVENOUS
Status: DISCONTINUED | OUTPATIENT
Start: 2024-04-29 | End: 2024-04-29 | Stop reason: HOSPADM

## 2024-04-29 RX ORDER — DOCUSATE SODIUM 100 MG/1
100 CAPSULE, LIQUID FILLED ORAL 2 TIMES DAILY
Status: DISCONTINUED | OUTPATIENT
Start: 2024-04-29 | End: 2024-05-03 | Stop reason: HOSPADM

## 2024-04-29 RX ORDER — ONDANSETRON 2 MG/ML
4 INJECTION INTRAMUSCULAR; INTRAVENOUS
Status: DISCONTINUED | OUTPATIENT
Start: 2024-04-29 | End: 2024-04-29 | Stop reason: HOSPADM

## 2024-04-29 RX ORDER — MANNITOL 250 MG/ML
12.5 INJECTION, SOLUTION INTRAVENOUS ONCE
Status: DISCONTINUED | OUTPATIENT
Start: 2024-04-29 | End: 2024-04-29 | Stop reason: HOSPADM

## 2024-04-29 RX ORDER — LIDOCAINE HYDROCHLORIDE 20 MG/ML
INJECTION, SOLUTION EPIDURAL; INFILTRATION; INTRACAUDAL; PERINEURAL PRN
Status: DISCONTINUED | OUTPATIENT
Start: 2024-04-29 | End: 2024-04-29 | Stop reason: SDUPTHER

## 2024-04-29 RX ORDER — ONDANSETRON 2 MG/ML
4 INJECTION INTRAMUSCULAR; INTRAVENOUS EVERY 6 HOURS PRN
Status: DISCONTINUED | OUTPATIENT
Start: 2024-04-29 | End: 2024-05-03 | Stop reason: HOSPADM

## 2024-04-29 RX ORDER — ACETAMINOPHEN 325 MG/1
650 TABLET ORAL EVERY 4 HOURS PRN
Status: DISCONTINUED | OUTPATIENT
Start: 2024-04-29 | End: 2024-05-03 | Stop reason: HOSPADM

## 2024-04-29 RX ORDER — NALOXONE HYDROCHLORIDE 0.4 MG/ML
INJECTION, SOLUTION INTRAMUSCULAR; INTRAVENOUS; SUBCUTANEOUS PRN
Status: DISCONTINUED | OUTPATIENT
Start: 2024-04-29 | End: 2024-04-29 | Stop reason: HOSPADM

## 2024-04-29 RX ORDER — BUPIVACAINE HYDROCHLORIDE 5 MG/ML
INJECTION, SOLUTION EPIDURAL; INTRACAUDAL PRN
Status: DISCONTINUED | OUTPATIENT
Start: 2024-04-29 | End: 2024-04-29 | Stop reason: ALTCHOICE

## 2024-04-29 RX ORDER — OXYCODONE HYDROCHLORIDE 5 MG/1
5 TABLET ORAL PRN
Status: DISCONTINUED | OUTPATIENT
Start: 2024-04-29 | End: 2024-04-29 | Stop reason: HOSPADM

## 2024-04-29 RX ORDER — SODIUM CHLORIDE, SODIUM LACTATE, POTASSIUM CHLORIDE, CALCIUM CHLORIDE 600; 310; 30; 20 MG/100ML; MG/100ML; MG/100ML; MG/100ML
INJECTION, SOLUTION INTRAVENOUS CONTINUOUS
Status: DISCONTINUED | OUTPATIENT
Start: 2024-04-29 | End: 2024-04-29 | Stop reason: HOSPADM

## 2024-04-29 RX ORDER — FENTANYL CITRATE 50 UG/ML
25 INJECTION, SOLUTION INTRAMUSCULAR; INTRAVENOUS
Status: DISCONTINUED | OUTPATIENT
Start: 2024-04-29 | End: 2024-04-29 | Stop reason: HOSPADM

## 2024-04-29 RX ORDER — MORPHINE SULFATE 2 MG/ML
2 INJECTION, SOLUTION INTRAMUSCULAR; INTRAVENOUS
Status: DISCONTINUED | OUTPATIENT
Start: 2024-04-29 | End: 2024-05-03 | Stop reason: HOSPADM

## 2024-04-29 RX ORDER — EPHEDRINE SULFATE 5 MG/ML
INJECTION INTRAVENOUS PRN
Status: DISCONTINUED | OUTPATIENT
Start: 2024-04-29 | End: 2024-04-29 | Stop reason: SDUPTHER

## 2024-04-29 RX ORDER — MIDAZOLAM HYDROCHLORIDE 2 MG/2ML
2 INJECTION, SOLUTION INTRAMUSCULAR; INTRAVENOUS
Status: DISCONTINUED | OUTPATIENT
Start: 2024-04-29 | End: 2024-04-29 | Stop reason: HOSPADM

## 2024-04-29 RX ORDER — HYDRALAZINE HYDROCHLORIDE 20 MG/ML
INJECTION INTRAMUSCULAR; INTRAVENOUS PRN
Status: DISCONTINUED | OUTPATIENT
Start: 2024-04-29 | End: 2024-04-29 | Stop reason: SDUPTHER

## 2024-04-29 RX ADMIN — HYDROCODONE BITARTRATE AND ACETAMINOPHEN 1 TABLET: 5; 325 TABLET ORAL at 17:55

## 2024-04-29 RX ADMIN — EPHEDRINE SULFATE 5 MG: 5 INJECTION INTRAVENOUS at 07:54

## 2024-04-29 RX ADMIN — HYDROMORPHONE HYDROCHLORIDE 0.5 MG: 2 INJECTION INTRAMUSCULAR; INTRAVENOUS; SUBCUTANEOUS at 10:36

## 2024-04-29 RX ADMIN — HYDROMORPHONE HYDROCHLORIDE 0.1 MG: 2 INJECTION INTRAMUSCULAR; INTRAVENOUS; SUBCUTANEOUS at 08:21

## 2024-04-29 RX ADMIN — SUGAMMADEX 50 MG: 100 INJECTION, SOLUTION INTRAVENOUS at 09:26

## 2024-04-29 RX ADMIN — ONDANSETRON 4 MG: 2 INJECTION INTRAMUSCULAR; INTRAVENOUS at 09:10

## 2024-04-29 RX ADMIN — HYDROMORPHONE HYDROCHLORIDE 0.5 MG: 2 INJECTION INTRAMUSCULAR; INTRAVENOUS; SUBCUTANEOUS at 11:09

## 2024-04-29 RX ADMIN — MORPHINE SULFATE 2 MG: 2 INJECTION, SOLUTION INTRAMUSCULAR; INTRAVENOUS at 20:00

## 2024-04-29 RX ADMIN — EPHEDRINE SULFATE 10 MG: 5 INJECTION INTRAVENOUS at 07:56

## 2024-04-29 RX ADMIN — KETAMINE HYDROCHLORIDE 10 MG: 50 INJECTION, SOLUTION INTRAMUSCULAR; INTRAVENOUS at 08:11

## 2024-04-29 RX ADMIN — HYDROMORPHONE HYDROCHLORIDE 0.2 MG: 2 INJECTION INTRAMUSCULAR; INTRAVENOUS; SUBCUTANEOUS at 08:24

## 2024-04-29 RX ADMIN — HYDRALAZINE HYDROCHLORIDE 2.5 MG: 20 INJECTION INTRAMUSCULAR; INTRAVENOUS at 08:33

## 2024-04-29 RX ADMIN — PROPOFOL 150 MG: 10 INJECTION, EMULSION INTRAVENOUS at 07:46

## 2024-04-29 RX ADMIN — GABAPENTIN 1500 MG: 300 CAPSULE ORAL at 20:00

## 2024-04-29 RX ADMIN — LIDOCAINE HYDROCHLORIDE 80 MG: 20 INJECTION, SOLUTION EPIDURAL; INFILTRATION; INTRACAUDAL; PERINEURAL at 07:46

## 2024-04-29 RX ADMIN — EPHEDRINE SULFATE 10 MG: 5 INJECTION INTRAVENOUS at 09:15

## 2024-04-29 RX ADMIN — Medication 2000 MG: at 08:05

## 2024-04-29 RX ADMIN — DOCUSATE SODIUM 100 MG: 100 CAPSULE, LIQUID FILLED ORAL at 20:00

## 2024-04-29 RX ADMIN — MORPHINE SULFATE 2 MG: 2 INJECTION, SOLUTION INTRAMUSCULAR; INTRAVENOUS at 22:49

## 2024-04-29 RX ADMIN — DEXAMETHASONE SODIUM PHOSPHATE 10 MG: 4 INJECTION, SOLUTION INTRAMUSCULAR; INTRAVENOUS at 08:40

## 2024-04-29 RX ADMIN — PROPOFOL 30 MG: 10 INJECTION, EMULSION INTRAVENOUS at 08:26

## 2024-04-29 RX ADMIN — WATER 1000 MG: 1 INJECTION INTRAMUSCULAR; INTRAVENOUS; SUBCUTANEOUS at 22:36

## 2024-04-29 RX ADMIN — HYDROMORPHONE HYDROCHLORIDE 0.6 MG: 2 INJECTION INTRAMUSCULAR; INTRAVENOUS; SUBCUTANEOUS at 07:46

## 2024-04-29 RX ADMIN — ROCURONIUM BROMIDE 15 MG: 10 INJECTION, SOLUTION INTRAVENOUS at 08:26

## 2024-04-29 RX ADMIN — SODIUM CHLORIDE, POTASSIUM CHLORIDE, SODIUM LACTATE AND CALCIUM CHLORIDE: 600; 310; 30; 20 INJECTION, SOLUTION INTRAVENOUS at 07:00

## 2024-04-29 RX ADMIN — KETAMINE HYDROCHLORIDE 20 MG: 50 INJECTION, SOLUTION INTRAMUSCULAR; INTRAVENOUS at 07:47

## 2024-04-29 RX ADMIN — SUGAMMADEX 50 MG: 100 INJECTION, SOLUTION INTRAVENOUS at 09:28

## 2024-04-29 RX ADMIN — HYDROMORPHONE HYDROCHLORIDE 0.1 MG: 2 INJECTION INTRAMUSCULAR; INTRAVENOUS; SUBCUTANEOUS at 08:20

## 2024-04-29 RX ADMIN — DEXTROSE AND SODIUM CHLORIDE: 5; 450 INJECTION, SOLUTION INTRAVENOUS at 12:28

## 2024-04-29 RX ADMIN — WATER 1000 MG: 1 INJECTION INTRAMUSCULAR; INTRAVENOUS; SUBCUTANEOUS at 16:28

## 2024-04-29 RX ADMIN — SUGAMMADEX 50 MG: 100 INJECTION, SOLUTION INTRAVENOUS at 09:32

## 2024-04-29 RX ADMIN — ROCURONIUM BROMIDE 10 MG: 10 INJECTION, SOLUTION INTRAVENOUS at 08:09

## 2024-04-29 RX ADMIN — DEXTROSE AND SODIUM CHLORIDE: 5; 450 INJECTION, SOLUTION INTRAVENOUS at 20:06

## 2024-04-29 RX ADMIN — LIDOCAINE HYDROCHLORIDE 1 MG/KG/HR: 4 INJECTION, SOLUTION INTRAVENOUS at 08:07

## 2024-04-29 RX ADMIN — ONDANSETRON 4 MG: 2 INJECTION INTRAMUSCULAR; INTRAVENOUS at 19:59

## 2024-04-29 RX ADMIN — BUPROPION HYDROCHLORIDE 150 MG: 150 TABLET, EXTENDED RELEASE ORAL at 16:28

## 2024-04-29 RX ADMIN — PROPOFOL 20 MG: 10 INJECTION, EMULSION INTRAVENOUS at 08:24

## 2024-04-29 RX ADMIN — PROPOFOL 50 MG: 10 INJECTION, EMULSION INTRAVENOUS at 08:33

## 2024-04-29 RX ADMIN — ATORVASTATIN CALCIUM 40 MG: 40 TABLET, FILM COATED ORAL at 20:00

## 2024-04-29 RX ADMIN — SUGAMMADEX 50 MG: 100 INJECTION, SOLUTION INTRAVENOUS at 09:30

## 2024-04-29 RX ADMIN — ROCURONIUM BROMIDE 40 MG: 10 INJECTION, SOLUTION INTRAVENOUS at 07:47

## 2024-04-29 RX ADMIN — SODIUM CHLORIDE, SODIUM LACTATE, POTASSIUM CHLORIDE, AND CALCIUM CHLORIDE: 600; 310; 30; 20 INJECTION, SOLUTION INTRAVENOUS at 07:55

## 2024-04-29 ASSESSMENT — PAIN DESCRIPTION - LOCATION
LOCATION: ABDOMEN

## 2024-04-29 ASSESSMENT — PAIN SCALES - GENERAL
PAINLEVEL_OUTOF10: 7
PAINLEVEL_OUTOF10: 7
PAINLEVEL_OUTOF10: 3
PAINLEVEL_OUTOF10: 6
PAINLEVEL_OUTOF10: 10
PAINLEVEL_OUTOF10: 6
PAINLEVEL_OUTOF10: 6

## 2024-04-29 ASSESSMENT — PAIN - FUNCTIONAL ASSESSMENT: PAIN_FUNCTIONAL_ASSESSMENT: 0-10

## 2024-04-29 ASSESSMENT — PAIN DESCRIPTION - DESCRIPTORS
DESCRIPTORS: SHARP
DESCRIPTORS: SHARP
DESCRIPTORS: SORE;TENDER
DESCRIPTORS: TENDER

## 2024-04-29 ASSESSMENT — PAIN DESCRIPTION - ORIENTATION
ORIENTATION: RIGHT

## 2024-04-29 NOTE — BRIEF OP NOTE
Brief Postoperative Note      Patient: Radha Grant  YOB: 1950  MRN: [id number]    Date of Procedure: 4/29/2024    Pre-Op Diagnosis Codes:     * Renal mass [N28.89] RIGHT    Post-Op Diagnosis: Same       Procedure:  RIGHT HALN    Surgeon(s):  Krysten Melo DO Bullock, Andrew R, MD    Assistant:  * No surgical staff found *    Anesthesia: General    Estimated Blood Loss (mL): 25cc    Complications: none immediate    Specimens:   ID Type Source Tests Collected by Time Destination   A : RIGHT KIDNEY Tissue Kidney SURGICAL PATHOLOGY Krysten Melo DO 4/29/2024 0830        Implants:  * No implants in log *      Drains:   Urinary Catheter 04/29/24 2 Way (Active)           Electronically signed by KRYSTEN MELO DO on 4/29/2024 at 9:28 AM

## 2024-04-29 NOTE — PERIOP NOTE
TRANSFER - OUT REPORT:    Verbal report given to GE Chaudhari on Radha Grant  being transferred to [facility] for routine post-op       Report consisted of patient’s Situation, Background, Assessment and   Recommendations(SBAR).     Information from the following report(s) Nurse Handoff Report, Adult Overview, Surgery Report, Intake/Output, MAR, Recent Results, Cardiac Rhythm SB, and Neuro Assessment was reviewed with the receiving nurse.    Lines:   Peripheral IV 04/29/24 Left;Posterior Hand (Active)   Site Assessment Clean, dry & intact 04/29/24 0955   Line Status Infusing 04/29/24 0955   Line Care Connections checked and tightened 04/29/24 0955   Phlebitis Assessment No symptoms 04/29/24 0955   Infiltration Assessment 0 04/29/24 0955   Alcohol Cap Used No 04/29/24 0955   Dressing Status Clean, dry & intact 04/29/24 0955   Dressing Type Transparent 04/29/24 0955   Dressing Intervention New 04/29/24 0652       Peripheral IV 04/29/24 Right Hand (Active)   Site Assessment Clean, dry & intact 04/29/24 0955   Line Status Normal saline locked 04/29/24 0955   Line Care Connections checked and tightened 04/29/24 0955   Phlebitis Assessment No symptoms 04/29/24 0955   Infiltration Assessment 0 04/29/24 0955   Alcohol Cap Used No 04/29/24 0955   Dressing Status Clean, dry & intact 04/29/24 0955   Dressing Type Transparent 04/29/24 0955        Opportunity for questions and clarification was provided.      Patient transported with:   O2 @ 3 liters  Tech    VTE prophylaxis orders have been written for Radha Grant.     at bedside.

## 2024-04-29 NOTE — PROGRESS NOTES
TRANSFER - IN REPORT:    Verbal report received from Allison Grant  being received from PACU for routine post-op      Report consisted of patient's Situation, Background, Assessment and   Recommendations(SBAR).     Information from the following report(s) Nurse Handoff Report was reviewed with the receiving nurse.    Opportunity for questions and clarification was provided.

## 2024-04-29 NOTE — H&P
Comments)     Denies allergy     Social History     Socioeconomic History    Marital status:      Spouse name: Not on file    Number of children: Not on file    Years of education: Not on file    Highest education level: Not on file   Occupational History    Not on file   Tobacco Use    Smoking status: Never    Smokeless tobacco: Never   Vaping Use    Vaping Use: Never used   Substance and Sexual Activity    Alcohol use: No    Drug use: No    Sexual activity: Not on file   Other Topics Concern    Not on file   Social History Narrative     and lives with her --has a son and daughter.  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  No pets.           Social Determinants of Health     Financial Resource Strain: Not on file   Food Insecurity: Not on file   Transportation Needs: Not on file   Physical Activity: Not on file   Stress: Not on file   Social Connections: Not on file   Intimate Partner Violence: Not on file   Housing Stability: Not on file     Family History   Problem Relation Age of Onset    Breast Cancer Mother 60    Heart Disease Father     Prostate Cancer Father        Review of Systems  All systems reviewed and are negative at this time.    Physical Exam  BP (!) 140/63   Pulse 60   Temp 97.9 °F (36.6 °C) (Oral)   Resp 12   Ht 1.778 m (5' 10\")   Wt 87.1 kg (192 lb)   SpO2 95%   BMI 27.55 kg/m²   General appearance - alert, well appearing, and in no distress  Mental status - alert and oriented  Eyes - extraocular eye movements intact, sclera anicteric  Nose - normal and patent, no erythema or discharge  Mouth - mucous membranes moist  Chest - clear to auscultation bilaterally  Heart - normal rate, regular rhythm  Abdomen - soft, nontender, nondistended, no masses or organomegaly  Neurological - normal speech, no focal findings or movement disorder noted  Skin - normal coloration and turgor    Assessment/Plan  R renal mass.  Films again reviewed.  There does appear to  be involvement of collecting system and mass appears to be in close vicinity of renal vessels.  I instructed pt that we will carefully examine tumor using intraoperative ultrasound and if it is felt that the tumor cannot be safely excised then we will proceed with a R radical nephrectomy.   She understands and is willing to proceed.  Plan for L SAMANTHA partial nephrectomy with possible radical nephrectomy.  All risks, benefits and alternatives to the above mentioned procedure were discussed and the patient is willing to proceed at this time.      KRYSTEN MELO, DO

## 2024-04-29 NOTE — OP NOTE
ASSISTANT Operative Note      Patient: Radha Grant  YOB: 1950  MRN: [id number]    Date of Procedure: 4/29/2024    Pre-Op Diagnosis Codes:     * Renal mass [N28.89]    Post-Op Diagnosis: Same       Procedure(s):  RIGHT NEPHRECTOMY LAPAROSCOPIC HAND ASSIST/ RIGHT/MARGOT ASSIST    Assistant Surgeon: Stanley Hammond MD    Anesthesia: General    Estimated Blood Loss (mL): less than 50     Complications: None    Specimens:   ID Type Source Tests Collected by Time Destination   A : RIGHT KIDNEY Tissue Kidney SURGICAL PATHOLOGY Shayne Lyn DO 4/29/2024 0830        Implants:  * No implants in log *      Drains:   Urinary Catheter 04/29/24 2 Way (Active)       Detailed Description of Procedure:     I was present and assisting Dr. Lyn from skin incision to fascial closure.      Electronically signed by STANLEY HAMMOND MD on 4/29/2024 at 9:32 AM

## 2024-04-29 NOTE — OP NOTE
[address]  [address]                            OPERATIVE REPORT      PATIENT NAME: SNOIA BAUMAN             : 1950  MED REC NO: [id number]                       ROOM: 616  ACCOUNT NO: [id number]                       ADMIT DATE: 2024  PROVIDER: Shayne Lyn DO    DATE OF SERVICE:  2024    PREOPERATIVE DIAGNOSES:  Right renal mass.    POSTOPERATIVE DIAGNOSES:  Right renal mass.    PROCEDURES PERFORMED:  Right hand-assisted laparoscopic radical nephrectomy.    SURGEON:  Shayne Lyn DO    ASSISTANT:  Dr. Hammond.    ANESTHESIA:  GETA    ESTIMATED BLOOD LOSS:  25 mL.    SPECIMENS REMOVED:  Right kidney.    INTRAOPERATIVE FINDINGS:  Please see dictated operative note.     COMPLICATIONS:  None immediate.    IMPLANTS:  None.    INDICATIONS:  This is a 73-year-old female who was recently noted to have an enlarging approximately 2.5 cm right upper pole enhancing renal mass by CT scan.  All risks, benefits, and alternatives of above-mentioned procedure have been discussed and she is willing to proceed at this time.    DESCRIPTION OF PROCEDURE:  The patient's consent was obtained.  The patient was brought back to the operating room, at which time she was placed in the supine position.  After the uneventful induction of general anesthesia, a Morocho catheter was placed to dependent drainage.  The patient was then placed in the right lateral decubitus position.  All pressure points were carefully padded.  The patient was secured to the table.  The patient's abdominal area was prepped and draped and a sterile field applied.  A small right lower quadrant oblique incision was made and carried down to the peritoneum without event.  The patient did have some mild right lower quadrant adhesions, which were taken down sharply using a scissors.  Eventually, I was able to place the GelPort through this incision.  The abdomen  Endo Close device under direct vision.  All ports were then removed under direct vision. The GelPort incision was closed in 2 layers. The first layer incorporated the peritoneum and transversalis fascial layers, this was closed using 0 Vicryl in a running fashion.  The second layer incorporated the internal and external oblique fascial layers, this was closed using #1 PDS in a running fashion.  All skin incisions were then closed using skin staples.  The patient tolerated the procedure well.  She was transferred to the PACU in stable condition.        DO EMILY PITTS/MANDIE  D:  04/29/2024 09:38:45  T:  04/29/2024 14:43:31  JOB #:  [id number]

## 2024-04-29 NOTE — ANESTHESIA PROCEDURE NOTES
Arterial Line:    An arterial line was placed using surface landmarks, in the OR for the following indication(s): continuous blood pressure monitoring and blood sampling needed.    A 20 gauge (size), 1 and 3/4 inch (length), Angiocath (type) catheter was placed, Seldinger technique not used, into the left radial artery, secured by tape and Tegaderm.  Anesthesia type: General    Events:  patient tolerated procedure well with no complications.4/29/2024 7:52 AM4/29/2024 7:58 AM  Resident/CRNA: Cecy Alvarez APRN - CRNA  Performed: Resident/CRNA   Preanesthetic Checklist  Completed: patient identified, IV checked, site marked, risks and benefits discussed, surgical/procedural consents, equipment checked, pre-op evaluation, timeout performed, anesthesia consent given, oxygen available, monitors applied/VS acknowledged, fire risk safety assessment completed and verbalized and blood product R/B/A discussed and consented

## 2024-04-29 NOTE — ANESTHESIA POSTPROCEDURE EVALUATION
Department of Anesthesiology  Postprocedure Note    Patient: Radha Grant  MRN: [id number]  YOB: 1950  Date of evaluation: 4/29/2024    Procedure Summary       Date: 04/29/24 Room / Location: [facility] MAIN OR  / [facility] MAIN OR    Anesthesia Start: 0732 Anesthesia Stop: 0956    Procedure: RIGHT NEPHRECTOMY LAPAROSCOPIC HAND ASSIST/ RIGHT/FROST ASSIST (Right: Kidney) Diagnosis:       Renal mass      (Renal mass [N28.89])    Providers: Shayne Lyn DO Responsible Provider: Justin Hamilton MD    Anesthesia Type: general ASA Status: 3            Anesthesia Type: No value filed.    Amre Phase I: Mare Score: 8    Mare Phase II:      Anesthesia Post Evaluation    Patient location during evaluation: PACU  Patient participation: complete - patient participated  Level of consciousness: awake  Airway patency: patent  Nausea & Vomiting: no nausea  Cardiovascular status: blood pressure returned to baseline and hemodynamically stable  Respiratory status: acceptable  Hydration status: stable  Multimodal analgesia pain management approach  Pain management: adequate    No notable events documented.

## 2024-04-30 LAB
ANION GAP SERPL CALC-SCNC: 9 MMOL/L (ref 9–18)
BUN SERPL-MCNC: 14 MG/DL (ref 8–23)
CALCIUM SERPL-MCNC: 8.5 MG/DL (ref 8.8–10.2)
CHLORIDE SERPL-SCNC: 105 MMOL/L (ref 98–107)
CO2 SERPL-SCNC: 26 MMOL/L (ref 20–28)
CREAT SERPL-MCNC: 1.16 MG/DL (ref 0.6–1.1)
GLUCOSE SERPL-MCNC: 120 MG/DL (ref 70–99)
POTASSIUM SERPL-SCNC: 4.1 MMOL/L (ref 3.5–5.1)
SODIUM SERPL-SCNC: 139 MMOL/L (ref 136–145)

## 2024-04-30 PROCEDURE — 6370000000 HC RX 637 (ALT 250 FOR IP): Performed by: UROLOGY

## 2024-04-30 PROCEDURE — 80048 BASIC METABOLIC PNL TOTAL CA: CPT

## 2024-04-30 PROCEDURE — 99024 POSTOP FOLLOW-UP VISIT: CPT | Performed by: UROLOGY

## 2024-04-30 PROCEDURE — 97161 PT EVAL LOW COMPLEX 20 MIN: CPT

## 2024-04-30 PROCEDURE — 6360000002 HC RX W HCPCS: Performed by: UROLOGY

## 2024-04-30 PROCEDURE — 97530 THERAPEUTIC ACTIVITIES: CPT

## 2024-04-30 PROCEDURE — 2580000003 HC RX 258: Performed by: UROLOGY

## 2024-04-30 PROCEDURE — 36415 COLL VENOUS BLD VENIPUNCTURE: CPT

## 2024-04-30 PROCEDURE — 1100000000 HC RM PRIVATE

## 2024-04-30 RX ADMIN — GABAPENTIN 1500 MG: 300 CAPSULE ORAL at 21:52

## 2024-04-30 RX ADMIN — METOPROLOL SUCCINATE 100 MG: 50 TABLET, EXTENDED RELEASE ORAL at 08:47

## 2024-04-30 RX ADMIN — MORPHINE SULFATE 2 MG: 2 INJECTION, SOLUTION INTRAMUSCULAR; INTRAVENOUS at 03:27

## 2024-04-30 RX ADMIN — BUPROPION HYDROCHLORIDE 300 MG: 300 TABLET, EXTENDED RELEASE ORAL at 08:47

## 2024-04-30 RX ADMIN — ATORVASTATIN CALCIUM 40 MG: 40 TABLET, FILM COATED ORAL at 21:53

## 2024-04-30 RX ADMIN — DOCUSATE SODIUM 100 MG: 100 CAPSULE, LIQUID FILLED ORAL at 08:47

## 2024-04-30 RX ADMIN — MORPHINE SULFATE 2 MG: 2 INJECTION, SOLUTION INTRAMUSCULAR; INTRAVENOUS at 13:51

## 2024-04-30 RX ADMIN — LEVOTHYROXINE SODIUM 125 MCG: 0.07 TABLET ORAL at 05:26

## 2024-04-30 RX ADMIN — DEXTROSE AND SODIUM CHLORIDE: 5; 450 INJECTION, SOLUTION INTRAVENOUS at 10:58

## 2024-04-30 RX ADMIN — GABAPENTIN 300 MG: 300 CAPSULE ORAL at 08:47

## 2024-04-30 RX ADMIN — MORPHINE SULFATE 2 MG: 2 INJECTION, SOLUTION INTRAMUSCULAR; INTRAVENOUS at 19:44

## 2024-04-30 RX ADMIN — DOCUSATE SODIUM 100 MG: 100 CAPSULE, LIQUID FILLED ORAL at 21:53

## 2024-04-30 RX ADMIN — WATER 1000 MG: 1 INJECTION INTRAMUSCULAR; INTRAVENOUS; SUBCUTANEOUS at 08:48

## 2024-04-30 RX ADMIN — MORPHINE SULFATE 2 MG: 2 INJECTION, SOLUTION INTRAMUSCULAR; INTRAVENOUS at 21:56

## 2024-04-30 RX ADMIN — BUPROPION HYDROCHLORIDE 150 MG: 150 TABLET, EXTENDED RELEASE ORAL at 17:48

## 2024-04-30 RX ADMIN — HYDROCODONE BITARTRATE AND ACETAMINOPHEN 1 TABLET: 5; 325 TABLET ORAL at 08:47

## 2024-04-30 RX ADMIN — MORPHINE SULFATE 2 MG: 2 INJECTION, SOLUTION INTRAMUSCULAR; INTRAVENOUS at 05:34

## 2024-04-30 RX ADMIN — DEXTROSE AND SODIUM CHLORIDE: 5; 450 INJECTION, SOLUTION INTRAVENOUS at 03:29

## 2024-04-30 RX ADMIN — HYDROCODONE BITARTRATE AND ACETAMINOPHEN 1 TABLET: 5; 325 TABLET ORAL at 15:11

## 2024-04-30 ASSESSMENT — PAIN SCALES - GENERAL
PAINLEVEL_OUTOF10: 5
PAINLEVEL_OUTOF10: 9
PAINLEVEL_OUTOF10: 7
PAINLEVEL_OUTOF10: 1
PAINLEVEL_OUTOF10: 8
PAINLEVEL_OUTOF10: 7
PAINLEVEL_OUTOF10: 7
PAINLEVEL_OUTOF10: 2
PAINLEVEL_OUTOF10: 5
PAINLEVEL_OUTOF10: 6
PAINLEVEL_OUTOF10: 7

## 2024-04-30 ASSESSMENT — PAIN DESCRIPTION - ORIENTATION
ORIENTATION: RIGHT

## 2024-04-30 ASSESSMENT — PAIN DESCRIPTION - DESCRIPTORS
DESCRIPTORS: SHARP
DESCRIPTORS: ACHING;DISCOMFORT;SORE
DESCRIPTORS: SHARP;STABBING;THROBBING
DESCRIPTORS: SHARP
DESCRIPTORS: ACHING;DISCOMFORT;SORE;SHARP
DESCRIPTORS: SHARP

## 2024-04-30 ASSESSMENT — PAIN DESCRIPTION - LOCATION
LOCATION: ABDOMEN
LOCATION: ABDOMEN
LOCATION: ABDOMEN;FLANK
LOCATION: FACE
LOCATION: ABDOMEN

## 2024-04-30 ASSESSMENT — PAIN - FUNCTIONAL ASSESSMENT
PAIN_FUNCTIONAL_ASSESSMENT: PREVENTS OR INTERFERES SOME ACTIVE ACTIVITIES AND ADLS
PAIN_FUNCTIONAL_ASSESSMENT: PREVENTS OR INTERFERES SOME ACTIVE ACTIVITIES AND ADLS

## 2024-04-30 NOTE — PROGRESS NOTES
3200 ml clear yellow urine emptied from gurrola catheter this AM. Patient received 4 doses of Morphine this shift for complaints of right abdominal pain 7/10-10/10; see MAR. Dressings to abdomen remain clean, dry, and intact. Tolerating clear liquids without difficulty. No distress noted.

## 2024-04-30 NOTE — PROGRESS NOTES
Patient in bed alert and oriented,  at bedside. Morocho draining clear yellow urine. IV patent infusing 5% dextrose @125ml/hr. Patient reports pain overnight, tenderness right flank area. Dressing clean, dry and intact. Last bowel movement 04/26/2024. Patient tolerating clear liquid diet, requesting upgrade to regular   09:00 Am   Clear liquids until, patient passes gas.   11:00 AM   OOB to chair, patient has not pass gas. IV left hand leaking, removed.   1346  Patient ambulating in room, back to bed. Patient reports has not pass gas  1512  Patient reporting no relief post morphine, states 10/10 pain. No bowel movement, has not pass gas.Positive bowel sounds.   1749  Patient passed gas. Pain, better. Patient  at bedside.No needs at this time. IV infusing  NS@125

## 2024-04-30 NOTE — PROGRESS NOTES
Doing well.  Denies flatus.  AF.  VSS  Abd soft, ND.  Dressings c/d/I  UOP stable and clear.  Labs reviewed.  Cr 1.16  S/P R HALN POD#1  Ambulate.  Voiding trial  Advance diet with flatus.

## 2024-04-30 NOTE — PLAN OF CARE
Problem: Pain  Goal: Verbalizes/displays adequate comfort level or baseline comfort level  4/30/2024 1536 by Alba Pavon, RN  Outcome: Not Progressing  4/30/2024 1536 by Alba Pavon RN  Outcome: Progressing     Problem: Pain  Goal: Verbalizes/displays adequate comfort level or baseline comfort level  4/30/2024 1536 by Alba Pavon, RN  Outcome: Not Progressing  4/30/2024 1536 by Alba Pavon, RN  Outcome: Progressing

## 2024-04-30 NOTE — CARE COORDINATION
04/30/24 1427   Service Assessment   Patient Orientation Alert and Oriented;Person;Place;Situation   Cognition Alert   History Provided By Patient;Spouse   Primary Caregiver Self   Accompanied By/Relationship  at bedside   Patient's Healthcare Decision Maker is: Legal Next of Kin   PCP Verified by CM Yes   Last Visit to PCP Within last 3 months   Prior Functional Level Independent in ADLs/IADLs   Current Functional Level Independent in ADLs/IADLs   Can patient return to prior living arrangement Yes   Ability to make needs known: Good   Family able to assist with home care needs: Yes   Would you like for me to discuss the discharge plan with any other family members/significant others, and if so, who? Yes   Financial Resources Other (Comment)  (BCBS)   Community Resources None   Social/Functional History   Lives With Spouse   ADL Assistance Independent   Ambulation Assistance Independent   Transfer Assistance Independent   Active  Yes   Mode of Transportation Car     CM met with pt and  at bedside, demographics and PCP verified, pt lives with her spouse, DME-RW, cane, RW, pt drives,  is emergency contact.Voices good support system. She would like [facility] home care, PT has recommended home PT. Pt has never been to [facility]. CM will continue to follow for discharge plan.

## 2024-04-30 NOTE — THERAPY EVALUATION
ACUTE PHYSICAL THERAPY GOALS:   (Developed with and agreed upon by patient and/or caregiver.)  (1.) Radha Grant will move from supine to sit and sit to supine , scoot up and down, and roll side to side with STAND BY ASSIST within 7 treatment day(s).    (2.) Radha Grant will transfer from bed to chair and chair to bed with CONTACT GUARD ASSIST using the least restrictive device within 7 treatment day(s).    (3.) Radha Grant will ambulate with CONTACT GUARD ASSIST for 300 feet with the least restrictive device within 7 treatment day(s).   (4.) Radha Grant will perform standing static and dynamic balance activities x 25 minutes with STAND BY ASSIST to improve safety within 7 treatment day(s).  (5.) Radha Grant will perform therapeutic exercises x 20 min for HEP with SUPERVISION to improve strength, endurance, and functional mobility within 7 treatment day(s).     PHYSICAL THERAPY Initial Assessment, Daily Note, and AM  (Link to Caseload Tracking: PT Visit Days : 1  Acknowledge Orders  Time In/Out  PT Charge Capture  Rehab Caseload Tracker    Radha Grant is a 73 y.o. female   PRIMARY DIAGNOSIS: Renal mass  Renal mass [N28.89]  Procedure(s) (LRB):  RIGHT NEPHRECTOMY LAPAROSCOPIC HAND ASSIST/ RIGHT/FROST ASSIST (Right)  1 Day Post-Op  Reason for Referral: Generalized Muscle Weakness (M62.81)  Other lack of cordination (R27.8)  Difficulty in walking, Not elsewhere classified (R26.2)  Other abnormalities of gait and mobility (R26.89)  Inpatient: Payor: MEDICARE / Plan: MEDICARE PART A AND B / Product Type: *No Product type* /     ASSESSMENT:     REHAB RECOMMENDATIONS:   Recommendation to date pending progress:  Setting:  Home Health Therapy    Equipment:     Confirm pt has RW she can use at home     ASSESSMENT:  Ms. Grant is a 73 year old F who presents s/p R nephrectomy after found to have a R renal mass.   This date pt performs mobility including sitting balance activities, sit <>  Restrictions/Precautions  Restrictions/Precautions: Fall Risk, Bed Alarm    Stairs      I=Independent, Mod I=Modified Independent, S=Supervision, SBA=Standby Assistance, CGA=Contact Guard Assistance,   Min=Minimal Assistance, Mod=Moderate Assistance, Max=Maximal Assistance, Total=Total Assistance, NT=Not Tested    PLAN:   FREQUENCY AND DURATION: 3 times/week for duration of hospital stay or until stated goals are met, whichever comes first.    THERAPY PROGNOSIS: Good    PROBLEM LIST:   (Skilled intervention is medically necessary to address:)  Decreased ADL/Functional Activities  Decreased Activity Tolerance  Decreased AROM/PROM  Decreased Balance  Decreased Coordination  Decreased Gait Ability  Decreased Safety Awareness  Decreased Strength  Decreased Transfer Abilities  Increased Pain INTERVENTIONS PLANNED:   (Benefits and precautions of physical therapy have been discussed with the patient.)  Self Care Training  Therapeutic Activity  Therapeutic Exercise/HEP  Neuromuscular Re-education  Gait Training  Education       TREATMENT:   EVALUATION: LOW COMPLEXITY: (Untimed Charge)  The initial evaluation charge encompasses clinical chart review, objective assessment, interpretation of assessment, and skilled monitoring of the patient's response to treatment in order to develop a plan of care.     TREATMENT:   Therapeutic Activity (8 Minutes): Therapeutic activity included Transfer Training, Ambulation on level ground, Sitting balance , and Standing balance to improve functional Activity tolerance, Balance, Coordination, Mobility, and Strength.    TREATMENT GRID:  N/A    AFTER TREATMENT PRECAUTIONS: Bed/Chair Locked, Call light within reach, Chair, Needs within reach, RN notified, and Visitors at bedside    INTERDISCIPLINARY COLLABORATION:  RN/ PCT and PT/ PTA    EDUCATION: Education Given To: Patient  Education Provided: Role of Therapy;Plan of Care;Precautions;Transfer Training;Fall Prevention

## 2024-05-01 LAB
ANION GAP SERPL CALC-SCNC: 10 MMOL/L (ref 9–18)
BASOPHILS # BLD: 0.1 K/UL (ref 0–0.2)
BASOPHILS NFR BLD: 1 % (ref 0–2)
BUN SERPL-MCNC: 10 MG/DL (ref 8–23)
CALCIUM SERPL-MCNC: 8.2 MG/DL (ref 8.8–10.2)
CHLORIDE SERPL-SCNC: 105 MMOL/L (ref 98–107)
CO2 SERPL-SCNC: 26 MMOL/L (ref 20–28)
CREAT SERPL-MCNC: 1.13 MG/DL (ref 0.6–1.1)
DIFFERENTIAL METHOD BLD: ABNORMAL
EOSINOPHIL # BLD: 0.1 K/UL (ref 0–0.8)
EOSINOPHIL NFR BLD: 1 % (ref 0.5–7.8)
ERYTHROCYTE [DISTWIDTH] IN BLOOD BY AUTOMATED COUNT: 12.9 % (ref 11.9–14.6)
GLUCOSE SERPL-MCNC: 119 MG/DL (ref 70–99)
HCT VFR BLD AUTO: 37.1 % (ref 35.8–46.3)
HGB BLD-MCNC: 11.8 G/DL (ref 11.7–15.4)
IMM GRANULOCYTES # BLD AUTO: 0.1 K/UL (ref 0–0.5)
IMM GRANULOCYTES NFR BLD AUTO: 0 % (ref 0–5)
LYMPHOCYTES # BLD: 2.4 K/UL (ref 0.5–4.6)
LYMPHOCYTES NFR BLD: 17 % (ref 13–44)
MCH RBC QN AUTO: 31.1 PG (ref 26.1–32.9)
MCHC RBC AUTO-ENTMCNC: 31.8 G/DL (ref 31.4–35)
MCV RBC AUTO: 97.6 FL (ref 82–102)
MONOCYTES # BLD: 1.2 K/UL (ref 0.1–1.3)
MONOCYTES NFR BLD: 9 % (ref 4–12)
NEUTS SEG # BLD: 10.4 K/UL (ref 1.7–8.2)
NEUTS SEG NFR BLD: 72 % (ref 43–78)
NRBC # BLD: 0 K/UL (ref 0–0.2)
PLATELET # BLD AUTO: 174 K/UL (ref 150–450)
PMV BLD AUTO: 11.3 FL (ref 9.4–12.3)
POTASSIUM SERPL-SCNC: 4 MMOL/L (ref 3.5–5.1)
RBC # BLD AUTO: 3.8 M/UL (ref 4.05–5.2)
SODIUM SERPL-SCNC: 141 MMOL/L (ref 136–145)
WBC # BLD AUTO: 14.2 K/UL (ref 4.3–11.1)

## 2024-05-01 PROCEDURE — 36415 COLL VENOUS BLD VENIPUNCTURE: CPT

## 2024-05-01 PROCEDURE — 2580000003 HC RX 258: Performed by: UROLOGY

## 2024-05-01 PROCEDURE — 1100000000 HC RM PRIVATE

## 2024-05-01 PROCEDURE — 6370000000 HC RX 637 (ALT 250 FOR IP): Performed by: UROLOGY

## 2024-05-01 PROCEDURE — 85025 COMPLETE CBC W/AUTO DIFF WBC: CPT

## 2024-05-01 PROCEDURE — 99024 POSTOP FOLLOW-UP VISIT: CPT | Performed by: PHYSICIAN ASSISTANT

## 2024-05-01 PROCEDURE — 97530 THERAPEUTIC ACTIVITIES: CPT

## 2024-05-01 PROCEDURE — 6360000002 HC RX W HCPCS: Performed by: UROLOGY

## 2024-05-01 PROCEDURE — 80048 BASIC METABOLIC PNL TOTAL CA: CPT

## 2024-05-01 PROCEDURE — 6370000000 HC RX 637 (ALT 250 FOR IP): Performed by: PHYSICIAN ASSISTANT

## 2024-05-01 RX ORDER — LEVOFLOXACIN 500 MG/1
250 TABLET, FILM COATED ORAL DAILY
Status: DISCONTINUED | OUTPATIENT
Start: 2024-05-01 | End: 2024-05-03 | Stop reason: HOSPADM

## 2024-05-01 RX ORDER — HEPARIN SODIUM 5000 [USP'U]/ML
5000 INJECTION, SOLUTION INTRAVENOUS; SUBCUTANEOUS 2 TIMES DAILY
Status: DISCONTINUED | OUTPATIENT
Start: 2024-05-01 | End: 2024-05-03 | Stop reason: HOSPADM

## 2024-05-01 RX ORDER — DIMETHICONE, CAMPHOR (SYNTHETIC), MENTHOL, AND PHENOL 1.1; .5; .625; .5 G/100G; G/100G; G/100G; G/100G
OINTMENT TOPICAL PRN
Status: DISCONTINUED | OUTPATIENT
Start: 2024-05-01 | End: 2024-05-03 | Stop reason: HOSPADM

## 2024-05-01 RX ADMIN — GABAPENTIN 1500 MG: 300 CAPSULE ORAL at 20:39

## 2024-05-01 RX ADMIN — ATORVASTATIN CALCIUM 40 MG: 40 TABLET, FILM COATED ORAL at 20:39

## 2024-05-01 RX ADMIN — DEXTROSE AND SODIUM CHLORIDE: 5; 450 INJECTION, SOLUTION INTRAVENOUS at 11:42

## 2024-05-01 RX ADMIN — HEPARIN SODIUM 5000 UNITS: 5000 INJECTION INTRAVENOUS; SUBCUTANEOUS at 15:58

## 2024-05-01 RX ADMIN — BUPROPION HYDROCHLORIDE 150 MG: 150 TABLET, EXTENDED RELEASE ORAL at 17:24

## 2024-05-01 RX ADMIN — DOCUSATE SODIUM 100 MG: 100 CAPSULE, LIQUID FILLED ORAL at 20:39

## 2024-05-01 RX ADMIN — GABAPENTIN 300 MG: 300 CAPSULE ORAL at 09:50

## 2024-05-01 RX ADMIN — METOPROLOL SUCCINATE 100 MG: 50 TABLET, EXTENDED RELEASE ORAL at 09:50

## 2024-05-01 RX ADMIN — DEXTROSE AND SODIUM CHLORIDE: 5; 450 INJECTION, SOLUTION INTRAVENOUS at 20:04

## 2024-05-01 RX ADMIN — HEPARIN SODIUM 5000 UNITS: 5000 INJECTION INTRAVENOUS; SUBCUTANEOUS at 20:39

## 2024-05-01 RX ADMIN — LEVOFLOXACIN 250 MG: 500 TABLET, FILM COATED ORAL at 09:51

## 2024-05-01 RX ADMIN — ACETAMINOPHEN 650 MG: 325 TABLET ORAL at 03:41

## 2024-05-01 RX ADMIN — DIAZEPAM 10 MG: 5 TABLET ORAL at 09:54

## 2024-05-01 RX ADMIN — Medication: at 17:26

## 2024-05-01 RX ADMIN — MORPHINE SULFATE 2 MG: 2 INJECTION, SOLUTION INTRAMUSCULAR; INTRAVENOUS at 21:35

## 2024-05-01 RX ADMIN — BUPROPION HYDROCHLORIDE 300 MG: 300 TABLET, EXTENDED RELEASE ORAL at 09:50

## 2024-05-01 RX ADMIN — HYDROCODONE BITARTRATE AND ACETAMINOPHEN 1 TABLET: 5; 325 TABLET ORAL at 15:58

## 2024-05-01 RX ADMIN — DOCUSATE SODIUM 100 MG: 100 CAPSULE, LIQUID FILLED ORAL at 09:50

## 2024-05-01 RX ADMIN — LEVOTHYROXINE SODIUM 125 MCG: 0.07 TABLET ORAL at 06:10

## 2024-05-01 RX ADMIN — MORPHINE SULFATE 2 MG: 2 INJECTION, SOLUTION INTRAMUSCULAR; INTRAVENOUS at 09:47

## 2024-05-01 ASSESSMENT — PAIN DESCRIPTION - LOCATION
LOCATION: ABDOMEN

## 2024-05-01 ASSESSMENT — PAIN SCALES - GENERAL
PAINLEVEL_OUTOF10: 5
PAINLEVEL_OUTOF10: 3
PAINLEVEL_OUTOF10: 7
PAINLEVEL_OUTOF10: 9
PAINLEVEL_OUTOF10: 6

## 2024-05-01 ASSESSMENT — PAIN - FUNCTIONAL ASSESSMENT
PAIN_FUNCTIONAL_ASSESSMENT: PREVENTS OR INTERFERES SOME ACTIVE ACTIVITIES AND ADLS

## 2024-05-01 ASSESSMENT — PAIN DESCRIPTION - ORIENTATION
ORIENTATION: RIGHT

## 2024-05-01 ASSESSMENT — PAIN DESCRIPTION - DESCRIPTORS
DESCRIPTORS: ACHING;DISCOMFORT
DESCRIPTORS: ACHING;SORE
DESCRIPTORS: ACHING;SORE

## 2024-05-01 NOTE — PROGRESS NOTES
Pt has not yet voided. Pt called for assistance to go to the restroom this afternoon post gurrola removal and when trying to get her up pt was very unsteady on her feet and was not safe to transfer to the restroom. Pt had pain this shift see MAR. Post morphine this AM pt became lethargic with a POSS score of 2 & was not able to walk saftely. This afternoon pt was given Norco for pain to walk, once medication started to work pt was still A&Ox4 but gait became unsteady & pt was mildly lethargic, once again it was unsafe to walk pt. Hourly rounds completed and all needs met. Bed is low, locked, bed alarm on,call light is in reach and pt is encouraged to call for assistance.

## 2024-05-01 NOTE — PROGRESS NOTES
Pt medicated and IVF infusing per MAR. Pt A&Ox4 this shift. She has received PRN morphine two times this shift for RLQ pain. Pt was running of fever of 101.6 degrees F, PRN tylenol was administered, repeat temperature check came down to 98.4 degrees F. Dressings are C, D, & I.  is bedside. A total of 1,800 mLs of yellow, clear urine was emptied from gurrola this shift. All known needs met at this time. Bed locked and lowered with call light within reach.

## 2024-05-01 NOTE — PROGRESS NOTES
Admit Date: 4/29/2024    Subjective:     Patient had fever overnight of 101.7. Reports flatus last night. States pain has been controlled.    Objective:     Patient Vitals for the past 8 hrs:   BP Temp Temp src Pulse Resp SpO2   05/01/24 0752 (!) 133/55 99 °F (37.2 °C) -- 78 20 90 %   05/01/24 0449 -- 98.4 °F (36.9 °C) -- -- -- --   05/01/24 0338 (!) 131/57 (!) 101.7 °F (38.7 °C) Oral 92 17 90 %     No intake/output data recorded.  04/29 1901 - 05/01 0700  In: 4987.6 [P.O.:1860; I.V.:3127.6]  Out: 38551 [Urine:06996]    Physical Exam:  GENERAL ASSESSMENT: alert, oriented to person, place and time, no acute distress and no anxiety, depression or agitation  Chest: Easy work of breathing  CVS exam: RRR  ABDOMEN: NT, ND  Neurological exam reveals alert, oriented, normal speech, no focal findings or movement disorder noted.  FEMALE GENITOURINARY EXAM: not done  MALE GENITAL EXAM: not done        Data Review No results found for this or any previous visit (from the past 24 hour(s)).    No results found.      Assessment:     Principal Problem:    Renal mass  Resolved Problems:    * No resolved hospital problems. *    S/P right HALN POD2. Reports flatus. Tmax 101.7.  Plan:   Advance diet.  Ambulate.  IS.  Check labs.  Start levaquin 250mg daily.      MAU Thomas  [facility]    Phone: [phone]    I have reviewed the above note and examined the patient.  I agree with the exam, assessment and plan.   Reports flatus last night.  Diet advanced.  Tm 101.7.  AF since.  VSS.  WBC 14K.  Path reviewed (RCC).  S/P R HALN POD#2.  Encourage IS.  Start abx.  Ambulate.      Shayne Lyn DO

## 2024-05-01 NOTE — PROGRESS NOTES
ACUTE PHYSICAL THERAPY GOALS:   (Developed with and agreed upon by patient and/or caregiver.)  (1.) Radha Grant will move from supine to sit and sit to supine , scoot up and down, and roll side to side with STAND BY ASSIST within 7 treatment day(s).    (2.) Radha Grant will transfer from bed to chair and chair to bed with CONTACT GUARD ASSIST using the least restrictive device within 7 treatment day(s).    (3.) Radha Grant will ambulate with CONTACT GUARD ASSIST for 300 feet with the least restrictive device within 7 treatment day(s).   (4.) Radha Grant will perform standing static and dynamic balance activities x 25 minutes with STAND BY ASSIST to improve safety within 7 treatment day(s).  (5.) Radha Grant will perform therapeutic exercises x 20 min for HEP with SUPERVISION to improve strength, endurance, and functional mobility within 7 treatment day(s).     PHYSICAL THERAPY: Daily Note AM   (Link to Caseload Tracking: PT Visit Days : 2  Time In/Out PT Charge Capture  Rehab Caseload Tracker  Orders    Radha Grant is a 73 y.o. female   PRIMARY DIAGNOSIS: Renal mass  Renal mass [N28.89]  Procedure(s) (LRB):  RIGHT NEPHRECTOMY LAPAROSCOPIC HAND ASSIST/ RIGHT/FROST ASSIST (Right)  2 Days Post-Op  Inpatient: Payor: MEDICARE / Plan: MEDICARE PART A AND B / Product Type: *No Product type* /     ASSESSMENT:     REHAB RECOMMENDATIONS:   Recommendation to date pending progress:  Setting:  Home Health Therapy    Equipment:    None     ASSESSMENT:    Ms. Grant is a 73 year old F who presents s/p R nephrectomy after found to have a R renal mass.   This morning pt presents very lethargic and drowsy, having difficulty keeping her eyes open and delayed command following. Supine > sit, sitting balance activities, and steps to bedside chair with Min-Mod A, BUE support at RW. Heavy cues for safety and sequencing. Due to pt condition unable to do any increased mobility with patient - limited  Mod=Moderate Assistance, Max=Maximal Assistance, Total=Total Assistance, NT=Not Tested    BALANCE: Good Fair+ Fair Fair- Poor NT Comments   Sitting Static [x] [] [] [] [] []    Sitting Dynamic [] [x] [] [] [] []              Standing Static [] [x] [] [] [] []    Standing Dynamic [] [] [x] [] [] []      GAIT: I Mod I S SBA CGA Min Mod Max Total  NT x2 Comments:   Level of Assistance [] [] [] [] [] [x] [x] [] [] [] []    Distance 5 feet    DME Rolling Walker    Gait Quality Decreased meryl , Decreased step clearance, Decreased step length, Narrow base of support, Path deviations , Trunk flexion, and Trunk sway increased    Weightbearing Status      Stairs      I=Independent, Mod I=Modified Independent, S=Supervision, SBA=Standby Assistance, CGA=Contact Guard Assistance,   Min=Minimal Assistance, Mod=Moderate Assistance, Max=Maximal Assistance, Total=Total Assistance, NT=Not Tested    PLAN:   FREQUENCY AND DURATION: 3 times/week for duration of hospital stay or until stated goals are met, whichever comes first.    TREATMENT:   TREATMENT:   Therapeutic Activity (10 Minutes): Therapeutic activity included Supine to Sit, Scooting, Transfer Training, Ambulation on level ground, Sitting balance , and Standing balance to improve functional Activity tolerance, Balance, Coordination, Mobility, and Strength.    TREATMENT GRID:  N/A    AFTER TREATMENT PRECAUTIONS: Alarm Activated, Bed/Chair Locked, Call light within reach, Chair, Needs within reach, RN notified, and Visitors at bedside    INTERDISCIPLINARY COLLABORATION:  RN/ PCT and PT/ PTA    EDUCATION:      TIME IN/OUT:  Time In: 0900  Time Out: 0910  Minutes: 10    Aparna Galeas, PT

## 2024-05-01 NOTE — CARE COORDINATION
[facility]. CM will continue to follow.

## 2024-05-02 LAB
ABO + RH BLD: NORMAL
ANION GAP SERPL CALC-SCNC: 10 MMOL/L (ref 9–18)
BASOPHILS # BLD: 0.1 K/UL (ref 0–0.2)
BASOPHILS NFR BLD: 1 % (ref 0–2)
BLD PROD TYP BPU: NORMAL
BLD PROD TYP BPU: NORMAL
BLOOD BANK DISPENSE STATUS: NORMAL
BLOOD BANK DISPENSE STATUS: NORMAL
BLOOD GROUP ANTIBODIES SERPL: NORMAL
BPU ID: NORMAL
BPU ID: NORMAL
BUN SERPL-MCNC: 7 MG/DL (ref 8–23)
CALCIUM SERPL-MCNC: 8.7 MG/DL (ref 8.8–10.2)
CHLORIDE SERPL-SCNC: 105 MMOL/L (ref 98–107)
CO2 SERPL-SCNC: 26 MMOL/L (ref 20–28)
CREAT SERPL-MCNC: 1.07 MG/DL (ref 0.6–1.1)
CROSSMATCH RESULT: NORMAL
CROSSMATCH RESULT: NORMAL
DIFFERENTIAL METHOD BLD: NORMAL
EOSINOPHIL # BLD: 0.5 K/UL (ref 0–0.8)
EOSINOPHIL NFR BLD: 5 % (ref 0.5–7.8)
ERYTHROCYTE [DISTWIDTH] IN BLOOD BY AUTOMATED COUNT: 12.8 % (ref 11.9–14.6)
GLUCOSE SERPL-MCNC: 129 MG/DL (ref 70–99)
HCT VFR BLD AUTO: 40.7 % (ref 35.8–46.3)
HGB BLD-MCNC: 13 G/DL (ref 11.7–15.4)
IMM GRANULOCYTES # BLD AUTO: 0 K/UL (ref 0–0.5)
IMM GRANULOCYTES NFR BLD AUTO: 0 % (ref 0–5)
LYMPHOCYTES # BLD: 1.8 K/UL (ref 0.5–4.6)
LYMPHOCYTES NFR BLD: 18 % (ref 13–44)
MCH RBC QN AUTO: 31.2 PG (ref 26.1–32.9)
MCHC RBC AUTO-ENTMCNC: 31.9 G/DL (ref 31.4–35)
MCV RBC AUTO: 97.6 FL (ref 82–102)
MONOCYTES # BLD: 1 K/UL (ref 0.1–1.3)
MONOCYTES NFR BLD: 10 % (ref 4–12)
NEUTS SEG # BLD: 6.8 K/UL (ref 1.7–8.2)
NEUTS SEG NFR BLD: 66 % (ref 43–78)
NRBC # BLD: 0 K/UL (ref 0–0.2)
PLATELET # BLD AUTO: 155 K/UL (ref 150–450)
PMV BLD AUTO: 12.3 FL (ref 9.4–12.3)
POTASSIUM SERPL-SCNC: 3.8 MMOL/L (ref 3.5–5.1)
RBC # BLD AUTO: 4.17 M/UL (ref 4.05–5.2)
SODIUM SERPL-SCNC: 140 MMOL/L (ref 136–145)
SPECIMEN EXP DATE BLD: NORMAL
UNIT DIVISION: 0
UNIT DIVISION: 0
WBC # BLD AUTO: 10.2 K/UL (ref 4.3–11.1)

## 2024-05-02 PROCEDURE — 97166 OT EVAL MOD COMPLEX 45 MIN: CPT

## 2024-05-02 PROCEDURE — 97530 THERAPEUTIC ACTIVITIES: CPT

## 2024-05-02 PROCEDURE — 6360000002 HC RX W HCPCS: Performed by: UROLOGY

## 2024-05-02 PROCEDURE — 6370000000 HC RX 637 (ALT 250 FOR IP): Performed by: PHYSICIAN ASSISTANT

## 2024-05-02 PROCEDURE — 2580000003 HC RX 258: Performed by: UROLOGY

## 2024-05-02 PROCEDURE — 2500000003 HC RX 250 WO HCPCS: Performed by: UROLOGY

## 2024-05-02 PROCEDURE — 97535 SELF CARE MNGMENT TRAINING: CPT

## 2024-05-02 PROCEDURE — 80048 BASIC METABOLIC PNL TOTAL CA: CPT

## 2024-05-02 PROCEDURE — 6370000000 HC RX 637 (ALT 250 FOR IP): Performed by: UROLOGY

## 2024-05-02 PROCEDURE — 97112 NEUROMUSCULAR REEDUCATION: CPT

## 2024-05-02 PROCEDURE — 85025 COMPLETE CBC W/AUTO DIFF WBC: CPT

## 2024-05-02 PROCEDURE — 1100000000 HC RM PRIVATE

## 2024-05-02 PROCEDURE — 99024 POSTOP FOLLOW-UP VISIT: CPT | Performed by: UROLOGY

## 2024-05-02 PROCEDURE — 36415 COLL VENOUS BLD VENIPUNCTURE: CPT

## 2024-05-02 RX ADMIN — DEXTROSE AND SODIUM CHLORIDE: 5; 450 INJECTION, SOLUTION INTRAVENOUS at 03:40

## 2024-05-02 RX ADMIN — MORPHINE SULFATE 2 MG: 2 INJECTION, SOLUTION INTRAMUSCULAR; INTRAVENOUS at 16:44

## 2024-05-02 RX ADMIN — ATORVASTATIN CALCIUM 40 MG: 40 TABLET, FILM COATED ORAL at 21:12

## 2024-05-02 RX ADMIN — TUBERCULIN PURIFIED PROTEIN DERIVATIVE 5 UNITS: 5 INJECTION, SOLUTION INTRADERMAL at 13:25

## 2024-05-02 RX ADMIN — GABAPENTIN 1500 MG: 300 CAPSULE ORAL at 21:12

## 2024-05-02 RX ADMIN — DEXTROSE AND SODIUM CHLORIDE: 5; 450 INJECTION, SOLUTION INTRAVENOUS at 13:25

## 2024-05-02 RX ADMIN — MORPHINE SULFATE 2 MG: 2 INJECTION, SOLUTION INTRAMUSCULAR; INTRAVENOUS at 14:05

## 2024-05-02 RX ADMIN — MORPHINE SULFATE 2 MG: 2 INJECTION, SOLUTION INTRAMUSCULAR; INTRAVENOUS at 05:30

## 2024-05-02 RX ADMIN — METOPROLOL SUCCINATE 100 MG: 50 TABLET, EXTENDED RELEASE ORAL at 10:02

## 2024-05-02 RX ADMIN — LEVOTHYROXINE SODIUM 125 MCG: 0.07 TABLET ORAL at 05:27

## 2024-05-02 RX ADMIN — HYDROCODONE BITARTRATE AND ACETAMINOPHEN 1 TABLET: 5; 325 TABLET ORAL at 21:18

## 2024-05-02 RX ADMIN — MORPHINE SULFATE 2 MG: 2 INJECTION, SOLUTION INTRAMUSCULAR; INTRAVENOUS at 10:09

## 2024-05-02 RX ADMIN — DOCUSATE SODIUM 100 MG: 100 CAPSULE, LIQUID FILLED ORAL at 10:02

## 2024-05-02 RX ADMIN — LEVOFLOXACIN 250 MG: 500 TABLET, FILM COATED ORAL at 10:01

## 2024-05-02 RX ADMIN — BUPROPION HYDROCHLORIDE 300 MG: 300 TABLET, EXTENDED RELEASE ORAL at 10:01

## 2024-05-02 RX ADMIN — DOCUSATE SODIUM 100 MG: 100 CAPSULE, LIQUID FILLED ORAL at 21:12

## 2024-05-02 RX ADMIN — MORPHINE SULFATE 2 MG: 2 INJECTION, SOLUTION INTRAMUSCULAR; INTRAVENOUS at 01:00

## 2024-05-02 RX ADMIN — BUPROPION HYDROCHLORIDE 150 MG: 150 TABLET, EXTENDED RELEASE ORAL at 16:45

## 2024-05-02 RX ADMIN — GABAPENTIN 300 MG: 300 CAPSULE ORAL at 10:02

## 2024-05-02 RX ADMIN — HEPARIN SODIUM 5000 UNITS: 5000 INJECTION INTRAVENOUS; SUBCUTANEOUS at 10:01

## 2024-05-02 RX ADMIN — HEPARIN SODIUM 5000 UNITS: 5000 INJECTION INTRAVENOUS; SUBCUTANEOUS at 21:14

## 2024-05-02 ASSESSMENT — PAIN DESCRIPTION - DESCRIPTORS
DESCRIPTORS: ACHING;DISCOMFORT;SORE
DESCRIPTORS: ACHING;DISCOMFORT;SORE
DESCRIPTORS: ACHING;SORE
DESCRIPTORS: ACHING;SORE
DESCRIPTORS: ACHING;DISCOMFORT;SORE
DESCRIPTORS: ACHING;PRESSURE

## 2024-05-02 ASSESSMENT — PAIN DESCRIPTION - LOCATION
LOCATION: ABDOMEN
LOCATION: FLANK
LOCATION: FLANK
LOCATION: ABDOMEN
LOCATION: FLANK

## 2024-05-02 ASSESSMENT — PAIN DESCRIPTION - ORIENTATION
ORIENTATION: RIGHT
ORIENTATION: RIGHT;LOWER
ORIENTATION: RIGHT
ORIENTATION: RIGHT;LOWER
ORIENTATION: RIGHT

## 2024-05-02 ASSESSMENT — PAIN SCALES - GENERAL
PAINLEVEL_OUTOF10: 9
PAINLEVEL_OUTOF10: 9
PAINLEVEL_OUTOF10: 1
PAINLEVEL_OUTOF10: 0
PAINLEVEL_OUTOF10: 9
PAINLEVEL_OUTOF10: 7
PAINLEVEL_OUTOF10: 6
PAINLEVEL_OUTOF10: 2
PAINLEVEL_OUTOF10: 9
PAINLEVEL_OUTOF10: 2
PAINLEVEL_OUTOF10: 0
PAINLEVEL_OUTOF10: 7

## 2024-05-02 ASSESSMENT — PAIN - FUNCTIONAL ASSESSMENT
PAIN_FUNCTIONAL_ASSESSMENT: PREVENTS OR INTERFERES SOME ACTIVE ACTIVITIES AND ADLS
PAIN_FUNCTIONAL_ASSESSMENT: ACTIVITIES ARE NOT PREVENTED
PAIN_FUNCTIONAL_ASSESSMENT: PREVENTS OR INTERFERES SOME ACTIVE ACTIVITIES AND ADLS
PAIN_FUNCTIONAL_ASSESSMENT: ACTIVITIES ARE NOT PREVENTED

## 2024-05-02 ASSESSMENT — PAIN DESCRIPTION - PAIN TYPE: TYPE: ACUTE PAIN;SURGICAL PAIN

## 2024-05-02 NOTE — PROGRESS NOTES
Pt medicated and IVF infusing per MAR. Pt is drowsy, but responds to voice. She is A&Ox4. Pt has received PRN morphine 3 times tonight. A total of 1,900 mLs of yellow, clear urine was emptied from purewick canister tonight. She also had one unmeasured urine occurrence. Pt ambulated approximately 50 ft tonight with a walker, tolerated fairly well.  is bedside. All known needs met at this time. Bed locked and lowered with call light within reach.

## 2024-05-02 NOTE — CARE COORDINATION
Dispo update: JULIETTE spoke to Ms. Grant in room 616.  She now wants to go to [facility] at a SNF, and not immediately home with home health.  [facility]  Await their review.

## 2024-05-02 NOTE — PROGRESS NOTES
Tolerating reg diet and voiding with assistance.  Pt slow to ambulate due to pain.  Poor effort with IS.  AF.  No fevers in past 24h.  VSS  Abd soft, ND.  Ports c/d/I.  Labs reviewed.  WBC 10K.  Cr 1.07  Path RCC G1/4.  Clear cell.  S/P R HALN POD#3.  I reviewed option for short term rehab placement.  She agrees with placement.  Will consult .  May transfer at any time from my standpt.  RTO in 10 days for staple removal.

## 2024-05-02 NOTE — PROGRESS NOTES
(See Comments)   UE AROM [x] []   UE PROM [] []   Strength []  Generally weak     Posture / Balance [] Sitting - Static:  (F/F+)  Sitting - Dynamic:  (F/F+)  Standing - Static:  (F/F-)  Standing - Dynamic: Fair, -   Sensation [x]     Coordination [x]       Tone [x]       Edema [x]    Activity Tolerance [] Patient limited by fatigue, Patient limited by pain     Hand Dominance R [x] L []      COGNITION/  PERCEPTION: INTACT IMPAIRED   (See Comments)   Orientation [x]     Vision []  See assessment   Hearing [x]     Cognition  [x]     Perception []       MOBILITY: I Mod I S SBA CGA Min Mod Max Total  NT x2 Comments:   Bed Mobility    Rolling [] [] [] [] [] [] [] [] [] [] []    Supine to Sit [] [] [] [] [] [] [x] [] [] [] []    Scooting [] [] [] [] [x] [x] [] [] [] [] []    Sit to Supine [] [] [] [] [] [x] [] [] [] [] []    Transfers    Sit to Stand [] [] [] [] [] [x] [x] [] [] [] []    Bed to Chair [] [] [] [] [] [] [] [] [] [] []    Stand to Sit [] [] [] [] [] [x] [x] [] [] [] []    Tub/Shower [] [] [] [] [] [] [] [] [] [] []     Toilet [] [] [] [] [] [] [] [] [] [] []      [] [] [] [] [] [] [] [] [] [] []    I=Independent, Mod I=Modified Independent, S=Supervision/Setup, SBA=Standby Assistance, CGA=Contact Guard Assistance, Min=Minimal Assistance, Mod=Moderate Assistance, Max=Maximal Assistance, Total=Total Assistance, NT=Not Tested    ACTIVITIES OF DAILY LIVING: I Mod I S SBA CGA Min Mod Max Total NT Comments   BASIC ADLs:              Upper Body Bathing  [] [] [] [x] [] [] [] [] [] []  Sitting edge of bed; close SBA   Lower Body Bathing [] [] [] [] [] [x] [] [] [] []  A for below knees due to pain; A for dynamic sitting balance while pt washed feet via cross legged method   Toileting [] [] [] [] [] [] [x] [] [] [] In bed via bed pan using hip bridging technique for bed pan placement, brief management, pericare; Mod A for brief, set up only for pericare   Upper Body Dressing [] [] [] [] [] [x] [] [] [] [] Sitting  edge of bed gown; A due to wearing tight robe   Lower Body Dressing [] [] [] [] [] [x] [] [] [] [] Sitting edge of bed R sock   Feeding [] [] [] [] [] [] [] [] [] []    Grooming [] [] [] [x] [] [] [] [] [] [] Sitting edge of bed washing face; close SBA   Personal Device Care [] [] [] [] [] [] [] [] [] []    Functional Mobility [] [] [] [] [] [x] [] [] [] [] Side steps to L up into bed via RW   I=Independent, Mod I=Modified Independent, S=Supervision/Setup, SBA=Standby Assistance, CGA=Contact Guard Assistance, Min=Minimal Assistance, Mod=Moderate Assistance, Max=Maximal Assistance, Total=Total Assistance, NT=Not Tested    PLAN:   FREQUENCY/DURATION   OT Plan of Care: 3 times/week for duration of hospital stay or until stated goals are met, whichever comes first.    PROBLEM LIST:   (Skilled intervention is medically necessary to address:)  Decreased ADL/Functional Activities  Decreased Activity Tolerance  Decreased Balance  Decreased Coordination  Decreased Gait Ability  Decreased Safety Awareness  Decreased Strength  Decreased Transfer Abilities  Increased Pain   INTERVENTIONS PLANNED:  (Benefits and precautions of occupational therapy have been discussed with the patient.)  Self Care Training  Therapeutic Activity  Therapeutic Exercise/HEP  Neuromuscular Re-education  Gait Training  Education         TREATMENT:     EVALUATION: MODERATE COMPLEXITY: (Untimed Charge)  The initial evaluation charge encompasses clinical chart review, objective assessment, interpretation of assessment, and skilled monitoring of the patient's response to treatment in order to develop a plan of care.     TREATMENT:   Co-Treatment PT/OT necessary due to patient's decreased overall endurance/tolerance levels, as well as need for high level skilled assistance to complete functional transfers/mobility and functional tasks  Neuromuscular Re-education (15 Minutes): Patient participated in neuromuscular re-education including functional reaching,

## 2024-05-02 NOTE — PROGRESS NOTES
ACUTE PHYSICAL THERAPY GOALS:   (Developed with and agreed upon by patient and/or caregiver.)  (1.) Radha Grant will move from supine to sit and sit to supine , scoot up and down, and roll side to side with STAND BY ASSIST within 7 treatment day(s).    (2.) Radha Grant will transfer from bed to chair and chair to bed with CONTACT GUARD ASSIST using the least restrictive device within 7 treatment day(s).    (3.) Radha Grant will ambulate with CONTACT GUARD ASSIST for 300 feet with the least restrictive device within 7 treatment day(s).   (4.) Radha Grant will perform standing static and dynamic balance activities x 25 minutes with STAND BY ASSIST to improve safety within 7 treatment day(s).  (5.) Radha Grant will perform therapeutic exercises x 20 min for HEP with SUPERVISION to improve strength, endurance, and functional mobility within 7 treatment day(s).     PHYSICAL THERAPY: Daily Note AM   (Link to Caseload Tracking: PT Visit Days : 3  Time In/Out PT Charge Capture  Rehab Caseload Tracker  Orders    Radha Grant is a 73 y.o. female   PRIMARY DIAGNOSIS: Renal mass  Renal mass [N28.89]  Procedure(s) (LRB):  RIGHT NEPHRECTOMY LAPAROSCOPIC HAND ASSIST/ RIGHT/FROST ASSIST (Right)  3 Days Post-Op  Inpatient: Payor: MEDICARE / Plan: MEDICARE PART A AND B / Product Type: *No Product type* /     ASSESSMENT:     REHAB RECOMMENDATIONS:   Recommendation to date pending progress:  Setting:  Short-term Rehab    Equipment:    None     ASSESSMENT:  Ms. Grant is a 73 year old F who presents s/p R nephrectomy after found to have a R renal mass.  Pt up in restroom with PCT upon arrival.  Pt requiring min A for all activities, and heavy verbal cueing for safety to navigate restroom and hygiene care.  Pt able to side step out of restroom with rolling walker with cueing for safety, pt then ambulated as below in room.  Pt slow to mobilize, and delayed processing with cues at times.  Mobility limited  YVETTE, PT

## 2024-05-03 ENCOUNTER — APPOINTMENT (OUTPATIENT)
Dept: GENERAL RADIOLOGY | Age: 74
End: 2024-05-03
Attending: UROLOGY
Payer: MEDICARE

## 2024-05-03 VITALS
OXYGEN SATURATION: 95 % | RESPIRATION RATE: 18 BRPM | WEIGHT: 192 LBS | DIASTOLIC BLOOD PRESSURE: 68 MMHG | BODY MASS INDEX: 27.49 KG/M2 | TEMPERATURE: 98.6 F | HEART RATE: 75 BPM | SYSTOLIC BLOOD PRESSURE: 138 MMHG | HEIGHT: 70 IN

## 2024-05-03 LAB
ANION GAP SERPL CALC-SCNC: 9 MMOL/L (ref 9–18)
BASOPHILS # BLD: 0 K/UL (ref 0–0.2)
BASOPHILS NFR BLD: 0 % (ref 0–2)
BUN SERPL-MCNC: 7 MG/DL (ref 8–23)
CALCIUM SERPL-MCNC: 8.9 MG/DL (ref 8.8–10.2)
CHLORIDE SERPL-SCNC: 106 MMOL/L (ref 98–107)
CO2 SERPL-SCNC: 26 MMOL/L (ref 20–28)
CREAT SERPL-MCNC: 1.04 MG/DL (ref 0.6–1.1)
DIFFERENTIAL METHOD BLD: ABNORMAL
EOSINOPHIL # BLD: 0.5 K/UL (ref 0–0.8)
EOSINOPHIL NFR BLD: 6 % (ref 0.5–7.8)
ERYTHROCYTE [DISTWIDTH] IN BLOOD BY AUTOMATED COUNT: 12.6 % (ref 11.9–14.6)
GLUCOSE SERPL-MCNC: 107 MG/DL (ref 70–99)
HCT VFR BLD AUTO: 36.8 % (ref 35.8–46.3)
HGB BLD-MCNC: 11.7 G/DL (ref 11.7–15.4)
IMM GRANULOCYTES # BLD AUTO: 0 K/UL (ref 0–0.5)
IMM GRANULOCYTES NFR BLD AUTO: 0 % (ref 0–5)
LYMPHOCYTES # BLD: 1.6 K/UL (ref 0.5–4.6)
LYMPHOCYTES NFR BLD: 19 % (ref 13–44)
MCH RBC QN AUTO: 31 PG (ref 26.1–32.9)
MCHC RBC AUTO-ENTMCNC: 31.8 G/DL (ref 31.4–35)
MCV RBC AUTO: 97.4 FL (ref 82–102)
MONOCYTES # BLD: 0.8 K/UL (ref 0.1–1.3)
MONOCYTES NFR BLD: 10 % (ref 4–12)
NEUTS SEG # BLD: 5.2 K/UL (ref 1.7–8.2)
NEUTS SEG NFR BLD: 65 % (ref 43–78)
NRBC # BLD: 0 K/UL (ref 0–0.2)
PLATELET # BLD AUTO: 164 K/UL (ref 150–450)
PMV BLD AUTO: 12.4 FL (ref 9.4–12.3)
POTASSIUM SERPL-SCNC: 3.8 MMOL/L (ref 3.5–5.1)
RBC # BLD AUTO: 3.78 M/UL (ref 4.05–5.2)
SARS-COV-2 RDRP RESP QL NAA+PROBE: NOT DETECTED
SODIUM SERPL-SCNC: 141 MMOL/L (ref 136–145)
SOURCE: NORMAL
WBC # BLD AUTO: 8.2 K/UL (ref 4.3–11.1)

## 2024-05-03 PROCEDURE — 85025 COMPLETE CBC W/AUTO DIFF WBC: CPT

## 2024-05-03 PROCEDURE — 71045 X-RAY EXAM CHEST 1 VIEW: CPT

## 2024-05-03 PROCEDURE — 36415 COLL VENOUS BLD VENIPUNCTURE: CPT

## 2024-05-03 PROCEDURE — 87635 SARS-COV-2 COVID-19 AMP PRB: CPT

## 2024-05-03 PROCEDURE — 6370000000 HC RX 637 (ALT 250 FOR IP): Performed by: NURSE PRACTITIONER

## 2024-05-03 PROCEDURE — 2580000003 HC RX 258: Performed by: UROLOGY

## 2024-05-03 PROCEDURE — 80048 BASIC METABOLIC PNL TOTAL CA: CPT

## 2024-05-03 PROCEDURE — 6370000000 HC RX 637 (ALT 250 FOR IP): Performed by: PHYSICIAN ASSISTANT

## 2024-05-03 PROCEDURE — 6370000000 HC RX 637 (ALT 250 FOR IP): Performed by: UROLOGY

## 2024-05-03 PROCEDURE — 6360000002 HC RX W HCPCS: Performed by: UROLOGY

## 2024-05-03 RX ORDER — BISACODYL 10 MG
10 SUPPOSITORY, RECTAL RECTAL DAILY
Status: DISCONTINUED | OUTPATIENT
Start: 2024-05-03 | End: 2024-05-03 | Stop reason: HOSPADM

## 2024-05-03 RX ADMIN — GABAPENTIN 300 MG: 300 CAPSULE ORAL at 10:06

## 2024-05-03 RX ADMIN — BISACODYL 10 MG: 10 SUPPOSITORY RECTAL at 10:12

## 2024-05-03 RX ADMIN — HEPARIN SODIUM 5000 UNITS: 5000 INJECTION INTRAVENOUS; SUBCUTANEOUS at 10:06

## 2024-05-03 RX ADMIN — MORPHINE SULFATE 2 MG: 2 INJECTION, SOLUTION INTRAMUSCULAR; INTRAVENOUS at 07:25

## 2024-05-03 RX ADMIN — LEVOTHYROXINE SODIUM 125 MCG: 0.07 TABLET ORAL at 06:11

## 2024-05-03 RX ADMIN — BUPROPION HYDROCHLORIDE 300 MG: 300 TABLET, EXTENDED RELEASE ORAL at 10:05

## 2024-05-03 RX ADMIN — METOPROLOL SUCCINATE 100 MG: 50 TABLET, EXTENDED RELEASE ORAL at 10:05

## 2024-05-03 RX ADMIN — HYDROCODONE BITARTRATE AND ACETAMINOPHEN 1 TABLET: 5; 325 TABLET ORAL at 10:22

## 2024-05-03 RX ADMIN — DEXTROSE AND SODIUM CHLORIDE: 5; 450 INJECTION, SOLUTION INTRAVENOUS at 06:13

## 2024-05-03 RX ADMIN — DOCUSATE SODIUM 100 MG: 100 CAPSULE, LIQUID FILLED ORAL at 10:06

## 2024-05-03 RX ADMIN — DIAZEPAM 10 MG: 5 TABLET ORAL at 16:18

## 2024-05-03 RX ADMIN — LEVOFLOXACIN 250 MG: 500 TABLET, FILM COATED ORAL at 10:06

## 2024-05-03 ASSESSMENT — PAIN SCALES - GENERAL
PAINLEVEL_OUTOF10: 1
PAINLEVEL_OUTOF10: 8
PAINLEVEL_OUTOF10: 8
PAINLEVEL_OUTOF10: 6

## 2024-05-03 ASSESSMENT — PAIN DESCRIPTION - DESCRIPTORS
DESCRIPTORS: ACHING;PRESSURE;SORE
DESCRIPTORS: ACHING;PRESSURE;SORE

## 2024-05-03 ASSESSMENT — PAIN DESCRIPTION - LOCATION
LOCATION: ABDOMEN
LOCATION: ABDOMEN

## 2024-05-03 ASSESSMENT — PAIN DESCRIPTION - ORIENTATION
ORIENTATION: RIGHT;LOWER;MID
ORIENTATION: RIGHT;LOWER;MID

## 2024-05-03 NOTE — CARE COORDINATION
Discharge note:  [employer] arranged for 4 pm pickup to [facility] for STR, room 19B, report [phone].  This plan is ok with Ms. Grant and her  in room 616, and with her RN Janine.       05/03/24 4729   Service Assessment   Patient Orientation Alert and Oriented   Cognition Alert   History Provided By Patient   Accompanied By/Relationship    Services At/After Discharge   Services At/After Discharge Skilled Nursing Facility (SNF)   Mode of Transport at Discharge BLS   Condition of Participation: Discharge Planning   The Plan for Transition of Care is related to the following treatment goals: [facility] for STR   The Patient and/or Patient Representative was provided with a Choice of Provider? Patient;Patient Representative   Name of the Patient Representative who was provided with the Choice of Provider and agrees with the Discharge Plan?     The Patient and/Or Patient Representative agree with the Discharge Plan? Yes   Freedom of Choice list was provided with basic dialogue that supports the patient's individualized plan of care/goals, treatment preferences, and shares the quality data associated with the providers?  Yes

## 2024-05-03 NOTE — PLAN OF CARE
Problem: Pain  Goal: Verbalizes/displays adequate comfort level or baseline comfort level  Outcome: Adequate for Discharge     Problem: Safety - Adult  Goal: Free from fall injury  Outcome: Adequate for Discharge     Problem: Discharge Planning  Goal: Discharge to home or other facility with appropriate resources  Outcome: Adequate for Discharge     Problem: ABCDS Injury Assessment  Goal: Absence of physical injury  Outcome: Adequate for Discharge

## 2024-05-03 NOTE — PROGRESS NOTES
Attempted to call report twice with no answer.  Richard now here to take pt to [facility] at this time.

## 2024-05-03 NOTE — PROGRESS NOTES
Pt medicated and IVF infusing per MAR. She is A&Ox4. She has received PRN norco once. She has had 3 unmeasured urine occurrences. No BM tonight. All known needs met at this time. Bed locked and lowered with call light within reach.

## 2024-05-03 NOTE — DISCHARGE SUMMARY
Final                Comment:    Pediatric calculator link: https://www.kidney.org/professionals/kdoqi/gfr_calculatorped     These results are not intended for use in patients <18 years of age.     eGFR results are calculated without a race factor using  the 2021 CKD-EPI equation. Careful clinical correlation is recommended, particularly when comparing to results calculated using previous equations.  The CKD-EPI equation is less accurate in patients with extremes of muscle mass, extra-renal metabolism of creatinine, excessive creatine ingestion, or following therapy that affects renal tubular secretion.    Calcium                                       Date: 04/30/2024  Value: 8.5 (L)     Ref range: 8.8 - 10.2 MG/DL   Status: Final  WBC                                           Date: 05/01/2024  Value: 14.2 (H)    Ref range: 4.3 - 11.1 K/uL    Status: Final  RBC                                           Date: 05/01/2024  Value: 3.80 (L)    Ref range: 4.05 - 5.2 M/uL    Status: Final  Hemoglobin                                    Date: 05/01/2024  Value: 11.8        Ref range: 11.7 - 15.4 g/dL   Status: Final  Hematocrit                                    Date: 05/01/2024  Value: 37.1        Ref range: 35.8 - 46.3 %      Status: Final  MCV                                           Date: 05/01/2024  Value: 97.6        Ref range: 82 - 102 FL        Status: Final  MCH                                           Date: 05/01/2024  Value: 31.1        Ref range: 26.1 - 32.9 PG     Status: Final  MCHC                                          Date: 05/01/2024  Value: 31.8        Ref range: 31.4 - 35.0 g/dL   Status: Final  RDW                                           Date: 05/01/2024  Value: 12.9        Ref range: 11.9 - 14.6 %      Status: Final  Platelets                                     Date: 05/01/2024  Value: 174         Ref range: 150 - 450 K/uL     Status: Final  MPV                                           Date:  unchanged compared with April 15, 2024.        [unfilled]    Discharge Medications    Current Discharge Medication List        Current Discharge Medication List        Current Discharge Medication List    CONTINUE these medications which have NOT CHANGED    solifenacin (VESICARE) 5 MG tablet  Take 2 tablets by mouth nightly    Multiple Vitamins-Minerals (CENTRUM MINIS WOMEN 50+ PO)  Take 1 tablet by mouth daily    CORAL CALCIUM PO  Take 2,000 mg by mouth in the morning and at bedtime    Alpha-Lipoic Acid 600 MG TABS  Take 1 tablet by mouth daily    gabapentin (NEURONTIN) 300 MG capsule  Take 1 capsule by mouth 5 times daily for 180 days.  Qty: 450 capsule Refills: 1  Associated Diagnoses:Burning feet syndrome    levothyroxine (SYNTHROID) 125 MCG tablet  Take 1 tablet by mouth Daily    metoprolol succinate (TOPROL XL) 100 MG extended release tablet  Take 1 tablet by mouth daily    ondansetron (ZOFRAN) 4 MG tablet  Take 1 tablet by mouth 3 times daily as needed for Nausea or Vomiting  Qty: 15 tablet Refills: 0    albuterol sulfate  (90 Base) MCG/ACT inhaler  Inhale 2 puffs into the lungs every 4 hours as needed    aspirin 81 MG EC tablet  Take by mouth 2 times daily    atorvastatin (LIPITOR) 40 MG tablet  Take 1 tablet by mouth nightly    !! buPROPion (WELLBUTRIN XL) 150 MG extended release tablet  Take 1 tablet by mouth every evening    !! buPROPion (WELLBUTRIN XL) 300 MG extended release tablet  Take 30 mg by mouth every morning    vitamin D3 (CHOLECALCIFEROL) 125 MCG (5000 UT) TABS tablet  Take 1 tablet by mouth daily    sucralfate (CARAFATE) 1 GM tablet  Take 1 tablet by mouth 3 times daily (before meals)    !! - Potential duplicate medications found. Please discuss with provider.          Current Discharge Medication List    STOP taking these medications    diazePAM (VALIUM) 10 MG tablet  Comments:  Reason for Stopping:          Time Spent on Discharge:  minutes were spent in patient examination,

## 2024-05-06 ENCOUNTER — CARE COORDINATION (OUTPATIENT)
Dept: CARE COORDINATION | Facility: CLINIC | Age: 74
End: 2024-05-06

## 2024-05-06 NOTE — CARE COORDINATION
Transition of care outreach postponed for 7 days due to patient's discharge to [facility].

## 2024-05-13 ENCOUNTER — CARE COORDINATION (OUTPATIENT)
Dept: CARE COORDINATION | Facility: CLINIC | Age: 74
End: 2024-05-13

## 2024-05-13 NOTE — CARE COORDINATION
Care Transitions Post-Acute Facility Update Call    2024    Patient: Radha Grant Patient : 1950   MRN: [id number]  Reason for Admission: Renal mass  Discharge Date: 5/3/24 RARS: Readmission Risk Score: 7.9         Care Transitions Post Acute Facility Update    Care Transitions Interventions      Post Acute Facility Update     Spoke with ARNULFO Carr at [facility] who reports patient remains in their care with no d/c plans at present.  Lilly reports reviews occur weekly on Wednesday.  Will continue to outreach per protocol.

## 2024-05-15 ENCOUNTER — CARE COORDINATION (OUTPATIENT)
Dept: CARE COORDINATION | Facility: CLINIC | Age: 74
End: 2024-05-15

## 2024-05-15 NOTE — CARE COORDINATION
Care Transitions Post-Acute Facility Update Call    5/15/2024    Patient: Radha Grant Patient : 1950   MRN: [id number]  Reason for Admission: Renal mass  Discharge Date: 5/3/24 RARS: Readmission Risk Score: 7.9    Care Transitions Post Acute Facility Update    Care Transitions Interventions      Post Acute Facility Update     Outreach to [facility].  Staff reports patient remains in their care.  Left voicemail for ARNULFO Tejeda requesting any d/c plan update.  Will continue to outreach per protocol.    Photo Preface (Leave Blank If You Do Not Want): Photographs were obtained today Detail Level: Simple

## 2024-05-16 ENCOUNTER — CARE COORDINATION (OUTPATIENT)
Dept: CARE COORDINATION | Facility: CLINIC | Age: 74
End: 2024-05-16

## 2024-05-16 NOTE — CARE COORDINATION
Care Transitions Post-Acute Facility Update Call    2024    Patient: Radha Grant Patient : 1950   MRN: [id number]  Reason for Admission: Renal Mass  Discharge Date: 5/3/24 RARS: Readmission Risk Score: 7.9         Care Transitions Post Acute Facility Update    Care Transitions Interventions      Post Acute Facility Update     Call received back from ARNULFO Tejeda at [facility] who reports patient scheduled to d/c to home on 24.  Notified CTN.

## 2024-05-20 ENCOUNTER — OFFICE VISIT (OUTPATIENT)
Dept: UROLOGY | Age: 74
End: 2024-05-20

## 2024-05-20 DIAGNOSIS — C64.1 RENAL CELL CARCINOMA OF RIGHT KIDNEY (HCC): ICD-10-CM

## 2024-05-20 DIAGNOSIS — N28.89 RENAL MASS: Primary | ICD-10-CM

## 2024-05-20 PROCEDURE — 99024 POSTOP FOLLOW-UP VISIT: CPT | Performed by: NURSE PRACTITIONER

## 2024-05-20 ASSESSMENT — ENCOUNTER SYMPTOMS
BACK PAIN: 0
NAUSEA: 0

## 2024-05-20 NOTE — PROGRESS NOTES
performed a medically appropriate examination, ordered medications/tests, reviewed tests and communicated with other health care professionals regarding this patient.     Caron Vasquez, HANNA    [facility]   [address] [place] [address]  Phone: [phone]  Fax: [phone]     Dr. Collins was supervising physician today and approves plan of care.    Elements of this note have been dictated using speech recognition software.  Although reviewed, errors of speech recognition may have occurred.

## 2024-05-23 ENCOUNTER — CARE COORDINATION (OUTPATIENT)
Dept: CARE COORDINATION | Facility: CLINIC | Age: 74
End: 2024-05-23

## 2024-05-23 NOTE — CARE COORDINATION
Care Transitions Post-Acute Facility Update Call    2024    Patient: Radha Grant Patient : 1950   MRN: [id number]  Reason for Admission: Renal mass  Discharge Date: 5/3/24 RARS: Readmission Risk Score: 7.9         Care Transitions Post Acute Facility Update    Care Transitions Interventions      Post Acute Facility Update      Spoke with staff at [facility] who reports plans for discharge remain set for May 29th.  Will schedule outreach post d/c from STR.

## 2024-05-29 ENCOUNTER — HOME HEALTH ADMISSION (OUTPATIENT)
Dept: HOME HEALTH SERVICES | Facility: HOME HEALTH | Age: 74
End: 2024-05-29

## 2024-05-30 ENCOUNTER — CARE COORDINATION (OUTPATIENT)
Dept: CARE COORDINATION | Facility: CLINIC | Age: 74
End: 2024-05-30

## 2024-05-30 NOTE — CARE COORDINATION
Care Transitions Outreach Attempt    Call within 2 business days of discharge: Yes   Attempted to reach patient for transitions of care follow up. Unable to reach patient.    Patient: Radha Grant Patient : 1950 MRN: [id number]    Last Discharge Facility       Date Complaint Diagnosis Description Type Department Provider    24  Renal mass Admission (Discharged) SFD6MS Shayne Lyn, DO              Was this an external facility discharge? Yes, 2024  Discharge Facility Name: [facility]  Attended Urology follow up appointment-2024  Noted following upcoming appointments from discharge chart review:   [facility] follow up appointment(s):   Future Appointments   Date Time Provider Department Center   2024  1:[facility]   2024  2:30 PM Jonatan Cleary MD [facility] AMB     Non-[facility]  follow up appointment(s): n/a

## 2024-05-30 NOTE — CARE COORDINATION
Care Transitions Post-Acute Facility Update Call    2024    Patient: Radha Grant Patient : 1950   MRN: [id number]  Reason for Admission: Renal mass  Discharge Date: 5/3/24 RARS: Readmission Risk Score: 7.9         Care Transitions Post Acute Facility Update    Care Transitions Interventions      Post Acute Facility Update     Spoke with Renate at [facility] who reports Mrs. Grant remains in care at their facility.  Left voicemail for ARNULFO Tejeda at [facility] requesting d/c plan update.

## 2024-05-30 NOTE — CARE COORDINATION
Care Transitions Post-Acute Facility Update Call    2024    Patient: Radha Grant Patient : 1950   MRN: [id number]  Reason for Admission: Renal mass  Discharge Date: 5/3/24 RARS: Readmission Risk Score: 7.9         Care Transitions Post Acute Facility Update    Care Transitions Interventions      Post Acute Facility Update     Return call from ARNULFO Tejeda at [facility] received.  Lilly reports patient did d/c home on 24.  Will notify CTN.

## 2024-05-31 ENCOUNTER — CARE COORDINATION (OUTPATIENT)
Dept: CARE COORDINATION | Facility: CLINIC | Age: 74
End: 2024-05-31

## 2024-05-31 NOTE — CARE COORDINATION
Care Transitions Outreach Attempt    Call within 2 business days of discharge: Yes   Attempted to reach patient for transitions of care follow up. Unable to reach patient.    Patient: Radha Grant Patient : 1950 MRN: [id number]    Last Discharge Facility       Date Complaint Diagnosis Description Type Department Provider    24  Renal mass Admission (Discharged) SFD6MS Shayne Lyn, DO              Was this an external facility discharge? Yes, 2024  Discharge Facility Name: [facility]    Noted following upcoming appointments from discharge chart review:   [facility] follow up appointment(s):   Future Appointments   Date Time Provider Department Center   2024  1:00 PM Shayne Lyn DO [facility] AM[facility]   2024  2:30 PM Jonatan Cleary MD [facility] AMB     Non-[facility]  follow up appointment(s): n/a

## 2024-07-05 ENCOUNTER — OFFICE VISIT (OUTPATIENT)
Dept: UROLOGY | Age: 74
End: 2024-07-05
Payer: MEDICARE

## 2024-07-05 DIAGNOSIS — C64.1 RENAL CANCER, RIGHT (HCC): Primary | ICD-10-CM

## 2024-07-05 LAB
BILIRUBIN, URINE, POC: NEGATIVE
BLOOD URINE, POC: NEGATIVE
GLUCOSE URINE, POC: NEGATIVE
KETONES, URINE, POC: NORMAL
LEUKOCYTE ESTERASE, URINE, POC: NORMAL
NITRITE, URINE, POC: NEGATIVE
PH, URINE, POC: 5.5 (ref 4.6–8)
PROTEIN,URINE, POC: NEGATIVE
SPECIFIC GRAVITY, URINE, POC: 1.02 (ref 1–1.03)
URINALYSIS CLARITY, POC: NORMAL
URINALYSIS COLOR, POC: NORMAL
UROBILINOGEN, POC: NORMAL

## 2024-07-05 PROCEDURE — 99024 POSTOP FOLLOW-UP VISIT: CPT | Performed by: UROLOGY

## 2024-07-05 PROCEDURE — 81003 URINALYSIS AUTO W/O SCOPE: CPT | Performed by: UROLOGY

## 2024-07-05 NOTE — PROGRESS NOTES
[facility]  [address] [place] [address]  [phone]    Radha Grant  : 1950     HPI   74 y.o., female returns in follow up for RCC.  S/P RALN on 24 for clear cell RCC [id number] with neg margins.  Cr was 1.04 on 5/3/24.  She has done well post op.      Past Medical History:   Diagnosis Date    Allergic rhinitis 2013    Asthma     Balance problem     Bipolar affective disorder (HCC)     Carotid stenosis     Left carotid stenosis- followed by PCP    Disturbance of skin sensation     Fatty liver     per pt    Fibromyalgia     GERD (gastroesophageal reflux disease)     History of gastric ulcer     History of miscarriage     History of peptic ulcer     Hyperlipidemia     Hypertension     Hypothyroidism     IBS (irritable bowel syndrome)     Impaired fasting glucose 2022    Lumbar disc disease     Mixed stress and urge urinary incontinence     ANUPAM on CPAP     complaint with CPAP    Osteoarthritis     Premature beats     PACs / PVCs    Right renal mass 2024    surgery scheduled on 24     Past Surgical History:   Procedure Laterality Date    BLADDER SUSPENSION      CATARACT REMOVAL Bilateral     with lens implants    CHOLECYSTECTOMY      COLONOSCOPY      CYSTOCELE REPAIR  2009    ESOPHAGEAL DILATATION      HYSTERECTOMY, TOTAL ABDOMINAL (CERVIX REMOVED)  2003    KIDNEY SURGERY Right 2024    RIGHT NEPHRECTOMY LAPAROSCOPIC HAND ASSIST/ RIGHT/FROST ASSIST performed by Shayne Lyn DO at [facility] MAIN OR    OVARY REMOVAL Bilateral     RECTOCELE REPAIR  2009    TONSILLECTOMY       Current Outpatient Medications   Medication Sig Dispense Refill    solifenacin (VESICARE) 5 MG tablet Take 2 tablets by mouth nightly      Multiple Vitamins-Minerals (CENTRUM MINIS WOMEN 50+ PO) Take 1 tablet by mouth daily      CORAL CALCIUM PO Take 2,000 mg by mouth in the morning and at bedtime      Alpha-Lipoic Acid 600 MG TABS Take 1 tablet by mouth daily

## 2024-09-06 ENCOUNTER — APPOINTMENT (RX ONLY)
Dept: URBAN - METROPOLITAN AREA CLINIC 24 | Facility: CLINIC | Age: 74
Setting detail: DERMATOLOGY
End: 2024-09-06

## 2024-09-06 DIAGNOSIS — L30.1 DYSHIDROSIS [POMPHOLYX]: ICD-10-CM

## 2024-09-06 PROCEDURE — ? PRESCRIPTION

## 2024-09-06 PROCEDURE — ? PRESCRIPTION MEDICATION MANAGEMENT

## 2024-09-06 PROCEDURE — ? COUNSELING

## 2024-09-06 PROCEDURE — 99214 OFFICE O/P EST MOD 30 MIN: CPT

## 2024-09-06 RX ORDER — CRISABOROLE 20 MG/G
OINTMENT TOPICAL
Qty: 60 | Refills: 6 | Status: ERX | COMMUNITY
Start: 2024-09-06

## 2024-09-06 RX ADMIN — CRISABOROLE: 20 OINTMENT TOPICAL at 00:00

## 2024-09-06 ASSESSMENT — LOCATION DETAILED DESCRIPTION DERM
LOCATION DETAILED: LEFT THENAR EMINENCE
LOCATION DETAILED: RIGHT MEDIAL PLANTAR MIDFOOT
LOCATION DETAILED: LEFT MEDIAL PLANTAR MIDFOOT
LOCATION DETAILED: RIGHT INDEX FINGERTIP

## 2024-09-06 ASSESSMENT — LOCATION ZONE DERM
LOCATION ZONE: FEET
LOCATION ZONE: FINGER
LOCATION ZONE: HAND

## 2024-09-06 ASSESSMENT — LOCATION SIMPLE DESCRIPTION DERM
LOCATION SIMPLE: RIGHT INDEX FINGER
LOCATION SIMPLE: RIGHT PLANTAR SURFACE
LOCATION SIMPLE: LEFT HAND
LOCATION SIMPLE: LEFT PLANTAR SURFACE

## 2024-09-06 NOTE — PROCEDURE: PRESCRIPTION MEDICATION MANAGEMENT
Detail Level: Zone
Plan: Vaseline under occlusion recommended daily.  Systemic medication discussed.  Patient did follow up with light therapy with Dr. Lujan and patch testing with Maxx.  She did not find that light therapy was effective.  [place] noted several allergens such as gold, nickel, etc.  Patient advised to contact [place] to verify her allergens.
Continue Regimen: Clobetasol ointment BID x two weeks and then PRN
Initiate Treatment: Eucrisa BID
Render In Strict Bullet Format?: No

## 2024-09-16 ENCOUNTER — OFFICE VISIT (OUTPATIENT)
Age: 74
End: 2024-09-16
Payer: MEDICARE

## 2024-09-16 VITALS
SYSTOLIC BLOOD PRESSURE: 136 MMHG | WEIGHT: 186 LBS | DIASTOLIC BLOOD PRESSURE: 80 MMHG | HEIGHT: 70 IN | BODY MASS INDEX: 26.63 KG/M2 | HEART RATE: 72 BPM

## 2024-09-16 DIAGNOSIS — E78.2 MIXED HYPERLIPIDEMIA: ICD-10-CM

## 2024-09-16 DIAGNOSIS — I10 ESSENTIAL HYPERTENSION: ICD-10-CM

## 2024-09-16 DIAGNOSIS — R00.2 PALPITATIONS: Primary | ICD-10-CM

## 2024-09-16 PROCEDURE — 1090F PRES/ABSN URINE INCON ASSESS: CPT | Performed by: INTERNAL MEDICINE

## 2024-09-16 PROCEDURE — 1036F TOBACCO NON-USER: CPT | Performed by: INTERNAL MEDICINE

## 2024-09-16 PROCEDURE — G8427 DOCREV CUR MEDS BY ELIG CLIN: HCPCS | Performed by: INTERNAL MEDICINE

## 2024-09-16 PROCEDURE — 99204 OFFICE O/P NEW MOD 45 MIN: CPT | Performed by: INTERNAL MEDICINE

## 2024-09-16 PROCEDURE — 3017F COLORECTAL CA SCREEN DOC REV: CPT | Performed by: INTERNAL MEDICINE

## 2024-09-16 PROCEDURE — 93000 ELECTROCARDIOGRAM COMPLETE: CPT | Performed by: INTERNAL MEDICINE

## 2024-09-16 PROCEDURE — G8400 PT W/DXA NO RESULTS DOC: HCPCS | Performed by: INTERNAL MEDICINE

## 2024-09-16 PROCEDURE — 3079F DIAST BP 80-89 MM HG: CPT | Performed by: INTERNAL MEDICINE

## 2024-09-16 PROCEDURE — G8417 CALC BMI ABV UP PARAM F/U: HCPCS | Performed by: INTERNAL MEDICINE

## 2024-09-16 PROCEDURE — 3075F SYST BP GE 130 - 139MM HG: CPT | Performed by: INTERNAL MEDICINE

## 2024-09-16 PROCEDURE — 1123F ACP DISCUSS/DSCN MKR DOCD: CPT | Performed by: INTERNAL MEDICINE

## 2024-09-16 RX ORDER — TRAMADOL HYDROCHLORIDE 50 MG/1
50 TABLET ORAL EVERY 8 HOURS PRN
COMMUNITY
Start: 2024-08-21

## 2024-09-16 ASSESSMENT — ENCOUNTER SYMPTOMS: BACK PAIN: 1

## 2024-10-10 NOTE — PROGRESS NOTES
Yes Automatic Reconciliation, Ar   solifenacin (VESICARE) 5 MG tablet Take 2 tablets by mouth nightly  Patient not taking: Reported on 10/11/2024    Mo Mendiola MD   Alpha-Lipoic Acid 600 MG TABS Take 1 tablet by mouth daily  Patient not taking: Reported on 10/11/2024    Mo Mendiola MD   gabapentin (NEURONTIN) 300 MG capsule Take 1 capsule by mouth 5 times daily for 180 days.  Patient taking differently: Take 1 capsule by mouth in the morning and at bedtime. 1 capsules in am and 5 capsules at bedtime 4/1/24 9/28/24  Jonatan Cleary MD   ondansetron (ZOFRAN) 4 MG tablet Take 1 tablet by mouth 3 times daily as needed for Nausea or Vomiting  Patient not taking: Reported on 9/16/2024 8/18/22   Ruddy Horan DO   sucralfate (CARAFATE) 1 GM tablet Take 1 tablet by mouth 3 times daily (before meals)  Patient not taking: Reported on 10/11/2024 1/4/22   Automatic Reconciliation, Ar     Allergies   Allergen Reactions    Fentanyl Other (See Comments)     Pt was told she was given Fentanyl during eye surgery with no complications        Bacitracin Dermatitis    Methyldibromoglutaronitrile Dermatitis     Chemical that causes skin irration to olegario feet    Nickel      \"Makes my skin peel\"    Other Dermatitis     Pt has allergy to chemical N, N- Dophenylguanidine, Goldsodiumthiosulfate, Myroxylon Pereirae resin    Potassium Dichromate Dermatitis    Venlafaxine Other (See Comments)     Denies allergy     Past Medical History:   Diagnosis Date    Allergic rhinitis 6/28/2013    Asthma     Balance problem     Bipolar affective disorder (HCC)     Carotid stenosis     Left carotid stenosis- followed by PCP    Disturbance of skin sensation     Fatty liver     per pt    Fibromyalgia     GERD (gastroesophageal reflux disease)     History of gastric ulcer     History of miscarriage     History of peptic ulcer     Hyperlipidemia     Hypertension     Hypothyroidism     IBS (irritable bowel syndrome)     Impaired fasting

## 2024-10-11 ENCOUNTER — OFFICE VISIT (OUTPATIENT)
Age: 74
End: 2024-10-11
Payer: MEDICARE

## 2024-10-11 VITALS
BODY MASS INDEX: 26.74 KG/M2 | SYSTOLIC BLOOD PRESSURE: 110 MMHG | HEIGHT: 70 IN | OXYGEN SATURATION: 97 % | HEART RATE: 69 BPM | DIASTOLIC BLOOD PRESSURE: 60 MMHG | WEIGHT: 186.8 LBS

## 2024-10-11 DIAGNOSIS — R00.2 PALPITATIONS: Primary | ICD-10-CM

## 2024-10-11 DIAGNOSIS — R07.89 CHEST WALL PAIN: ICD-10-CM

## 2024-10-11 PROCEDURE — 1036F TOBACCO NON-USER: CPT | Performed by: INTERNAL MEDICINE

## 2024-10-11 PROCEDURE — G8427 DOCREV CUR MEDS BY ELIG CLIN: HCPCS | Performed by: INTERNAL MEDICINE

## 2024-10-11 PROCEDURE — G8484 FLU IMMUNIZE NO ADMIN: HCPCS | Performed by: INTERNAL MEDICINE

## 2024-10-11 PROCEDURE — 3017F COLORECTAL CA SCREEN DOC REV: CPT | Performed by: INTERNAL MEDICINE

## 2024-10-11 PROCEDURE — 1090F PRES/ABSN URINE INCON ASSESS: CPT | Performed by: INTERNAL MEDICINE

## 2024-10-11 PROCEDURE — 99213 OFFICE O/P EST LOW 20 MIN: CPT | Performed by: INTERNAL MEDICINE

## 2024-10-11 PROCEDURE — 3078F DIAST BP <80 MM HG: CPT | Performed by: INTERNAL MEDICINE

## 2024-10-11 PROCEDURE — G8417 CALC BMI ABV UP PARAM F/U: HCPCS | Performed by: INTERNAL MEDICINE

## 2024-10-11 PROCEDURE — 1123F ACP DISCUSS/DSCN MKR DOCD: CPT | Performed by: INTERNAL MEDICINE

## 2024-10-11 PROCEDURE — 3074F SYST BP LT 130 MM HG: CPT | Performed by: INTERNAL MEDICINE

## 2024-10-11 PROCEDURE — G8400 PT W/DXA NO RESULTS DOC: HCPCS | Performed by: INTERNAL MEDICINE

## 2024-10-11 ASSESSMENT — ENCOUNTER SYMPTOMS: BACK PAIN: 1

## 2024-12-02 ENCOUNTER — HOSPITAL ENCOUNTER (OUTPATIENT)
Dept: GENERAL RADIOLOGY | Age: 74
Discharge: HOME | End: 2024-12-05
Payer: MEDICARE

## 2024-12-02 DIAGNOSIS — M25.562 ACUTE BILATERAL KNEE PAIN: ICD-10-CM

## 2024-12-02 DIAGNOSIS — M25.561 ACUTE BILATERAL KNEE PAIN: ICD-10-CM

## 2024-12-02 DIAGNOSIS — M54.9 BACK PAIN, UNSPECIFIED BACK LOCATION, UNSPECIFIED BACK PAIN LATERALITY, UNSPECIFIED CHRONICITY: ICD-10-CM

## 2024-12-02 DIAGNOSIS — M54.2 NECK PAIN: ICD-10-CM

## 2024-12-02 PROCEDURE — 72070 X-RAY EXAM THORAC SPINE 2VWS: CPT

## 2024-12-02 PROCEDURE — 73562 X-RAY EXAM OF KNEE 3: CPT

## 2024-12-02 PROCEDURE — 72100 X-RAY EXAM L-S SPINE 2/3 VWS: CPT

## 2024-12-02 PROCEDURE — 72040 X-RAY EXAM NECK SPINE 2-3 VW: CPT

## 2024-12-11 ENCOUNTER — APPOINTMENT (OUTPATIENT)
Dept: URBAN - METROPOLITAN AREA CLINIC 24 | Facility: CLINIC | Age: 74
Setting detail: DERMATOLOGY
End: 2024-12-11

## 2024-12-11 DIAGNOSIS — L98.1 FACTITIAL DERMATITIS: ICD-10-CM | Status: INADEQUATELY CONTROLLED

## 2024-12-11 DIAGNOSIS — L30.1 DYSHIDROSIS [POMPHOLYX]: ICD-10-CM | Status: STABLE

## 2024-12-11 PROCEDURE — 99214 OFFICE O/P EST MOD 30 MIN: CPT

## 2024-12-11 PROCEDURE — ? OTHER

## 2024-12-11 PROCEDURE — ? PRESCRIPTION

## 2024-12-11 PROCEDURE — ? COUNSELING

## 2024-12-11 PROCEDURE — ? PRESCRIPTION MEDICATION MANAGEMENT

## 2024-12-11 RX ORDER — MUPIROCIN 20 MG/G
OINTMENT TOPICAL
Qty: 22 | Refills: 2 | Status: ERX

## 2024-12-11 ASSESSMENT — LOCATION ZONE DERM
LOCATION ZONE: FEET
LOCATION ZONE: HAND
LOCATION ZONE: NECK
LOCATION ZONE: ARM
LOCATION ZONE: FINGER

## 2024-12-11 ASSESSMENT — LOCATION DETAILED DESCRIPTION DERM
LOCATION DETAILED: RIGHT DISTAL DORSAL FOREARM
LOCATION DETAILED: RIGHT INDEX FINGERTIP
LOCATION DETAILED: LEFT THENAR EMINENCE
LOCATION DETAILED: RIGHT MEDIAL PLANTAR MIDFOOT
LOCATION DETAILED: LEFT MEDIAL PLANTAR MIDFOOT
LOCATION DETAILED: RIGHT DISTAL POSTERIOR UPPER ARM
LOCATION DETAILED: MID TRAPEZIAL NECK

## 2024-12-11 ASSESSMENT — LOCATION SIMPLE DESCRIPTION DERM
LOCATION SIMPLE: RIGHT FOREARM
LOCATION SIMPLE: RIGHT PLANTAR SURFACE
LOCATION SIMPLE: TRAPEZIAL NECK
LOCATION SIMPLE: LEFT PLANTAR SURFACE
LOCATION SIMPLE: RIGHT POSTERIOR UPPER ARM
LOCATION SIMPLE: LEFT HAND
LOCATION SIMPLE: RIGHT INDEX FINGER

## 2024-12-11 NOTE — PROCEDURE: PRESCRIPTION MEDICATION MANAGEMENT
Detail Level: Zone
Plan: Vaseline under occlusion recommended daily.  Systemic medication discussed.  Patient did follow up with light therapy with Dr. Lujan and patch testing with Maxx.  She did not find that light therapy was effective.  [place] noted several allergens such as gold, nickel, etc.  Patient advised to contact [place] to verify her allergens.
Continue Regimen: Clobetasol ointment BID x two weeks and then PRN
Initiate Treatment: Eucrisa BID
Render In Strict Bullet Format?: No
Continue Regimen: Mupirocin twice a day until healed.
Modify Regimen: Keep nails short. Keep areas that are open with Vaseline or the mupirocin ointment with a bandage. \\nRecommended letting her Psychiatrist know that she is picking again.\\n\\nDiscussed supplement— NAC \\nWill need to discussed doctor first to make sure she can take it with all her other medications.

## 2024-12-11 NOTE — PROCEDURE: OTHER
Detail Level: Zone
Note Text (......Xxx Chief Complaint.): This diagnosis correlates with the
Render Risk Assessment In Note?: no
Other (Free Text): 12/11/24 Doing significantly well. Pt to continue exactly what she is doing. Photos taken today for comparison.

## 2024-12-27 ENCOUNTER — APPOINTMENT (OUTPATIENT)
Dept: CT IMAGING | Age: 74
End: 2024-12-27
Payer: MEDICARE

## 2024-12-27 ENCOUNTER — HOSPITAL ENCOUNTER (EMERGENCY)
Age: 74
Discharge: HOME | End: 2024-12-27
Payer: MEDICARE

## 2024-12-27 VITALS
SYSTOLIC BLOOD PRESSURE: 109 MMHG | OXYGEN SATURATION: 100 % | HEIGHT: 70 IN | DIASTOLIC BLOOD PRESSURE: 78 MMHG | WEIGHT: 195 LBS | RESPIRATION RATE: 20 BRPM | BODY MASS INDEX: 27.92 KG/M2 | TEMPERATURE: 98.6 F | HEART RATE: 72 BPM

## 2024-12-27 DIAGNOSIS — M94.0 COSTOCHONDRITIS: Primary | ICD-10-CM

## 2024-12-27 LAB
ANION GAP SERPL CALC-SCNC: 12 MMOL/L (ref 7–16)
BASOPHILS # BLD: 0.1 K/UL (ref 0–0.2)
BASOPHILS NFR BLD: 1 % (ref 0–2)
BUN SERPL-MCNC: 19 MG/DL (ref 8–23)
CALCIUM SERPL-MCNC: 9.4 MG/DL (ref 8.8–10.2)
CHLORIDE SERPL-SCNC: 103 MMOL/L (ref 98–107)
CO2 SERPL-SCNC: 27 MMOL/L (ref 20–29)
CREAT SERPL-MCNC: 1 MG/DL (ref 0.8–1.3)
DIFFERENTIAL METHOD BLD: ABNORMAL
EOSINOPHIL # BLD: 0.3 K/UL (ref 0–0.8)
EOSINOPHIL NFR BLD: 4 % (ref 0.5–7.8)
ERYTHROCYTE [DISTWIDTH] IN BLOOD BY AUTOMATED COUNT: 13.6 % (ref 11.9–14.6)
GLUCOSE SERPL-MCNC: 128 MG/DL (ref 65–100)
HCT VFR BLD AUTO: 38.7 % (ref 35.8–46.3)
HGB BLD-MCNC: 12.7 G/DL (ref 11.7–15.4)
IMM GRANULOCYTES # BLD AUTO: 0 K/UL (ref 0–0.5)
IMM GRANULOCYTES NFR BLD AUTO: 0 % (ref 0–5)
LYMPHOCYTES # BLD: 2.2 K/UL (ref 0.5–4.6)
LYMPHOCYTES NFR BLD: 27 % (ref 13–44)
MCH RBC QN AUTO: 31.4 PG (ref 26.1–32.9)
MCHC RBC AUTO-ENTMCNC: 32.8 G/DL (ref 31.4–35)
MCV RBC AUTO: 95.8 FL (ref 82–102)
MONOCYTES # BLD: 0.8 K/UL (ref 0.1–1.3)
MONOCYTES NFR BLD: 10 % (ref 4–12)
NEUTS SEG # BLD: 4.8 K/UL (ref 1.7–8.2)
NEUTS SEG NFR BLD: 59 % (ref 43–78)
NRBC # BLD: 0 K/UL (ref 0–0.2)
NT PRO BNP: 105 PG/ML (ref 0–450)
PLATELET # BLD AUTO: 261 K/UL (ref 150–450)
PMV BLD AUTO: 10.6 FL (ref 9.4–12.3)
POTASSIUM SERPL-SCNC: 4.1 MMOL/L (ref 3.5–5.1)
RBC # BLD AUTO: 4.04 M/UL (ref 4.05–5.2)
SODIUM SERPL-SCNC: 142 MMOL/L (ref 133–143)
TROPONIN T SERPL HS-MCNC: 10.6 NG/L (ref 0–14)
TROPONIN T SERPL HS-MCNC: 11.2 NG/L (ref 0–14)
WBC # BLD AUTO: 8.2 K/UL (ref 4.3–11.1)

## 2024-12-27 PROCEDURE — 80048 BASIC METABOLIC PNL TOTAL CA: CPT

## 2024-12-27 PROCEDURE — 6360000004 HC RX CONTRAST MEDICATION: Performed by: PHYSICIAN ASSISTANT

## 2024-12-27 PROCEDURE — 71260 CT THORAX DX C+: CPT

## 2024-12-27 PROCEDURE — 84484 ASSAY OF TROPONIN QUANT: CPT

## 2024-12-27 PROCEDURE — 93005 ELECTROCARDIOGRAM TRACING: CPT | Performed by: PHYSICIAN ASSISTANT

## 2024-12-27 PROCEDURE — 99285 EMERGENCY DEPT VISIT HI MDM: CPT

## 2024-12-27 PROCEDURE — 83880 ASSAY OF NATRIURETIC PEPTIDE: CPT

## 2024-12-27 PROCEDURE — 6370000000 HC RX 637 (ALT 250 FOR IP): Performed by: PHYSICIAN ASSISTANT

## 2024-12-27 PROCEDURE — 85025 COMPLETE CBC W/AUTO DIFF WBC: CPT

## 2024-12-27 RX ORDER — TRAMADOL HYDROCHLORIDE 50 MG/1
50 TABLET ORAL EVERY 6 HOURS PRN
Status: DISCONTINUED | OUTPATIENT
Start: 2024-12-27 | End: 2024-12-27 | Stop reason: HOSPADM

## 2024-12-27 RX ORDER — IOPAMIDOL 755 MG/ML
100 INJECTION, SOLUTION INTRAVASCULAR
Status: COMPLETED | OUTPATIENT
Start: 2024-12-27 | End: 2024-12-27

## 2024-12-27 RX ADMIN — TRAMADOL HYDROCHLORIDE 50 MG: 50 TABLET ORAL at 19:53

## 2024-12-27 RX ADMIN — IOPAMIDOL 100 ML: 755 INJECTION, SOLUTION INTRAVENOUS at 17:27

## 2024-12-27 ASSESSMENT — PAIN SCALES - GENERAL
PAINLEVEL_OUTOF10: 5
PAINLEVEL_OUTOF10: 7
PAINLEVEL_OUTOF10: 5

## 2024-12-27 ASSESSMENT — PAIN - FUNCTIONAL ASSESSMENT
PAIN_FUNCTIONAL_ASSESSMENT: 0-10
PAIN_FUNCTIONAL_ASSESSMENT: 0-10

## 2024-12-27 ASSESSMENT — PAIN DESCRIPTION - ORIENTATION: ORIENTATION: MID

## 2024-12-27 ASSESSMENT — PAIN DESCRIPTION - LOCATION
LOCATION: CHEST
LOCATION: CHEST

## 2024-12-27 NOTE — ED TRIAGE NOTES
PT ambulatory to room 5 with steady gait. Per patient, she's having upper midsternal chest pain, sob and \"feels like her heart is racing\". Symptom onset a week ago. Pt reports pain is intermittent with deep breathes. PT reports chest tenderness on palpation. Pt believes her prescriptions are \"making her sick\".

## 2024-12-27 NOTE — ED PROVIDER NOTES
pulmonary angiography protocol.  Coronal reconstructions  were performed.    Radiation dose reduction techniques were used for this study. Our CT scanners  use one or all of the following: Automated exposure control, adjustment of the  mA and/or kV according to patient size, iterative reconstruction.    FINDINGS:  STUDY QUALITY: The exam study quality is good.    AIRWAYS: The central airways are patent.    LUNGS: The lungs are clear bilaterally.  No suspicious pulmonary nodules.    PLEURA: No pleural effusion or pneumothorax.     HEART: The heart is not enlarged. No calcified coronary atherosclerosis.  No  pericardial effusion.     THORACIC AORTA: The aorta is normal in caliber.     PULMONARY ARTERY: No pulmonary embolus to the segmental level. The main  pulmonary artery is normal in caliber.    MEDIASTINUM/SHIREEN: No mediastinal mass or lymphadenopathy.    CHEST WALL: No mass or axillary lymphadenopathy.     UPPER ABDOMEN: The visualized upper abdomen is remarkable for cholecystectomy  changes.    BONES: No suspicious osseous lesion.         Impression    1.  No pulmonary embolus.  2.  No acute findings within the chest.        Electronically signed by hCi Johnson   Basic Metabolic Panel   Result Value Ref Range    Sodium 142 133 - 143 mmol/L    Potassium 4.1 3.5 - 5.1 mmol/L    Chloride 103 98 - 107 mmol/L    CO2 27 20 - 29 mmol/L    Anion Gap 12 7 - 16 mmol/L    Glucose 128 (H) 65 - 100 mg/dL    BUN 19 8 - 23 MG/DL    Creatinine 1.00 0.80 - 1.30 MG/DL    Est, Glom Filt Rate 59 (L) >60 ml/min/1.73m2    Calcium 9.4 8.8 - 10.2 MG/DL   CBC with Auto Differential   Result Value Ref Range    WBC 8.2 4.3 - 11.1 K/uL    RBC 4.04 (L) 4.05 - 5.20 M/uL    Hemoglobin 12.7 11.7 - 15.4 g/dL    Hematocrit 38.7 35.8 - 46.3 %    MCV 95.8 82.0 - 102.0 FL    MCH 31.4 26.1 - 32.9 PG    MCHC 32.8 31.4 - 35.0 g/dL    RDW 13.6 11.9 - 14.6 %    Platelets 261 150 - 450 K/uL    MPV 10.6 9.4 - 12.3 FL    nRBC 0.00 0.0 - 0.2 K/uL

## 2024-12-28 LAB
EKG ATRIAL RATE: 103 BPM
EKG DIAGNOSIS: NORMAL
EKG P AXIS: 78 DEGREES
EKG P-R INTERVAL: 162 MS
EKG Q-T INTERVAL: 335 MS
EKG QRS DURATION: 85 MS
EKG QTC CALCULATION (BAZETT): 441 MS
EKG R AXIS: -79 DEGREES
EKG T AXIS: 59 DEGREES
EKG VENTRICULAR RATE: 104 BPM

## 2024-12-28 PROCEDURE — 93010 ELECTROCARDIOGRAM REPORT: CPT | Performed by: INTERNAL MEDICINE

## 2025-01-28 ENCOUNTER — OFFICE VISIT (OUTPATIENT)
Dept: ORTHOPEDIC SURGERY | Age: 75
End: 2025-01-28
Payer: MEDICARE

## 2025-01-28 DIAGNOSIS — M25.561 PAIN IN BOTH KNEES, UNSPECIFIED CHRONICITY: Primary | ICD-10-CM

## 2025-01-28 DIAGNOSIS — M25.562 PAIN IN BOTH KNEES, UNSPECIFIED CHRONICITY: Primary | ICD-10-CM

## 2025-01-28 DIAGNOSIS — M47.816 LUMBAR SPONDYLOSIS: ICD-10-CM

## 2025-01-28 PROCEDURE — 99203 OFFICE O/P NEW LOW 30 MIN: CPT | Performed by: PHYSICIAN ASSISTANT

## 2025-01-28 PROCEDURE — G8428 CUR MEDS NOT DOCUMENT: HCPCS | Performed by: PHYSICIAN ASSISTANT

## 2025-01-28 PROCEDURE — 1036F TOBACCO NON-USER: CPT | Performed by: PHYSICIAN ASSISTANT

## 2025-01-28 PROCEDURE — 3017F COLORECTAL CA SCREEN DOC REV: CPT | Performed by: PHYSICIAN ASSISTANT

## 2025-01-28 PROCEDURE — 1090F PRES/ABSN URINE INCON ASSESS: CPT | Performed by: PHYSICIAN ASSISTANT

## 2025-01-28 PROCEDURE — G8400 PT W/DXA NO RESULTS DOC: HCPCS | Performed by: PHYSICIAN ASSISTANT

## 2025-01-28 PROCEDURE — 1123F ACP DISCUSS/DSCN MKR DOCD: CPT | Performed by: PHYSICIAN ASSISTANT

## 2025-01-28 PROCEDURE — G8417 CALC BMI ABV UP PARAM F/U: HCPCS | Performed by: PHYSICIAN ASSISTANT

## 2025-01-28 NOTE — PROGRESS NOTES
Name: Radha Grant  YOB: 1950  Gender: female  MRN: [id number]    CC:   Chief Complaint   Patient presents with    New Patient     Back pain         HPI:   Radha Grant is a 74 y.o. female with a PMHx of multiple comorbidities listed below including fibromyalgia, chronic back pain, bipolar, chronic neuropathy and poor balance.         They present here for evaluation of multiple orthopedic complaints.  She has pain from her neck to her lower back.  She also has knee pain and shoulder pain.  This is a chronic issue for her.  Things seem to get worse after she was at the beach several months ago and sustained a fall.  She was in the hospital for a couple of days primarily evaluating pain she was having in her chest.  But over the last several months she has had just persistent back pain up and down her back from her lower back to her neck.  She does not have any significant rating pain down the arms or legs.  No numbness or tingling the arms or legs per se of significance.  She has pain with simple movements such as twisting or bending.  She recently saw Dr. Edwards with pain management who recommended management with tramadol and physical therapy.  The patient has not started physical therapy.  She has reservations over its effectiveness.            Past Medical History Includes:   Past Medical History:   Diagnosis Date    Allergic rhinitis 6/28/2013    Asthma     Balance problem     Bipolar affective disorder (HCC)     Carotid stenosis     Left carotid stenosis- followed by PCP    Disturbance of skin sensation     Fatty liver     per pt    Fibromyalgia     GERD (gastroesophageal reflux disease)     History of gastric ulcer     History of miscarriage     History of peptic ulcer     Hyperlipidemia     Hypertension     Hypothyroidism     IBS (irritable bowel syndrome)     Impaired fasting glucose 2/7/2022    Lumbar disc disease     Mixed stress and urge urinary incontinence     ANUPAM on CPAP

## 2025-02-16 ENCOUNTER — HOSPITAL ENCOUNTER (EMERGENCY)
Age: 75
Discharge: HOME | End: 2025-02-16
Attending: EMERGENCY MEDICINE
Payer: MEDICARE

## 2025-02-16 VITALS
BODY MASS INDEX: 27.2 KG/M2 | OXYGEN SATURATION: 97 % | RESPIRATION RATE: 16 BRPM | HEART RATE: 71 BPM | HEIGHT: 70 IN | TEMPERATURE: 98.8 F | DIASTOLIC BLOOD PRESSURE: 68 MMHG | SYSTOLIC BLOOD PRESSURE: 127 MMHG | WEIGHT: 190 LBS

## 2025-02-16 DIAGNOSIS — R22.0 FACIAL SWELLING: Primary | ICD-10-CM

## 2025-02-16 DIAGNOSIS — T78.40XA ALLERGIC REACTION, INITIAL ENCOUNTER: ICD-10-CM

## 2025-02-16 LAB
ANION GAP SERPL CALC-SCNC: 9 MMOL/L (ref 7–16)
BUN SERPL-MCNC: 24 MG/DL (ref 8–23)
CALCIUM SERPL-MCNC: 9.2 MG/DL (ref 8.8–10.2)
CHLORIDE SERPL-SCNC: 104 MMOL/L (ref 98–107)
CO2 SERPL-SCNC: 28 MMOL/L (ref 20–29)
CREAT SERPL-MCNC: 1.12 MG/DL (ref 0.8–1.3)
CRP SERPL-MCNC: 0.3 MG/DL (ref 0–0.9)
ERYTHROCYTE [DISTWIDTH] IN BLOOD BY AUTOMATED COUNT: 12.9 % (ref 11.9–14.6)
ERYTHROCYTE [SEDIMENTATION RATE] IN BLOOD: 20 MM/HR (ref 0–30)
GLUCOSE SERPL-MCNC: 104 MG/DL (ref 65–100)
HCT VFR BLD AUTO: 40.4 % (ref 35.8–46.3)
HGB BLD-MCNC: 13.3 G/DL (ref 11.7–15.4)
MCH RBC QN AUTO: 31.1 PG (ref 26.1–32.9)
MCHC RBC AUTO-ENTMCNC: 32.9 G/DL (ref 31.4–35)
MCV RBC AUTO: 94.4 FL (ref 82–102)
NRBC # BLD: 0 K/UL (ref 0–0.2)
PLATELET # BLD AUTO: 223 K/UL (ref 150–450)
PMV BLD AUTO: 11.1 FL (ref 9.4–12.3)
POTASSIUM SERPL-SCNC: 3.6 MMOL/L (ref 3.5–5.1)
RBC # BLD AUTO: 4.28 M/UL (ref 4.05–5.2)
SODIUM SERPL-SCNC: 141 MMOL/L (ref 133–143)
WBC # BLD AUTO: 9.6 K/UL (ref 4.3–11.1)

## 2025-02-16 PROCEDURE — 2500000003 HC RX 250 WO HCPCS: Performed by: EMERGENCY MEDICINE

## 2025-02-16 PROCEDURE — 96374 THER/PROPH/DIAG INJ IV PUSH: CPT

## 2025-02-16 PROCEDURE — 6360000002 HC RX W HCPCS: Performed by: EMERGENCY MEDICINE

## 2025-02-16 PROCEDURE — 80048 BASIC METABOLIC PNL TOTAL CA: CPT

## 2025-02-16 PROCEDURE — 86140 C-REACTIVE PROTEIN: CPT

## 2025-02-16 PROCEDURE — 85027 COMPLETE CBC AUTOMATED: CPT

## 2025-02-16 PROCEDURE — 2580000003 HC RX 258: Performed by: EMERGENCY MEDICINE

## 2025-02-16 PROCEDURE — 96375 TX/PRO/DX INJ NEW DRUG ADDON: CPT

## 2025-02-16 PROCEDURE — 85652 RBC SED RATE AUTOMATED: CPT

## 2025-02-16 PROCEDURE — 99284 EMERGENCY DEPT VISIT MOD MDM: CPT

## 2025-02-16 RX ORDER — LORATADINE 10 MG/1
10 TABLET ORAL DAILY
Qty: 14 TABLET | Refills: 0 | Status: SHIPPED | OUTPATIENT
Start: 2025-02-16 | End: 2025-03-02

## 2025-02-16 RX ORDER — FAMOTIDINE 20 MG/1
20 TABLET, FILM COATED ORAL DAILY
Qty: 60 TABLET | Refills: 0 | Status: SHIPPED | OUTPATIENT
Start: 2025-02-16

## 2025-02-16 RX ORDER — DIPHENHYDRAMINE HYDROCHLORIDE 50 MG/ML
25 INJECTION INTRAMUSCULAR; INTRAVENOUS
Status: COMPLETED | OUTPATIENT
Start: 2025-02-16 | End: 2025-02-16

## 2025-02-16 RX ORDER — PREDNISONE 10 MG/1
TABLET ORAL
Qty: 18 TABLET | Refills: 0 | Status: SHIPPED | OUTPATIENT
Start: 2025-02-16 | End: 2025-02-24

## 2025-02-16 RX ADMIN — METHYLPREDNISOLONE SODIUM SUCCINATE 125 MG: 125 INJECTION INTRAMUSCULAR; INTRAVENOUS at 18:54

## 2025-02-16 RX ADMIN — DIPHENHYDRAMINE HYDROCHLORIDE 25 MG: 50 INJECTION INTRAMUSCULAR; INTRAVENOUS at 18:55

## 2025-02-16 RX ADMIN — FAMOTIDINE 20 MG: 10 INJECTION, SOLUTION INTRAVENOUS at 18:55

## 2025-02-16 ASSESSMENT — PAIN SCALES - GENERAL
PAINLEVEL_OUTOF10: 7
PAINLEVEL_OUTOF10: 4

## 2025-02-16 ASSESSMENT — PAIN - FUNCTIONAL ASSESSMENT: PAIN_FUNCTIONAL_ASSESSMENT: 0-10

## 2025-02-16 ASSESSMENT — PAIN DESCRIPTION - LOCATION: LOCATION: FACE

## 2025-02-16 NOTE — ED TRIAGE NOTES
Patient ambulatory in ED with complaint of rash to bilateral eyes, swelling, and itching. Symptoms started on Friday. Patient has taken benadryl without relief. No new lotions, makeup, or soaps.

## 2025-02-16 NOTE — ED PROVIDER NOTES
Emergency Department Provider Note       PCP: Chai Benitez Jr., MD   Age: 74 y.o.   Sex: female     DISPOSITION Decision To Discharge 02/16/2025 08:38:06 PM    ICD-10-CM    1. Facial swelling  R22.0       2. Allergic reaction, initial encounter  T78.40XA           Medical Decision Making     Patient has no signs of any airway compromise.  Her allergic reaction is superficial just involving the skin.  I did send some inflammatory markers given autoimmune possibility with malar distribution but they were normal.  I am going to treat as an allergic reaction and have patient followup with pcp.  Her symptoms did improve following the IV solumedrol, benadryl, and pepcid.      1 or more acute illnesses that pose a threat to life or bodily function.   Prescription drug management performed.  Patient was discharged risks and benefits of hospitalization were considered.  Shared medical decision making was utilized in creating the patients health plan today.  I independently ordered and reviewed each unique test.    I reviewed external records: provider visit note from outside specialist.  I reviewed external records: previous lab results from outside ED.     History     Patient is coming in with chief complaint of rash to her face.  It has been there for several days.  She also has some swelling diffusely to the face and it does itch.  She has tried some Benadryl without much relief.  She states she has had allergy testing before and is allergic to multiple chemicals.  She does not report any new exposures but she did have a family members self tanning on her arms which she states was placed after the symptoms that started.  She is also reported a longstanding history of some tongue irritation has been going on for months and she has a history of chronic back pain    Physical Exam     Vitals signs and nursing note reviewed:  Vitals:    02/16/25 1640 02/16/25 2000 02/16/25 2015 02/16/25 2045   BP: 130/78 132/63 122/63

## 2025-02-17 ENCOUNTER — HOSPITAL ENCOUNTER (OUTPATIENT)
Dept: ULTRASOUND IMAGING | Age: 75
Discharge: HOME | End: 2025-02-19
Payer: MEDICARE

## 2025-02-17 DIAGNOSIS — C64.1 RENAL CANCER, RIGHT (HCC): ICD-10-CM

## 2025-02-17 PROCEDURE — 76770 US EXAM ABDO BACK WALL COMP: CPT

## 2025-02-20 ENCOUNTER — TELEPHONE (OUTPATIENT)
Dept: UROLOGY | Age: 75
End: 2025-02-20

## 2025-02-20 NOTE — TELEPHONE ENCOUNTER
----- Message from Dr. Shayne Lyn, DO sent at 2/18/2025  7:49 AM EST -----  Please let pt know that CECELIA was neg for cancer recurrence.

## 2025-03-07 ENCOUNTER — OFFICE VISIT (OUTPATIENT)
Dept: ORTHOPEDIC SURGERY | Age: 75
End: 2025-03-07

## 2025-03-07 DIAGNOSIS — M25.562 PAIN IN BOTH KNEES, UNSPECIFIED CHRONICITY: Primary | ICD-10-CM

## 2025-03-07 DIAGNOSIS — M17.11 OSTEOARTHRITIS OF RIGHT KNEE, UNSPECIFIED OSTEOARTHRITIS TYPE: ICD-10-CM

## 2025-03-07 DIAGNOSIS — M17.12 OSTEOARTHRITIS OF LEFT KNEE, UNSPECIFIED OSTEOARTHRITIS TYPE: ICD-10-CM

## 2025-03-07 DIAGNOSIS — M25.561 PAIN IN BOTH KNEES, UNSPECIFIED CHRONICITY: Primary | ICD-10-CM

## 2025-03-07 RX ORDER — TRIAMCINOLONE ACETONIDE 40 MG/ML
40 INJECTION, SUSPENSION INTRA-ARTICULAR; INTRAMUSCULAR ONCE
Status: COMPLETED | OUTPATIENT
Start: 2025-03-07 | End: 2025-03-07

## 2025-03-07 RX ADMIN — TRIAMCINOLONE ACETONIDE 40 MG: 40 INJECTION, SUSPENSION INTRA-ARTICULAR; INTRAMUSCULAR at 13:33

## 2025-03-07 RX ADMIN — TRIAMCINOLONE ACETONIDE 40 MG: 40 INJECTION, SUSPENSION INTRA-ARTICULAR; INTRAMUSCULAR at 13:34

## 2025-03-07 NOTE — PROGRESS NOTES
joint line.  Limb lengths are equal.  The gait is noted to be with a slight trendelenburg and antalgia.  Straight leg test is negative.  Quadriceps strength is good.  Sensation is intact to light touch bilaterally.  Their judgment and insight are normal.  They are oriented to time, place and person.  Their memory is good and the mood and affect appropriate.    X-RAY: Views of the bilateral knee are reviewed.  4 views standing reveal some joint space loss, eburnated bone, and osteophyte formation present.      X-ray impression:  Moderate osteoarthritis of the bilateral  knee.    IMPRESSION:    Diagnosis Orders   1. Pain in both knees, unspecified chronicity  XR Knee Bilateral Standard Extended VW      2. Osteoarthritis of right knee, unspecified osteoarthritis type        3. Osteoarthritis of left knee, unspecified osteoarthritis type            RECOMMENDATIONS:    Reviewed x-ray findings with the patient. The concerns with functional limitations are increasing and response is variable with conservative measures. Surgery was discussed today with the patient.  They are not limited to warrant any type of surgical consideration.  We will additionally try injection therapies as we process management of this progressive and chronic condition.    Procedure Note: Time out was performed which included identifying the patient by name and date of birth.  The procedure site was identified with all present in agreement.   The bilateral  knee was prepped with alcohol and injected with 40 mg of Kenalog and 2 mL of 0.5 % marcaine.  [place] US unit with a variable frequency (6.0-15.0 MHz) linear transducer was used to visualize the retropatellar fat pad, patella, patellar tendon, tibia, and to ensure proper intra-articular placement of medication.  Injection image was stored in the patient's permanent chart.  The injection was without event and I will follow them as scheduled.  We will get approval to start bilateral knee HP at follow-up

## 2025-03-10 ENCOUNTER — OFFICE VISIT (OUTPATIENT)
Dept: ORTHOPEDIC SURGERY | Age: 75
End: 2025-03-10
Payer: MEDICARE

## 2025-03-10 VITALS — BODY MASS INDEX: 27.2 KG/M2 | HEIGHT: 70 IN | WEIGHT: 190 LBS

## 2025-03-10 DIAGNOSIS — M47.816 LUMBAR SPONDYLOSIS: Primary | ICD-10-CM

## 2025-03-10 PROCEDURE — G8417 CALC BMI ABV UP PARAM F/U: HCPCS | Performed by: PHYSICIAN ASSISTANT

## 2025-03-10 PROCEDURE — 3017F COLORECTAL CA SCREEN DOC REV: CPT | Performed by: PHYSICIAN ASSISTANT

## 2025-03-10 PROCEDURE — 1123F ACP DISCUSS/DSCN MKR DOCD: CPT | Performed by: PHYSICIAN ASSISTANT

## 2025-03-10 PROCEDURE — G8400 PT W/DXA NO RESULTS DOC: HCPCS | Performed by: PHYSICIAN ASSISTANT

## 2025-03-10 PROCEDURE — G8428 CUR MEDS NOT DOCUMENT: HCPCS | Performed by: PHYSICIAN ASSISTANT

## 2025-03-10 PROCEDURE — 99213 OFFICE O/P EST LOW 20 MIN: CPT | Performed by: PHYSICIAN ASSISTANT

## 2025-03-10 PROCEDURE — 1090F PRES/ABSN URINE INCON ASSESS: CPT | Performed by: PHYSICIAN ASSISTANT

## 2025-03-10 PROCEDURE — 1036F TOBACCO NON-USER: CPT | Performed by: PHYSICIAN ASSISTANT

## 2025-03-10 NOTE — PROGRESS NOTES
03/10/25        Name: Radha Grant  YOB: 1950  Gender: female  MRN: [id number]    CC: Follow-up (Lumbar spine pain )       HPI: Radha Grant is a 74 y.o. female who returns for Follow-up (Lumbar spine pain )         This patient returns the office today.  I last saw her in the office 1/28/2025.  I recommended she continue follow-up care with Dr. Billy her pain management provider.  I encouraged her to start physical therapy as Dr. Billy recommended.  She has not started physical therapy and or tried any other interventions for her pain since I have seen her.  She is spoken with Dr. Gillespie's office on the phone but not been back for an office visit since I last saw her.  The patient is considering an alternative to Dr. Billy for management of her chronic pain.      History was obtained by patient      Meds/PSH/PMH/FH/SH: This information has been reviewed.      ALLERGIES:   Allergies   Allergen Reactions    Fentanyl Other (See Comments)     Pt was told she was given Fentanyl during eye surgery with no complications        Bacitracin Dermatitis    Methyldibromoglutaronitrile Dermatitis     Chemical that causes skin irration to olegario feet    Nickel      \"Makes my skin peel\"    Other Dermatitis     Pt has allergy to chemical N, N- Dophenylguanidine, Goldsodiumthiosulfate, Myroxylon Pereirae resin    Potassium Dichromate Dermatitis    Venlafaxine Other (See Comments)     Denies allergy              Physical Examination:            Imaging:          XR CERVICAL SPINE (2-3 VIEWS) Final result 12/4/2024          Narrative     XR CERVICAL SPINE (2-3 VIEWS) - 12/2/2024 4:17 PM - [id number]    INDICATION: Cervicalgia.      COMPARISON: 11/18/2019 MR cervical spine without contrast.    Cervical spine 4 views. Swimmer's view seen on thoracic study.    FINDINGS: ...   Impression   Chronic findings.                      XR THORACIC SPINE (2 VIEWS)  Order: [id number]   Status: Final result       Next

## 2025-03-20 DIAGNOSIS — M17.11 OSTEOARTHRITIS OF RIGHT KNEE, UNSPECIFIED OSTEOARTHRITIS TYPE: Primary | ICD-10-CM

## 2025-05-30 ENCOUNTER — OFFICE VISIT (OUTPATIENT)
Dept: UROLOGY | Age: 75
End: 2025-05-30
Payer: MEDICARE

## 2025-05-30 DIAGNOSIS — R10.9 RIGHT FLANK PAIN: ICD-10-CM

## 2025-05-30 DIAGNOSIS — N39.0 URINARY TRACT INFECTION WITH HEMATURIA, SITE UNSPECIFIED: ICD-10-CM

## 2025-05-30 DIAGNOSIS — R31.9 URINARY TRACT INFECTION WITH HEMATURIA, SITE UNSPECIFIED: ICD-10-CM

## 2025-05-30 DIAGNOSIS — C64.1 RENAL CANCER, RIGHT (HCC): Primary | ICD-10-CM

## 2025-05-30 LAB
BILIRUBIN, URINE, POC: NEGATIVE
BLOOD URINE, POC: NEGATIVE
GLUCOSE URINE, POC: NEGATIVE MG/DL
KETONES, URINE, POC: NEGATIVE MG/DL
LEUKOCYTE ESTERASE, URINE, POC: NEGATIVE
NITRITE, URINE, POC: NEGATIVE
PH, URINE, POC: 6.5 (ref 4.6–8)
PROTEIN,URINE, POC: NEGATIVE MG/DL
SPECIFIC GRAVITY, URINE, POC: 1.02 (ref 1–1.03)
URINALYSIS CLARITY, POC: NORMAL
URINALYSIS COLOR, POC: NORMAL
UROBILINOGEN, POC: NORMAL MG/DL

## 2025-05-30 PROCEDURE — G8417 CALC BMI ABV UP PARAM F/U: HCPCS | Performed by: NURSE PRACTITIONER

## 2025-05-30 PROCEDURE — 1159F MED LIST DOCD IN RCRD: CPT | Performed by: NURSE PRACTITIONER

## 2025-05-30 PROCEDURE — 81003 URINALYSIS AUTO W/O SCOPE: CPT | Performed by: NURSE PRACTITIONER

## 2025-05-30 PROCEDURE — G8400 PT W/DXA NO RESULTS DOC: HCPCS | Performed by: NURSE PRACTITIONER

## 2025-05-30 PROCEDURE — 3017F COLORECTAL CA SCREEN DOC REV: CPT | Performed by: NURSE PRACTITIONER

## 2025-05-30 PROCEDURE — 99214 OFFICE O/P EST MOD 30 MIN: CPT | Performed by: NURSE PRACTITIONER

## 2025-05-30 PROCEDURE — 1036F TOBACCO NON-USER: CPT | Performed by: NURSE PRACTITIONER

## 2025-05-30 PROCEDURE — G8427 DOCREV CUR MEDS BY ELIG CLIN: HCPCS | Performed by: NURSE PRACTITIONER

## 2025-05-30 PROCEDURE — 1123F ACP DISCUSS/DSCN MKR DOCD: CPT | Performed by: NURSE PRACTITIONER

## 2025-05-30 PROCEDURE — 1090F PRES/ABSN URINE INCON ASSESS: CPT | Performed by: NURSE PRACTITIONER

## 2025-05-30 NOTE — PROGRESS NOTES
[facility]  [address] [place] [address]  [phone]          Radha Grant  : 1950    Chief Complaint   Patient presents with    Follow-up          HPI     Radha Grant is a 74 y.o. female  follow up for RCC.  S/P RALN on 24 for clear cell RCC [id number] with neg margins.  Cr was 1.04 on 5/3/24. Failed to keep appt Oct.  She is back today because of 2 UTIs. Reviewing the chart I do not see any recent urinalysis or cultures. She was treated twice by PCP for UTI.  She is having issues with balance. A referral has been made to neurology, but she has not heard anything yet.      She is having some lower abd pain and right flank pain. She has not had any recent imaging. Recent Creatinine was at her baseline 1.12.        Past Medical History:   Diagnosis Date    Allergic rhinitis 2013    Asthma     Balance problem     Bipolar affective disorder (HCC)     Carotid stenosis     Left carotid stenosis- followed by PCP    Disturbance of skin sensation     Fatty liver     per pt    Fibromyalgia     GERD (gastroesophageal reflux disease)     History of gastric ulcer     History of miscarriage     History of peptic ulcer     Hyperlipidemia     Hypertension     Hypothyroidism     IBS (irritable bowel syndrome)     Impaired fasting glucose 2022    Lumbar disc disease     Mixed stress and urge urinary incontinence     ANUPAM on CPAP     complaint with CPAP    Osteoarthritis     Premature beats     PACs / PVCs    Right renal mass 2024    surgery scheduled on 24     Past Surgical History:   Procedure Laterality Date    BLADDER SUSPENSION      CATARACT REMOVAL Bilateral     with lens implants    CHOLECYSTECTOMY      COLONOSCOPY      CYSTOCELE REPAIR  2009    ESOPHAGEAL DILATATION      HYSTERECTOMY, TOTAL ABDOMINAL (CERVIX REMOVED)  2003    KIDNEY SURGERY Right 2024    RIGHT NEPHRECTOMY LAPAROSCOPIC HAND ASSIST/ RIGHT/FROST ASSIST performed by Eboni  Epidermal Sutures: 5-0 Fast Absorbing Gut

## 2025-06-06 DIAGNOSIS — N39.0 URINARY TRACT INFECTION WITH HEMATURIA, SITE UNSPECIFIED: Primary | ICD-10-CM

## 2025-06-06 DIAGNOSIS — R31.9 URINARY TRACT INFECTION WITH HEMATURIA, SITE UNSPECIFIED: Primary | ICD-10-CM

## 2025-06-11 ENCOUNTER — APPOINTMENT (OUTPATIENT)
Dept: URBAN - METROPOLITAN AREA CLINIC 24 | Facility: CLINIC | Age: 75
Setting detail: DERMATOLOGY
End: 2025-06-11

## 2025-06-11 DIAGNOSIS — L23.89 ALLERGIC CONTACT DERMATITIS DUE TO OTHER AGENTS: ICD-10-CM

## 2025-06-11 DIAGNOSIS — L82.1 OTHER SEBORRHEIC KERATOSIS: ICD-10-CM

## 2025-06-11 DIAGNOSIS — L30.1 DYSHIDROSIS [POMPHOLYX]: ICD-10-CM | Status: WELL CONTROLLED

## 2025-06-11 DIAGNOSIS — Z71.89 OTHER SPECIFIED COUNSELING: ICD-10-CM

## 2025-06-11 DIAGNOSIS — L81.4 OTHER MELANIN HYPERPIGMENTATION: ICD-10-CM

## 2025-06-11 DIAGNOSIS — L57.8 OTHER SKIN CHANGES DUE TO CHRONIC EXPOSURE TO NONIONIZING RADIATION: ICD-10-CM

## 2025-06-11 DIAGNOSIS — D18.0 HEMANGIOMA: ICD-10-CM

## 2025-06-11 PROBLEM — D18.01 HEMANGIOMA OF SKIN AND SUBCUTANEOUS TISSUE: Status: ACTIVE | Noted: 2025-06-11

## 2025-06-11 PROCEDURE — ? PRESCRIPTION

## 2025-06-11 PROCEDURE — ? COUNSELING

## 2025-06-11 PROCEDURE — ? PRESCRIPTION MEDICATION MANAGEMENT

## 2025-06-11 PROCEDURE — ? OTHER

## 2025-06-11 RX ORDER — CLOBETASOL PROPIONATE 0.5 MG/G
OINTMENT TOPICAL
Qty: 45 | Refills: 2 | Status: ERX | COMMUNITY
Start: 2025-06-11

## 2025-06-11 RX ADMIN — CLOBETASOL PROPIONATE: 0.5 OINTMENT TOPICAL at 00:00

## 2025-06-11 ASSESSMENT — LOCATION DETAILED DESCRIPTION DERM
LOCATION DETAILED: LEFT INFERIOR LATERAL MIDBACK
LOCATION DETAILED: RIGHT LATERAL ABDOMEN
LOCATION DETAILED: PERIUMBILICAL SKIN
LOCATION DETAILED: LEFT MEDIAL PLANTAR MIDFOOT
LOCATION DETAILED: RIGHT MEDIAL PLANTAR MIDFOOT
LOCATION DETAILED: LEFT POSTERIOR SHOULDER
LOCATION DETAILED: RIGHT PROXIMAL DORSAL FOREARM
LOCATION DETAILED: RIGHT MID-UPPER BACK
LOCATION DETAILED: RIGHT INDEX FINGERTIP
LOCATION DETAILED: RIGHT SUPERIOR UPPER BACK
LOCATION DETAILED: LEFT THENAR EMINENCE

## 2025-06-11 ASSESSMENT — LOCATION SIMPLE DESCRIPTION DERM
LOCATION SIMPLE: RIGHT FOREARM
LOCATION SIMPLE: RIGHT INDEX FINGER
LOCATION SIMPLE: LEFT LOWER BACK
LOCATION SIMPLE: RIGHT PLANTAR SURFACE
LOCATION SIMPLE: LEFT PLANTAR SURFACE
LOCATION SIMPLE: LEFT SHOULDER
LOCATION SIMPLE: ABDOMEN
LOCATION SIMPLE: RIGHT UPPER BACK
LOCATION SIMPLE: LEFT HAND

## 2025-06-11 ASSESSMENT — LOCATION ZONE DERM
LOCATION ZONE: TRUNK
LOCATION ZONE: ARM
LOCATION ZONE: FINGER
LOCATION ZONE: FEET
LOCATION ZONE: HAND

## 2025-06-11 NOTE — PROCEDURE: OTHER
Detail Level: Zone
Note Text (......Xxx Chief Complaint.): This diagnosis correlates with the
Render Risk Assessment In Note?: no
Other (Free Text): 6/11/25 doing really well. No currently flairs. Recommend to keep following g the regime she has going on. \\n\\n12/11/24 Doing significantly well. Pt to continue exactly what she is doing. Photos taken today for comparison.

## 2025-06-18 ENCOUNTER — TELEPHONE (OUTPATIENT)
Dept: UROLOGY | Age: 75
End: 2025-06-18

## 2025-06-19 DIAGNOSIS — N39.0 URINARY TRACT INFECTION WITH HEMATURIA, SITE UNSPECIFIED: Primary | ICD-10-CM

## 2025-06-19 DIAGNOSIS — R31.9 URINARY TRACT INFECTION WITH HEMATURIA, SITE UNSPECIFIED: Primary | ICD-10-CM

## 2025-06-30 ENCOUNTER — APPOINTMENT (OUTPATIENT)
Dept: GENERAL RADIOLOGY | Age: 75
End: 2025-06-30
Payer: MEDICARE

## 2025-06-30 ENCOUNTER — HOSPITAL ENCOUNTER (EMERGENCY)
Age: 75
Discharge: HOME | End: 2025-06-30
Attending: EMERGENCY MEDICINE
Payer: MEDICARE

## 2025-06-30 ENCOUNTER — APPOINTMENT (OUTPATIENT)
Dept: CT IMAGING | Age: 75
End: 2025-06-30
Payer: MEDICARE

## 2025-06-30 VITALS
WEIGHT: 199 LBS | SYSTOLIC BLOOD PRESSURE: 114 MMHG | HEART RATE: 65 BPM | TEMPERATURE: 98.4 F | BODY MASS INDEX: 28.49 KG/M2 | DIASTOLIC BLOOD PRESSURE: 71 MMHG | OXYGEN SATURATION: 94 % | RESPIRATION RATE: 20 BRPM | HEIGHT: 70 IN
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
## 2025-06-30 DIAGNOSIS — N30.00 ACUTE CYSTITIS WITHOUT HEMATURIA: ICD-10-CM

## 2025-06-30 LAB
ALBUMIN SERPL-MCNC: 4.2 G/DL (ref 3.2–4.6)
ALBUMIN/GLOB SERPL: 1 (ref 1–1.9)
ALP SERPL-CCNC: 155 U/L (ref 35–104)
ALT SERPL-CCNC: 22 U/L (ref 12–65)
ANION GAP SERPL CALC-SCNC: 11 MMOL/L (ref 7–16)
APPEARANCE UR: ABNORMAL
AST SERPL-CCNC: 36 U/L (ref 15–37)
BACTERIA URNS QL MICRO: ABNORMAL /HPF
BASOPHILS # BLD: 0.11 K/UL (ref 0–0.2)
BASOPHILS NFR BLD: 0.7 % (ref 0–2)
BILIRUB SERPL-MCNC: 0.4 MG/DL (ref 0–1.2)
BILIRUB UR QL: NEGATIVE
BUN SERPL-MCNC: 19 MG/DL (ref 8–23)
CALCIUM SERPL-MCNC: 9.8 MG/DL (ref 8.8–10.2)
CHLORIDE SERPL-SCNC: 100 MMOL/L (ref 98–107)
CO2 SERPL-SCNC: 25 MMOL/L (ref 20–29)
COLOR UR: ABNORMAL
CREAT SERPL-MCNC: 1.07 MG/DL (ref 0.8–1.3)
DIFFERENTIAL METHOD BLD: ABNORMAL
EOSINOPHIL # BLD: 0.28 K/UL (ref 0–0.8)
EOSINOPHIL NFR BLD: 1.9 % (ref 0.5–7.8)
EPI CELLS #/AREA URNS HPF: ABNORMAL /HPF
ERYTHROCYTE [DISTWIDTH] IN BLOOD BY AUTOMATED COUNT: 12.8 % (ref 11.9–14.6)
GLOBULIN SER CALC-MCNC: 4.4 G/DL (ref 2.3–3.5)
GLUCOSE SERPL-MCNC: 107 MG/DL (ref 65–100)
GLUCOSE UR STRIP.AUTO-MCNC: NEGATIVE MG/DL
HCT VFR BLD AUTO: 42.2 % (ref 35.8–46.3)
HGB BLD-MCNC: 13.9 G/DL (ref 11.7–15.4)
HGB UR QL STRIP: ABNORMAL
IMM GRANULOCYTES # BLD AUTO: 0.09 K/UL (ref 0–0.5)
IMM GRANULOCYTES NFR BLD AUTO: 0.6 % (ref 0–5)
KETONES UR QL STRIP.AUTO: NEGATIVE MG/DL
LACTATE SERPL-SCNC: 0.8 MMOL/L (ref 0.5–2)
LEUKOCYTE ESTERASE UR QL STRIP.AUTO: ABNORMAL
LYMPHOCYTES # BLD: 2.07 K/UL (ref 0.5–4.6)
LYMPHOCYTES NFR BLD: 13.8 % (ref 13–44)
MAGNESIUM SERPL-MCNC: 2 MG/DL (ref 1.8–2.4)
MCH RBC QN AUTO: 30.6 PG (ref 26.1–32.9)
MCHC RBC AUTO-ENTMCNC: 32.9 G/DL (ref 31.4–35)
MCV RBC AUTO: 93 FL (ref 82–102)
MONOCYTES # BLD: 0.98 K/UL (ref 0.1–1.3)
MONOCYTES NFR BLD: 6.6 % (ref 4–12)
MUCOUS THREADS URNS QL MICRO: 0 /LPF
NEUTS SEG # BLD: 11.43 K/UL (ref 1.7–8.2)
NEUTS SEG NFR BLD: 76.4 % (ref 43–78)
NITRITE UR QL STRIP.AUTO: NEGATIVE
NRBC # BLD: 0 K/UL (ref 0–0.2)
OTHER OBSERVATIONS: ABNORMAL
PH UR STRIP: 5.5 (ref 5–9)
PLATELET # BLD AUTO: 286 K/UL (ref 150–450)
PMV BLD AUTO: 10.3 FL (ref 9.4–12.3)
POTASSIUM SERPL-SCNC: 5 MMOL/L (ref 3.5–5.1)
PROCALCITONIN SERPL-MCNC: 0.04 NG/ML (ref 0–0.49)
PROT SERPL-MCNC: 8.6 G/DL (ref 6.3–8.2)
PROT UR STRIP-MCNC: NEGATIVE MG/DL
RBC # BLD AUTO: 4.54 M/UL (ref 4.05–5.2)
RBC #/AREA URNS HPF: ABNORMAL /HPF
SODIUM SERPL-SCNC: 136 MMOL/L (ref 133–143)
SP GR UR REFRACTOMETRY: 1.02 (ref 1–1.02)
UROBILINOGEN UR QL STRIP.AUTO: 0.2 EU/DL (ref 0.2–1)
WBC # BLD AUTO: 15 K/UL (ref 4.3–11.1)
WBC URNS QL MICRO: ABNORMAL /HPF

## 2025-06-30 PROCEDURE — 99285 EMERGENCY DEPT VISIT HI MDM: CPT

## 2025-06-30 PROCEDURE — 84145 PROCALCITONIN (PCT): CPT

## 2025-06-30 PROCEDURE — 73080 X-RAY EXAM OF ELBOW: CPT

## 2025-06-30 PROCEDURE — 6360000002 HC RX W HCPCS

## 2025-06-30 PROCEDURE — 83605 ASSAY OF LACTIC ACID: CPT

## 2025-06-30 PROCEDURE — 80053 COMPREHEN METABOLIC PANEL: CPT

## 2025-06-30 PROCEDURE — 81001 URINALYSIS AUTO W/SCOPE: CPT

## 2025-06-30 PROCEDURE — 96374 THER/PROPH/DIAG INJ IV PUSH: CPT

## 2025-06-30 PROCEDURE — 87086 URINE CULTURE/COLONY COUNT: CPT

## 2025-06-30 PROCEDURE — 6370000000 HC RX 637 (ALT 250 FOR IP)

## 2025-06-30 PROCEDURE — 2500000003 HC RX 250 WO HCPCS

## 2025-06-30 PROCEDURE — 70450 CT HEAD/BRAIN W/O DYE: CPT

## 2025-06-30 PROCEDURE — 83735 ASSAY OF MAGNESIUM: CPT

## 2025-06-30 PROCEDURE — 85025 COMPLETE CBC W/AUTO DIFF WBC: CPT

## 2025-06-30 PROCEDURE — 72125 CT NECK SPINE W/O DYE: CPT

## 2025-06-30 RX ORDER — HYDROCODONE BITARTRATE AND ACETAMINOPHEN 5; 325 MG/1; MG/1
1 TABLET ORAL
Refills: 0 | Status: COMPLETED | OUTPATIENT
Start: 2025-06-30 | End: 2025-06-30

## 2025-06-30 RX ORDER — CEPHALEXIN 500 MG/1
500 CAPSULE ORAL 2 TIMES DAILY
Qty: 14 CAPSULE | Refills: 0 | Status: SHIPPED | OUTPATIENT
Start: 2025-06-30 | End: 2025-07-07

## 2025-06-30 RX ADMIN — HYDROCODONE BITARTRATE AND ACETAMINOPHEN 1 TABLET: 5; 325 TABLET ORAL at 15:04

## 2025-06-30 RX ADMIN — CEFTRIAXONE 1000 MG: 1 INJECTION, POWDER, FOR SOLUTION INTRAMUSCULAR; INTRAVENOUS at 16:48

## 2025-06-30 ASSESSMENT — PAIN - FUNCTIONAL ASSESSMENT: PAIN_FUNCTIONAL_ASSESSMENT: 0-10

## 2025-06-30 ASSESSMENT — PAIN SCALES - GENERAL
PAINLEVEL_OUTOF10: 4
PAINLEVEL_OUTOF10: 10
PAINLEVEL_OUTOF10: 9

## 2025-06-30 ASSESSMENT — ENCOUNTER SYMPTOMS: BACK PAIN: 0

## 2025-06-30 ASSESSMENT — PAIN DESCRIPTION - LOCATION: LOCATION: HEAD

## 2025-06-30 NOTE — ED NOTES
Patient mobility status ambulates with mild difficulty.     I have reviewed discharge instructions with the patient.  The patient verbalized understanding.    Patient left ED via Discharge Method: wheelchair to Home with Significant Other.    Opportunity for questions and clarification provided.     Patient given 2 scripts.

## 2025-06-30 NOTE — DISCHARGE INSTRUCTIONS
As discussed your workup today in the emergency department was reassuring, there was no acute abnormality noted on your imaging.  Please follow-up with your primary care provider for further management of your pain.  Your urine was positive for UTI, I did provide you first dose of the antibiotic in the emergency department today, I have started you on a course of cephalexin, please take as prescribed.  Please reach out to your urologist for further management of your recurrent UTIs.  Please monitor yourself for any change or worsening symptoms, please return to the emergency department if needed.

## 2025-06-30 NOTE — ED PROVIDER NOTES
presentation most consistent with acute uncomplicated cystitis.  There are no systemic symptoms present.  Sepsis markers negative.  Patient is well-appearing.  Low suspicion for acute pyelonephritis, no reproducible CVA tenderness, patient afebrile.  Low suspicion for kidney stone or infected stone, no abdominal pain.  History of hysterectomy.  Presentation does not correlate with ovarian torsion, PID or appendicitis, no reproducible abdominal pain present on physical exam. Instructed to reach out to urology for further management of her UTI.  Fall was consistent with mechanical fall, no acute injury found in workup.  Patient had resolution of her pain.  At this time suitable for outpatient follow-up given resolution of pain, intact neurovascular exam, no acute finding on imaging, intact gait.  Instructed to reach out to PCP for further management of her pain.  Discussed supportive treatment with patient.  She verbalized understanding ED course as well as treatment plan.  Return precautions provided, she verbalized understanding.     1 acute, uncomplicated illness or injury.  Prescription drug management performed.  Shared medical decision making was utilized in creating the patients health plan today.  I independently ordered and reviewed each unique test.    I reviewed external records: ED visit note from a different ED.   I reviewed external records: provider visit note from PCP.  I reviewed external records: provider visit note from outside specialist.       I interpreted the X-rays no acute fracture.  I interpreted the CT Scan no intracranial abnormality.  ED provider's independent EKG interpretation sinus rhythm 74, nonspecific ST segment changes.            History     HPI  Patient is a 74-year-old female with a past medical history of moderate to severe oropharyngeal dysphagia, renal cell carcinoma s/p right nephrectomy, bipolar disorder,, GERD, HTN, hypothyroidism presenting the emergency department after

## 2025-07-02 LAB
BACTERIA SPEC CULT: NORMAL
SERVICE CMNT-IMP: NORMAL

## 2025-07-17 NOTE — PROGRESS NOTES
Reason for collection: UTI symptoms  Patient #: [phone]  Pharmacy: [facility] [phone]         UA - Dipstick  Results for orders placed or performed in visit on 03/13/23   AMB POC URINALYSIS DIP STICK AUTO W/O MICRO   Result Value Ref Range    Color (UA POC)      Clarity (UA POC)      Glucose, Urine, POC Negative Negative    Bilirubin, Urine, POC Negative Negative    KETONES, Urine, POC Negative Negative    Specific Gravity, Urine, POC 1.025 1.001 - 1.035    Blood (UA POC) Large Negative    pH, Urine, POC 5.5 4.6 - 8.0    Protein, Urine, POC Negative Negative    Urobilinogen, POC 0.2 mg/dL     Nitrite, Urine, POC Positive Negative    Leukocyte Esterase, Urine, POC Small Negative   Results for orders placed or performed in visit on 10/08/19   AMB POC URINALYSIS DIP STICK AUTO W/O MICRO   Result Value Ref Range    Color (UA POC) Orange     Clarity (UA POC) Clear     Glucose, Urine, POC Negative Negative    Bilirubin, Urine, POC Negative Negative    Ketones, Urine, POC Trace Negative    Specific Gravity, Urine, POC 1.025 1.001 - 1.035 NA    Blood (UA POC) Negative Negative    pH, Urine, POC 6.0 4.6 - 8.0 NA    Protein, Urine, POC Negative Negative    Urobilinogen, POC 0.2 mg/dL 0.2 - 1    Nitrite, Urine, POC Negative Negative    Leukocyte Esterase, Urine, POC 1+ Negative       UA - Micro  WBC - 0  RBC - 0  Bacteria - 0  Epith - 0      Requested Prescriptions     Signed Prescriptions Disp Refills    ciprofloxacin (CIPRO) 500 MG tablet 14 tablet 0     Sig: Take 1 tablet by mouth 2 times daily for 7 days    ciprofloxacin (CIPRO) 500 MG tablet 14 tablet 0     Sig: Take 1 tablet by mouth 2 times daily for 7 days     Plan    Diagnosis Orders   1. Urinary tract infection with hematuria, site unspecified  AMB POC URINALYSIS DIP STICK AUTO W/O MICRO        Ucx sent. I e-rx Cipro. Returned phone call and notified there is an order by Dr. Olivarez for a CMP which can be drawn prior to the Reclast.

## 2025-08-14 ENCOUNTER — HOSPITAL ENCOUNTER (OUTPATIENT)
Age: 75
Discharge: HOME | End: 2025-08-16
Payer: MEDICARE

## 2025-08-14 DIAGNOSIS — M54.14 THORACIC NEURITIS: ICD-10-CM

## 2025-08-14 DIAGNOSIS — M54.16 LUMBAR RADICULOPATHY: ICD-10-CM

## 2025-08-14 DIAGNOSIS — M54.12 BRACHIAL NEURITIS: ICD-10-CM

## 2025-08-14 PROCEDURE — 72146 MRI CHEST SPINE W/O DYE: CPT

## 2025-08-14 PROCEDURE — 72141 MRI NECK SPINE W/O DYE: CPT

## 2025-08-14 PROCEDURE — 72148 MRI LUMBAR SPINE W/O DYE: CPT

## 2025-08-15 ENCOUNTER — APPOINTMENT (OUTPATIENT)
Dept: URBAN - METROPOLITAN AREA CLINIC 24 | Facility: CLINIC | Age: 75
Setting detail: DERMATOLOGY
End: 2025-08-15

## 2025-08-15 DIAGNOSIS — L30.9 DERMATITIS, UNSPECIFIED: ICD-10-CM

## 2025-08-15 PROCEDURE — ? OTHER

## 2025-08-15 PROCEDURE — ? PRESCRIPTION

## 2025-08-15 PROCEDURE — ? BIOPSY BY SHAVE METHOD

## 2025-08-15 PROCEDURE — ? PRESCRIPTION MEDICATION MANAGEMENT

## 2025-08-15 PROCEDURE — ? SEPARATE AND IDENTIFIABLE DOCUMENTATION

## 2025-08-15 RX ORDER — CLOBETASOL PROPIONATE 0.5 MG/G
OINTMENT TOPICAL
Qty: 60 | Refills: 3 | Status: ERX

## 2025-08-15 ASSESSMENT — LOCATION SIMPLE DESCRIPTION DERM: LOCATION SIMPLE: RIGHT FOREARM

## 2025-08-15 ASSESSMENT — LOCATION ZONE DERM: LOCATION ZONE: ARM

## 2025-08-15 ASSESSMENT — LOCATION DETAILED DESCRIPTION DERM: LOCATION DETAILED: RIGHT PROXIMAL DORSAL FOREARM

## 2025-09-04 ENCOUNTER — HOSPITAL ENCOUNTER (OUTPATIENT)
Dept: GENERAL RADIOLOGY | Age: 75
Discharge: HOME | End: 2025-09-06
Payer: MEDICARE

## 2025-09-04 DIAGNOSIS — M54.12 CERVICAL RADICULOPATHY: ICD-10-CM

## 2025-09-04 PROCEDURE — 72050 X-RAY EXAM NECK SPINE 4/5VWS: CPT

## (undated) DEVICE — 3M™ IOBAN™ 2 ANTIMICROBIAL INCISE DRAPE 6650EZ: Brand: IOBAN™ 2

## (undated) DEVICE — ACCESS PLATFORM FOR MINIMALLY INVASIVE SURGERY.: Brand: GELPORT® LAPAROSCOPIC  SYSTEM

## (undated) DEVICE — TUBING INSUFFLATION SMK EVAC HI FLO SET PNEUMOCLEAR

## (undated) DEVICE — Device

## (undated) DEVICE — 3M™ TEGADERM™ TRANSPARENT FILM DRESSING FRAME STYLE, 1626W, 4 IN X 4-3/4 IN (10 CM X 12 CM), 50/CT 4CT/CASE: Brand: 3M™ TEGADERM™

## (undated) DEVICE — GENERAL LAPAROSCOPY: Brand: MEDLINE INDUSTRIES, INC.

## (undated) DEVICE — Z DISCONTINUED SPONGE LAPAROTOMY W18XL18IN WHITE STRUNG RADIOPAQUE STERILE

## (undated) DEVICE — AGENT HEMOSTATIC SURG ORIGINAL ABS 2X14IN LOOSE KNIT 12/CA

## (undated) DEVICE — SUTURE VICRYL SZ 0 L36IN ABSRB UD L36MM CT-1 1/2 CIR J946H

## (undated) DEVICE — POUCH SPEC RETRV SHFT 15MM 13X23CM GRN POLYUR DBL RNG HNDL

## (undated) DEVICE — PUMP SUC IRR TBNG L10FT W/ HNDPC ASSEMB STRYKEFLOW 2

## (undated) DEVICE — SHEARS ENDOSCP HARM 36CM ULTRASONIC CRV TIP UPGRD

## (undated) DEVICE — APPLIER SUT CLP FOR 2-0 3-0 4-0 VCRL ABSRB SUT

## (undated) DEVICE — SUTURE PDS II SZ 1 L27IN ABSRB VLT CT-1 L36MM 1/2 CIR Z341H

## (undated) DEVICE — STAPLER ECHELON 3000 45MM STANDARD

## (undated) DEVICE — PREMIUM WET SKIN PREP TRAY: Brand: MEDLINE INDUSTRIES, INC.

## (undated) DEVICE — RELOAD STPL L45MM H1-2.6MM MESENTERY THN TISS WHT GRIPPING

## (undated) DEVICE — TROCAR: Brand: KII FIOS FIRST ENTRY

## (undated) DEVICE — INTENDED FOR TISSUE SEPARATION, AND OTHER PROCEDURES THAT REQUIRE A SHARP SURGICAL BLADE TO PUNCTURE OR CUT.: Brand: BARD-PARKER ® STAINLESS STEEL BLADES

## (undated) DEVICE — SUTURE VICRYL SZ 2-0 L27IN ABSRB UD L26MM SH 1/2 CIR J417H

## (undated) DEVICE — SOLUTION IRRIG 3000ML 0.9% SOD CHL USP UROMATIC PLAS CONT

## (undated) DEVICE — APPLIER CLP M L L11.4IN DIA10MM ENDOSCP ROT MULT FOR LIG

## (undated) DEVICE — AGENT HEMSTAT W3XL4IN OXIDIZED REGENERATED CELOS ABSRB FOR

## (undated) DEVICE — SURGIFOAM SPNG SZ 100

## (undated) DEVICE — SUTURE VICRYL SZ 0 L27IN ABSRB UD L36MM CT-1 1/2 CIR J260H

## (undated) DEVICE — TROCARS: Brand: KII® OPTICAL ACCESS SYSTEM

## (undated) DEVICE — SPONGE GZ W4XL4IN COT 12 PLY TYP VII WVN C FLD DSGN STERILE

## (undated) DEVICE — FOLEY TRAY DRAINAGE BG 16 FR ANTI REFLX CHMBR COMPLETE CARE

## (undated) DEVICE — SOLUTION ANTIFOG VIS SYS CLEARIFY LAPSCP

## (undated) DEVICE — PAD,NON-ADHERENT,3X8,STERILE,LF,1/PK: Brand: MEDLINE

## (undated) DEVICE — LOGICUT SCISSOR LENGTH 320MM: Brand: LOGI - LAPAROSCOPIC INSTRUMENT SYSTEM